# Patient Record
Sex: FEMALE | Race: BLACK OR AFRICAN AMERICAN | NOT HISPANIC OR LATINO | Employment: OTHER | RURAL
[De-identification: names, ages, dates, MRNs, and addresses within clinical notes are randomized per-mention and may not be internally consistent; named-entity substitution may affect disease eponyms.]

---

## 2020-08-19 ENCOUNTER — HISTORICAL (OUTPATIENT)
Dept: ADMINISTRATIVE | Facility: HOSPITAL | Age: 75
End: 2020-08-19

## 2020-08-21 LAB
REPORT: 38
REPORT: NORMAL

## 2020-08-23 LAB
REPORT: NORMAL

## 2021-02-03 ENCOUNTER — HISTORICAL (OUTPATIENT)
Dept: ADMINISTRATIVE | Facility: HOSPITAL | Age: 76
End: 2021-02-03

## 2021-04-06 RX ORDER — CILOSTAZOL 50 MG/1
TABLET ORAL
Qty: 180 TABLET | Refills: 3 | Status: SHIPPED | OUTPATIENT
Start: 2021-04-06 | End: 2021-07-07 | Stop reason: SDUPTHER

## 2021-04-06 RX ORDER — LOSARTAN POTASSIUM 50 MG/1
TABLET ORAL
Qty: 90 TABLET | Refills: 3 | Status: SHIPPED | OUTPATIENT
Start: 2021-04-06 | End: 2021-07-07 | Stop reason: SDUPTHER

## 2021-04-12 DIAGNOSIS — R56.9 SEIZURE: Primary | ICD-10-CM

## 2021-04-12 RX ORDER — DIVALPROEX SODIUM 250 MG/1
250 TABLET, DELAYED RELEASE ORAL 3 TIMES DAILY
COMMUNITY
End: 2021-04-12 | Stop reason: SDUPTHER

## 2021-04-12 RX ORDER — DIVALPROEX SODIUM 500 MG/1
500 TABLET, FILM COATED, EXTENDED RELEASE ORAL DAILY
Qty: 60 TABLET | Refills: 5 | Status: SHIPPED | OUTPATIENT
Start: 2021-04-12 | End: 2021-07-27

## 2021-04-12 RX ORDER — DIVALPROEX SODIUM 500 MG/1
500 TABLET, FILM COATED, EXTENDED RELEASE ORAL DAILY
COMMUNITY
End: 2021-04-12 | Stop reason: SDUPTHER

## 2021-04-12 RX ORDER — DIVALPROEX SODIUM 250 MG/1
250 TABLET, DELAYED RELEASE ORAL 3 TIMES DAILY
COMMUNITY
End: 2021-05-17

## 2021-04-12 RX ORDER — DIVALPROEX SODIUM 250 MG/1
TABLET, DELAYED RELEASE ORAL
Qty: 30 TABLET | Refills: 1 | Status: SHIPPED | OUTPATIENT
Start: 2021-04-12 | End: 2021-05-17

## 2021-05-04 VITALS
WEIGHT: 201.81 LBS | RESPIRATION RATE: 18 BRPM | HEART RATE: 68 BPM | HEIGHT: 66 IN | SYSTOLIC BLOOD PRESSURE: 130 MMHG | BODY MASS INDEX: 32.43 KG/M2 | DIASTOLIC BLOOD PRESSURE: 60 MMHG

## 2021-05-05 VITALS
SYSTOLIC BLOOD PRESSURE: 130 MMHG | HEART RATE: 68 BPM | BODY MASS INDEX: 32.43 KG/M2 | HEIGHT: 66 IN | DIASTOLIC BLOOD PRESSURE: 60 MMHG | RESPIRATION RATE: 18 BRPM | WEIGHT: 201.81 LBS

## 2021-05-17 DIAGNOSIS — R56.9 SEIZURE: ICD-10-CM

## 2021-05-17 RX ORDER — DIVALPROEX SODIUM 250 MG/1
TABLET, DELAYED RELEASE ORAL
Qty: 30 TABLET | Refills: 1 | Status: SHIPPED | OUTPATIENT
Start: 2021-05-17 | End: 2021-07-27 | Stop reason: SDUPTHER

## 2021-07-07 ENCOUNTER — OFFICE VISIT (OUTPATIENT)
Dept: PRIMARY CARE CLINIC | Facility: CLINIC | Age: 76
End: 2021-07-07
Payer: COMMERCIAL

## 2021-07-07 VITALS
TEMPERATURE: 98 F | SYSTOLIC BLOOD PRESSURE: 122 MMHG | DIASTOLIC BLOOD PRESSURE: 82 MMHG | HEART RATE: 78 BPM | BODY MASS INDEX: 30.37 KG/M2 | OXYGEN SATURATION: 97 % | WEIGHT: 189 LBS | HEIGHT: 66 IN

## 2021-07-07 DIAGNOSIS — D64.9 ANEMIA, UNSPECIFIED TYPE: Primary | ICD-10-CM

## 2021-07-07 DIAGNOSIS — I10 ESSENTIAL HYPERTENSION, BENIGN: Primary | ICD-10-CM

## 2021-07-07 DIAGNOSIS — J45.909 ASTHMA, UNSPECIFIED ASTHMA SEVERITY, UNSPECIFIED WHETHER COMPLICATED, UNSPECIFIED WHETHER PERSISTENT: ICD-10-CM

## 2021-07-07 PROCEDURE — 99213 PR OFFICE/OUTPT VISIT, EST, LEVL III, 20-29 MIN: ICD-10-PCS | Mod: ,,, | Performed by: NURSE PRACTITIONER

## 2021-07-07 PROCEDURE — 99213 OFFICE O/P EST LOW 20 MIN: CPT | Mod: ,,, | Performed by: NURSE PRACTITIONER

## 2021-07-07 RX ORDER — ALBUTEROL SULFATE 90 UG/1
2 AEROSOL, METERED RESPIRATORY (INHALATION) EVERY 6 HOURS PRN
COMMUNITY
End: 2021-07-07 | Stop reason: SDUPTHER

## 2021-07-07 RX ORDER — CLORAZEPATE DIPOTASSIUM 7.5 MG/1
0.5 TABLET ORAL 2 TIMES DAILY
COMMUNITY
Start: 2021-06-08 | End: 2021-09-08 | Stop reason: SDUPTHER

## 2021-07-07 RX ORDER — FERROUS SULFATE 325(65) MG
325 TABLET ORAL
Qty: 90 TABLET | Refills: 0 | Status: SHIPPED | OUTPATIENT
Start: 2021-07-07 | End: 2023-02-07

## 2021-07-07 RX ORDER — CILOSTAZOL 50 MG/1
50 TABLET ORAL 2 TIMES DAILY
Qty: 180 TABLET | Refills: 1 | Status: SHIPPED | OUTPATIENT
Start: 2021-07-07 | End: 2021-10-11

## 2021-07-07 RX ORDER — LOSARTAN POTASSIUM 50 MG/1
50 TABLET ORAL DAILY
Qty: 90 TABLET | Refills: 1 | Status: SHIPPED | OUTPATIENT
Start: 2021-07-07 | End: 2021-10-06

## 2021-07-07 RX ORDER — FUROSEMIDE 20 MG/1
20 TABLET ORAL DAILY
COMMUNITY
Start: 2021-06-08 | End: 2021-12-10

## 2021-07-07 RX ORDER — ALBUTEROL SULFATE 2 MG/5ML
2 SYRUP ORAL 3 TIMES DAILY
Qty: 450 ML | Refills: 11 | Status: SHIPPED | OUTPATIENT
Start: 2021-07-07 | End: 2022-07-07 | Stop reason: SDUPTHER

## 2021-07-07 RX ORDER — ALBUTEROL SULFATE 90 UG/1
2 AEROSOL, METERED RESPIRATORY (INHALATION) EVERY 6 HOURS PRN
Qty: 18 G | Refills: 1 | Status: SHIPPED | OUTPATIENT
Start: 2021-07-07 | End: 2022-07-07 | Stop reason: SDUPTHER

## 2021-07-27 ENCOUNTER — OFFICE VISIT (OUTPATIENT)
Dept: NEUROLOGY | Facility: CLINIC | Age: 76
End: 2021-07-27
Payer: COMMERCIAL

## 2021-07-27 ENCOUNTER — TELEPHONE (OUTPATIENT)
Dept: NEUROLOGY | Facility: CLINIC | Age: 76
End: 2021-07-27

## 2021-07-27 VITALS
DIASTOLIC BLOOD PRESSURE: 74 MMHG | OXYGEN SATURATION: 99 % | RESPIRATION RATE: 18 BRPM | BODY MASS INDEX: 30.37 KG/M2 | HEIGHT: 66 IN | SYSTOLIC BLOOD PRESSURE: 138 MMHG | WEIGHT: 189 LBS | HEART RATE: 82 BPM

## 2021-07-27 DIAGNOSIS — R56.9 SEIZURE: ICD-10-CM

## 2021-07-27 DIAGNOSIS — Z79.899 ENCOUNTER FOR LONG-TERM CURRENT USE OF MEDICATION: Primary | ICD-10-CM

## 2021-07-27 PROCEDURE — 99213 PR OFFICE/OUTPT VISIT, EST, LEVL III, 20-29 MIN: ICD-10-PCS | Mod: S$PBB,,, | Performed by: NURSE PRACTITIONER

## 2021-07-27 PROCEDURE — 99214 OFFICE O/P EST MOD 30 MIN: CPT | Mod: PBBFAC | Performed by: NURSE PRACTITIONER

## 2021-07-27 PROCEDURE — 99213 OFFICE O/P EST LOW 20 MIN: CPT | Mod: S$PBB,,, | Performed by: NURSE PRACTITIONER

## 2021-07-27 RX ORDER — DIVALPROEX SODIUM 250 MG/1
TABLET, DELAYED RELEASE ORAL
Qty: 90 TABLET | Refills: 3 | Status: SHIPPED | OUTPATIENT
Start: 2021-07-27 | End: 2022-01-07

## 2021-07-27 RX ORDER — DIVALPROEX SODIUM 500 MG/1
1000 TABLET, FILM COATED, EXTENDED RELEASE ORAL DAILY
Qty: 180 TABLET | Refills: 3 | Status: SHIPPED | OUTPATIENT
Start: 2021-07-27 | End: 2021-09-13

## 2021-09-07 ENCOUNTER — TELEPHONE (OUTPATIENT)
Dept: PRIMARY CARE CLINIC | Facility: CLINIC | Age: 76
End: 2021-09-07

## 2021-09-08 RX ORDER — CLORAZEPATE DIPOTASSIUM 3.75 MG/1
3.75 TABLET ORAL 2 TIMES DAILY PRN
Qty: 60 TABLET | Refills: 5 | Status: SHIPPED | OUTPATIENT
Start: 2021-09-08 | End: 2022-02-22

## 2021-09-08 RX ORDER — CLORAZEPATE DIPOTASSIUM 7.5 MG/1
3.75 TABLET ORAL 2 TIMES DAILY
Qty: 180 TABLET | Refills: 3 | OUTPATIENT
Start: 2021-09-08

## 2021-09-13 DIAGNOSIS — R56.9 SEIZURE: ICD-10-CM

## 2021-09-13 RX ORDER — DIVALPROEX SODIUM 500 MG/1
TABLET, FILM COATED, EXTENDED RELEASE ORAL
Qty: 60 TABLET | Refills: 5 | Status: SHIPPED | OUTPATIENT
Start: 2021-09-13 | End: 2022-03-25

## 2021-11-29 ENCOUNTER — OFFICE VISIT (OUTPATIENT)
Dept: VASCULAR SURGERY | Facility: CLINIC | Age: 76
End: 2021-11-29
Payer: COMMERCIAL

## 2021-11-29 VITALS
WEIGHT: 197.19 LBS | BODY MASS INDEX: 31.69 KG/M2 | SYSTOLIC BLOOD PRESSURE: 154 MMHG | RESPIRATION RATE: 16 BRPM | HEIGHT: 66 IN | HEART RATE: 80 BPM | DIASTOLIC BLOOD PRESSURE: 84 MMHG

## 2021-11-29 DIAGNOSIS — I87.312 STASIS EDEMA WITH ULCER, LEFT: ICD-10-CM

## 2021-11-29 DIAGNOSIS — L97.929 STASIS EDEMA WITH ULCER, LEFT: ICD-10-CM

## 2021-11-29 DIAGNOSIS — L81.9 HYPERPIGMENTATION OF SKIN: ICD-10-CM

## 2021-11-29 DIAGNOSIS — R60.0 LOWER EXTREMITY EDEMA: ICD-10-CM

## 2021-11-29 DIAGNOSIS — I87.2 VENOUS INSUFFICIENCY (CHRONIC) (PERIPHERAL): ICD-10-CM

## 2021-11-29 DIAGNOSIS — R60.0 EDEMA, LOWER EXTREMITY: ICD-10-CM

## 2021-11-29 DIAGNOSIS — M79.605 LEFT LEG PAIN: Primary | ICD-10-CM

## 2021-11-29 DIAGNOSIS — I87.2 VENOUS INSUFFICIENCY: ICD-10-CM

## 2021-11-29 PROCEDURE — 99214 OFFICE O/P EST MOD 30 MIN: CPT | Mod: S$PBB,25,, | Performed by: FAMILY MEDICINE

## 2021-11-29 PROCEDURE — 97597 DBRDMT OPN WND 1ST 20 CM/<: CPT | Mod: PBBFAC | Performed by: FAMILY MEDICINE

## 2021-11-29 PROCEDURE — 97597 DBRDMT OPN WND 1ST 20 CM/<: CPT | Mod: S$PBB,,, | Performed by: FAMILY MEDICINE

## 2021-11-29 PROCEDURE — 97597 PR DEBRIDEMENT OPEN WOUND 20 SQ CM<: ICD-10-PCS | Mod: S$PBB,,, | Performed by: FAMILY MEDICINE

## 2021-11-29 PROCEDURE — 99214 OFFICE O/P EST MOD 30 MIN: CPT | Mod: PBBFAC,25 | Performed by: FAMILY MEDICINE

## 2021-11-29 PROCEDURE — 99214 PR OFFICE/OUTPT VISIT, EST, LEVL IV, 30-39 MIN: ICD-10-PCS | Mod: S$PBB,25,, | Performed by: FAMILY MEDICINE

## 2021-12-02 ENCOUNTER — OUTSIDE PLACE OF SERVICE (OUTPATIENT)
Dept: VASCULAR SURGERY | Facility: CLINIC | Age: 76
End: 2021-12-02
Payer: COMMERCIAL

## 2021-12-02 PROCEDURE — G0180 MD CERTIFICATION HHA PATIENT: HCPCS | Mod: ,,, | Performed by: FAMILY MEDICINE

## 2021-12-02 PROCEDURE — G0180 PR HOME HEALTH MD CERTIFICATION: ICD-10-PCS | Mod: ,,, | Performed by: FAMILY MEDICINE

## 2021-12-07 ENCOUNTER — OFFICE VISIT (OUTPATIENT)
Dept: VASCULAR SURGERY | Facility: CLINIC | Age: 76
End: 2021-12-07
Payer: COMMERCIAL

## 2021-12-07 ENCOUNTER — HOSPITAL ENCOUNTER (OUTPATIENT)
Dept: RADIOLOGY | Facility: HOSPITAL | Age: 76
Discharge: HOME | End: 2021-12-07
Attending: FAMILY MEDICINE
Payer: COMMERCIAL

## 2021-12-07 VITALS
DIASTOLIC BLOOD PRESSURE: 70 MMHG | RESPIRATION RATE: 12 BRPM | SYSTOLIC BLOOD PRESSURE: 130 MMHG | HEART RATE: 72 BPM | BODY MASS INDEX: 31.66 KG/M2 | WEIGHT: 197 LBS | HEIGHT: 66 IN

## 2021-12-07 DIAGNOSIS — L97.929 STASIS EDEMA WITH ULCER, LEFT: ICD-10-CM

## 2021-12-07 DIAGNOSIS — R60.0 LOWER EXTREMITY EDEMA: ICD-10-CM

## 2021-12-07 DIAGNOSIS — I87.2 VENOUS INSUFFICIENCY (CHRONIC) (PERIPHERAL): ICD-10-CM

## 2021-12-07 DIAGNOSIS — M79.605 LEFT LEG PAIN: ICD-10-CM

## 2021-12-07 DIAGNOSIS — I87.2 VENOUS INSUFFICIENCY (CHRONIC) (PERIPHERAL): Primary | ICD-10-CM

## 2021-12-07 DIAGNOSIS — L81.9 HYPERPIGMENTATION OF SKIN: ICD-10-CM

## 2021-12-07 DIAGNOSIS — I87.312 STASIS EDEMA WITH ULCER, LEFT: ICD-10-CM

## 2021-12-07 PROCEDURE — 93971 EXTREMITY STUDY: CPT | Mod: TC

## 2021-12-07 PROCEDURE — 97597 PR DEBRIDEMENT OPEN WOUND 20 SQ CM<: ICD-10-PCS | Mod: S$PBB,,, | Performed by: FAMILY MEDICINE

## 2021-12-07 PROCEDURE — 93971 US VENOUS REFLUX STUDY UNILATERAL: ICD-10-PCS | Mod: 26,,, | Performed by: FAMILY MEDICINE

## 2021-12-07 PROCEDURE — 97597 DBRDMT OPN WND 1ST 20 CM/<: CPT | Mod: PBBFAC | Performed by: FAMILY MEDICINE

## 2021-12-07 PROCEDURE — 93971 EXTREMITY STUDY: CPT | Mod: 26,,, | Performed by: FAMILY MEDICINE

## 2021-12-07 PROCEDURE — 97597 DBRDMT OPN WND 1ST 20 CM/<: CPT | Mod: S$PBB,,, | Performed by: FAMILY MEDICINE

## 2021-12-07 PROCEDURE — 99214 OFFICE O/P EST MOD 30 MIN: CPT | Mod: PBBFAC,25 | Performed by: FAMILY MEDICINE

## 2021-12-21 ENCOUNTER — TELEPHONE (OUTPATIENT)
Dept: VASCULAR SURGERY | Facility: CLINIC | Age: 76
End: 2021-12-21
Payer: MEDICAID

## 2021-12-21 ENCOUNTER — OFFICE VISIT (OUTPATIENT)
Dept: VASCULAR SURGERY | Facility: CLINIC | Age: 76
End: 2021-12-21
Payer: COMMERCIAL

## 2021-12-21 VITALS
WEIGHT: 195 LBS | RESPIRATION RATE: 16 BRPM | HEIGHT: 66 IN | HEART RATE: 88 BPM | BODY MASS INDEX: 31.34 KG/M2 | SYSTOLIC BLOOD PRESSURE: 146 MMHG | DIASTOLIC BLOOD PRESSURE: 76 MMHG

## 2021-12-21 DIAGNOSIS — I87.2 VENOUS INSUFFICIENCY (CHRONIC) (PERIPHERAL): ICD-10-CM

## 2021-12-21 DIAGNOSIS — L81.9 HYPERPIGMENTATION OF SKIN: ICD-10-CM

## 2021-12-21 DIAGNOSIS — M79.605 LEFT LEG PAIN: ICD-10-CM

## 2021-12-21 DIAGNOSIS — I87.312 STASIS EDEMA WITH ULCER, LEFT: Primary | ICD-10-CM

## 2021-12-21 DIAGNOSIS — L97.929 STASIS EDEMA WITH ULCER, LEFT: Primary | ICD-10-CM

## 2021-12-21 PROCEDURE — 99214 PR OFFICE/OUTPT VISIT, EST, LEVL IV, 30-39 MIN: ICD-10-PCS | Mod: S$PBB,,, | Performed by: FAMILY MEDICINE

## 2021-12-21 PROCEDURE — 99214 OFFICE O/P EST MOD 30 MIN: CPT | Mod: PBBFAC | Performed by: FAMILY MEDICINE

## 2021-12-21 PROCEDURE — 87186 CULTURE, WOUND: ICD-10-PCS | Mod: ,,, | Performed by: CLINICAL MEDICAL LABORATORY

## 2021-12-21 PROCEDURE — 87070 CULTURE, WOUND: ICD-10-PCS | Mod: ,,, | Performed by: CLINICAL MEDICAL LABORATORY

## 2021-12-21 PROCEDURE — 87075 CULTURE, ANAEROBE: ICD-10-PCS | Mod: ,,, | Performed by: CLINICAL MEDICAL LABORATORY

## 2021-12-21 PROCEDURE — 87070 CULTURE OTHR SPECIMN AEROBIC: CPT | Mod: ,,, | Performed by: CLINICAL MEDICAL LABORATORY

## 2021-12-21 PROCEDURE — 99214 OFFICE O/P EST MOD 30 MIN: CPT | Mod: S$PBB,,, | Performed by: FAMILY MEDICINE

## 2021-12-21 PROCEDURE — 87075 CULTR BACTERIA EXCEPT BLOOD: CPT | Mod: ,,, | Performed by: CLINICAL MEDICAL LABORATORY

## 2021-12-21 PROCEDURE — 25000003 PHARM REV CODE 250

## 2021-12-21 PROCEDURE — 87186 SC STD MICRODIL/AGAR DIL: CPT | Mod: ,,, | Performed by: CLINICAL MEDICAL LABORATORY

## 2021-12-21 PROCEDURE — 87077 CULTURE AEROBIC IDENTIFY: CPT | Mod: ,,, | Performed by: CLINICAL MEDICAL LABORATORY

## 2021-12-21 PROCEDURE — 87077 CULTURE, WOUND: ICD-10-PCS | Mod: ,,, | Performed by: CLINICAL MEDICAL LABORATORY

## 2021-12-21 RX ORDER — SULFAMETHOXAZOLE AND TRIMETHOPRIM 800; 160 MG/1; MG/1
1 TABLET ORAL 2 TIMES DAILY
Qty: 14 TABLET | Refills: 0 | Status: SHIPPED | OUTPATIENT
Start: 2021-12-21 | End: 2021-12-21

## 2021-12-21 RX ORDER — CLINDAMYCIN HYDROCHLORIDE 300 MG/1
300 CAPSULE ORAL 3 TIMES DAILY
Qty: 21 CAPSULE | Refills: 0 | Status: SHIPPED | OUTPATIENT
Start: 2021-12-21 | End: 2021-12-28

## 2021-12-23 LAB — MICROORGANISM SPEC CULT: ABNORMAL

## 2021-12-25 LAB — BACTERIA SPEC ANAEROBE CULT: NORMAL

## 2021-12-29 RX ORDER — LEVOFLOXACIN 750 MG/1
750 TABLET ORAL DAILY
Qty: 7 TABLET | Refills: 0 | Status: SHIPPED | OUTPATIENT
Start: 2021-12-29 | End: 2022-01-05

## 2022-01-06 ENCOUNTER — OFFICE VISIT (OUTPATIENT)
Dept: VASCULAR SURGERY | Facility: CLINIC | Age: 77
End: 2022-01-06
Payer: COMMERCIAL

## 2022-01-06 VITALS
WEIGHT: 196.19 LBS | DIASTOLIC BLOOD PRESSURE: 80 MMHG | SYSTOLIC BLOOD PRESSURE: 130 MMHG | TEMPERATURE: 97 F | HEART RATE: 84 BPM | BODY MASS INDEX: 31.53 KG/M2 | RESPIRATION RATE: 18 BRPM | HEIGHT: 66 IN

## 2022-01-06 DIAGNOSIS — I87.2 VENOUS INSUFFICIENCY (CHRONIC) (PERIPHERAL): ICD-10-CM

## 2022-01-06 DIAGNOSIS — I87.312 STASIS EDEMA WITH ULCER, LEFT: Primary | ICD-10-CM

## 2022-01-06 DIAGNOSIS — M79.605 LEFT LEG PAIN: ICD-10-CM

## 2022-01-06 DIAGNOSIS — L97.929 STASIS EDEMA WITH ULCER, LEFT: Primary | ICD-10-CM

## 2022-01-06 DIAGNOSIS — L81.9 HYPERPIGMENTATION OF SKIN: ICD-10-CM

## 2022-01-06 PROCEDURE — 3075F PR MOST RECENT SYSTOLIC BLOOD PRESS GE 130-139MM HG: ICD-10-PCS | Mod: CPTII,,, | Performed by: FAMILY MEDICINE

## 2022-01-06 PROCEDURE — 3075F SYST BP GE 130 - 139MM HG: CPT | Mod: CPTII,,, | Performed by: FAMILY MEDICINE

## 2022-01-06 PROCEDURE — 25000003 PHARM REV CODE 250

## 2022-01-06 PROCEDURE — 1160F PR REVIEW ALL MEDS BY PRESCRIBER/CLIN PHARMACIST DOCUMENTED: ICD-10-PCS | Mod: CPTII,,, | Performed by: FAMILY MEDICINE

## 2022-01-06 PROCEDURE — 1159F PR MEDICATION LIST DOCUMENTED IN MEDICAL RECORD: ICD-10-PCS | Mod: CPTII,,, | Performed by: FAMILY MEDICINE

## 2022-01-06 PROCEDURE — 3079F PR MOST RECENT DIASTOLIC BLOOD PRESSURE 80-89 MM HG: ICD-10-PCS | Mod: CPTII,,, | Performed by: FAMILY MEDICINE

## 2022-01-06 PROCEDURE — 1159F MED LIST DOCD IN RCRD: CPT | Mod: CPTII,,, | Performed by: FAMILY MEDICINE

## 2022-01-06 PROCEDURE — 3079F DIAST BP 80-89 MM HG: CPT | Mod: CPTII,,, | Performed by: FAMILY MEDICINE

## 2022-01-06 PROCEDURE — 1160F RVW MEDS BY RX/DR IN RCRD: CPT | Mod: CPTII,,, | Performed by: FAMILY MEDICINE

## 2022-01-06 PROCEDURE — 99214 PR OFFICE/OUTPT VISIT, EST, LEVL IV, 30-39 MIN: ICD-10-PCS | Mod: S$PBB,,, | Performed by: FAMILY MEDICINE

## 2022-01-06 PROCEDURE — 99214 OFFICE O/P EST MOD 30 MIN: CPT | Mod: S$PBB,,, | Performed by: FAMILY MEDICINE

## 2022-01-06 PROCEDURE — 99214 OFFICE O/P EST MOD 30 MIN: CPT | Mod: PBBFAC | Performed by: FAMILY MEDICINE

## 2022-01-06 RX ORDER — HYDROCODONE BITARTRATE AND ACETAMINOPHEN 5; 325 MG/1; MG/1
1 TABLET ORAL EVERY 6 HOURS PRN
Qty: 20 TABLET | Refills: 0 | Status: SHIPPED | OUTPATIENT
Start: 2022-01-06 | End: 2022-01-26 | Stop reason: SDUPTHER

## 2022-01-06 NOTE — PROGRESS NOTES
[facility] CLINIC NOTE    Patient ID: Umesh Lassiter is a 76 y.o. female.    I. HISTORY     Chief Complaint:   Chief Complaint   Patient presents with    Wound Check     2 week wound check.  Collagen with silver, alginate, foam pad with 3MCoban 2 Lite wrap.  Pt states HH has been wrapping to tight, had to remove on her own.        HPI: Umesh Lassiter is a 76 y.o. female who presents today for a 2 week wound check and re-evaluation after being on p.o. Levaquin for the last week.  The patient was initially started on clindamycin in the week prior, and was switched to Levaquin once culture results were available.  The patient does not feel that her wound is any better.  She continues to have swelling of the left lower extremity along with persistent stasis ulcer.  Mild odor noted from wound today, but not over power ring.  Skin of the periwound area and wound bed very tender to palpation.  She has been receiving twice weekly wrap and wound dressing changes from home health.  She feels as though she had a subjective fever a few nights ago, she is afebrile in the clinic today.  Vital signs are stable without signs of distress.    Reflux study 12/21/2021 shows no evidence of DVT or superficial venous reflux.  Shows a well ablated proximal left great saphenous vein and ablated left small saphenous vein.     Clinical summary:   The patient is known to our clinic but has not been seen since 03/02/2021 for a one-month follow-up status post left great saphenous vein ablation.  She has been lost to follow-up since.  She states that this ulceration of her left lower extremity has been present for approximately 6 months.  She states that his been persistent and worsening during that time.  She has not been wearing compression daily.  She has pitting edema, hyperpigmentation along with the ulceration of the left medial Gator region.  No signs of infection, including purulent drainage, increased temperature or foul odor.  She  denies complaints of her right lower extremity.    Left great saphenous vein laser ablation 01/08/2021.  Right great saphenous vein, duplicate, laser ablation 12/17/2020.  Right distal great saphenous vein erythema ablation 02/28/2020.  Left small saphenous vein laser ablation 04/27/2018.  Right great saphenous vein laser ablation 04/20/2018.    Past Medical History:   Diagnosis Date    Acquired hypothyroidism     Anxiety state     Asthma     Bilateral leg pain 11/23/2020    BMI 33.0-33.9,adult     Bronchitis     Cellulitis 11/09/2020    Cellulitis and abscess of left lower extremity 08/19/2020    Cellulitis of right lower limb 11/10/2020    Chronic venous hypertension (idiopathic) with ulcer of bilateral lower extremity (CODE) 07/02/2018    CVA (cerebral vascular accident) 05/23/2018    Diverticular disease of colon 10/24/2014    Epilepsy 03/11/2021    Essential hypertension     History of adenomatous polyp of colon     Hyperlipemia     Lower extremity edema 03/21/2018    Non-pressure chronic ulcer of other part of left lower leg with unspecified severity 09/30/2020    Non-pressure chronic ulcer of other part of right lower leg with unspecified severity 02/13/2020    Obesity     Other skin changes 01/12/2021    Pain in left leg 03/02/2021    Pain in right leg 02/02/2021    Primary osteoarthritis of both knees     PVD (peripheral vascular disease) 11/10/2020    Venous insufficiency (chronic) (peripheral) 03/11/2021    Venous thrombosis 04/25/2019    Resolved on f/u U/S s/p ablation    Vitamin D deficiency 03/11/2021        Past Surgical History:   Procedure Laterality Date    KNEE ARTHROSCOPY Left     x 2    LASER ABLATION Right 04/20/2018    [facility] LASER ABLATION PERFORMED BY DR. KARMA BUTCHER.    LASER ABLATION Left 04/27/2018    [facility] LASER ABLATION PERFORMED BY DR. KARMA BUTCHER.    LASER ABLATION Right 12/17/2020    [facility] ( DUPLICATED )LASER ABLATION PERFORMED BY DR. KARMA BUTCHER.     LASER ABLATION Left 01/08/2021    [facility] LASER ABLATION PERFORMED BY DR. KARMA BUTCHER.    Remove cataracts, insert lens Bilateral     SHOULDER ARTHROSCOPY Left     SINUS SURGERY      TOTAL ABDOMINAL HYSTERECTOMY W/ BILATERAL SALPINGOOPHORECTOMY      VENOUS ABLATION Right 02/28/2020    DISTAL [facility] VARITHENA ABLATION PERFORMED BY DR. KARMA BUTCHER.       Social History     Tobacco Use   Smoking Status Never Smoker   Smokeless Tobacco Never Used         Current Outpatient Medications:     albuterol (PROVENTIL/VENTOLIN HFA) 90 mcg/actuation inhaler, Inhale 2 puffs into the lungs every 6 (six) hours as needed for Wheezing. Rescue, Disp: 18 g, Rfl: 1    albuterol 2 mg/5 mL syrup, Take 5 mLs (2 mg total) by mouth 3 (three) times daily., Disp: 450 mL, Rfl: 11    cilostazoL (PLETAL) 50 MG Tab, TAKE 1 TABLET BY MOUTH TWICE DAILY, Disp: 180 tablet, Rfl: 1    clorazepate (TRANXENE) 3.75 MG Tab, Take 1 tablet (3.75 mg total) by mouth 2 (two) times daily as needed., Disp: 60 tablet, Rfl: 5    divalproex (DEPAKOTE) 250 MG EC tablet, TAKE 1 TABLET BY MOUTH EACH MORNING, Disp: 90 tablet, Rfl: 3    divalproex ER (DEPAKOTE) 500 MG Tb24, TAKE 2 TABLETS BY MOUTH EACH NIGHT AT BEDTIME, Disp: 60 tablet, Rfl: 5    ferrous sulfate (FEOSOL) 325 mg (65 mg iron) Tab tablet, Take 1 tablet (325 mg total) by mouth daily with breakfast., Disp: 90 tablet, Rfl: 0    furosemide (LASIX) 20 MG tablet, TAKE 1 TABLET BY MOUTH EVERY DAY FOR FLUID, Disp: 90 tablet, Rfl: 3    losartan (COZAAR) 50 MG tablet, TAKE 1 TABLET BY MOUTH EVERY DAY FOR HIGH BLOOD PRESSURE, Disp: 90 tablet, Rfl: 1    HYDROcodone-acetaminophen (NORCO) 5-325 mg per tablet, Take 1 tablet by mouth every 6 (six) hours as needed for Pain., Disp: 20 tablet, Rfl: 0    Review of Systems   Constitutional: Negative for activity change, chills, diaphoresis, fatigue and fever.   Respiratory: Negative for cough and shortness of breath.    Cardiovascular: Positive for leg swelling.  Negative for chest pain and claudication.        Hyperpigmentation LE   Gastrointestinal: Negative for nausea and vomiting.   Musculoskeletal: Positive for leg pain. Negative for joint swelling.   Integumentary:  Negative for rash and wound.   Neurological: Negative for weakness and numbness.        II. PHYSICAL EXAM     Physical Exam  Constitutional:       General: She is awake. She is not in acute distress.     Appearance: Normal appearance. She is obese. She is not ill-appearing or toxic-appearing.   HENT:      Head: Normocephalic and atraumatic.   Eyes:      Extraocular Movements: Extraocular movements intact.      Conjunctiva/sclera: Conjunctivae normal.      Pupils: Pupils are equal, round, and reactive to light.   Neck:      Vascular: No carotid bruit or JVD.   Cardiovascular:      Rate and Rhythm: Normal rate and regular rhythm.      Pulses:           Dorsalis pedis pulses are detected w/ Doppler on the left side.        Posterior tibial pulses are detected w/ Doppler on the left side.      Heart sounds: No murmur heard.      Pulmonary:      Effort: Pulmonary effort is normal. No respiratory distress.      Breath sounds: No stridor. No wheezing, rhonchi or rales.   Musculoskeletal:         General: No swelling, tenderness or deformity.      Right lower le+ Edema present.      Left lower le+ Edema present.        Legs:       Comments: Ulcer of left medial Gator region measuring approximately 3.0 cm x 5.0 cm with 0.1 cm in depth.  Yellow slough noted in the wound bed.  Today, periwound tissue appears more friable with increased erythema and a little bit of increased warmth to palpation.    Hyperpigmentation of the left lower Gator region along with +2 pitting edema.       Feet:      Comments: Triphasic dopplerable pulses of the left lower extremity.  Skin:     General: Skin is warm.      Capillary Refill: Capillary refill takes less than 2 seconds.      Coloration: Skin is not ashen.      Findings: No  bruising, erythema, lesion, rash or wound.   Neurological:      Mental Status: She is alert and oriented to person, place, and time.      Motor: No weakness.   Psychiatric:         Speech: Speech normal.         Behavior: Behavior normal. Behavior is cooperative.       Reticular/Spider veins noted:  RLE: medial calf  LLE: none    Varicose veins noted:  RLE: medial calf  LLE:  medial calf    CEAP Classification  Clinical Signs: Class 6 - Leg ulceration, skin changes as defined above  Etiologic Classification: Primary  Anatomic distribution: Superficial  Pathophysiologic dysfunction: Reflux       Venous Clinical Severity Score  Pain:2=Daily, moderate activity limitation, occasional analgesics  Varicose Veins: 1=Few, scattered. Branch varicose veins  Venous Edema: 2=Afternoon edema, above ankle  Pigmentation: 2=Diffuse over most of gaiter distribution (lower 1/3) or recent pigmentation (purple)  Inflammation: 1=Mild cellulitis, limited to marginal area around ulcer  Induration: 1=Focal, circummalleolar (< 5 cm)  Number of Active Ulcers: 2=2  Active Ulceration, Duration: 2=>3 months, <1 year  Active Ulcer Size: 2=2 - 6 cm diameter  Compressive Therapy: 2=Wears elastic stockings most days  Total Score: 17       III. ASSESSMENT & PLAN (MEDICAL DECISION MAKING)     1. Stasis edema with ulcer, left    2. Hyperpigmentation of skin    3. Left leg pain    4. Venous insufficiency (chronic) (peripheral)        Assessment/Diagnosis and Plan:  Today, we will clean the patient's wound and apply wound dressing, and a light Ace.  No debridement performed today due to patient's sensitivity even following the application of lidocaine gel.  The patient appears to have a active infection despite 2 different p.o. antibiotics.  I have contacted [facility] for IV antibiotic administration through home health.  Antibiotic choice to be match with culture sensitivities.     Continue home health orders for twice weekly wrap/wound dressing  changes.  Follow-up in 1 weeks for wound check.    Orders Placed This Encounter    HYDROcodone-acetaminophen (NORCO) 5-325 mg per tablet      Minh Cisneros DO

## 2022-01-07 ENCOUNTER — OFFICE VISIT (OUTPATIENT)
Dept: PRIMARY CARE CLINIC | Facility: CLINIC | Age: 77
End: 2022-01-07
Payer: COMMERCIAL

## 2022-01-07 VITALS
HEART RATE: 100 BPM | SYSTOLIC BLOOD PRESSURE: 152 MMHG | OXYGEN SATURATION: 99 % | WEIGHT: 199 LBS | RESPIRATION RATE: 18 BRPM | DIASTOLIC BLOOD PRESSURE: 78 MMHG | HEIGHT: 66 IN | BODY MASS INDEX: 31.98 KG/M2 | TEMPERATURE: 98 F

## 2022-01-07 DIAGNOSIS — E03.9 HYPOTHYROIDISM, UNSPECIFIED TYPE: ICD-10-CM

## 2022-01-07 DIAGNOSIS — E78.5 HYPERLIPIDEMIA, UNSPECIFIED HYPERLIPIDEMIA TYPE: ICD-10-CM

## 2022-01-07 DIAGNOSIS — I87.2 VENOUS INSUFFICIENCY (CHRONIC) (PERIPHERAL): ICD-10-CM

## 2022-01-07 DIAGNOSIS — I10 ESSENTIAL HYPERTENSION, BENIGN: Primary | ICD-10-CM

## 2022-01-07 PROCEDURE — 99213 PR OFFICE/OUTPT VISIT, EST, LEVL III, 20-29 MIN: ICD-10-PCS | Mod: ,,, | Performed by: NURSE PRACTITIONER

## 2022-01-07 PROCEDURE — 3078F PR MOST RECENT DIASTOLIC BLOOD PRESSURE < 80 MM HG: ICD-10-PCS | Mod: ,,, | Performed by: NURSE PRACTITIONER

## 2022-01-07 PROCEDURE — 1159F PR MEDICATION LIST DOCUMENTED IN MEDICAL RECORD: ICD-10-PCS | Mod: ,,, | Performed by: NURSE PRACTITIONER

## 2022-01-07 PROCEDURE — 3078F DIAST BP <80 MM HG: CPT | Mod: ,,, | Performed by: NURSE PRACTITIONER

## 2022-01-07 PROCEDURE — 1101F PT FALLS ASSESS-DOCD LE1/YR: CPT | Mod: ,,, | Performed by: NURSE PRACTITIONER

## 2022-01-07 PROCEDURE — 3288F FALL RISK ASSESSMENT DOCD: CPT | Mod: ,,, | Performed by: NURSE PRACTITIONER

## 2022-01-07 PROCEDURE — 3077F SYST BP >= 140 MM HG: CPT | Mod: ,,, | Performed by: NURSE PRACTITIONER

## 2022-01-07 PROCEDURE — 3077F PR MOST RECENT SYSTOLIC BLOOD PRESSURE >= 140 MM HG: ICD-10-PCS | Mod: ,,, | Performed by: NURSE PRACTITIONER

## 2022-01-07 PROCEDURE — 3288F PR FALLS RISK ASSESSMENT DOCUMENTED: ICD-10-PCS | Mod: ,,, | Performed by: NURSE PRACTITIONER

## 2022-01-07 PROCEDURE — 1101F PR PT FALLS ASSESS DOC 0-1 FALLS W/OUT INJ PAST YR: ICD-10-PCS | Mod: ,,, | Performed by: NURSE PRACTITIONER

## 2022-01-07 PROCEDURE — 99213 OFFICE O/P EST LOW 20 MIN: CPT | Mod: ,,, | Performed by: NURSE PRACTITIONER

## 2022-01-07 PROCEDURE — 1159F MED LIST DOCD IN RCRD: CPT | Mod: ,,, | Performed by: NURSE PRACTITIONER

## 2022-01-07 RX ORDER — CILOSTAZOL 50 MG/1
50 TABLET ORAL 2 TIMES DAILY
Qty: 180 TABLET | Refills: 3 | Status: SHIPPED | OUTPATIENT
Start: 2022-01-07 | End: 2022-10-27 | Stop reason: SDUPTHER

## 2022-01-07 RX ORDER — LOSARTAN POTASSIUM 50 MG/1
50 TABLET ORAL DAILY
Qty: 90 TABLET | Refills: 3 | Status: SHIPPED | OUTPATIENT
Start: 2022-01-07 | End: 2022-10-27 | Stop reason: SDUPTHER

## 2022-01-07 RX ORDER — FUROSEMIDE 20 MG/1
20 TABLET ORAL DAILY
Qty: 90 TABLET | Refills: 3 | Status: SHIPPED | OUTPATIENT
Start: 2022-01-07 | End: 2022-10-27 | Stop reason: SDUPTHER

## 2022-01-07 NOTE — PROGRESS NOTES
Subjective:       Patient ID: Umesh Lassiter is a 76 y.o. female.    Chief Complaint: Follow-up (6 month) and Medication Refill (Lasix, losartan, and pletal)    HPI Umesh Lassiter presents today for routine follow up and medication refills.  Patient last seen by Terri MIR. TSH was elevated, will recheck today. CBC also showed mild anemia, patient has been on ferrous sulfate once daily. Will recheck.    Review of Systems   Constitutional: Negative for fatigue, fever and unexpected weight change.   HENT: Negative for nasal congestion, postnasal drip and sore throat.    Eyes: Negative for pain and redness.   Respiratory: Negative for cough, shortness of breath and wheezing.    Cardiovascular: Positive for leg swelling. Negative for chest pain.   Gastrointestinal: Negative for abdominal pain, constipation, diarrhea and nausea.   Genitourinary: Negative for dysuria and hematuria.   Musculoskeletal: Negative for gait problem.   Integumentary:  Negative for color change and rash.   Neurological: Negative for vertigo, syncope and headaches.         Objective:      Physical Exam  Constitutional:       Appearance: Normal appearance.   HENT:      Head: Normocephalic.   Eyes:      Pupils: Pupils are equal, round, and reactive to light.   Cardiovascular:      Rate and Rhythm: Normal rate and regular rhythm.      Pulses: Normal pulses.      Heart sounds: Normal heart sounds.   Pulmonary:      Effort: Pulmonary effort is normal. No respiratory distress.      Breath sounds: Normal breath sounds. No wheezing, rhonchi or rales.   Abdominal:      General: Bowel sounds are normal.      Palpations: Abdomen is soft.      Tenderness: There is no abdominal tenderness.   Musculoskeletal:         General: Normal range of motion.      Cervical back: Normal range of motion and neck supple. No tenderness.      Right lower leg: Edema present.      Left lower leg: Edema present.   Skin:     General: Skin is warm and dry.    Neurological:      General: No focal deficit present.      Mental Status: She is alert.      Cranial Nerves: No cranial nerve deficit.      Gait: Gait normal.         Assessment:       Problem List Items Addressed This Visit        Cardiac/Vascular    Venous insufficiency (chronic) (peripheral)    Relevant Medications    cilostazoL (PLETAL) 50 MG Tab    furosemide (LASIX) 20 MG tablet    Essential hypertension, benign - Primary     Monitor BP at home and record readings  Notify clinic if BP remains > 140/90         Relevant Medications    losartan (COZAAR) 50 MG tablet    Hyperlipemia       Endocrine    Hypothyroidism    Relevant Orders    TSH    CBC Auto Differential          Plan:       Essential hypertension, benign  Monitor BP at home and record readings  Notify clinic if BP remains > 140/90

## 2022-01-13 ENCOUNTER — HOSPITAL ENCOUNTER (INPATIENT)
Facility: HOSPITAL | Age: 77
LOS: 6 days | Discharge: HOME HEALTH | DRG: 300 | End: 2022-01-19
Attending: FAMILY MEDICINE | Admitting: FAMILY MEDICINE
Payer: COMMERCIAL

## 2022-01-13 ENCOUNTER — OFFICE VISIT (OUTPATIENT)
Dept: VASCULAR SURGERY | Facility: CLINIC | Age: 77
DRG: 300 | End: 2022-01-13
Payer: COMMERCIAL

## 2022-01-13 VITALS
DIASTOLIC BLOOD PRESSURE: 60 MMHG | TEMPERATURE: 98 F | SYSTOLIC BLOOD PRESSURE: 124 MMHG | WEIGHT: 198 LBS | RESPIRATION RATE: 18 BRPM | HEART RATE: 96 BPM | BODY MASS INDEX: 31.82 KG/M2 | HEIGHT: 66 IN

## 2022-01-13 DIAGNOSIS — G40.909 EPILEPSY: ICD-10-CM

## 2022-01-13 DIAGNOSIS — E78.5 HYPERLIPEMIA: ICD-10-CM

## 2022-01-13 DIAGNOSIS — L08.9 WOUND INFECTION: ICD-10-CM

## 2022-01-13 DIAGNOSIS — R60.0 LOWER EXTREMITY EDEMA: ICD-10-CM

## 2022-01-13 DIAGNOSIS — A49.8 PSEUDOMONAS INFECTION: ICD-10-CM

## 2022-01-13 DIAGNOSIS — E03.9 HYPOTHYROIDISM: ICD-10-CM

## 2022-01-13 DIAGNOSIS — T14.8XXA WOUND INFECTION: ICD-10-CM

## 2022-01-13 DIAGNOSIS — L03.119 CELLULITIS OF LEG: ICD-10-CM

## 2022-01-13 DIAGNOSIS — L97.929 VENOUS ULCER OF LEFT LEG: ICD-10-CM

## 2022-01-13 DIAGNOSIS — G40.409 OTHER GENERALIZED EPILEPSY, NOT INTRACTABLE, WITHOUT STATUS EPILEPTICUS: ICD-10-CM

## 2022-01-13 DIAGNOSIS — M79.605 LEFT LEG PAIN: ICD-10-CM

## 2022-01-13 DIAGNOSIS — I10 HYPERTENSION: ICD-10-CM

## 2022-01-13 DIAGNOSIS — L03.116 CELLULITIS OF LEFT LOWER EXTREMITY: ICD-10-CM

## 2022-01-13 DIAGNOSIS — I87.2 VENOUS INSUFFICIENCY (CHRONIC) (PERIPHERAL): ICD-10-CM

## 2022-01-13 DIAGNOSIS — I10 HYPERTENSION, UNSPECIFIED TYPE: ICD-10-CM

## 2022-01-13 DIAGNOSIS — I83.029 VENOUS ULCER OF LEFT LEG: ICD-10-CM

## 2022-01-13 DIAGNOSIS — I87.312 STASIS EDEMA WITH ULCER, LEFT: Primary | ICD-10-CM

## 2022-01-13 DIAGNOSIS — L97.929 STASIS EDEMA WITH ULCER, LEFT: Primary | ICD-10-CM

## 2022-01-13 DIAGNOSIS — L81.9 HYPERPIGMENTATION OF SKIN: ICD-10-CM

## 2022-01-13 LAB
ANION GAP SERPL CALCULATED.3IONS-SCNC: 10 MMOL/L (ref 7–16)
BASOPHILS # BLD AUTO: 0.03 K/UL (ref 0–0.2)
BASOPHILS NFR BLD AUTO: 0.5 % (ref 0–1)
BUN SERPL-MCNC: 10 MG/DL (ref 7–18)
BUN/CREAT SERPL: 10 (ref 6–20)
CALCIUM SERPL-MCNC: 9.1 MG/DL (ref 8.5–10.1)
CHLORIDE SERPL-SCNC: 108 MMOL/L (ref 98–107)
CO2 SERPL-SCNC: 30 MMOL/L (ref 21–32)
CREAT SERPL-MCNC: 1.04 MG/DL (ref 0.55–1.02)
DIFFERENTIAL METHOD BLD: ABNORMAL
EOSINOPHIL # BLD AUTO: 0.17 K/UL (ref 0–0.5)
EOSINOPHIL NFR BLD AUTO: 2.7 % (ref 1–4)
ERYTHROCYTE [DISTWIDTH] IN BLOOD BY AUTOMATED COUNT: 15.2 % (ref 11.5–14.5)
GLUCOSE SERPL-MCNC: 87 MG/DL (ref 74–106)
HCT VFR BLD AUTO: 35.9 % (ref 38–47)
HGB BLD-MCNC: 11.4 G/DL (ref 12–16)
IMM GRANULOCYTES # BLD AUTO: 0.06 K/UL (ref 0–0.04)
IMM GRANULOCYTES NFR BLD: 1 % (ref 0–0.4)
LACTATE SERPL-SCNC: 0.8 MMOL/L (ref 0.4–2)
LYMPHOCYTES # BLD AUTO: 2.4 K/UL (ref 1–4.8)
LYMPHOCYTES NFR BLD AUTO: 38.6 % (ref 27–41)
MCH RBC QN AUTO: 26.2 PG (ref 27–31)
MCHC RBC AUTO-ENTMCNC: 31.8 G/DL (ref 32–36)
MCV RBC AUTO: 82.5 FL (ref 80–96)
MONOCYTES # BLD AUTO: 0.6 K/UL (ref 0–0.8)
MONOCYTES NFR BLD AUTO: 9.6 % (ref 2–6)
MPC BLD CALC-MCNC: 10.5 FL (ref 9.4–12.4)
NEUTROPHILS # BLD AUTO: 2.96 K/UL (ref 1.8–7.7)
NEUTROPHILS NFR BLD AUTO: 47.6 % (ref 53–65)
NRBC # BLD AUTO: 0 X10E3/UL
NRBC, AUTO (.00): 0 %
PLATELET # BLD AUTO: 289 K/UL (ref 150–400)
POTASSIUM SERPL-SCNC: 4.4 MMOL/L (ref 3.5–5.1)
RBC # BLD AUTO: 4.35 M/UL (ref 4.2–5.4)
SARS-COV-2 RDRP RESP QL NAA+PROBE: NEGATIVE
SODIUM SERPL-SCNC: 144 MMOL/L (ref 136–145)
VALPROATE SERPL-MCNC: 67 ΜG/ML (ref 50–100)
WBC # BLD AUTO: 6.22 K/UL (ref 4.5–11)

## 2022-01-13 PROCEDURE — 99214 PR OFFICE/OUTPT VISIT, EST, LEVL IV, 30-39 MIN: ICD-10-PCS | Mod: S$PBB,,, | Performed by: FAMILY MEDICINE

## 2022-01-13 PROCEDURE — 96365 THER/PROPH/DIAG IV INF INIT: CPT

## 2022-01-13 PROCEDURE — 99285 EMERGENCY DEPT VISIT HI MDM: CPT | Mod: 25

## 2022-01-13 PROCEDURE — 36415 COLL VENOUS BLD VENIPUNCTURE: CPT | Performed by: FAMILY MEDICINE

## 2022-01-13 PROCEDURE — 3078F DIAST BP <80 MM HG: CPT | Mod: CPTII,,, | Performed by: FAMILY MEDICINE

## 2022-01-13 PROCEDURE — 99214 OFFICE O/P EST MOD 30 MIN: CPT | Mod: S$PBB,,, | Performed by: FAMILY MEDICINE

## 2022-01-13 PROCEDURE — 99223 1ST HOSP IP/OBS HIGH 75: CPT | Mod: ,,, | Performed by: HOSPITALIST

## 2022-01-13 PROCEDURE — 85025 COMPLETE CBC W/AUTO DIFF WBC: CPT | Performed by: FAMILY MEDICINE

## 2022-01-13 PROCEDURE — 3078F PR MOST RECENT DIASTOLIC BLOOD PRESSURE < 80 MM HG: ICD-10-PCS | Mod: CPTII,,, | Performed by: FAMILY MEDICINE

## 2022-01-13 PROCEDURE — 1160F PR REVIEW ALL MEDS BY PRESCRIBER/CLIN PHARMACIST DOCUMENTED: ICD-10-PCS | Mod: CPTII,,, | Performed by: FAMILY MEDICINE

## 2022-01-13 PROCEDURE — 63600175 PHARM REV CODE 636 W HCPCS: Performed by: FAMILY MEDICINE

## 2022-01-13 PROCEDURE — 1159F PR MEDICATION LIST DOCUMENTED IN MEDICAL RECORD: ICD-10-PCS | Mod: CPTII,,, | Performed by: FAMILY MEDICINE

## 2022-01-13 PROCEDURE — 83605 ASSAY OF LACTIC ACID: CPT | Performed by: FAMILY MEDICINE

## 2022-01-13 PROCEDURE — 99223 PR INITIAL HOSPITAL CARE,LEVL III: ICD-10-PCS | Mod: ,,, | Performed by: HOSPITALIST

## 2022-01-13 PROCEDURE — 80048 BASIC METABOLIC PNL TOTAL CA: CPT | Performed by: FAMILY MEDICINE

## 2022-01-13 PROCEDURE — 25000003 PHARM REV CODE 250: Performed by: FAMILY MEDICINE

## 2022-01-13 PROCEDURE — 11000001 HC ACUTE MED/SURG PRIVATE ROOM

## 2022-01-13 PROCEDURE — 87040 BLOOD CULTURE FOR BACTERIA: CPT | Performed by: FAMILY MEDICINE

## 2022-01-13 PROCEDURE — 80164 ASSAY DIPROPYLACETIC ACD TOT: CPT | Performed by: FAMILY MEDICINE

## 2022-01-13 PROCEDURE — 1160F RVW MEDS BY RX/DR IN RCRD: CPT | Mod: CPTII,,, | Performed by: FAMILY MEDICINE

## 2022-01-13 PROCEDURE — 1159F MED LIST DOCD IN RCRD: CPT | Mod: CPTII,,, | Performed by: FAMILY MEDICINE

## 2022-01-13 PROCEDURE — 3074F PR MOST RECENT SYSTOLIC BLOOD PRESSURE < 130 MM HG: ICD-10-PCS | Mod: CPTII,,, | Performed by: FAMILY MEDICINE

## 2022-01-13 PROCEDURE — 3074F SYST BP LT 130 MM HG: CPT | Mod: CPTII,,, | Performed by: FAMILY MEDICINE

## 2022-01-13 PROCEDURE — 87635 SARS-COV-2 COVID-19 AMP PRB: CPT | Performed by: FAMILY MEDICINE

## 2022-01-13 PROCEDURE — 99214 OFFICE O/P EST MOD 30 MIN: CPT | Mod: PBBFAC,25 | Performed by: FAMILY MEDICINE

## 2022-01-13 RX ORDER — DIVALPROEX SODIUM 500 MG/1
1000 TABLET, FILM COATED, EXTENDED RELEASE ORAL DAILY
Status: DISCONTINUED | OUTPATIENT
Start: 2022-01-13 | End: 2022-01-19 | Stop reason: HOSPADM

## 2022-01-13 RX ORDER — HYDRALAZINE HYDROCHLORIDE 20 MG/ML
10 INJECTION INTRAMUSCULAR; INTRAVENOUS EVERY 6 HOURS PRN
Status: DISCONTINUED | OUTPATIENT
Start: 2022-01-13 | End: 2022-01-19 | Stop reason: HOSPADM

## 2022-01-13 RX ORDER — LOSARTAN POTASSIUM 50 MG/1
50 TABLET ORAL DAILY
Status: DISCONTINUED | OUTPATIENT
Start: 2022-01-14 | End: 2022-01-19 | Stop reason: HOSPADM

## 2022-01-13 RX ORDER — SODIUM CHLORIDE 0.9 % (FLUSH) 0.9 %
10 SYRINGE (ML) INJECTION
Status: DISCONTINUED | OUTPATIENT
Start: 2022-01-13 | End: 2022-01-19 | Stop reason: HOSPADM

## 2022-01-13 RX ORDER — GUAIFENESIN/DEXTROMETHORPHAN 100-10MG/5
10 SYRUP ORAL EVERY 6 HOURS PRN
Status: DISCONTINUED | OUTPATIENT
Start: 2022-01-13 | End: 2022-01-19 | Stop reason: HOSPADM

## 2022-01-13 RX ORDER — BISACODYL 5 MG
10 TABLET, DELAYED RELEASE (ENTERIC COATED) ORAL DAILY PRN
Status: DISCONTINUED | OUTPATIENT
Start: 2022-01-13 | End: 2022-01-19 | Stop reason: HOSPADM

## 2022-01-13 RX ORDER — ACETAMINOPHEN 500 MG
1000 TABLET ORAL EVERY 6 HOURS PRN
Status: DISCONTINUED | OUTPATIENT
Start: 2022-01-13 | End: 2022-01-19 | Stop reason: HOSPADM

## 2022-01-13 RX ORDER — FUROSEMIDE 20 MG/1
20 TABLET ORAL DAILY
Status: DISCONTINUED | OUTPATIENT
Start: 2022-01-14 | End: 2022-01-19 | Stop reason: HOSPADM

## 2022-01-13 RX ORDER — LANOLIN ALCOHOL/MO/W.PET/CERES
1 CREAM (GRAM) TOPICAL DAILY
Status: DISCONTINUED | OUTPATIENT
Start: 2022-01-14 | End: 2022-01-19 | Stop reason: HOSPADM

## 2022-01-13 RX ORDER — SIMETHICONE 80 MG
1 TABLET,CHEWABLE ORAL 3 TIMES DAILY PRN
Status: DISCONTINUED | OUTPATIENT
Start: 2022-01-13 | End: 2022-01-19 | Stop reason: HOSPADM

## 2022-01-13 RX ORDER — ONDANSETRON 2 MG/ML
8 INJECTION INTRAMUSCULAR; INTRAVENOUS EVERY 6 HOURS PRN
Status: DISCONTINUED | OUTPATIENT
Start: 2022-01-13 | End: 2022-01-19 | Stop reason: HOSPADM

## 2022-01-13 RX ORDER — LEVOFLOXACIN 5 MG/ML
500 INJECTION, SOLUTION INTRAVENOUS
Status: DISCONTINUED | OUTPATIENT
Start: 2022-01-13 | End: 2022-01-19 | Stop reason: HOSPADM

## 2022-01-13 RX ORDER — ENOXAPARIN SODIUM 100 MG/ML
40 INJECTION SUBCUTANEOUS EVERY 24 HOURS
Status: DISCONTINUED | OUTPATIENT
Start: 2022-01-13 | End: 2022-01-19 | Stop reason: HOSPADM

## 2022-01-13 RX ORDER — TRAZODONE HYDROCHLORIDE 50 MG/1
50 TABLET ORAL NIGHTLY PRN
Status: DISCONTINUED | OUTPATIENT
Start: 2022-01-13 | End: 2022-01-19 | Stop reason: HOSPADM

## 2022-01-13 RX ORDER — CLORAZEPATE DIPOTASSIUM 3.75 MG/1
3.75 TABLET ORAL 2 TIMES DAILY PRN
Status: DISCONTINUED | OUTPATIENT
Start: 2022-01-13 | End: 2022-01-19 | Stop reason: HOSPADM

## 2022-01-13 RX ORDER — HYDROCODONE BITARTRATE AND ACETAMINOPHEN 7.5; 325 MG/1; MG/1
1 TABLET ORAL EVERY 6 HOURS PRN
Status: DISCONTINUED | OUTPATIENT
Start: 2022-01-13 | End: 2022-01-19 | Stop reason: HOSPADM

## 2022-01-13 RX ORDER — CILOSTAZOL 50 MG/1
50 TABLET ORAL 2 TIMES DAILY
Status: DISCONTINUED | OUTPATIENT
Start: 2022-01-13 | End: 2022-01-19 | Stop reason: HOSPADM

## 2022-01-13 RX ADMIN — DIVALPROEX SODIUM 1000 MG: 500 TABLET, FILM COATED, EXTENDED RELEASE ORAL at 09:01

## 2022-01-13 RX ADMIN — CILOSTAZOL 50 MG: 50 TABLET ORAL at 09:01

## 2022-01-13 RX ADMIN — ENOXAPARIN SODIUM 40 MG: 40 INJECTION SUBCUTANEOUS at 09:01

## 2022-01-13 RX ADMIN — HYDROCODONE BITARTRATE AND ACETAMINOPHEN 1 TABLET: 7.5; 325 TABLET ORAL at 05:01

## 2022-01-13 RX ADMIN — LEVOFLOXACIN 500 MG: 5 INJECTION, SOLUTION INTRAVENOUS at 05:01

## 2022-01-13 NOTE — ED PROVIDER NOTES
Encounter Date: 1/13/2022    SCRIBE #1 NOTE: I, Ayala Pierson, am scribing for, and in the presence of,  Natalya Starks. I have scribed the entire note.       History     Chief Complaint   Patient presents with    Wound Infection     The patient is a 76 year old female with complaints of a wound infection. The wound is on the lower left leg and has progressively gotten worse. She was taking antibiotics orally but did not have any relief even after changing the dosage. She was then placed on IV antibiotics but she could not take them because she does not have help at home. Home health comes once a week. She also complains of chills and fever and states her pain is a 10/10.    The history is provided by the patient and medical records. No  was used.     Review of patient's allergies indicates:   Allergen Reactions    Lexapro [escitalopram]      Past Medical History:   Diagnosis Date    Anxiety     Asthma     CVA (cerebral vascular accident) 05/23/2018    DVT (deep venous thrombosis) 2019    Epilepsy     Hyperlipidemia     Hypertension     Hypothyroid     PVD (peripheral vascular disease)     Venous stasis      Past Surgical History:   Procedure Laterality Date    KNEE ARTHROSCOPY Left     x 2    LASER ABLATION Right 04/20/2018    [facility] LASER ABLATION PERFORMED BY DR. KARMA BUTCHER.    LASER ABLATION Left 04/27/2018    [facility] LASER ABLATION PERFORMED BY DR. KARMA BUTCHER.    LASER ABLATION Right 12/17/2020    [facility] ( DUPLICATED )LASER ABLATION PERFORMED BY DR. KARMA BUTCHER.    LASER ABLATION Left 01/08/2021    [facility] LASER ABLATION PERFORMED BY DR. KARMA BUTCHER.    Remove cataracts, insert lens Bilateral     SHOULDER ARTHROSCOPY Left     SINUS SURGERY      TOTAL ABDOMINAL HYSTERECTOMY W/ BILATERAL SALPINGOOPHORECTOMY      VENOUS ABLATION Right 02/28/2020    DISTAL [facility] VARITHENA ABLATION PERFORMED BY DR. KARMA BUTCHER.     Family History   Problem Relation Age of Onset    Alzheimer's disease  Mother     Endometrial cancer Mother     Heart disease Mother     Lung cancer Father     Diabetes Sister     Endometrial cancer Sister     Hypertension Sister     Polycystic kidney disease Sister     Diabetes Brother     Sickle cell anemia Daughter     Thyroid disease Sister     Seizures Brother     Heart disease Daughter         7 MONTHS    No Known Problems Son     No Known Problems Son     No Known Problems Son     Cancer Brother     No Known Problems Sister     No Known Problems Sister     No Known Problems Sister     No Known Problems Sister     No Known Problems Sister      Social History     Tobacco Use    Smoking status: Never Smoker    Smokeless tobacco: Never Used   Substance Use Topics    Alcohol use: Never    Drug use: Never     Review of Systems   Constitutional: Positive for chills. Negative for fever.   Skin: Positive for wound (Infection of wound on lower left leg).   All other systems reviewed and are negative.      Physical Exam     Initial Vitals [01/13/22 1713]   BP Pulse Resp Temp SpO2   (!) 179/77 82 18 98.8 °F (37.1 °C) 100 %      MAP       --         Physical Exam    Constitutional: She appears well-developed and well-nourished.   HENT:   Head: Normocephalic.   Eyes: EOM are normal. Pupils are equal, round, and reactive to light.   Neck: Neck supple.   Normal range of motion.  Cardiovascular: Normal rate.   Pulmonary/Chest: No respiratory distress.   Musculoskeletal:         General: Normal range of motion.      Cervical back: Normal range of motion and neck supple.     Neurological: She is alert and oriented to person, place, and time.   Skin: Skin is warm and dry.        4cm x 3cm irregular ulceration to left interior leg  Wound is hot to the touch and draining yellow discharge   Psychiatric: She has a normal mood and affect. Thought content normal.         ED Course   Procedures  Labs Reviewed   BASIC METABOLIC PANEL - Abnormal; Notable for the following  components:       Result Value    Chloride 108 (*)     Creatinine 1.04 (*)     eGFR 54 (*)     All other components within normal limits   CBC WITH DIFFERENTIAL - Abnormal; Notable for the following components:    Hemoglobin 11.4 (*)     Hematocrit 35.9 (*)     MCH 26.2 (*)     MCHC 31.8 (*)     RDW 15.2 (*)     Neutrophils % 47.6 (*)     Monocytes % 9.6 (*)     Immature Granulocytes % 1.0 (*)     Immature Granulocytes, Absolute 0.06 (*)     All other components within normal limits   LACTIC ACID, PLASMA - Normal   SARS-COV-2 RNA AMPLIFICATION, QUAL - Normal   CBC W/ AUTO DIFFERENTIAL    Narrative:     The following orders were created for panel order CBC auto differential.  Procedure                               Abnormality         Status                     ---------                               -----------         ------                     CBC with Differential[[id number]]        Abnormal            Final result                 Please view results for these tests on the individual orders.          Imaging Results          US Lower Extremity Veins Bilateral (Final result)  Result time 01/14/22 07:41:36    Final result by Jonathon Gaviria MD (01/14/22 07:41:36)                 Impression:      No evidence of deep venous thrombosis in either lower extremity.      Electronically signed by: Jonathon Gaviria  Date:    01/14/2022  Time:    07:41             Narrative:    EXAMINATION:  US LOWER EXTREMITY VEINS BILATERAL    CLINICAL HISTORY:  To rule out DVT. Pain and swelling present.;    TECHNIQUE:  Duplex and color flow Doppler and dynamic compression was performed of the bilateral lower extremity veins.  Ultrasound images captured and stored.    COMPARISON:  None    FINDINGS:  Right thigh veins: The common femoral, femoral, popliteal, upper greater saphenous, and deep femoral veins are patent and free of thrombus. The veins are normally compressible and have normal phasic flow and augmentation response.    Right calf veins:  The visualized calf veins are patent.    Left thigh veins: The common femoral, femoral, popliteal, upper greater saphenous, and deep femoral veins are patent and free of thrombus. The veins are normally compressible and have normal phasic flow and augmentation response.    Left calf veins: The visualized calf veins are patent.    Miscellaneous: None                                 Medications - No data to display             Attending Attestation:           Physician Attestation for Scribe:  Physician Attestation Statement for Scribe #1: I, Natalya Nogueira M.D., reviewed documentation, as scribed by Ayala Pierson in my presence, and it is both accurate and complete.                      Clinical Impression:   Final diagnoses:  [L03.119] Cellulitis of leg  [L03.116] Cellulitis of left lower extremity  [G40.409] Other generalized epilepsy, not intractable, without status epilepticus  [I10] Hypertension, unspecified type  [I83.029, L97.929] Venous ulcer of left leg  [A49.8] Pseudomonas infection          ED Disposition Condition    Admit               Natalya Starks MD  02/03/22 3842

## 2022-01-13 NOTE — PROGRESS NOTES
[facility] CLINIC NOTE    Patient ID: Umesh Lassiter is a 76 y.o. female.    I. HISTORY     Chief Complaint:   Chief Complaint   Patient presents with    Wound Check     1 week left leg wound check. Unable to performed IV self home infusion.        HPI: Umesh Lassiter is a 76 y.o. female who presents today for a 1 week wound check and re-evaluation.  On last visit, a home infusion of IV antibiotics was ordered for the patient.  The patient had trouble with flushing of the IV and administering the antibiotics herself.  She lives alone and does not have any help.  We tried to reduce the frequency of the antibiotic administration but this still did not resolve the problem and she has refused further treatment of IV antibiotics at home.  She continues to have swelling of her left lower extremity along with pain of her stasis ulcer.  She has darker hyperpigmentation around the periwound area is states is very tender to palpation.  Mild odor noted from the wound today, but not over powering.  Previous cultures positive for Pseudomonas, antibiotics have been tailored to sensitivities. Vital signs are stable without signs of distress.    Reflux study 12/21/2021 shows no evidence of DVT or superficial venous reflux.  Shows a well ablated proximal left great saphenous vein and ablated left small saphenous vein.     Clinical summary:   The patient is known to our clinic but has not been seen since 03/02/2021 for a one-month follow-up status post left great saphenous vein ablation.  She has been lost to follow-up since.  She states that this ulceration of her left lower extremity has been present for approximately 6 months.  She states that his been persistent and worsening during that time.  She has not been wearing compression daily.  She has pitting edema, hyperpigmentation along with the ulceration of the left medial Gator region.  No signs of infection, including purulent drainage, increased temperature or foul odor.   She denies complaints of her right lower extremity.    Left great saphenous vein laser ablation 01/08/2021.  Right great saphenous vein, duplicate, laser ablation 12/17/2020.  Right distal great saphenous vein erythema ablation 02/28/2020.  Left small saphenous vein laser ablation 04/27/2018.  Right great saphenous vein laser ablation 04/20/2018.    Past Medical History:   Diagnosis Date    Acquired hypothyroidism     Anxiety state     Asthma     Bilateral leg pain 11/23/2020    BMI 33.0-33.9,adult     Bronchitis     Cellulitis 11/09/2020    Cellulitis and abscess of left lower extremity 08/19/2020    Cellulitis of right lower limb 11/10/2020    Chronic venous hypertension (idiopathic) with ulcer of bilateral lower extremity (CODE) 07/02/2018    CVA (cerebral vascular accident) 05/23/2018    Diverticular disease of colon 10/24/2014    Epilepsy 03/11/2021    Essential hypertension     History of adenomatous polyp of colon     Hyperlipemia     Lower extremity edema 03/21/2018    Non-pressure chronic ulcer of other part of left lower leg with unspecified severity 09/30/2020    Non-pressure chronic ulcer of other part of right lower leg with unspecified severity 02/13/2020    Obesity     Other skin changes 01/12/2021    Pain in left leg 03/02/2021    Pain in right leg 02/02/2021    Primary osteoarthritis of both knees     PVD (peripheral vascular disease) 11/10/2020    Venous insufficiency (chronic) (peripheral) 03/11/2021    Venous thrombosis 04/25/2019    Resolved on f/u U/S s/p ablation    Vitamin D deficiency 03/11/2021        Past Surgical History:   Procedure Laterality Date    KNEE ARTHROSCOPY Left     x 2    LASER ABLATION Right 04/20/2018    GSV LASER ABLATION PERFORMED BY DR. KARMA BUTCHER.    LASER ABLATION Left 04/27/2018    SSV LASER ABLATION PERFORMED BY DR. KARMA BUTCHER.    LASER ABLATION Right 12/17/2020    GSV ( DUPLICATED )LASER ABLATION PERFORMED BY DR. KARMA BUTCHER.     LASER ABLATION Left 01/08/2021    GSV LASER ABLATION PERFORMED BY DR. KARMA BUTCHER.    Remove cataracts, insert lens Bilateral     SHOULDER ARTHROSCOPY Left     SINUS SURGERY      TOTAL ABDOMINAL HYSTERECTOMY W/ BILATERAL SALPINGOOPHORECTOMY      VENOUS ABLATION Right 02/28/2020    DISTAL GSV VARITHENA ABLATION PERFORMED BY DR. KARMA BUTCHER.       Social History     Tobacco Use   Smoking Status Never Smoker   Smokeless Tobacco Never Used         Current Outpatient Medications:     albuterol (PROVENTIL/VENTOLIN HFA) 90 mcg/actuation inhaler, Inhale 2 puffs into the lungs every 6 (six) hours as needed for Wheezing. Rescue, Disp: 18 g, Rfl: 1    albuterol 2 mg/5 mL syrup, Take 5 mLs (2 mg total) by mouth 3 (three) times daily., Disp: 450 mL, Rfl: 11    cilostazoL (PLETAL) 50 MG Tab, Take 1 tablet (50 mg total) by mouth 2 (two) times daily., Disp: 180 tablet, Rfl: 3    clorazepate (TRANXENE) 3.75 MG Tab, Take 1 tablet (3.75 mg total) by mouth 2 (two) times daily as needed., Disp: 60 tablet, Rfl: 5    divalproex ER (DEPAKOTE) 500 MG Tb24, TAKE 2 TABLETS BY MOUTH EACH NIGHT AT BEDTIME, Disp: 60 tablet, Rfl: 5    ferrous sulfate (FEOSOL) 325 mg (65 mg iron) Tab tablet, Take 1 tablet (325 mg total) by mouth daily with breakfast., Disp: 90 tablet, Rfl: 0    furosemide (LASIX) 20 MG tablet, Take 1 tablet (20 mg total) by mouth once daily., Disp: 90 tablet, Rfl: 3    HYDROcodone-acetaminophen (NORCO) 5-325 mg per tablet, Take 1 tablet by mouth every 6 (six) hours as needed for Pain., Disp: 20 tablet, Rfl: 0    losartan (COZAAR) 50 MG tablet, Take 1 tablet (50 mg total) by mouth once daily., Disp: 90 tablet, Rfl: 3    Review of Systems   Constitutional: Negative for activity change, chills, diaphoresis, fatigue and fever.   Respiratory: Negative for cough and shortness of breath.    Cardiovascular: Positive for leg swelling. Negative for chest pain and claudication.        Hyperpigmentation LE   Gastrointestinal:  Negative for nausea and vomiting.   Musculoskeletal: Positive for leg pain. Negative for joint swelling.   Integumentary:  Negative for rash and wound.   Neurological: Negative for weakness and numbness.        II. PHYSICAL EXAM     Physical Exam  Constitutional:       General: She is awake. She is not in acute distress.     Appearance: Normal appearance. She is obese. She is not ill-appearing or toxic-appearing.   HENT:      Head: Normocephalic and atraumatic.   Eyes:      Extraocular Movements: Extraocular movements intact.      Conjunctiva/sclera: Conjunctivae normal.      Pupils: Pupils are equal, round, and reactive to light.   Neck:      Vascular: No carotid bruit or JVD.   Cardiovascular:      Rate and Rhythm: Normal rate and regular rhythm.      Pulses:           Dorsalis pedis pulses are detected w/ Doppler on the left side.        Posterior tibial pulses are detected w/ Doppler on the left side.      Heart sounds: No murmur heard.      Pulmonary:      Effort: Pulmonary effort is normal. No respiratory distress.      Breath sounds: No stridor. No wheezing, rhonchi or rales.   Musculoskeletal:         General: No swelling, tenderness or deformity.      Right lower le+ Edema present.      Left lower le+ Edema present.        Legs:       Comments: Ulcer of left medial Gator region measuring approximately 4.0 cm x 5.0 cm with 0.1 cm in depth.  Yellow slough noted in the wound bed.  Today, periwound tissue appears more friable with increased erythema and a little bit of increased warmth to palpation.    Hyperpigmentation of the left lower Gator region along with +2 pitting edema.       Feet:      Comments: Triphasic dopplerable pulses of the left lower extremity.  Skin:     General: Skin is warm.      Capillary Refill: Capillary refill takes less than 2 seconds.      Coloration: Skin is not ashen.      Findings: No bruising, erythema, lesion, rash or wound.   Neurological:      Mental Status: She is  alert and oriented to person, place, and time.      Motor: No weakness.   Psychiatric:         Speech: Speech normal.         Behavior: Behavior normal. Behavior is cooperative.       Reticular/Spider veins noted:  RLE: medial calf  LLE: none    Varicose veins noted:  RLE: medial calf  LLE:  medial calf    CEAP Classification  Clinical Signs: Class 6 - Leg ulceration, skin changes as defined above  Etiologic Classification: Primary  Anatomic distribution: Superficial  Pathophysiologic dysfunction: Reflux       Venous Clinical Severity Score  Pain:2=Daily, moderate activity limitation, occasional analgesics  Varicose Veins: 1=Few, scattered. Branch varicose veins  Venous Edema: 2=Afternoon edema, above ankle  Pigmentation: 2=Diffuse over most of gaiter distribution (lower 1/3) or recent pigmentation (purple)  Inflammation: 1=Mild cellulitis, limited to marginal area around ulcer  Induration: 1=Focal, circummalleolar (< 5 cm)  Number of Active Ulcers: 2=2  Active Ulceration, Duration: 2=>3 months, <1 year  Active Ulcer Size: 2=2 - 6 cm diameter  Compressive Therapy: 2=Wears elastic stockings most days  Total Score: 17       III. ASSESSMENT & PLAN (MEDICAL DECISION MAKING)     1. Stasis edema with ulcer, left    2. Hyperpigmentation of skin    3. Venous insufficiency (chronic) (peripheral)    4. Lower extremity edema    5. Wound infection        Assessment/Diagnosis and Plan:  The patient is left lower extremity wound infection, cultures positive for Pseudomonas, has been unresolved following p.o. antibiotics and what little IV antibiotics the patient received before denying home health IV services.  Today, I will have the patient admitted to the hospitalist service for IV antibiotics and supportive care. We cleaned the patient's wound but left it free of wound dressing since the patient will be admitted to the hospital.  I have spoken with the direct admission service in the patient will be accepted.  No debridement  today secondary to patient's hypersensitivity/pain.  We will follow-up with her post hospital discharge.    Orders Placed This Encounter    POCT COVID-19 Rapid Screening      Minh Cisneros, DO

## 2022-01-14 LAB
APTT PPP: 34.5 SECONDS (ref 25.2–37.3)
BASOPHILS # BLD AUTO: 0.04 K/UL (ref 0–0.2)
BASOPHILS NFR BLD AUTO: 0.7 % (ref 0–1)
DIFFERENTIAL METHOD BLD: ABNORMAL
EOSINOPHIL # BLD AUTO: 0.15 K/UL (ref 0–0.5)
EOSINOPHIL NFR BLD AUTO: 2.7 % (ref 1–4)
ERYTHROCYTE [DISTWIDTH] IN BLOOD BY AUTOMATED COUNT: 15 % (ref 11.5–14.5)
HCT VFR BLD AUTO: 32.6 % (ref 38–47)
HGB BLD-MCNC: 10.5 G/DL (ref 12–16)
IMM GRANULOCYTES # BLD AUTO: 0.05 K/UL (ref 0–0.04)
IMM GRANULOCYTES NFR BLD: 0.9 % (ref 0–0.4)
INR BLD: 0.93 (ref 0.9–1.1)
LYMPHOCYTES # BLD AUTO: 2.63 K/UL (ref 1–4.8)
LYMPHOCYTES NFR BLD AUTO: 46.6 % (ref 27–41)
MCH RBC QN AUTO: 26.1 PG (ref 27–31)
MCHC RBC AUTO-ENTMCNC: 32.2 G/DL (ref 32–36)
MCV RBC AUTO: 81.1 FL (ref 80–96)
MONOCYTES # BLD AUTO: 0.55 K/UL (ref 0–0.8)
MONOCYTES NFR BLD AUTO: 9.8 % (ref 2–6)
MPC BLD CALC-MCNC: 10.3 FL (ref 9.4–12.4)
NEUTROPHILS # BLD AUTO: 2.22 K/UL (ref 1.8–7.7)
NEUTROPHILS NFR BLD AUTO: 39.3 % (ref 53–65)
NRBC # BLD AUTO: 0 X10E3/UL
NRBC, AUTO (.00): 0 %
PLATELET # BLD AUTO: 277 K/UL (ref 150–400)
PROTHROMBIN TIME: 12.5 SECONDS (ref 11.7–14.7)
RBC # BLD AUTO: 4.02 M/UL (ref 4.2–5.4)
WBC # BLD AUTO: 5.64 K/UL (ref 4.5–11)

## 2022-01-14 PROCEDURE — 85025 COMPLETE CBC W/AUTO DIFF WBC: CPT | Performed by: FAMILY MEDICINE

## 2022-01-14 PROCEDURE — 63600175 PHARM REV CODE 636 W HCPCS: Performed by: FAMILY MEDICINE

## 2022-01-14 PROCEDURE — 25000003 PHARM REV CODE 250: Performed by: FAMILY MEDICINE

## 2022-01-14 PROCEDURE — 36415 COLL VENOUS BLD VENIPUNCTURE: CPT | Performed by: FAMILY MEDICINE

## 2022-01-14 PROCEDURE — 25000003 PHARM REV CODE 250: Performed by: HOSPITALIST

## 2022-01-14 PROCEDURE — 11000001 HC ACUTE MED/SURG PRIVATE ROOM

## 2022-01-14 PROCEDURE — 85730 THROMBOPLASTIN TIME PARTIAL: CPT | Performed by: HOSPITALIST

## 2022-01-14 PROCEDURE — 63600175 PHARM REV CODE 636 W HCPCS: Performed by: HOSPITALIST

## 2022-01-14 PROCEDURE — 36415 COLL VENOUS BLD VENIPUNCTURE: CPT | Performed by: HOSPITALIST

## 2022-01-14 PROCEDURE — 99232 PR SUBSEQUENT HOSPITAL CARE,LEVL II: ICD-10-PCS | Mod: ,,, | Performed by: FAMILY MEDICINE

## 2022-01-14 PROCEDURE — 99232 SBSQ HOSP IP/OBS MODERATE 35: CPT | Mod: ,,, | Performed by: FAMILY MEDICINE

## 2022-01-14 RX ADMIN — DIVALPROEX SODIUM 1000 MG: 500 TABLET, FILM COATED, EXTENDED RELEASE ORAL at 09:01

## 2022-01-14 RX ADMIN — CEFEPIME HYDROCHLORIDE 1 G: 1 INJECTION, POWDER, FOR SOLUTION INTRAMUSCULAR; INTRAVENOUS at 09:01

## 2022-01-14 RX ADMIN — LEVOFLOXACIN 500 MG: 5 INJECTION, SOLUTION INTRAVENOUS at 05:01

## 2022-01-14 RX ADMIN — CILOSTAZOL 50 MG: 50 TABLET ORAL at 09:01

## 2022-01-14 RX ADMIN — CEFEPIME HYDROCHLORIDE 1 G: 1 INJECTION, POWDER, FOR SOLUTION INTRAMUSCULAR; INTRAVENOUS at 03:01

## 2022-01-14 RX ADMIN — CILOSTAZOL 50 MG: 50 TABLET ORAL at 08:01

## 2022-01-14 RX ADMIN — CEFEPIME HYDROCHLORIDE 1 G: 1 INJECTION, POWDER, FOR SOLUTION INTRAMUSCULAR; INTRAVENOUS at 06:01

## 2022-01-14 RX ADMIN — HYDROCODONE BITARTRATE AND ACETAMINOPHEN 1 TABLET: 7.5; 325 TABLET ORAL at 01:01

## 2022-01-14 RX ADMIN — FUROSEMIDE 20 MG: 20 TABLET ORAL at 09:01

## 2022-01-14 RX ADMIN — LOSARTAN POTASSIUM 50 MG: 50 TABLET, FILM COATED ORAL at 09:01

## 2022-01-14 RX ADMIN — FERROUS SULFATE TAB 325 MG (65 MG ELEMENTAL FE) 1 EACH: 325 (65 FE) TAB at 09:01

## 2022-01-14 RX ADMIN — ENOXAPARIN SODIUM 40 MG: 40 INJECTION SUBCUTANEOUS at 05:01

## 2022-01-14 NOTE — H&P
[facility] [place] Emergency Department  Hospital Medicine  History & Physical    Patient Name: Umesh Lassiter  MRN: [id number]  Patient Class: IP- Inpatient  Admission Date: 1/13/2022  Attending Physician: Dillon Alcantara Jr., MD   Primary Care Provider: MELANI Kuo         Patient information was obtained from patient, past medical records and ER records.     Subjective:     Principal Problem:Cellulitis of leg    Chief Complaint:   Chief Complaint   Patient presents with    Wound Infection        HPI: Patient is a 76 year old female presenting to [facility] ED from Dr. Cisneros's office due to infected venous stasis ulcer of the left leg. He has been treating the wound since 12/21/21. The wound initially grew Pseudomonas from the wound culture (sensitivity: pansensitive). Oral antibiotics were tried and the wound failed to respond, then IV antibiotics were attempted at home but the patient was unable to successfully administer the IV antibiotics to herself. When seen at Dr. Cisneros's clinic, he was concerned for erythema and warmth spreading from the wound, and wound size increasing itself. Upon talking with the patient, she endorses pain at the wound site 10/10, subjective fever, chills, nausea. Vitals were stable. No leukocytosis present. Lactic acid level is 0.8. Blood cultures X 2 have been collected, and the patient was started on Levaquin while in the ED.    Patient will be admitted inpatient to the hospital service for further evaluation and management of infected venous stasis ulcer of the left lower extremity.      Past Medical History:   Diagnosis Date    Anxiety     Asthma     CVA (cerebral vascular accident) 05/23/2018    DVT (deep venous thrombosis) 2019    Epilepsy     Hyperlipidemia     Hypertension     Hypothyroid     PVD (peripheral vascular disease)     Venous stasis        Past Surgical History:   Procedure Laterality Date    KNEE ARTHROSCOPY Left     x 2     LASER ABLATION Right 04/20/2018    GSV LASER ABLATION PERFORMED BY DR. KARMA BUTCHER.    LASER ABLATION Left 04/27/2018    SSV LASER ABLATION PERFORMED BY DR. KARMA BUTCHER.    LASER ABLATION Right 12/17/2020    GSV ( DUPLICATED )LASER ABLATION PERFORMED BY DR. KARMA BUTCHER.    LASER ABLATION Left 01/08/2021    GSV LASER ABLATION PERFORMED BY DR. KARMA BUTCHER.    Remove cataracts, insert lens Bilateral     SHOULDER ARTHROSCOPY Left     SINUS SURGERY      TOTAL ABDOMINAL HYSTERECTOMY W/ BILATERAL SALPINGOOPHORECTOMY      VENOUS ABLATION Right 02/28/2020    DISTAL GSV VARITHENA ABLATION PERFORMED BY DR. KARMA BUTCHER.       Review of patient's allergies indicates:   Allergen Reactions    Lexapro [escitalopram]        No current facility-administered medications on file prior to encounter.     Current Outpatient Medications on File Prior to Encounter   Medication Sig    albuterol (PROVENTIL/VENTOLIN HFA) 90 mcg/actuation inhaler Inhale 2 puffs into the lungs every 6 (six) hours as needed for Wheezing. Rescue    albuterol 2 mg/5 mL syrup Take 5 mLs (2 mg total) by mouth 3 (three) times daily.    cilostazoL (PLETAL) 50 MG Tab Take 1 tablet (50 mg total) by mouth 2 (two) times daily.    clorazepate (TRANXENE) 3.75 MG Tab Take 1 tablet (3.75 mg total) by mouth 2 (two) times daily as needed.    divalproex ER (DEPAKOTE) 500 MG Tb24 TAKE 2 TABLETS BY MOUTH EACH NIGHT AT BEDTIME    ferrous sulfate (FEOSOL) 325 mg (65 mg iron) Tab tablet Take 1 tablet (325 mg total) by mouth daily with breakfast.    furosemide (LASIX) 20 MG tablet Take 1 tablet (20 mg total) by mouth once daily.    HYDROcodone-acetaminophen (NORCO) 5-325 mg per tablet Take 1 tablet by mouth every 6 (six) hours as needed for Pain.    losartan (COZAAR) 50 MG tablet Take 1 tablet (50 mg total) by mouth once daily.     Family History     Problem Relation (Age of Onset)    Alzheimer's disease Mother    Cancer Brother    Diabetes Sister, Brother     Endometrial cancer Mother, Sister    Heart disease Mother, Daughter    Hypertension Sister    Lung cancer Father    No Known Problems Son, Son, Son, Sister, Sister, Sister, Sister, Sister    Polycystic kidney disease Sister    Seizures Brother    Sickle cell anemia Daughter    Thyroid disease Sister        Tobacco Use    Smoking status: Never Smoker    Smokeless tobacco: Never Used   Substance and Sexual Activity    Alcohol use: Never    Drug use: Never    Sexual activity: Not Currently     Review of Systems   Constitutional: Positive for chills and fever. Negative for appetite change, diaphoresis and fatigue.   HENT: Negative for congestion, dental problem, ear pain, hearing loss, mouth sores, nosebleeds, postnasal drip, rhinorrhea, sinus pain, sneezing, sore throat and trouble swallowing.    Eyes: Negative for photophobia, pain, redness and visual disturbance.   Respiratory: Negative for cough, shortness of breath and wheezing.    Cardiovascular: Negative for chest pain, palpitations and leg swelling.   Gastrointestinal: Positive for nausea. Negative for abdominal pain, blood in stool, constipation, diarrhea and vomiting.   Endocrine: Negative for cold intolerance and heat intolerance.   Genitourinary: Negative for difficulty urinating, dysuria, flank pain, frequency, genital sores and hematuria.   Musculoskeletal: Negative for arthralgias, back pain, gait problem, myalgias, neck pain and neck stiffness.   Skin: Positive for wound. Negative for color change and rash.   Neurological: Negative for dizziness, tremors, seizures, syncope, facial asymmetry, speech difficulty, weakness, light-headedness, numbness and headaches.   Hematological: Does not bruise/bleed easily.   Psychiatric/Behavioral: Negative for agitation, confusion, hallucinations and suicidal ideas. The patient is not nervous/anxious.      Objective:     Vital Signs (Most Recent):  Temp: 98.8 °F (37.1 °C) (01/13/22 1713)  Pulse: 93 (01/13/22  2000)  Resp: 17 (01/13/22 2000)  BP: (!) 181/84 (01/13/22 2000)  SpO2: 100 % (01/13/22 2000) Vital Signs (24h Range):  Temp:  [98.2 °F (36.8 °C)-98.8 °F (37.1 °C)] 98.8 °F (37.1 °C)  Pulse:  [82-96] 93  Resp:  [17-18] 17  SpO2:  [100 %] 100 %  BP: (124-181)/(60-84) 181/84     Weight: 83.9 kg (185 lb)  Body mass index is 29.86 kg/m².    Physical Exam  Vitals reviewed.   Constitutional:       General: She is not in acute distress.     Appearance: She is not ill-appearing, toxic-appearing or diaphoretic.   HENT:      Head: Normocephalic and atraumatic.      Right Ear: External ear normal.      Left Ear: External ear normal.      Nose: Nose normal.      Mouth/Throat:      Mouth: Mucous membranes are moist.   Eyes:      General: No scleral icterus.     Extraocular Movements: Extraocular movements intact.      Conjunctiva/sclera: Conjunctivae normal.      Pupils: Pupils are equal, round, and reactive to light.   Neck:      Vascular: No carotid bruit.   Cardiovascular:      Rate and Rhythm: Normal rate and regular rhythm.      Pulses: Normal pulses of bilateral upper extremities. Dorsalis pedis pulses felt bilaterally.     Heart sounds: Normal heart sounds. No murmur heard.  No friction rub. No gallop.    Pulmonary:      Effort: Pulmonary effort is normal. No respiratory distress.      Breath sounds: Normal breath sounds. No wheezing or rales.   Abdominal:      General: Abdomen is flat. Bowel sounds are normal.      Palpations: Abdomen is soft.      Tenderness: There is no abdominal tenderness.   Skin:     General: Skin is warm.      Capillary Refill: Capillary refill takes less than 2 seconds.      Coloration: Skin is not jaundiced or pale.      Findings: Erythema and lesion present. No bruising or rash.      Comments: Large venous stasis ulcer on the medial shin with surrounding erythema. Warm to the touch.   Neurological:      General: No focal deficit present.      Mental Status: She is alert and oriented to person,  place, and time.      Cranial Nerves: No cranial nerve deficit.      Sensory: No sensory deficit.      Motor: No weakness.   Psychiatric:         Mood and Affect: Mood normal.         Behavior: Behavior normal.           CRANIAL NERVES     CN III, IV, VI   Pupils are equal, round, and reactive to light.       Significant Labs: All pertinent labs within the past 24 hours have been reviewed.    Significant Imaging: I have reviewed all pertinent imaging results/findings within the past 24 hours.    Assessment/Plan:     * Cellulitis of leg  - wound culture previously positive for Pseudomonas on 12/21/21  - wound failed to respond to oral therapy and unsure if patient gave herself any IV meds at home  - repeat wound culture (pend)  - blood culture X 2 (pend)  - Levaquin 500 mg IV q 24 hrs; deescalate upon review of final wound cxs/sensitivites  - bilateral lower extremity dopplers ordered to r/o DVT        Venous ulcer of left leg  - wound care consult placed  - Tylenol for mild pain  - Norco 7.5 mg PO q 6 hrs PRN for moderate to severe pain      Hypertension  - BP currently elevated at 179/77  - continue patient's home BP medication of Losartan 50 mg PO daily  - hydralazine 10 mg IV q 6 hrs PRN for systolic BP > 180 or diastolic BP > 110      Epilepsy  - continue home Depakote  - Valproic acid level (pend)        VTE Risk Mitigation (From admission, onward)         Ordered     enoxaparin injection 40 mg  Daily         01/13/22 2027     Place TERRY hose  Until discontinued         01/13/22 2027     IP VTE HIGH RISK PATIENT  Once         01/13/22 2027                   Tray Solomon MD  Department of Hospital Medicine   [facility] - Emergency Department

## 2022-01-14 NOTE — PLAN OF CARE
[facility] - [facility] Telemetry  Initial Discharge Assessment       Primary Care Provider: MELANI Kuo    Admission Diagnosis: Cellulitis of leg [L03.119]    Admission Date: 1/13/2022  Expected Discharge Date:     Discharge Barriers Identified: None    Payor: [employer] / Plan: [employer] MEDICARE HMO / Product Type: Medicare Advantage /     Extended Emergency Contact Information  Primary Emergency Contact: nazia munoz  Mobile Phone: [phone]  Relation: Daughter  Preferred language: English   needed? No    Discharge Plan A: Home Health  Discharge Plan B: Home Health      [facility] - [place], [place] - [address]  [address]  [place] [address]  Phone: [phone] Fax: [phone]    [id number] - [place], [place] - [address]  [address]  [place] [address]  Phone: [phone] Fax: [phone]      Initial Assessment (most recent)     Adult Discharge Assessment - 01/14/22 1028        Discharge Assessment    Assessment Type Discharge Planning Assessment     Source of Information family     If unable to respond/provide information was family/caregiver contacted? Yes     Contact Name/Number nazia munoz, brandon     Lives With alone     Do you expect to return to your current living situation? Yes     Equipment Currently Used at Home walker, standard     Do you currently have service(s) that help you manage your care at home? Yes     Name and Contact number of agency [facility]     Who is going to help you get home at discharge? brandon torres     Discharge Plan A Home Health     Discharge Plan B Home Health     DME Needed Upon Discharge  none     Discharge Plan discussed with: Adult children     Discharge Barriers Identified None                 SS spoke with pt's daughter Nazia Munoz. States pt lives at home alone. Pt is current with [facility]. Pt has a walker at home but does not currently use it. Plan at d/c is to return home  with [facility], choice obtained. IM obtained. SS following for d/c needs.

## 2022-01-14 NOTE — ASSESSMENT & PLAN NOTE
- wound culture previously positive for Pseudomonas on 12/21/21  - wound failed to respond to oral therapy and unsure if patient gave herself any IV meds at home  - repeat wound culture (pend)  - blood culture X 2 (pend)  - Levaquin 500 mg IV q 24 hrs; deescalate upon review of final wound cxs/sensitivites

## 2022-01-14 NOTE — ASSESSMENT & PLAN NOTE
- wound care consult placed  - Tylenol for mild pain  - Norco 7.5 mg PO q 6 hrs PRN for moderate to severe pain

## 2022-01-14 NOTE — ASSESSMENT & PLAN NOTE
- BP currently elevated at 179/77  - continue patient's home BP medication of Losartan 50 mg PO daily  - hydralazine 10 mg IV q 6 hrs PRN for systolic BP > 180 or diastolic BP > 110

## 2022-01-14 NOTE — SUBJECTIVE & OBJECTIVE
Past Medical History:   Diagnosis Date    Anxiety     Asthma     CVA (cerebral vascular accident) 05/23/2018    DVT (deep venous thrombosis) 2019    Epilepsy     Hyperlipidemia     Hypertension     Hypothyroid     PVD (peripheral vascular disease)     Venous stasis        Past Surgical History:   Procedure Laterality Date    KNEE ARTHROSCOPY Left     x 2    LASER ABLATION Right 04/20/2018    [facility] LASER ABLATION PERFORMED BY DR. KARMA BUTCHER.    LASER ABLATION Left 04/27/2018    [facility] LASER ABLATION PERFORMED BY DR. KARMA BUTCHER.    LASER ABLATION Right 12/17/2020    [facility] ( DUPLICATED )LASER ABLATION PERFORMED BY DR. KARMA BUTCHER.    LASER ABLATION Left 01/08/2021    [facility] LASER ABLATION PERFORMED BY DR. KARMA BUTCHER.    Remove cataracts, insert lens Bilateral     SHOULDER ARTHROSCOPY Left     SINUS SURGERY      TOTAL ABDOMINAL HYSTERECTOMY W/ BILATERAL SALPINGOOPHORECTOMY      VENOUS ABLATION Right 02/28/2020    [facility] VARITHENA ABLATION PERFORMED BY DR. KARMA BUTCHER.       Review of patient's allergies indicates:   Allergen Reactions    Lexapro [escitalopram]        No current facility-administered medications on file prior to encounter.     Current Outpatient Medications on File Prior to Encounter   Medication Sig    albuterol (PROVENTIL/VENTOLIN HFA) 90 mcg/actuation inhaler Inhale 2 puffs into the lungs every 6 (six) hours as needed for Wheezing. Rescue    albuterol 2 mg/5 mL syrup Take 5 mLs (2 mg total) by mouth 3 (three) times daily.    cilostazoL (PLETAL) 50 MG Tab Take 1 tablet (50 mg total) by mouth 2 (two) times daily.    clorazepate (TRANXENE) 3.75 MG Tab Take 1 tablet (3.75 mg total) by mouth 2 (two) times daily as needed.    divalproex ER (DEPAKOTE) 500 MG Tb24 TAKE 2 TABLETS BY MOUTH EACH NIGHT AT BEDTIME    ferrous sulfate (FEOSOL) 325 mg (65 mg iron) Tab tablet Take 1 tablet (325 mg total) by mouth daily with breakfast.    furosemide (LASIX) 20 MG tablet Take 1 tablet (20  mg total) by mouth once daily.    HYDROcodone-acetaminophen (NORCO) 5-325 mg per tablet Take 1 tablet by mouth every 6 (six) hours as needed for Pain.    losartan (COZAAR) 50 MG tablet Take 1 tablet (50 mg total) by mouth once daily.     Family History     Problem Relation (Age of Onset)    Alzheimer's disease Mother    Cancer Brother    Diabetes Sister, Brother    Endometrial cancer Mother, Sister    Heart disease Mother, Daughter    Hypertension Sister    Lung cancer Father    No Known Problems Son, Son, Son, Sister, Sister, Sister, Sister, Sister    Polycystic kidney disease Sister    Seizures Brother    Sickle cell anemia Daughter    Thyroid disease Sister        Tobacco Use    Smoking status: Never Smoker    Smokeless tobacco: Never Used   Substance and Sexual Activity    Alcohol use: Never    Drug use: Never    Sexual activity: Not Currently     Review of Systems   Constitutional: Positive for chills and fever. Negative for appetite change, diaphoresis and fatigue.   HENT: Negative for congestion, dental problem, ear pain, hearing loss, mouth sores, nosebleeds, postnasal drip, rhinorrhea, sinus pain, sneezing, sore throat and trouble swallowing.    Eyes: Negative for photophobia, pain, redness and visual disturbance.   Respiratory: Negative for cough, shortness of breath and wheezing.    Cardiovascular: Negative for chest pain, palpitations and leg swelling.   Gastrointestinal: Positive for nausea. Negative for abdominal pain, blood in stool, constipation, diarrhea and vomiting.   Endocrine: Negative for cold intolerance and heat intolerance.   Genitourinary: Negative for difficulty urinating, dysuria, flank pain, frequency, genital sores and hematuria.   Musculoskeletal: Negative for arthralgias, back pain, gait problem, myalgias, neck pain and neck stiffness.   Skin: Positive for wound. Negative for color change and rash.   Neurological: Negative for dizziness, tremors, seizures, syncope, facial  asymmetry, speech difficulty, weakness, light-headedness, numbness and headaches.   Hematological: Does not bruise/bleed easily.   Psychiatric/Behavioral: Negative for agitation, confusion, hallucinations and suicidal ideas. The patient is not nervous/anxious.      Objective:     Vital Signs (Most Recent):  Temp: 98.8 °F (37.1 °C) (01/13/22 1713)  Pulse: 93 (01/13/22 2000)  Resp: 17 (01/13/22 2000)  BP: (!) 181/84 (01/13/22 2000)  SpO2: 100 % (01/13/22 2000) Vital Signs (24h Range):  Temp:  [98.2 °F (36.8 °C)-98.8 °F (37.1 °C)] 98.8 °F (37.1 °C)  Pulse:  [82-96] 93  Resp:  [17-18] 17  SpO2:  [100 %] 100 %  BP: (124-181)/(60-84) 181/84     Weight: 83.9 kg (185 lb)  Body mass index is 29.86 kg/m².    Physical Exam  Vitals reviewed.   Constitutional:       General: She is not in acute distress.     Appearance: She is not ill-appearing, toxic-appearing or diaphoretic.   HENT:      Head: Normocephalic and atraumatic.      Right Ear: External ear normal.      Left Ear: External ear normal.      Nose: Nose normal.      Mouth/Throat:      Mouth: Mucous membranes are moist.   Eyes:      General: No scleral icterus.     Extraocular Movements: Extraocular movements intact.      Conjunctiva/sclera: Conjunctivae normal.      Pupils: Pupils are equal, round, and reactive to light.   Neck:      Vascular: No carotid bruit.   Cardiovascular:      Rate and Rhythm: Normal rate and regular rhythm.      Pulses: Normal pulses.      Heart sounds: Normal heart sounds. No murmur heard.  No friction rub. No gallop.    Pulmonary:      Effort: Pulmonary effort is normal. No respiratory distress.      Breath sounds: Normal breath sounds. No wheezing or rales.   Abdominal:      General: Abdomen is flat. Bowel sounds are normal.      Palpations: Abdomen is soft.      Tenderness: There is no abdominal tenderness.   Skin:     General: Skin is warm.      Capillary Refill: Capillary refill takes less than 2 seconds.      Coloration: Skin is not  jaundiced or pale.      Findings: Erythema and lesion present. No bruising or rash.      Comments: Large venous stasis ulcer on the medial shin with surrounding erythema. Warm to the touch.   Neurological:      General: No focal deficit present.      Mental Status: She is alert and oriented to person, place, and time.      Cranial Nerves: No cranial nerve deficit.      Sensory: No sensory deficit.      Motor: No weakness.   Psychiatric:         Mood and Affect: Mood normal.         Behavior: Behavior normal.           CRANIAL NERVES     CN III, IV, VI   Pupils are equal, round, and reactive to light.       Significant Labs: All pertinent labs within the past 24 hours have been reviewed.    Significant Imaging: I have reviewed all pertinent imaging results/findings within the past 24 hours.

## 2022-01-14 NOTE — HPI
Patient is a 76 year old female presenting to [facility] ED from Dr. Cisneros's office due to infected venous stasis ulcer of the left leg. He has been treating the wound since 12/21/21. The wound initially grew Pseudomonas from the wound culture (sensitivity: pansensitive). Oral antibiotics were tried and the wound failed to respond, then IV antibiotics were attempted at home but the patient was unable to successfully administer the IV antibiotics to herself. When seen at Dr. Cisneros's clinic, he was concerned for erythema and warmth spreading from the wound, and wound size increasing itself. Upon talking with the patient, she endorses pain at the wound site 10/10, subjective fever, chills, nausea. Vitals were stable. No leukocytosis present. Lactic acid level is 0.8. Blood cultures X 2 have been collected, and the patient was started on Levaquin while in the ED.    Patient will be admitted inpatient to the hospital service for further evaluation and management of infected venous stasis ulcer of the left lower extremity.

## 2022-01-15 LAB
BASOPHILS # BLD AUTO: 0.04 K/UL (ref 0–0.2)
BASOPHILS NFR BLD AUTO: 0.7 % (ref 0–1)
DIFFERENTIAL METHOD BLD: ABNORMAL
EOSINOPHIL # BLD AUTO: 0.17 K/UL (ref 0–0.5)
EOSINOPHIL NFR BLD AUTO: 3.1 % (ref 1–4)
ERYTHROCYTE [DISTWIDTH] IN BLOOD BY AUTOMATED COUNT: 15 % (ref 11.5–14.5)
HCT VFR BLD AUTO: 37.2 % (ref 38–47)
HGB BLD-MCNC: 12.4 G/DL (ref 12–16)
IMM GRANULOCYTES # BLD AUTO: 0.06 K/UL (ref 0–0.04)
IMM GRANULOCYTES NFR BLD: 1.1 % (ref 0–0.4)
LYMPHOCYTES # BLD AUTO: 1.74 K/UL (ref 1–4.8)
LYMPHOCYTES NFR BLD AUTO: 31.5 % (ref 27–41)
MCH RBC QN AUTO: 26.6 PG (ref 27–31)
MCHC RBC AUTO-ENTMCNC: 33.3 G/DL (ref 32–36)
MCV RBC AUTO: 79.7 FL (ref 80–96)
MONOCYTES # BLD AUTO: 0.64 K/UL (ref 0–0.8)
MONOCYTES NFR BLD AUTO: 11.6 % (ref 2–6)
MPC BLD CALC-MCNC: 10.4 FL (ref 9.4–12.4)
NEUTROPHILS # BLD AUTO: 2.88 K/UL (ref 1.8–7.7)
NEUTROPHILS NFR BLD AUTO: 52 % (ref 53–65)
NRBC # BLD AUTO: 0 X10E3/UL
NRBC, AUTO (.00): 0 %
PLATELET # BLD AUTO: 321 K/UL (ref 150–400)
RBC # BLD AUTO: 4.67 M/UL (ref 4.2–5.4)
WBC # BLD AUTO: 5.53 K/UL (ref 4.5–11)

## 2022-01-15 PROCEDURE — 63600175 PHARM REV CODE 636 W HCPCS: Performed by: FAMILY MEDICINE

## 2022-01-15 PROCEDURE — 25000003 PHARM REV CODE 250: Performed by: FAMILY MEDICINE

## 2022-01-15 PROCEDURE — 36415 COLL VENOUS BLD VENIPUNCTURE: CPT | Performed by: FAMILY MEDICINE

## 2022-01-15 PROCEDURE — 99232 SBSQ HOSP IP/OBS MODERATE 35: CPT | Mod: ,,, | Performed by: FAMILY MEDICINE

## 2022-01-15 PROCEDURE — 85025 COMPLETE CBC W/AUTO DIFF WBC: CPT | Performed by: FAMILY MEDICINE

## 2022-01-15 PROCEDURE — 11000001 HC ACUTE MED/SURG PRIVATE ROOM

## 2022-01-15 PROCEDURE — 25000003 PHARM REV CODE 250: Performed by: HOSPITALIST

## 2022-01-15 PROCEDURE — 63600175 PHARM REV CODE 636 W HCPCS: Performed by: HOSPITALIST

## 2022-01-15 PROCEDURE — 99232 PR SUBSEQUENT HOSPITAL CARE,LEVL II: ICD-10-PCS | Mod: ,,, | Performed by: FAMILY MEDICINE

## 2022-01-15 RX ADMIN — ENOXAPARIN SODIUM 40 MG: 40 INJECTION SUBCUTANEOUS at 05:01

## 2022-01-15 RX ADMIN — DIVALPROEX SODIUM 1000 MG: 500 TABLET, FILM COATED, EXTENDED RELEASE ORAL at 09:01

## 2022-01-15 RX ADMIN — CEFEPIME HYDROCHLORIDE 1 G: 1 INJECTION, POWDER, FOR SOLUTION INTRAMUSCULAR; INTRAVENOUS at 03:01

## 2022-01-15 RX ADMIN — LEVOFLOXACIN 500 MG: 5 INJECTION, SOLUTION INTRAVENOUS at 05:01

## 2022-01-15 RX ADMIN — FUROSEMIDE 20 MG: 20 TABLET ORAL at 09:01

## 2022-01-15 RX ADMIN — HYDROCODONE BITARTRATE AND ACETAMINOPHEN 1 TABLET: 7.5; 325 TABLET ORAL at 12:01

## 2022-01-15 RX ADMIN — CEFEPIME HYDROCHLORIDE 1 G: 1 INJECTION, POWDER, FOR SOLUTION INTRAMUSCULAR; INTRAVENOUS at 09:01

## 2022-01-15 RX ADMIN — CEFEPIME HYDROCHLORIDE 1 G: 1 INJECTION, POWDER, FOR SOLUTION INTRAMUSCULAR; INTRAVENOUS at 05:01

## 2022-01-15 RX ADMIN — HYDROCODONE BITARTRATE AND ACETAMINOPHEN 1 TABLET: 7.5; 325 TABLET ORAL at 09:01

## 2022-01-15 RX ADMIN — HYDROCODONE BITARTRATE AND ACETAMINOPHEN 1 TABLET: 7.5; 325 TABLET ORAL at 05:01

## 2022-01-15 RX ADMIN — CILOSTAZOL 50 MG: 50 TABLET ORAL at 09:01

## 2022-01-15 RX ADMIN — LOSARTAN POTASSIUM 50 MG: 50 TABLET, FILM COATED ORAL at 09:01

## 2022-01-15 RX ADMIN — FERROUS SULFATE TAB 325 MG (65 MG ELEMENTAL FE) 1 EACH: 325 (65 FE) TAB at 09:01

## 2022-01-15 NOTE — SUBJECTIVE & OBJECTIVE
Interval History: Admitted last night with cellulitis LLE, cultures with psuedomonas. No complaints.     Review of Systems   Respiratory: Negative for shortness of breath.    Cardiovascular: Negative for chest pain.   Gastrointestinal: Negative for abdominal pain.     Objective:     Vital Signs (Most Recent):  Temp: 98 °F (36.7 °C) (01/14/22 1901)  Pulse: 91 (01/14/22 1901)  Resp: 18 (01/14/22 1901)  BP: (!) 142/65 (01/14/22 1901)  SpO2: 96 % (01/14/22 1901) Vital Signs (24h Range):  Temp:  [97 °F (36.1 °C)-98.7 °F (37.1 °C)] 98 °F (36.7 °C)  Pulse:  [82-94] 91  Resp:  [18] 18  SpO2:  [95 %-99 %] 96 %  BP: (133-148)/(52-83) 142/65     Weight: 83 kg (182 lb 15.7 oz)  Body mass index is 29.53 kg/m².  No intake or output data in the 24 hours ending 01/14/22 2203   Physical Exam  Vitals reviewed.   Constitutional:       General: She is not in acute distress.     Appearance: She is not ill-appearing, toxic-appearing or diaphoretic.   HENT:      Head: Normocephalic and atraumatic.      Right Ear: External ear normal.      Left Ear: External ear normal.      Nose: Nose normal.      Mouth/Throat:      Mouth: Mucous membranes are moist.   Eyes:      General: No scleral icterus.     Extraocular Movements: Extraocular movements intact.      Conjunctiva/sclera: Conjunctivae normal.      Pupils: Pupils are equal, round, and reactive to light.   Neck:      Vascular: No carotid bruit.   Cardiovascular:      Rate and Rhythm: Normal rate and regular rhythm.      Pulses: Normal pulses.      Heart sounds: Normal heart sounds. No murmur heard.  No friction rub. No gallop.    Pulmonary:      Effort: Pulmonary effort is normal. No respiratory distress.      Breath sounds: Normal breath sounds. No wheezing or rales.   Abdominal:      General: Abdomen is flat. Bowel sounds are normal.      Palpations: Abdomen is soft.      Tenderness: There is no abdominal tenderness.   Skin:     General: Skin is warm.      Capillary Refill: Capillary  refill takes less than 2 seconds.      Coloration: Skin is not jaundiced or pale.      Findings: Erythema and lesion present. No bruising or rash.      Comments: Large venous stasis ulcer on the medial shin with surrounding erythema. Warm to the touch.   Neurological:      General: No focal deficit present.      Mental Status: She is alert and oriented to person, place, and time.      Cranial Nerves: No cranial nerve deficit.      Sensory: No sensory deficit.      Motor: No weakness.   Psychiatric:         Mood and Affect: Mood normal.         Behavior: Behavior normal.         Significant Labs:   All pertinent labs within the past 24 hours have been reviewed.  BMP:   Recent Labs   Lab 01/13/22  1730   GLU 87      K 4.4   *   CO2 30   BUN 10   CREATININE 1.04*   CALCIUM 9.1     CBC:   Recent Labs   Lab 01/13/22  1730 01/14/22  0607   WBC 6.22 5.64   HGB 11.4* 10.5*   HCT 35.9* 32.6*    277       Significant Imaging: I have reviewed all pertinent imaging results/findings within the past 24 hours.

## 2022-01-15 NOTE — PROGRESS NOTES
[address]  Wound Care    Patient Name:  Umesh Lassiter   MRN:  [id number]  Date: 1/14/2022  Diagnosis: Cellulitis of leg    History:     Past Medical History:   Diagnosis Date    Anxiety     Asthma     CVA (cerebral vascular accident) 05/23/2018    DVT (deep venous thrombosis) 2019    Epilepsy     Hyperlipidemia     Hypertension     Hypothyroid     PVD (peripheral vascular disease)     Venous stasis        Social History     Socioeconomic History    Marital status:    Tobacco Use    Smoking status: Never Smoker    Smokeless tobacco: Never Used   Substance and Sexual Activity    Alcohol use: Never    Drug use: Never    Sexual activity: Not Currently       Precautions:     Allergies as of 01/13/2022 - Reviewed 01/13/2022   Allergen Reaction Noted    Lexapro [escitalopram]  04/12/2021       WOC Assessment Details/Treatment        01/14/22 1745   WOCN Assessment   WOCN Total Time (mins) 45   Visit Date 01/14/22   Visit Time 1745   Consult Type New   WOCN Speciality Wound   Wound venous   Number of Wounds 1   Intervention assessed;changed;applied;chart review;orders;team conference   Teaching on-going;complication;discharge   Skin Interventions   Device Skin Pressure Protection absorbent pad utilized/changed;adhesive use limited   Pressure Reduction Techniques frequent weight shift encouraged   Skin Protection adhesive use limited;skin sealant/moisture barrier applied;tubing/devices free from skin contact   Positioning   Body Position position changed independently   Head of Bed (HOB) Positioning HOB at 30-45 degrees   Positioning/Transfer Devices pillows;in use   Pressure Injury Prevention    Check Moisture Management Pad Not Applicable   Elevate Heels Technique Pillow   Check Medical Devices Done        Wound 01/13/22 1900 Venous Ulcer Left lower #1   Date First Assessed/Time First Assessed: 01/13/22 1900   Pre-existing: Yes  Primary Wound Type: Venous Ulcer   Side: Left  Orientation: lower  Wound Number: #1   Wound Image    Wound WDL ex   Dressing Appearance Moist drainage   Drainage Amount Moderate   Drainage Characteristics/Odor Yellow;Creamy;No odor   Appearance Pink;Red;Yellow;Moist;Fibrin   Tissue loss description Partial thickness   Red (%), Wound Tissue Color 10 %   Yellow (%), Wound Tissue Color 90 %   Periwound Area Macerated;Moist;Redness;Swelling;Warm;Denuded   Wound Edges Defined;Open   Wound Length (cm) 5 cm   Wound Width (cm) 3 cm   Wound Depth (cm) 0.1 cm   Wound Volume (cm^3) 1.5 cm^3   Wound Surface Area (cm^2) 15 cm^2   Care Cleansed with:;Wound cleanser;Applied:;Skin Barrier   Dressing Applied;Other (comment);Absorptive Pad;Rolled gauze  (DACC: sorbact)   Periwound Care Absorptive dressing applied;Dry periwound area maintained;Skin barrier film applied   Dressing Change Due 01/15/22     Left LE venous stasis ulcer present to medial gaitor region with hemosiderin staining noted to periwound. Patient went to see Dr. Cisneros today with worsening 6 month ulceration.     I attempted to educate on the importance of not standing/sitting for too long periods of time and to try to keep Le's elevated above the level of the heart when sitting. I also tried to encourage the patient to begin wearing compression hose at home once the cellulitis improves. She verbalized understanding.    Recommendations:    Left Lower Extremity venous stasis ulcer: DAILY  1. Cleanse with wound cleanser  2. Periwound: pat dry, spray no sting barrier spray and allow to air dry  3. Apply sorbact (double layer) to wound bed  4. Cover with ABD pad (cut into 1/4th).  5. Wrap in kerlix.  Date and time dressing.    Recommendations made to primary team for above. Orders placed.     01/14/2022

## 2022-01-15 NOTE — PROGRESS NOTES
[address] Medicine  Progress Note    Patient Name: Umesh Lassiter  MRN: [id number]  Patient Class: IP- Inpatient   Admission Date: 1/13/2022  Length of Stay: 1 days  Attending Physician: Dillon Alcantara Jr., MD  Primary Care Provider: MELANI Kuo        Subjective:     Principal Problem:Cellulitis of leg        HPI:  Patient is a 76 year old female presenting to [facility] ED from Dr. Cisneros's office due to infected venous stasis ulcer of the left leg. He has been treating the wound since 12/21/21. The wound initially grew Pseudomonas from the wound culture (sensitivity: pansensitive). Oral antibiotics were tried and the wound failed to respond, then IV antibiotics were attempted at home but the patient was unable to successfully administer the IV antibiotics to herself. When seen at Dr. Cisneros's clinic, he was concerned for erythema and warmth spreading from the wound, and wound size increasing itself. Upon talking with the patient, she endorses pain at the wound site 10/10, subjective fever, chills, nausea. Vitals were stable. No leukocytosis present. Lactic acid level is 0.8. Blood cultures X 2 have been collected, and the patient was started on Levaquin while in the ED.    Patient will be admitted inpatient to the hospital service for further evaluation and management of infected venous stasis ulcer of the left lower extremity.      Overview/Hospital Course:  No notes on file    Interval History: Admitted last night with cellulitis LLE, cultures with psuedomonas. No complaints.     Review of Systems   Respiratory: Negative for shortness of breath.    Cardiovascular: Negative for chest pain.   Gastrointestinal: Negative for abdominal pain.     Objective:     Vital Signs (Most Recent):  Temp: 98 °F (36.7 °C) (01/14/22 1901)  Pulse: 91 (01/14/22 1901)  Resp: 18 (01/14/22 1901)  BP: (!) 142/65 (01/14/22 1901)  SpO2: 96 % (01/14/22 1901) Vital  Signs (24h Range):  Temp:  [97 °F (36.1 °C)-98.7 °F (37.1 °C)] 98 °F (36.7 °C)  Pulse:  [82-94] 91  Resp:  [18] 18  SpO2:  [95 %-99 %] 96 %  BP: (133-148)/(52-83) 142/65     Weight: 83 kg (182 lb 15.7 oz)  Body mass index is 29.53 kg/m².  No intake or output data in the 24 hours ending 01/14/22 2203   Physical Exam  Vitals reviewed.   Constitutional:       General: She is not in acute distress.     Appearance: She is not ill-appearing, toxic-appearing or diaphoretic.   HENT:      Head: Normocephalic and atraumatic.      Right Ear: External ear normal.      Left Ear: External ear normal.      Nose: Nose normal.      Mouth/Throat:      Mouth: Mucous membranes are moist.   Eyes:      General: No scleral icterus.     Extraocular Movements: Extraocular movements intact.      Conjunctiva/sclera: Conjunctivae normal.      Pupils: Pupils are equal, round, and reactive to light.   Neck:      Vascular: No carotid bruit.   Cardiovascular:      Rate and Rhythm: Normal rate and regular rhythm.      Pulses: Normal pulses.      Heart sounds: Normal heart sounds. No murmur heard.  No friction rub. No gallop.    Pulmonary:      Effort: Pulmonary effort is normal. No respiratory distress.      Breath sounds: Normal breath sounds. No wheezing or rales.   Abdominal:      General: Abdomen is flat. Bowel sounds are normal.      Palpations: Abdomen is soft.      Tenderness: There is no abdominal tenderness.   Skin:     General: Skin is warm.      Capillary Refill: Capillary refill takes less than 2 seconds.      Coloration: Skin is not jaundiced or pale.      Findings: Erythema and lesion present. No bruising or rash.      Comments: Large venous stasis ulcer on the medial shin with surrounding erythema. Warm to the touch.   Neurological:      General: No focal deficit present.      Mental Status: She is alert and oriented to person, place, and time.      Cranial Nerves: No cranial nerve deficit.      Sensory: No sensory deficit.       Motor: No weakness.   Psychiatric:         Mood and Affect: Mood normal.         Behavior: Behavior normal.         Significant Labs:   All pertinent labs within the past 24 hours have been reviewed.  BMP:   Recent Labs   Lab 01/13/22  1730   GLU 87      K 4.4   *   CO2 30   BUN 10   CREATININE 1.04*   CALCIUM 9.1     CBC:   Recent Labs   Lab 01/13/22  1730 01/14/22  0607   WBC 6.22 5.64   HGB 11.4* 10.5*   HCT 35.9* 32.6*    277       Significant Imaging: I have reviewed all pertinent imaging results/findings within the past 24 hours.      Assessment/Plan:      * Cellulitis of leg  Cefepime/levaquin, f/u repeat cx.       Epilepsy  - continue home Depakote  - Valproic acid level (pend)      Venous ulcer of left leg  - wound care consult placed  - Tylenol for mild pain  - Norco 7.5 mg PO q 6 hrs PRN for moderate to severe pain      Hypertension  - BP currently elevated at 179/77  - continue patient's home BP medication of Losartan 50 mg PO daily  - hydralazine 10 mg IV q 6 hrs PRN for systolic BP > 180 or diastolic BP > 110        VTE Risk Mitigation (From admission, onward)         Ordered     enoxaparin injection 40 mg  Daily         01/13/22 2027     Place TERRY hose  Until discontinued         01/13/22 2027     IP VTE HIGH RISK PATIENT  Once         01/13/22 2027                Discharge Planning   CARMEN:      Code Status: Full Code   Is the patient medically ready for discharge?:     Reason for patient still in hospital (select all that apply): Treatment  Discharge Plan A: Home Health                  Dillon Alcantara Jr, MD  Department of Hospital Medicine   [address]

## 2022-01-15 NOTE — SUBJECTIVE & OBJECTIVE
Interval History: No new complaints. Leg pain is bothersome.     Review of Systems   Respiratory: Negative for shortness of breath.    Cardiovascular: Negative for chest pain.   Gastrointestinal: Negative for abdominal pain.     Objective:     Vital Signs (Most Recent):  Temp: 97 °F (36.1 °C) (01/15/22 1636)  Pulse: 89 (01/15/22 1636)  Resp: 18 (01/15/22 1733)  BP: (!) 127/55 (01/15/22 1636)  SpO2: 100 % (01/15/22 1636) Vital Signs (24h Range):  Temp:  [97 °F (36.1 °C)-98.6 °F (37 °C)] 97 °F (36.1 °C)  Pulse:  [74-91] 89  Resp:  [18-20] 18  SpO2:  [94 %-100 %] 100 %  BP: (126-142)/(55-69) 127/55     Weight: 88 kg (194 lb 0.1 oz)  Body mass index is 31.31 kg/m².    Intake/Output Summary (Last 24 hours) at 1/15/2022 1759  Last data filed at 1/15/2022 0015  Gross per 24 hour   Intake 400 ml   Output --   Net 400 ml      Physical Exam  Vitals reviewed.   Constitutional:       General: She is not in acute distress.     Appearance: She is not ill-appearing, toxic-appearing or diaphoretic.   HENT:      Head: Normocephalic and atraumatic.      Right Ear: External ear normal.      Left Ear: External ear normal.      Nose: Nose normal.      Mouth/Throat:      Mouth: Mucous membranes are moist.   Eyes:      General: No scleral icterus.     Extraocular Movements: Extraocular movements intact.      Conjunctiva/sclera: Conjunctivae normal.      Pupils: Pupils are equal, round, and reactive to light.   Neck:      Vascular: No carotid bruit.   Cardiovascular:      Rate and Rhythm: Normal rate and regular rhythm.      Pulses: Normal pulses.      Heart sounds: Normal heart sounds. No murmur heard.  No friction rub. No gallop.    Pulmonary:      Effort: Pulmonary effort is normal. No respiratory distress.      Breath sounds: Normal breath sounds. No wheezing or rales.   Abdominal:      General: Abdomen is flat. Bowel sounds are normal.      Palpations: Abdomen is soft.      Tenderness: There is no abdominal tenderness.   Skin:      General: Skin is warm.      Capillary Refill: Capillary refill takes less than 2 seconds.      Coloration: Skin is not jaundiced or pale.      Findings: Erythema and lesion present. No bruising or rash.      Comments: Large venous stasis ulcer on the medial shin with improving surrounding erythema.    Neurological:      General: No focal deficit present.      Mental Status: She is alert and oriented to person, place, and time.      Cranial Nerves: No cranial nerve deficit.      Sensory: No sensory deficit.      Motor: No weakness.   Psychiatric:         Mood and Affect: Mood normal.         Behavior: Behavior normal.         Significant Labs:   All pertinent labs within the past 24 hours have been reviewed.  BMP: No results for input(s): GLU, NA, K, CL, CO2, BUN, CREATININE, CALCIUM, MG in the last 48 hours.  CBC:   Recent Labs   Lab 01/14/22  0607 01/15/22  0826   WBC 5.64 5.53   HGB 10.5* 12.4   HCT 32.6* 37.2*    321       Significant Imaging: I have reviewed all pertinent imaging results/findings within the past 24 hours.

## 2022-01-15 NOTE — PLAN OF CARE
Problem: Adult Inpatient Plan of Care  Goal: Plan of Care Review  Outcome: Ongoing, Progressing  Goal: Patient-Specific Goal (Individualized)  Outcome: Ongoing, Progressing  Goal: Absence of Hospital-Acquired Illness or Injury  Outcome: Ongoing, Progressing  Goal: Optimal Comfort and Wellbeing  Outcome: Ongoing, Progressing  Goal: Readiness for Transition of Care  Outcome: Ongoing, Progressing     Problem: Impaired Wound Healing  Goal: Optimal Wound Healing  Outcome: Ongoing, Progressing

## 2022-01-16 LAB
BASOPHILS # BLD AUTO: 0.04 K/UL (ref 0–0.2)
BASOPHILS NFR BLD AUTO: 0.6 % (ref 0–1)
DIFFERENTIAL METHOD BLD: ABNORMAL
EOSINOPHIL # BLD AUTO: 0.25 K/UL (ref 0–0.5)
EOSINOPHIL NFR BLD AUTO: 4 % (ref 1–4)
ERYTHROCYTE [DISTWIDTH] IN BLOOD BY AUTOMATED COUNT: 15 % (ref 11.5–14.5)
HCT VFR BLD AUTO: 38 % (ref 38–47)
HGB BLD-MCNC: 12.2 G/DL (ref 12–16)
IMM GRANULOCYTES # BLD AUTO: 0.07 K/UL (ref 0–0.04)
IMM GRANULOCYTES NFR BLD: 1.1 % (ref 0–0.4)
LYMPHOCYTES # BLD AUTO: 2.22 K/UL (ref 1–4.8)
LYMPHOCYTES NFR BLD AUTO: 35.2 % (ref 27–41)
MCH RBC QN AUTO: 26.2 PG (ref 27–31)
MCHC RBC AUTO-ENTMCNC: 32.1 G/DL (ref 32–36)
MCV RBC AUTO: 81.7 FL (ref 80–96)
MONOCYTES # BLD AUTO: 0.62 K/UL (ref 0–0.8)
MONOCYTES NFR BLD AUTO: 9.8 % (ref 2–6)
MPC BLD CALC-MCNC: 10.2 FL (ref 9.4–12.4)
NEUTROPHILS # BLD AUTO: 3.11 K/UL (ref 1.8–7.7)
NEUTROPHILS NFR BLD AUTO: 49.3 % (ref 53–65)
NRBC # BLD AUTO: 0 X10E3/UL
NRBC, AUTO (.00): 0 %
PLATELET # BLD AUTO: 284 K/UL (ref 150–400)
RBC # BLD AUTO: 4.65 M/UL (ref 4.2–5.4)
WBC # BLD AUTO: 6.31 K/UL (ref 4.5–11)

## 2022-01-16 PROCEDURE — 25000003 PHARM REV CODE 250: Performed by: HOSPITALIST

## 2022-01-16 PROCEDURE — 25000003 PHARM REV CODE 250: Performed by: FAMILY MEDICINE

## 2022-01-16 PROCEDURE — 11000001 HC ACUTE MED/SURG PRIVATE ROOM

## 2022-01-16 PROCEDURE — 36415 COLL VENOUS BLD VENIPUNCTURE: CPT | Performed by: FAMILY MEDICINE

## 2022-01-16 PROCEDURE — 63600175 PHARM REV CODE 636 W HCPCS: Performed by: FAMILY MEDICINE

## 2022-01-16 PROCEDURE — 85025 COMPLETE CBC W/AUTO DIFF WBC: CPT | Performed by: FAMILY MEDICINE

## 2022-01-16 PROCEDURE — 63600175 PHARM REV CODE 636 W HCPCS: Performed by: HOSPITALIST

## 2022-01-16 PROCEDURE — 99232 PR SUBSEQUENT HOSPITAL CARE,LEVL II: ICD-10-PCS | Mod: ,,, | Performed by: FAMILY MEDICINE

## 2022-01-16 PROCEDURE — 99232 SBSQ HOSP IP/OBS MODERATE 35: CPT | Mod: ,,, | Performed by: FAMILY MEDICINE

## 2022-01-16 RX ADMIN — CILOSTAZOL 50 MG: 50 TABLET ORAL at 10:01

## 2022-01-16 RX ADMIN — CEFEPIME HYDROCHLORIDE 1 G: 1 INJECTION, POWDER, FOR SOLUTION INTRAMUSCULAR; INTRAVENOUS at 05:01

## 2022-01-16 RX ADMIN — FUROSEMIDE 20 MG: 20 TABLET ORAL at 10:01

## 2022-01-16 RX ADMIN — LEVOFLOXACIN 500 MG: 5 INJECTION, SOLUTION INTRAVENOUS at 06:01

## 2022-01-16 RX ADMIN — CEFEPIME HYDROCHLORIDE 1 G: 1 INJECTION, POWDER, FOR SOLUTION INTRAMUSCULAR; INTRAVENOUS at 09:01

## 2022-01-16 RX ADMIN — FERROUS SULFATE TAB 325 MG (65 MG ELEMENTAL FE) 1 EACH: 325 (65 FE) TAB at 10:01

## 2022-01-16 RX ADMIN — CILOSTAZOL 50 MG: 50 TABLET ORAL at 09:01

## 2022-01-16 RX ADMIN — CEFEPIME HYDROCHLORIDE 1 G: 1 INJECTION, POWDER, FOR SOLUTION INTRAMUSCULAR; INTRAVENOUS at 01:01

## 2022-01-16 RX ADMIN — DIVALPROEX SODIUM 1000 MG: 500 TABLET, FILM COATED, EXTENDED RELEASE ORAL at 10:01

## 2022-01-16 RX ADMIN — LOSARTAN POTASSIUM 50 MG: 50 TABLET, FILM COATED ORAL at 10:01

## 2022-01-16 RX ADMIN — ENOXAPARIN SODIUM 40 MG: 40 INJECTION SUBCUTANEOUS at 06:01

## 2022-01-16 NOTE — SUBJECTIVE & OBJECTIVE
Interval History:  Leg less painful.     Review of Systems   Respiratory: Negative for shortness of breath.    Cardiovascular: Negative for chest pain.   Gastrointestinal: Negative for abdominal pain and nausea.     Objective:     Vital Signs (Most Recent):  Temp: 97.9 °F (36.6 °C) (01/16/22 0425)  Pulse: 75 (01/16/22 0425)  Resp: 18 (01/16/22 0425)  BP: 121/65 (01/16/22 0425)  SpO2: 97 % (01/16/22 0425) Vital Signs (24h Range):  Temp:  [97 °F (36.1 °C)-98.4 °F (36.9 °C)] 97.9 °F (36.6 °C)  Pulse:  [74-89] 75  Resp:  [18-19] 18  SpO2:  [93 %-100 %] 97 %  BP: (117-128)/(45-65) 121/65     Weight: 88.4 kg (194 lb 14.2 oz)  Body mass index is 31.46 kg/m².  No intake or output data in the 24 hours ending 01/16/22 0834   Physical Exam  Vitals reviewed.   Constitutional:       General: She is not in acute distress.     Appearance: She is not ill-appearing, toxic-appearing or diaphoretic.   HENT:      Head: Normocephalic and atraumatic.      Right Ear: External ear normal.      Left Ear: External ear normal.      Nose: Nose normal.      Mouth/Throat:      Mouth: Mucous membranes are moist.   Neck:      Vascular: No carotid bruit.   Cardiovascular:      Rate and Rhythm: Normal rate and regular rhythm.      Pulses: Normal pulses.      Heart sounds: Normal heart sounds. No murmur heard.  No friction rub. No gallop.    Pulmonary:      Effort: Pulmonary effort is normal. No respiratory distress.      Breath sounds: Normal breath sounds. No wheezing or rales.   Abdominal:      General: Abdomen is flat. Bowel sounds are normal.      Palpations: Abdomen is soft.      Tenderness: There is no abdominal tenderness.   Skin:     General: Skin is warm.      Capillary Refill: Capillary refill takes less than 2 seconds.      Coloration: Skin is not jaundiced or pale.      Findings: Erythema and lesion present. No bruising or rash.      Comments: Large venous stasis ulcer on the medial shin with improving surrounding erythema.     Neurological:      General: No focal deficit present.      Mental Status: She is alert and oriented to person, place, and time.      Cranial Nerves: No cranial nerve deficit.      Sensory: No sensory deficit.      Motor: No weakness.   Psychiatric:         Mood and Affect: Mood normal.         Behavior: Behavior normal.         Significant Labs:   All pertinent labs within the past 24 hours have been reviewed.  BMP: No results for input(s): GLU, NA, K, CL, CO2, BUN, CREATININE, CALCIUM, MG in the last 48 hours.  CBC:   Recent Labs   Lab 01/15/22  0826   WBC 5.53   HGB 12.4   HCT 37.2*          Significant Imaging: I have reviewed all pertinent imaging results/findings within the past 24 hours.

## 2022-01-16 NOTE — ASSESSMENT & PLAN NOTE
-- continue patient's home BP medication of Losartan 50 mg PO daily  - hydralazine 10 mg IV q 6 hrs PRN for systolic BP > 180 or diastolic BP > 110

## 2022-01-16 NOTE — PROGRESS NOTES
[address] Medicine  Progress Note    Patient Name: Umesh Lassiter  MRN: [id number]  Patient Class: IP- Inpatient   Admission Date: 1/13/2022  Length of Stay: 2 days  Attending Physician: Dillon Alcantara Jr., MD  Primary Care Provider: MELANI Kuo        Subjective:     Principal Problem:Cellulitis of leg        HPI:  Patient is a 76 year old female presenting to [facility] ED from Dr. Cisneros's office due to infected venous stasis ulcer of the left leg. He has been treating the wound since 12/21/21. The wound initially grew Pseudomonas from the wound culture (sensitivity: pansensitive). Oral antibiotics were tried and the wound failed to respond, then IV antibiotics were attempted at home but the patient was unable to successfully administer the IV antibiotics to herself. When seen at Dr. Cisneros's clinic, he was concerned for erythema and warmth spreading from the wound, and wound size increasing itself. Upon talking with the patient, she endorses pain at the wound site 10/10, subjective fever, chills, nausea. Vitals were stable. No leukocytosis present. Lactic acid level is 0.8. Blood cultures X 2 have been collected, and the patient was started on Levaquin while in the ED.    Patient will be admitted inpatient to the hospital service for further evaluation and management of infected venous stasis ulcer of the left lower extremity.      Overview/Hospital Course:  No notes on file    Interval History: No new complaints. Leg pain is bothersome.     Review of Systems   Respiratory: Negative for shortness of breath.    Cardiovascular: Negative for chest pain.   Gastrointestinal: Negative for abdominal pain.     Objective:     Vital Signs (Most Recent):  Temp: 97 °F (36.1 °C) (01/15/22 1636)  Pulse: 89 (01/15/22 1636)  Resp: 18 (01/15/22 1733)  BP: (!) 127/55 (01/15/22 1636)  SpO2: 100 % (01/15/22 1636) Vital Signs (24h Range):  Temp:  [97 °F (36.1  °C)-98.6 °F (37 °C)] 97 °F (36.1 °C)  Pulse:  [74-91] 89  Resp:  [18-20] 18  SpO2:  [94 %-100 %] 100 %  BP: (126-142)/(55-69) 127/55     Weight: 88 kg (194 lb 0.1 oz)  Body mass index is 31.31 kg/m².    Intake/Output Summary (Last 24 hours) at 1/15/2022 1758  Last data filed at 1/15/2022 0015  Gross per 24 hour   Intake 400 ml   Output --   Net 400 ml      Physical Exam  Vitals reviewed.   Constitutional:       General: She is not in acute distress.     Appearance: She is not ill-appearing, toxic-appearing or diaphoretic.   HENT:      Head: Normocephalic and atraumatic.      Right Ear: External ear normal.      Left Ear: External ear normal.      Nose: Nose normal.      Mouth/Throat:      Mouth: Mucous membranes are moist.   Eyes:      General: No scleral icterus.     Extraocular Movements: Extraocular movements intact.      Conjunctiva/sclera: Conjunctivae normal.      Pupils: Pupils are equal, round, and reactive to light.   Neck:      Vascular: No carotid bruit.   Cardiovascular:      Rate and Rhythm: Normal rate and regular rhythm.      Pulses: Normal pulses.      Heart sounds: Normal heart sounds. No murmur heard.  No friction rub. No gallop.    Pulmonary:      Effort: Pulmonary effort is normal. No respiratory distress.      Breath sounds: Normal breath sounds. No wheezing or rales.   Abdominal:      General: Abdomen is flat. Bowel sounds are normal.      Palpations: Abdomen is soft.      Tenderness: There is no abdominal tenderness.   Skin:     General: Skin is warm.      Capillary Refill: Capillary refill takes less than 2 seconds.      Coloration: Skin is not jaundiced or pale.      Findings: Erythema and lesion present. No bruising or rash.      Comments: Large venous stasis ulcer on the medial shin with improving surrounding erythema.    Neurological:      General: No focal deficit present.      Mental Status: She is alert and oriented to person, place, and time.      Cranial Nerves: No cranial nerve  deficit.      Sensory: No sensory deficit.      Motor: No weakness.   Psychiatric:         Mood and Affect: Mood normal.         Behavior: Behavior normal.         Significant Labs:   All pertinent labs within the past 24 hours have been reviewed.  BMP: No results for input(s): GLU, NA, K, CL, CO2, BUN, CREATININE, CALCIUM, MG in the last 48 hours.  CBC:   Recent Labs   Lab 01/14/22  0607 01/15/22  0826   WBC 5.64 5.53   HGB 10.5* 12.4   HCT 32.6* 37.2*    321       Significant Imaging: I have reviewed all pertinent imaging results/findings within the past 24 hours.      Assessment/Plan:      * Cellulitis of leg  Cefepime/levaquin, improving.  Add Norco for pain.       Epilepsy  - continue home Depakote  - Valproic acid level (pend)      Venous ulcer of left leg  - wound care consult placed  -    Hypertension  -- continue patient's home BP medication of Losartan 50 mg PO daily  - hydralazine 10 mg IV q 6 hrs PRN for systolic BP > 180 or diastolic BP > 110        VTE Risk Mitigation (From admission, onward)         Ordered     enoxaparin injection 40 mg  Daily         01/13/22 2027     Place TERRY hose  Until discontinued         01/13/22 2027     IP VTE HIGH RISK PATIENT  Once         01/13/22 2027                Discharge Planning   CARMEN:      Code Status: Full Code   Is the patient medically ready for discharge?:     Reason for patient still in hospital (select all that apply): Treatment  Discharge Plan A: Home Health                  Dillon Alcantara Jr, MD  Department of Hospital Medicine   [address]

## 2022-01-16 NOTE — PROGRESS NOTES
[address] Medicine  Progress Note    Patient Name: Umesh Lassiter  MRN: [id number]  Patient Class: IP- Inpatient   Admission Date: 1/13/2022  Length of Stay: 3 days  Attending Physician: Dillon Alcantara Jr., MD  Primary Care Provider: MELANI Kuo        Subjective:     Principal Problem:Cellulitis of leg        HPI:  Patient is a 76 year old female presenting to [facility] ED from Dr. Cisneros's office due to infected venous stasis ulcer of the left leg. He has been treating the wound since 12/21/21. The wound initially grew Pseudomonas from the wound culture (sensitivity: pansensitive). Oral antibiotics were tried and the wound failed to respond, then IV antibiotics were attempted at home but the patient was unable to successfully administer the IV antibiotics to herself. When seen at Dr. Cisneros's clinic, he was concerned for erythema and warmth spreading from the wound, and wound size increasing itself. Upon talking with the patient, she endorses pain at the wound site 10/10, subjective fever, chills, nausea. Vitals were stable. No leukocytosis present. Lactic acid level is 0.8. Blood cultures X 2 have been collected, and the patient was started on Levaquin while in the ED.    Patient will be admitted inpatient to the hospital service for further evaluation and management of infected venous stasis ulcer of the left lower extremity.      Overview/Hospital Course:  No notes on file    Interval History:  Leg less painful.     Review of Systems   Respiratory: Negative for shortness of breath.    Cardiovascular: Negative for chest pain.   Gastrointestinal: Negative for abdominal pain and nausea.     Objective:     Vital Signs (Most Recent):  Temp: 97.9 °F (36.6 °C) (01/16/22 0425)  Pulse: 75 (01/16/22 0425)  Resp: 18 (01/16/22 0425)  BP: 121/65 (01/16/22 0425)  SpO2: 97 % (01/16/22 0425) Vital Signs (24h Range):  Temp:  [97 °F (36.1 °C)-98.4 °F (36.9  °C)] 97.9 °F (36.6 °C)  Pulse:  [74-89] 75  Resp:  [18-19] 18  SpO2:  [93 %-100 %] 97 %  BP: (117-128)/(45-65) 121/65     Weight: 88.4 kg (194 lb 14.2 oz)  Body mass index is 31.46 kg/m².  No intake or output data in the 24 hours ending 01/16/22 0834   Physical Exam  Vitals reviewed.   Constitutional:       General: She is not in acute distress.     Appearance: She is not ill-appearing, toxic-appearing or diaphoretic.   HENT:      Head: Normocephalic and atraumatic.      Right Ear: External ear normal.      Left Ear: External ear normal.      Nose: Nose normal.      Mouth/Throat:      Mouth: Mucous membranes are moist.   Neck:      Vascular: No carotid bruit.   Cardiovascular:      Rate and Rhythm: Normal rate and regular rhythm.      Pulses: Normal pulses.      Heart sounds: Normal heart sounds. No murmur heard.  No friction rub. No gallop.    Pulmonary:      Effort: Pulmonary effort is normal. No respiratory distress.      Breath sounds: Normal breath sounds. No wheezing or rales.   Abdominal:      General: Abdomen is flat. Bowel sounds are normal.      Palpations: Abdomen is soft.      Tenderness: There is no abdominal tenderness.   Skin:     General: Skin is warm.      Capillary Refill: Capillary refill takes less than 2 seconds.      Coloration: Skin is not jaundiced or pale.      Findings: Erythema and lesion present. No bruising or rash.      Comments: Large venous stasis ulcer on the medial shin with improving surrounding erythema.    Neurological:      General: No focal deficit present.      Mental Status: She is alert and oriented to person, place, and time.      Cranial Nerves: No cranial nerve deficit.      Sensory: No sensory deficit.      Motor: No weakness.   Psychiatric:         Mood and Affect: Mood normal.         Behavior: Behavior normal.         Significant Labs:   All pertinent labs within the past 24 hours have been reviewed.  BMP: No results for input(s): GLU, NA, K, CL, CO2, BUN, CREATININE,  CALCIUM, MG in the last 48 hours.  CBC:   Recent Labs   Lab 01/15/22  0826   WBC 5.53   HGB 12.4   HCT 37.2*          Significant Imaging: I have reviewed all pertinent imaging results/findings within the past 24 hours.      Assessment/Plan:      * Cellulitis of leg  Cefepime/levaquin, improving.  Continue IV cefepime plus levaquin for another day or two.     Epilepsy  - continue home Depakote  - Valproic acid level (pend)      Venous ulcer of left leg  - wound care consult placed. Appreciate recs.   -    Hypertension  -- continue patient's home BP medication of Losartan 50 mg PO daily  - hydralazine 10 mg IV q 6 hrs PRN for systolic BP > 180 or diastolic BP > 110        VTE Risk Mitigation (From admission, onward)         Ordered     enoxaparin injection 40 mg  Daily         01/13/22 2027     Place TERRY hose  Until discontinued         01/13/22 2027     IP VTE HIGH RISK PATIENT  Once         01/13/22 2027                Discharge Planning   CARMEN:      Code Status: Full Code   Is the patient medically ready for discharge?:     Reason for patient still in hospital (select all that apply): Treatment  Discharge Plan A: Home Health                  Dillon Alcantara Jr, MD  Department of Hospital Medicine   [address]

## 2022-01-17 LAB
BASOPHILS # BLD AUTO: 0.04 K/UL (ref 0–0.2)
BASOPHILS NFR BLD AUTO: 0.7 % (ref 0–1)
DIFFERENTIAL METHOD BLD: ABNORMAL
EOSINOPHIL # BLD AUTO: 0.24 K/UL (ref 0–0.5)
EOSINOPHIL NFR BLD AUTO: 4.4 % (ref 1–4)
ERYTHROCYTE [DISTWIDTH] IN BLOOD BY AUTOMATED COUNT: 14.9 % (ref 11.5–14.5)
GLUCOSE SERPL-MCNC: 107 MG/DL (ref 70–105)
HCT VFR BLD AUTO: 35.6 % (ref 38–47)
HGB BLD-MCNC: 11.6 G/DL (ref 12–16)
IMM GRANULOCYTES # BLD AUTO: 0.07 K/UL (ref 0–0.04)
IMM GRANULOCYTES NFR BLD: 1.3 % (ref 0–0.4)
LYMPHOCYTES # BLD AUTO: 1.71 K/UL (ref 1–4.8)
LYMPHOCYTES NFR BLD AUTO: 31 % (ref 27–41)
MCH RBC QN AUTO: 25.6 PG (ref 27–31)
MCHC RBC AUTO-ENTMCNC: 32.6 G/DL (ref 32–36)
MCV RBC AUTO: 78.4 FL (ref 80–96)
MONOCYTES # BLD AUTO: 0.77 K/UL (ref 0–0.8)
MONOCYTES NFR BLD AUTO: 14 % (ref 2–6)
MPC BLD CALC-MCNC: 10.6 FL (ref 9.4–12.4)
NEUTROPHILS # BLD AUTO: 2.68 K/UL (ref 1.8–7.7)
NEUTROPHILS NFR BLD AUTO: 48.6 % (ref 53–65)
NRBC # BLD AUTO: 0 X10E3/UL
NRBC, AUTO (.00): 0 %
PLATELET # BLD AUTO: 213 K/UL (ref 150–400)
RBC # BLD AUTO: 4.54 M/UL (ref 4.2–5.4)
WBC # BLD AUTO: 5.51 K/UL (ref 4.5–11)

## 2022-01-17 PROCEDURE — 99232 PR SUBSEQUENT HOSPITAL CARE,LEVL II: ICD-10-PCS | Mod: ,,, | Performed by: FAMILY MEDICINE

## 2022-01-17 PROCEDURE — 25000003 PHARM REV CODE 250: Performed by: HOSPITALIST

## 2022-01-17 PROCEDURE — 94761 N-INVAS EAR/PLS OXIMETRY MLT: CPT

## 2022-01-17 PROCEDURE — 63600175 PHARM REV CODE 636 W HCPCS: Performed by: FAMILY MEDICINE

## 2022-01-17 PROCEDURE — 11000001 HC ACUTE MED/SURG PRIVATE ROOM

## 2022-01-17 PROCEDURE — 82962 GLUCOSE BLOOD TEST: CPT

## 2022-01-17 PROCEDURE — 25000003 PHARM REV CODE 250: Performed by: FAMILY MEDICINE

## 2022-01-17 PROCEDURE — 99232 SBSQ HOSP IP/OBS MODERATE 35: CPT | Mod: ,,, | Performed by: FAMILY MEDICINE

## 2022-01-17 PROCEDURE — 85025 COMPLETE CBC W/AUTO DIFF WBC: CPT | Performed by: FAMILY MEDICINE

## 2022-01-17 PROCEDURE — 63600175 PHARM REV CODE 636 W HCPCS: Performed by: HOSPITALIST

## 2022-01-17 PROCEDURE — 36415 COLL VENOUS BLD VENIPUNCTURE: CPT | Performed by: FAMILY MEDICINE

## 2022-01-17 RX ADMIN — CEFEPIME HYDROCHLORIDE 1 G: 1 INJECTION, POWDER, FOR SOLUTION INTRAMUSCULAR; INTRAVENOUS at 05:01

## 2022-01-17 RX ADMIN — FERROUS SULFATE TAB 325 MG (65 MG ELEMENTAL FE) 1 EACH: 325 (65 FE) TAB at 10:01

## 2022-01-17 RX ADMIN — CEFEPIME HYDROCHLORIDE 1 G: 1 INJECTION, POWDER, FOR SOLUTION INTRAMUSCULAR; INTRAVENOUS at 09:01

## 2022-01-17 RX ADMIN — CILOSTAZOL 50 MG: 50 TABLET ORAL at 09:01

## 2022-01-17 RX ADMIN — LEVOFLOXACIN 500 MG: 5 INJECTION, SOLUTION INTRAVENOUS at 06:01

## 2022-01-17 RX ADMIN — ENOXAPARIN SODIUM 40 MG: 40 INJECTION SUBCUTANEOUS at 05:01

## 2022-01-17 RX ADMIN — CEFEPIME HYDROCHLORIDE 1 G: 1 INJECTION, POWDER, FOR SOLUTION INTRAMUSCULAR; INTRAVENOUS at 03:01

## 2022-01-17 RX ADMIN — FUROSEMIDE 20 MG: 20 TABLET ORAL at 10:01

## 2022-01-17 RX ADMIN — LOSARTAN POTASSIUM 50 MG: 50 TABLET, FILM COATED ORAL at 10:01

## 2022-01-17 RX ADMIN — DIVALPROEX SODIUM 1000 MG: 500 TABLET, FILM COATED, EXTENDED RELEASE ORAL at 10:01

## 2022-01-17 RX ADMIN — CILOSTAZOL 50 MG: 50 TABLET ORAL at 10:01

## 2022-01-17 NOTE — NURSING
As reported by off going Nurse (RIGO) pt stated she was tired of wearing her heart monitor. After explaining the benefit of the heart monitor pt still insisted that she was not going to wear her heart monitor. Will notify her doctor. Will continue to provide safe environment .

## 2022-01-18 LAB
BASOPHILS # BLD AUTO: 0.03 K/UL (ref 0–0.2)
BASOPHILS NFR BLD AUTO: 0.5 % (ref 0–1)
DIFFERENTIAL METHOD BLD: ABNORMAL
EOSINOPHIL # BLD AUTO: 0.25 K/UL (ref 0–0.5)
EOSINOPHIL NFR BLD AUTO: 4.3 % (ref 1–4)
ERYTHROCYTE [DISTWIDTH] IN BLOOD BY AUTOMATED COUNT: 15.1 % (ref 11.5–14.5)
HCT VFR BLD AUTO: 35.9 % (ref 38–47)
HGB BLD-MCNC: 11.8 G/DL (ref 12–16)
IMM GRANULOCYTES # BLD AUTO: 0.06 K/UL (ref 0–0.04)
IMM GRANULOCYTES NFR BLD: 1 % (ref 0–0.4)
LYMPHOCYTES # BLD AUTO: 1.99 K/UL (ref 1–4.8)
LYMPHOCYTES NFR BLD AUTO: 34.1 % (ref 27–41)
MCH RBC QN AUTO: 26.5 PG (ref 27–31)
MCHC RBC AUTO-ENTMCNC: 32.9 G/DL (ref 32–36)
MCV RBC AUTO: 80.5 FL (ref 80–96)
MONOCYTES # BLD AUTO: 0.87 K/UL (ref 0–0.8)
MONOCYTES NFR BLD AUTO: 14.9 % (ref 2–6)
MPC BLD CALC-MCNC: 10.8 FL (ref 9.4–12.4)
NEUTROPHILS # BLD AUTO: 2.63 K/UL (ref 1.8–7.7)
NEUTROPHILS NFR BLD AUTO: 45.2 % (ref 53–65)
NRBC # BLD AUTO: 0 X10E3/UL
NRBC, AUTO (.00): 0 %
PLATELET # BLD AUTO: 253 K/UL (ref 150–400)
RBC # BLD AUTO: 4.46 M/UL (ref 4.2–5.4)
WBC # BLD AUTO: 5.83 K/UL (ref 4.5–11)

## 2022-01-18 PROCEDURE — 85025 COMPLETE CBC W/AUTO DIFF WBC: CPT | Performed by: FAMILY MEDICINE

## 2022-01-18 PROCEDURE — 11000001 HC ACUTE MED/SURG PRIVATE ROOM

## 2022-01-18 PROCEDURE — 99232 SBSQ HOSP IP/OBS MODERATE 35: CPT | Mod: ,,, | Performed by: FAMILY MEDICINE

## 2022-01-18 PROCEDURE — 63600175 PHARM REV CODE 636 W HCPCS: Performed by: FAMILY MEDICINE

## 2022-01-18 PROCEDURE — 36415 COLL VENOUS BLD VENIPUNCTURE: CPT | Performed by: FAMILY MEDICINE

## 2022-01-18 PROCEDURE — 25000003 PHARM REV CODE 250: Performed by: FAMILY MEDICINE

## 2022-01-18 PROCEDURE — 25000003 PHARM REV CODE 250: Performed by: HOSPITALIST

## 2022-01-18 PROCEDURE — 63600175 PHARM REV CODE 636 W HCPCS: Performed by: HOSPITALIST

## 2022-01-18 PROCEDURE — 99232 PR SUBSEQUENT HOSPITAL CARE,LEVL II: ICD-10-PCS | Mod: ,,, | Performed by: FAMILY MEDICINE

## 2022-01-18 PROCEDURE — 94761 N-INVAS EAR/PLS OXIMETRY MLT: CPT

## 2022-01-18 RX ADMIN — DIVALPROEX SODIUM 1000 MG: 500 TABLET, FILM COATED, EXTENDED RELEASE ORAL at 09:01

## 2022-01-18 RX ADMIN — CEFEPIME HYDROCHLORIDE 1 G: 1 INJECTION, POWDER, FOR SOLUTION INTRAMUSCULAR; INTRAVENOUS at 06:01

## 2022-01-18 RX ADMIN — LEVOFLOXACIN 500 MG: 5 INJECTION, SOLUTION INTRAVENOUS at 06:01

## 2022-01-18 RX ADMIN — HYDROCODONE BITARTRATE AND ACETAMINOPHEN 1 TABLET: 7.5; 325 TABLET ORAL at 09:01

## 2022-01-18 RX ADMIN — FUROSEMIDE 20 MG: 20 TABLET ORAL at 09:01

## 2022-01-18 RX ADMIN — FERROUS SULFATE TAB 325 MG (65 MG ELEMENTAL FE) 1 EACH: 325 (65 FE) TAB at 09:01

## 2022-01-18 RX ADMIN — CEFEPIME HYDROCHLORIDE 1 G: 1 INJECTION, POWDER, FOR SOLUTION INTRAMUSCULAR; INTRAVENOUS at 09:01

## 2022-01-18 RX ADMIN — CILOSTAZOL 50 MG: 50 TABLET ORAL at 09:01

## 2022-01-18 RX ADMIN — ENOXAPARIN SODIUM 40 MG: 40 INJECTION SUBCUTANEOUS at 05:01

## 2022-01-18 RX ADMIN — LOSARTAN POTASSIUM 50 MG: 50 TABLET, FILM COATED ORAL at 09:01

## 2022-01-18 RX ADMIN — CEFEPIME HYDROCHLORIDE 1 G: 1 INJECTION, POWDER, FOR SOLUTION INTRAMUSCULAR; INTRAVENOUS at 02:01

## 2022-01-18 NOTE — PLAN OF CARE
Problem: Impaired Wound Healing  Goal: Optimal Wound Healing  Outcome: Ongoing, Progressing     Problem: Adult Inpatient Plan of Care  Goal: Readiness for Transition of Care  Outcome: Ongoing, Progressing     Problem: Adult Inpatient Plan of Care  Goal: Optimal Comfort and Wellbeing  Outcome: Ongoing, Progressing

## 2022-01-18 NOTE — PROGRESS NOTES
[address] Medicine  Progress Note    Patient Name: Umesh Lassiter  MRN: [id number]  Patient Class: IP- Inpatient   Admission Date: 1/13/2022  Length of Stay: 4 days  Attending Physician: Dillon Alcantara Jr., MD  Primary Care Provider: MELANI Kuo        Subjective:     Principal Problem:Cellulitis of leg        HPI:  Patient is a 76 year old female presenting to [facility] ED from Dr. Cisneros's office due to infected venous stasis ulcer of the left leg. He has been treating the wound since 12/21/21. The wound initially grew Pseudomonas from the wound culture (sensitivity: pansensitive). Oral antibiotics were tried and the wound failed to respond, then IV antibiotics were attempted at home but the patient was unable to successfully administer the IV antibiotics to herself. When seen at Dr. Cisneros's clinic, he was concerned for erythema and warmth spreading from the wound, and wound size increasing itself. Upon talking with the patient, she endorses pain at the wound site 10/10, subjective fever, chills, nausea. Vitals were stable. No leukocytosis present. Lactic acid level is 0.8. Blood cultures X 2 have been collected, and the patient was started on Levaquin while in the ED.    Patient will be admitted inpatient to the hospital service for further evaluation and management of infected venous stasis ulcer of the left lower extremity.      Overview/Hospital Course:  No notes on file    Interval History:  No new complaints. Leg less painful.     Review of Systems   Respiratory: Negative for shortness of breath.    Cardiovascular: Negative for chest pain.   Gastrointestinal: Negative for abdominal pain.     Objective:     Vital Signs (Most Recent):  Temp: 99 °F (37.2 °C) (01/17/22 1515)  Pulse: 65 (01/17/22 1515)  Resp: 20 (01/17/22 1515)  BP: (!) 92/52 (01/17/22 1515)  SpO2: 98 % (01/17/22 1515) Vital Signs (24h Range):  Temp:  [97.3 °F (36.3 °C)-99  °F (37.2 °C)] 99 °F (37.2 °C)  Pulse:  [65-90] 65  Resp:  [18-20] 20  SpO2:  [92 %-98 %] 98 %  BP: ()/(48-66) 92/52     Weight: 88.2 kg (194 lb 7.1 oz)  Body mass index is 31.38 kg/m².  No intake or output data in the 24 hours ending 01/17/22 1844   Physical Exam  Vitals reviewed.   Constitutional:       General: She is not in acute distress.     Appearance: She is not ill-appearing, toxic-appearing or diaphoretic.   HENT:      Head: Normocephalic and atraumatic.      Right Ear: External ear normal.      Left Ear: External ear normal.      Nose: Nose normal.      Mouth/Throat:      Mouth: Mucous membranes are moist.   Neck:      Vascular: No carotid bruit.   Cardiovascular:      Rate and Rhythm: Normal rate and regular rhythm.      Pulses: Normal pulses.      Heart sounds: Normal heart sounds. No murmur heard.  No friction rub. No gallop.    Pulmonary:      Effort: Pulmonary effort is normal. No respiratory distress.      Breath sounds: Normal breath sounds. No wheezing or rales.   Abdominal:      General: Abdomen is flat. Bowel sounds are normal.      Palpations: Abdomen is soft.      Tenderness: There is no abdominal tenderness.   Skin:     General: Skin is warm.      Capillary Refill: Capillary refill takes less than 2 seconds.      Coloration: Skin is not jaundiced or pale.      Findings: Erythema and lesion present. No bruising or rash.      Comments: Large venous stasis ulcer on the medial shin with improving surrounding erythema.    Neurological:      General: No focal deficit present.      Mental Status: She is alert and oriented to person, place, and time.      Cranial Nerves: No cranial nerve deficit.      Sensory: No sensory deficit.      Motor: No weakness.   Psychiatric:         Mood and Affect: Mood normal.         Behavior: Behavior normal.         Significant Labs:   All pertinent labs within the past 24 hours have been reviewed.  BMP: No results for input(s): GLU, NA, K, CL, CO2, BUN,  CREATININE, CALCIUM, MG in the last 48 hours.  CBC:   Recent Labs   Lab 01/16/22  0821 01/17/22  0624   WBC 6.31 5.51   HGB 12.2 11.6*   HCT 38.0 35.6*    213       Significant Imaging: I have reviewed all pertinent imaging results/findings within the past 24 hours.      Assessment/Plan:      * Cellulitis of leg  Cefepime/levaquin, improving.  Continue IV cefepime plus levaquin for another day.     Epilepsy  - continue home Depakote  - Valproic acid level (pend)      Venous ulcer of left leg  Follow up with wound care tomorrow for d/c recs.   -    Hypertension  -- continue patient's home BP medication of Losartan 50 mg PO daily  - hydralazine 10 mg IV q 6 hrs PRN for systolic BP > 180 or diastolic BP > 110        VTE Risk Mitigation (From admission, onward)         Ordered     enoxaparin injection 40 mg  Daily         01/13/22 2027     Place TERRY hose  Until discontinued         01/13/22 2027     IP VTE HIGH RISK PATIENT  Once         01/13/22 2027                Discharge Planning   CARMEN:      Code Status: Full Code   Is the patient medically ready for discharge?:     Reason for patient still in hospital (select all that apply): Treatment  Discharge Plan A: Home Health                  Dillon Alcantara Jr, MD  Department of Hospital Medicine   [address]

## 2022-01-18 NOTE — SUBJECTIVE & OBJECTIVE
Interval History:  No new complaints.  Leg feeling better.     Review of Systems   Respiratory: Negative for shortness of breath.    Cardiovascular: Negative for chest pain.   Gastrointestinal: Negative for abdominal pain.     Objective:     Vital Signs (Most Recent):  Temp: 98.1 °F (36.7 °C) (01/18/22 1619)  Pulse: 79 (01/18/22 1619)  Resp: 18 (01/18/22 1619)  BP: (!) 127/50 (01/18/22 1619)  SpO2: 98 % (01/18/22 1619) Vital Signs (24h Range):  Temp:  [97.2 °F (36.2 °C)-98.6 °F (37 °C)] 98.1 °F (36.7 °C)  Pulse:  [72-81] 79  Resp:  [18-20] 18  SpO2:  [97 %-98 %] 98 %  BP: (111-151)/(42-65) 127/50     Weight: 88.2 kg (194 lb 7.1 oz)  Body mass index is 31.38 kg/m².  No intake or output data in the 24 hours ending 01/18/22 1652   Physical Exam  Constitutional:       Appearance: Normal appearance.   Cardiovascular:      Rate and Rhythm: Normal rate and regular rhythm.      Heart sounds: Normal heart sounds.   Pulmonary:      Effort: Pulmonary effort is normal. No respiratory distress.      Breath sounds: Normal breath sounds. No wheezing.   Abdominal:      General: Abdomen is flat. There is no distension.      Palpations: Abdomen is soft.      Tenderness: There is no abdominal tenderness.   Skin:     General: Skin is warm and dry.      Comments: LLE ulcer much improved.  Wound base cleaning up nicely.    Neurological:      Mental Status: She is alert.         Significant Labs:   All pertinent labs within the past 24 hours have been reviewed.  BMP: No results for input(s): GLU, NA, K, CL, CO2, BUN, CREATININE, CALCIUM, MG in the last 48 hours.  CBC:   Recent Labs   Lab 01/17/22  0624 01/18/22  0233   WBC 5.51 5.83   HGB 11.6* 11.8*   HCT 35.6* 35.9*    253       Significant Imaging: I have reviewed all pertinent imaging results/findings within the past 24 hours.

## 2022-01-18 NOTE — PROGRESS NOTES
[address]  Wound Care    Patient Name:  Umesh Lassiter   MRN:  [id number]  Date: 1/18/2022  Diagnosis: Cellulitis of leg    History:     Past Medical History:   Diagnosis Date    Anxiety     Asthma     CVA (cerebral vascular accident) 05/23/2018    DVT (deep venous thrombosis) 2019    Epilepsy     Hyperlipidemia     Hypertension     Hypothyroid     PVD (peripheral vascular disease)     Venous stasis        Social History     Socioeconomic History    Marital status:    Tobacco Use    Smoking status: Never Smoker    Smokeless tobacco: Never Used   Substance and Sexual Activity    Alcohol use: Never    Drug use: Never    Sexual activity: Not Currently       Precautions:     Allergies as of 01/13/2022 - Reviewed 01/13/2022   Allergen Reaction Noted    Lexapro [escitalopram]  04/12/2021       WOC Assessment Details/Treatment      01/18/22 1616   WOCN Assessment   WOCN Total Time (mins) 45   Visit Date 01/18/22   Visit Time 1616   Consult Type Follow Up   WOCN Speciality Wound   Wound venous   Number of Wounds 1   Intervention assessed;changed;applied;chart review;team conference;orders   Teaching on-going;complication;discharge        Wound 01/13/22 1900 Venous Ulcer Left lower #1   Date First Assessed/Time First Assessed: 01/13/22 1900   Pre-existing: Yes  Primary Wound Type: Venous Ulcer  Side: Left  Orientation: lower  Wound Number: #1   Wound Image    Wound WDL ex   Dressing Appearance Dried drainage   Drainage Amount Small   Drainage Characteristics/Odor Serous;Yellow;No odor   Appearance Red;Yellow;Dry;Fibrin   Tissue loss description Partial thickness   Red (%), Wound Tissue Color 20 %   Yellow (%), Wound Tissue Color 80 %   Periwound Area Intact;Dry;Other (see comments)  (peeling)   Wound Edges Defined;Open   Wound Length (cm) 5 cm   Wound Width (cm) 2.7 cm   Wound Depth (cm) 0.1 cm   Wound Volume (cm^3) 1.35 cm^3   Wound Surface Area (cm^2) 13.5 cm^2   Care Cleansed  with:;Wound cleanser;Applied:;Skin Barrier   Dressing Applied;Other (comment);Absorptive Pad;Rolled gauze  (Sorbact)   Periwound Care Absorptive dressing applied;Skin barrier film applied   Dressing Change Due 01/20/22     Assessed wound with Dr. Alcantara. Wound has slightly decreased in size since last week. Periwound edema and erythema are now gone. Wound bed still has yellow, dry fibrin but it has improved since last week's assessment.    Pt encouraged once discharged to continue wearing compression hose at home and to keep f/u appt with Dr. Cisneros for close monitoring of this wound and prevention of further wounds.    Left Lower Extremity venous stasis ulcer: Every OTHER day  *remove previous dressing DRY.  1. Cleanse with wound cleanser  2. Periwound: pat dry, spray no sting barrier spray and allow to air dry  3. Apply sorbact (double layer) to wound bed  4. Cover with ABD pad (cut into 1/4th). *very important to help absorb extra drainage & prevent kerlix from soiling quickly.  5. Wrap in kerlix.  Date and time dressing.    Recommendations made to primary team for above. Orders placed.     01/18/2022

## 2022-01-18 NOTE — ASSESSMENT & PLAN NOTE
Cefepime/levaquin, improving.  Continue IV cefepime plus levaquin for another day. Anticipate d/c tomorrow.

## 2022-01-18 NOTE — PROGRESS NOTES
[address] Medicine  Progress Note    Patient Name: Umesh Lassiter  MRN: [id number]  Patient Class: IP- Inpatient   Admission Date: 1/13/2022  Length of Stay: 5 days  Attending Physician: Dillon Alcantara Jr., MD  Primary Care Provider: MELANI Kuo        Subjective:     Principal Problem:Cellulitis of leg        HPI:  Patient is a 76 year old female presenting to [facility] ED from Dr. Cisneros's office due to infected venous stasis ulcer of the left leg. He has been treating the wound since 12/21/21. The wound initially grew Pseudomonas from the wound culture (sensitivity: pansensitive). Oral antibiotics were tried and the wound failed to respond, then IV antibiotics were attempted at home but the patient was unable to successfully administer the IV antibiotics to herself. When seen at Dr. Cisneros's clinic, he was concerned for erythema and warmth spreading from the wound, and wound size increasing itself. Upon talking with the patient, she endorses pain at the wound site 10/10, subjective fever, chills, nausea. Vitals were stable. No leukocytosis present. Lactic acid level is 0.8. Blood cultures X 2 have been collected, and the patient was started on Levaquin while in the ED.    Patient will be admitted inpatient to the hospital service for further evaluation and management of infected venous stasis ulcer of the left lower extremity.      Overview/Hospital Course:  No notes on file    Interval History:  No new complaints.  Leg feeling better.     Review of Systems   Respiratory: Negative for shortness of breath.    Cardiovascular: Negative for chest pain.   Gastrointestinal: Negative for abdominal pain.     Objective:     Vital Signs (Most Recent):  Temp: 98.1 °F (36.7 °C) (01/18/22 1619)  Pulse: 79 (01/18/22 1619)  Resp: 18 (01/18/22 1619)  BP: (!) 127/50 (01/18/22 1619)  SpO2: 98 % (01/18/22 1619) Vital Signs (24h Range):  Temp:  [97.2 °F (36.2  °C)-98.6 °F (37 °C)] 98.1 °F (36.7 °C)  Pulse:  [72-81] 79  Resp:  [18-20] 18  SpO2:  [97 %-98 %] 98 %  BP: (111-151)/(42-65) 127/50     Weight: 88.2 kg (194 lb 7.1 oz)  Body mass index is 31.38 kg/m².  No intake or output data in the 24 hours ending 01/18/22 1652   Physical Exam  Constitutional:       Appearance: Normal appearance.   Cardiovascular:      Rate and Rhythm: Normal rate and regular rhythm.      Heart sounds: Normal heart sounds.   Pulmonary:      Effort: Pulmonary effort is normal. No respiratory distress.      Breath sounds: Normal breath sounds. No wheezing.   Abdominal:      General: Abdomen is flat. There is no distension.      Palpations: Abdomen is soft.      Tenderness: There is no abdominal tenderness.   Skin:     General: Skin is warm and dry.      Comments: LLE ulcer much improved.  Wound base cleaning up nicely.    Neurological:      Mental Status: She is alert.         Significant Labs:   All pertinent labs within the past 24 hours have been reviewed.  BMP: No results for input(s): GLU, NA, K, CL, CO2, BUN, CREATININE, CALCIUM, MG in the last 48 hours.  CBC:   Recent Labs   Lab 01/17/22  0624 01/18/22  0233   WBC 5.51 5.83   HGB 11.6* 11.8*   HCT 35.6* 35.9*    253       Significant Imaging: I have reviewed all pertinent imaging results/findings within the past 24 hours.      Assessment/Plan:      * Cellulitis of leg  Cefepime/levaquin, improving.  Continue IV cefepime plus levaquin for another day. Anticipate d/c tomorrow.      Epilepsy  - continue home Depakote  - Valproic acid level (pend)      Venous ulcer of left leg  Will arrange f/u with Dr. Cisneros.     Hypertension  -- continue patient's home BP medication of Losartan 50 mg PO daily  - hydralazine 10 mg IV q 6 hrs PRN for systolic BP > 180 or diastolic BP > 110        VTE Risk Mitigation (From admission, onward)         Ordered     enoxaparin injection 40 mg  Daily         01/13/22 2027     Place TERRY hose  Until discontinued          01/13/22 2027     IP VTE HIGH RISK PATIENT  Once         01/13/22 2027                Discharge Planning   CARMEN:      Code Status: Full Code   Is the patient medically ready for discharge?:     Reason for patient still in hospital (select all that apply): Treatment  Discharge Plan A: Home Health                  Dillon Alcantara Jr, MD  Department of Hospital Medicine   [address]

## 2022-01-18 NOTE — SUBJECTIVE & OBJECTIVE
Interval History:  No new complaints. Leg less painful.     Review of Systems   Respiratory: Negative for shortness of breath.    Cardiovascular: Negative for chest pain.   Gastrointestinal: Negative for abdominal pain.     Objective:     Vital Signs (Most Recent):  Temp: 99 °F (37.2 °C) (01/17/22 1515)  Pulse: 65 (01/17/22 1515)  Resp: 20 (01/17/22 1515)  BP: (!) 92/52 (01/17/22 1515)  SpO2: 98 % (01/17/22 1515) Vital Signs (24h Range):  Temp:  [97.3 °F (36.3 °C)-99 °F (37.2 °C)] 99 °F (37.2 °C)  Pulse:  [65-90] 65  Resp:  [18-20] 20  SpO2:  [92 %-98 %] 98 %  BP: ()/(48-66) 92/52     Weight: 88.2 kg (194 lb 7.1 oz)  Body mass index is 31.38 kg/m².  No intake or output data in the 24 hours ending 01/17/22 1844   Physical Exam  Vitals reviewed.   Constitutional:       General: She is not in acute distress.     Appearance: She is not ill-appearing, toxic-appearing or diaphoretic.   HENT:      Head: Normocephalic and atraumatic.      Right Ear: External ear normal.      Left Ear: External ear normal.      Nose: Nose normal.      Mouth/Throat:      Mouth: Mucous membranes are moist.   Neck:      Vascular: No carotid bruit.   Cardiovascular:      Rate and Rhythm: Normal rate and regular rhythm.      Pulses: Normal pulses.      Heart sounds: Normal heart sounds. No murmur heard.  No friction rub. No gallop.    Pulmonary:      Effort: Pulmonary effort is normal. No respiratory distress.      Breath sounds: Normal breath sounds. No wheezing or rales.   Abdominal:      General: Abdomen is flat. Bowel sounds are normal.      Palpations: Abdomen is soft.      Tenderness: There is no abdominal tenderness.   Skin:     General: Skin is warm.      Capillary Refill: Capillary refill takes less than 2 seconds.      Coloration: Skin is not jaundiced or pale.      Findings: Erythema and lesion present. No bruising or rash.      Comments: Large venous stasis ulcer on the medial shin with improving surrounding erythema.     Neurological:      General: No focal deficit present.      Mental Status: She is alert and oriented to person, place, and time.      Cranial Nerves: No cranial nerve deficit.      Sensory: No sensory deficit.      Motor: No weakness.   Psychiatric:         Mood and Affect: Mood normal.         Behavior: Behavior normal.         Significant Labs:   All pertinent labs within the past 24 hours have been reviewed.  BMP: No results for input(s): GLU, NA, K, CL, CO2, BUN, CREATININE, CALCIUM, MG in the last 48 hours.  CBC:   Recent Labs   Lab 01/16/22  0821 01/17/22  0624   WBC 6.31 5.51   HGB 12.2 11.6*   HCT 38.0 35.6*    213       Significant Imaging: I have reviewed all pertinent imaging results/findings within the past 24 hours.

## 2022-01-19 VITALS
RESPIRATION RATE: 18 BRPM | OXYGEN SATURATION: 98 % | DIASTOLIC BLOOD PRESSURE: 50 MMHG | SYSTOLIC BLOOD PRESSURE: 121 MMHG | BODY MASS INDEX: 31.25 KG/M2 | WEIGHT: 194.44 LBS | HEIGHT: 66 IN | HEART RATE: 77 BPM | TEMPERATURE: 98 F

## 2022-01-19 PROBLEM — L03.119 CELLULITIS OF LEG: Status: RESOLVED | Noted: 2022-01-13 | Resolved: 2022-01-19

## 2022-01-19 PROBLEM — L03.116 CELLULITIS OF LEFT LOWER EXTREMITY: Status: ACTIVE | Noted: 2022-01-13

## 2022-01-19 LAB
BACTERIA BLD CULT: NORMAL
BACTERIA BLD CULT: NORMAL
BASOPHILS # BLD AUTO: 0.04 K/UL (ref 0–0.2)
BASOPHILS NFR BLD AUTO: 0.7 % (ref 0–1)
DIFFERENTIAL METHOD BLD: ABNORMAL
EOSINOPHIL # BLD AUTO: 0.37 K/UL (ref 0–0.5)
EOSINOPHIL NFR BLD AUTO: 6.9 % (ref 1–4)
ERYTHROCYTE [DISTWIDTH] IN BLOOD BY AUTOMATED COUNT: 14.5 % (ref 11.5–14.5)
HCT VFR BLD AUTO: 34.6 % (ref 38–47)
HGB BLD-MCNC: 11.5 G/DL (ref 12–16)
IMM GRANULOCYTES # BLD AUTO: 0.07 K/UL (ref 0–0.04)
IMM GRANULOCYTES NFR BLD: 1.3 % (ref 0–0.4)
LYMPHOCYTES # BLD AUTO: 2.2 K/UL (ref 1–4.8)
LYMPHOCYTES NFR BLD AUTO: 41 % (ref 27–41)
MCH RBC QN AUTO: 26.4 PG (ref 27–31)
MCHC RBC AUTO-ENTMCNC: 33.2 G/DL (ref 32–36)
MCV RBC AUTO: 79.4 FL (ref 80–96)
MONOCYTES # BLD AUTO: 0.86 K/UL (ref 0–0.8)
MONOCYTES NFR BLD AUTO: 16 % (ref 2–6)
MPC BLD CALC-MCNC: 10.9 FL (ref 9.4–12.4)
NEUTROPHILS # BLD AUTO: 1.82 K/UL (ref 1.8–7.7)
NEUTROPHILS NFR BLD AUTO: 34.1 % (ref 53–65)
NRBC # BLD AUTO: 0 X10E3/UL
NRBC, AUTO (.00): 0 %
PLATELET # BLD AUTO: 231 K/UL (ref 150–400)
RBC # BLD AUTO: 4.36 M/UL (ref 4.2–5.4)
WBC # BLD AUTO: 5.36 K/UL (ref 4.5–11)

## 2022-01-19 PROCEDURE — 25000003 PHARM REV CODE 250: Performed by: FAMILY MEDICINE

## 2022-01-19 PROCEDURE — 36415 COLL VENOUS BLD VENIPUNCTURE: CPT | Performed by: FAMILY MEDICINE

## 2022-01-19 PROCEDURE — 63600175 PHARM REV CODE 636 W HCPCS: Performed by: HOSPITALIST

## 2022-01-19 PROCEDURE — 99239 PR HOSPITAL DISCHARGE DAY,>30 MIN: ICD-10-PCS | Mod: ,,, | Performed by: FAMILY MEDICINE

## 2022-01-19 PROCEDURE — 94761 N-INVAS EAR/PLS OXIMETRY MLT: CPT

## 2022-01-19 PROCEDURE — 25000003 PHARM REV CODE 250: Performed by: HOSPITALIST

## 2022-01-19 PROCEDURE — 85025 COMPLETE CBC W/AUTO DIFF WBC: CPT | Performed by: FAMILY MEDICINE

## 2022-01-19 PROCEDURE — 99239 HOSP IP/OBS DSCHRG MGMT >30: CPT | Mod: ,,, | Performed by: FAMILY MEDICINE

## 2022-01-19 RX ORDER — LEVOFLOXACIN 500 MG/1
500 TABLET, FILM COATED ORAL DAILY
Qty: 3 TABLET | Refills: 0 | Status: SHIPPED | OUTPATIENT
Start: 2022-01-19 | End: 2022-01-26 | Stop reason: ALTCHOICE

## 2022-01-19 RX ADMIN — CILOSTAZOL 50 MG: 50 TABLET ORAL at 09:01

## 2022-01-19 RX ADMIN — DIVALPROEX SODIUM 1000 MG: 500 TABLET, FILM COATED, EXTENDED RELEASE ORAL at 09:01

## 2022-01-19 RX ADMIN — FUROSEMIDE 20 MG: 20 TABLET ORAL at 09:01

## 2022-01-19 RX ADMIN — CEFEPIME HYDROCHLORIDE 1 G: 1 INJECTION, POWDER, FOR SOLUTION INTRAMUSCULAR; INTRAVENOUS at 05:01

## 2022-01-19 RX ADMIN — FERROUS SULFATE TAB 325 MG (65 MG ELEMENTAL FE) 1 EACH: 325 (65 FE) TAB at 09:01

## 2022-01-19 NOTE — NURSING
Discharge paperwork given and reviewed.  Patient voiced understanding.    Wound care instructions given and reviewed. Supplies to be sent with patient.  Voiced understanding.

## 2022-01-19 NOTE — DISCHARGE SUMMARY
[address] Medicine  Discharge Summary      Patient Name: Umesh Lassiter  MRN: [id number]  Patient Class: IP- Inpatient  Admission Date: 1/13/2022  Hospital Length of Stay: 6 days  Discharge Date and Time:  01/19/2022 9:42 AM  Attending Physician: Dillon Alcantara Jr., MD   Discharging Provider: Dillon Alcantara Jr, MD  Primary Care Provider: MELANI Kuo      HPI:   Patient is a 76 year old female presenting to [facility] ED from Dr. Cisneros's office due to infected venous stasis ulcer of the left leg. He has been treating the wound since 12/21/21. The wound initially grew Pseudomonas from the wound culture (sensitivity: pansensitive). Oral antibiotics were tried and the wound failed to respond, then IV antibiotics were attempted at home but the patient was unable to successfully administer the IV antibiotics to herself. When seen at Dr. Cisneros's clinic, he was concerned for erythema and warmth spreading from the wound, and wound size increasing itself. Upon talking with the patient, she endorses pain at the wound site 10/10, subjective fever, chills, nausea. Vitals were stable. No leukocytosis present. Lactic acid level is 0.8. Blood cultures X 2 have been collected, and the patient was started on Levaquin while in the ED.    Patient will be admitted inpatient to the hospital service for further evaluation and management of infected venous stasis ulcer of the left lower extremity.      * No surgery found *      Hospital Course:   Patient admitted with venous stasis ulcer LLE with surrounding cellulitis.  Recent outpatient cultures showed psuedomonas. She was covered with cefepime and levaquin and had a quick response.  Wound care consult was obtained from Ms Begum.  Wound treated with sorbact dressing with substantial improvement.  At this point she has reached maximum hospital benefit and is ready for discharge home. Will schedule f/u appt with  Dr. Cisneros in one week. Will give 3 more days of levaquin PO.        Goals of Care Treatment Preferences:  Code Status: Full Code      Consults:     Venous ulcer of left leg  Will arrange f/u with Dr. Cisneros. Continue wound care ulcers       Final Active Diagnoses:    Diagnosis Date Noted POA    Venous ulcer of left leg [I83.029, L97.929] 01/13/2022 Yes    Epilepsy [G40.909] 01/13/2022 Yes    Hypertension [I10] 01/07/2022 Yes      Problems Resolved During this Admission:    Diagnosis Date Noted Date Resolved POA    PRINCIPAL PROBLEM:  Cellulitis of leg [L03.119] 01/13/2022 01/19/2022 Yes    Pseudomonas infection [A49.8]  01/19/2022 Yes       Discharged Condition: good    Disposition: Home or Self Care    Follow Up:   Follow-up Information     Minh Cisneros DO In 1 week.    Specialties: Phlebotomy , Family Medicine  Contact information:  [address]  [facility] [id number] [phone]                       Patient Instructions:      Diet Adult Regular     Activity as tolerated       Significant Diagnostic Studies: Labs: BMP: No results for input(s): GLU, NA, K, CL, CO2, BUN, CREATININE, CALCIUM, MG in the last 48 hours.    Pending Diagnostic Studies:     Procedure Component Value Units Date/Time    EXTRA TUBES [[id number]] Collected: 01/19/22 0449    Order Status: Sent Lab Status: In process Updated: 01/19/22 0449    Specimen: Blood, Venous     Narrative:      The following orders were created for panel order EXTRA TUBES.  Procedure                               Abnormality         Status                     ---------                               -----------         ------                     Light Green Top Hold[[id number]]                             In process                   Please view results for these tests on the individual orders.    EXTRA TUBES [[id number]] Collected: 01/16/22 0927    Order Status: Sent Lab Status: In process Updated: 01/16/22 0927    Specimen: Blood, Venous      Narrative:      The following orders were created for panel order EXTRA TUBES.  Procedure                               Abnormality         Status                     ---------                               -----------         ------                     Light Green Top Hold[[id number]]                             In process                   Please view results for these tests on the individual orders.    EXTRA TUBES [[id number]] Collected: 01/15/22 0832    Order Status: Sent Lab Status: In process Updated: 01/15/22 0832    Specimen: Blood, Venous     Narrative:      The following orders were created for panel order EXTRA TUBES.  Procedure                               Abnormality         Status                     ---------                               -----------         ------                     Light Green Top Hold[[id number]]                             In process                   Please view results for these tests on the individual orders.    EXTRA TUBES [[id number]] Collected: 01/14/22 0718    Order Status: Sent Lab Status: In process Updated: 01/14/22 0718    Specimen: Blood, Venous     Narrative:      The following orders were created for panel order EXTRA TUBES.  Procedure                               Abnormality         Status                     ---------                               -----------         ------                     Light Green Top Hold[[id number]]                             In process                   Please view results for these tests on the individual orders.         Medications:  Reconciled Home Medications:      Medication List      START taking these medications    levoFLOXacin 500 MG tablet  Commonly known as: LEVAQUIN  Take 1 tablet (500 mg total) by mouth once daily.        CONTINUE taking these medications    * albuterol 90 mcg/actuation inhaler  Commonly known as: PROVENTIL/VENTOLIN HFA  Inhale 2 puffs into the lungs every 6 (six) hours as needed for Wheezing. Rescue     *  albuterol 2 mg/5 mL syrup  Take 5 mLs (2 mg total) by mouth 3 (three) times daily.     cilostazoL 50 MG Tab  Commonly known as: PLETAL  Take 1 tablet (50 mg total) by mouth 2 (two) times daily.     clorazepate 3.75 MG Tab  Commonly known as: TRANXENE  Take 1 tablet (3.75 mg total) by mouth 2 (two) times daily as needed.     divalproex  MG Tb24  Commonly known as: DEPAKOTE  TAKE 2 TABLETS BY MOUTH EACH NIGHT AT BEDTIME     ferrous sulfate 325 mg (65 mg iron) Tab tablet  Commonly known as: FEOSOL  Take 1 tablet (325 mg total) by mouth daily with breakfast.     furosemide 20 MG tablet  Commonly known as: LASIX  Take 1 tablet (20 mg total) by mouth once daily.     HYDROcodone-acetaminophen 5-325 mg per tablet  Commonly known as: NORCO  Take 1 tablet by mouth every 6 (six) hours as needed for Pain.     losartan 50 MG tablet  Commonly known as: COZAAR  Take 1 tablet (50 mg total) by mouth once daily.         * This list has 2 medication(s) that are the same as other medications prescribed for you. Read the directions carefully, and ask your doctor or other care provider to review them with you.                Indwelling Lines/Drains at time of discharge:   Lines/Drains/Airways     None                 Time spent on the discharge of patient: 32 minutes         Dillon Alcantara Jr, MD  Department of Hospital Medicine  [address]

## 2022-01-19 NOTE — PLAN OF CARE
[facility] - [facility] Telemetry  Discharge Final Note    Primary Care Provider: MELANI Kuo    Expected Discharge Date: 1/19/2022    Final Discharge Note (most recent)     Final Note - 01/19/22 0956        Final Note    Assessment Type Final Discharge Note     Anticipated Discharge Disposition Home-Health Care Svc        Post-Acute Status    Post-Acute Authorization Home Health     Home Health Status Set-up Complete/Auth obtained     Patient choice form signed by patient/caregiver List with quality metrics by geographic area provided;List from CMS Compare;List from System Post-Acute Care     Discharge Delays None known at this time                 Important Message from Medicare  Important Message from Medicare regarding Discharge Appeal Rights: Given to patient/caregiver,Explained to patient/caregiver,Signed/date by patient/caregiver     Date IMM was signed: 01/14/22  Time IMM was signed: 1024    Contact Info     Minh Cisneros DO   Specialty: Phlebotomy , Family Medicine    [address]  [facility]  [facility] [address]   Phone: [phone]       Next Steps: Follow up in 1 week(s)      pt to d/c home with [facility] today. No further needs. D/c summary faxed to [facility].

## 2022-01-19 NOTE — HOSPITAL COURSE
Patient admitted with venous stasis ulcer LLE with surrounding cellulitis.  Recent outpatient cultures showed psuedomonas. She was covered with cefepime and levaquin and had a quick response.  Wound care consult was obtained from Ms Begum.  Wound treated with sorbact dressing with substantial improvement.  At this point she has reached maximum hospital benefit and is ready for discharge home. Will schedule f/u appt with Dr. Cisneros in one week. Will give 3 more days of levaquin PO.

## 2022-01-20 RX ORDER — LEVOTHYROXINE SODIUM 50 UG/1
50 TABLET ORAL
COMMUNITY
End: 2022-01-20 | Stop reason: SDUPTHER

## 2022-01-21 RX ORDER — LEVOTHYROXINE SODIUM 50 UG/1
50 TABLET ORAL
Qty: 90 TABLET | Refills: 3 | Status: SHIPPED | OUTPATIENT
Start: 2022-01-21 | End: 2022-10-27 | Stop reason: SDUPTHER

## 2022-01-26 ENCOUNTER — OFFICE VISIT (OUTPATIENT)
Dept: VASCULAR SURGERY | Facility: CLINIC | Age: 77
End: 2022-01-26
Payer: COMMERCIAL

## 2022-01-26 VITALS
BODY MASS INDEX: 31.28 KG/M2 | RESPIRATION RATE: 118 BRPM | DIASTOLIC BLOOD PRESSURE: 70 MMHG | HEART RATE: 100 BPM | WEIGHT: 194.63 LBS | TEMPERATURE: 98 F | HEIGHT: 66 IN | SYSTOLIC BLOOD PRESSURE: 124 MMHG

## 2022-01-26 DIAGNOSIS — L81.9 HYPERPIGMENTATION OF SKIN: ICD-10-CM

## 2022-01-26 DIAGNOSIS — I87.2 VENOUS INSUFFICIENCY (CHRONIC) (PERIPHERAL): ICD-10-CM

## 2022-01-26 DIAGNOSIS — R60.0 LOWER EXTREMITY EDEMA: ICD-10-CM

## 2022-01-26 DIAGNOSIS — I87.312 STASIS EDEMA WITH ULCER, LEFT: Primary | ICD-10-CM

## 2022-01-26 DIAGNOSIS — L97.929 STASIS EDEMA WITH ULCER, LEFT: Primary | ICD-10-CM

## 2022-01-26 PROCEDURE — 25000003 PHARM REV CODE 250

## 2022-01-26 PROCEDURE — 1159F MED LIST DOCD IN RCRD: CPT | Mod: CPTII,,, | Performed by: FAMILY MEDICINE

## 2022-01-26 PROCEDURE — 1159F PR MEDICATION LIST DOCUMENTED IN MEDICAL RECORD: ICD-10-PCS | Mod: CPTII,,, | Performed by: FAMILY MEDICINE

## 2022-01-26 PROCEDURE — 99214 OFFICE O/P EST MOD 30 MIN: CPT | Mod: PBBFAC | Performed by: FAMILY MEDICINE

## 2022-01-26 PROCEDURE — 3074F PR MOST RECENT SYSTOLIC BLOOD PRESSURE < 130 MM HG: ICD-10-PCS | Mod: CPTII,,, | Performed by: FAMILY MEDICINE

## 2022-01-26 PROCEDURE — 99214 OFFICE O/P EST MOD 30 MIN: CPT | Mod: S$PBB,,, | Performed by: FAMILY MEDICINE

## 2022-01-26 PROCEDURE — 99214 PR OFFICE/OUTPT VISIT, EST, LEVL IV, 30-39 MIN: ICD-10-PCS | Mod: S$PBB,,, | Performed by: FAMILY MEDICINE

## 2022-01-26 PROCEDURE — 1160F RVW MEDS BY RX/DR IN RCRD: CPT | Mod: CPTII,,, | Performed by: FAMILY MEDICINE

## 2022-01-26 PROCEDURE — 1160F PR REVIEW ALL MEDS BY PRESCRIBER/CLIN PHARMACIST DOCUMENTED: ICD-10-PCS | Mod: CPTII,,, | Performed by: FAMILY MEDICINE

## 2022-01-26 PROCEDURE — 1111F PR DISCHARGE MEDS RECONCILED W/ CURRENT OUTPATIENT MED LIST: ICD-10-PCS | Mod: CPTII,,, | Performed by: FAMILY MEDICINE

## 2022-01-26 PROCEDURE — 3074F SYST BP LT 130 MM HG: CPT | Mod: CPTII,,, | Performed by: FAMILY MEDICINE

## 2022-01-26 PROCEDURE — 3078F PR MOST RECENT DIASTOLIC BLOOD PRESSURE < 80 MM HG: ICD-10-PCS | Mod: CPTII,,, | Performed by: FAMILY MEDICINE

## 2022-01-26 PROCEDURE — 3078F DIAST BP <80 MM HG: CPT | Mod: CPTII,,, | Performed by: FAMILY MEDICINE

## 2022-01-26 PROCEDURE — 1111F DSCHRG MED/CURRENT MED MERGE: CPT | Mod: CPTII,,, | Performed by: FAMILY MEDICINE

## 2022-01-26 RX ORDER — HYDROCODONE BITARTRATE AND ACETAMINOPHEN 5; 325 MG/1; MG/1
1 TABLET ORAL EVERY 6 HOURS PRN
Qty: 24 TABLET | Refills: 0 | Status: SHIPPED | OUTPATIENT
Start: 2022-01-26 | End: 2022-04-12

## 2022-01-26 RX ORDER — LIDOCAINE HYDROCHLORIDE 20 MG/ML
JELLY TOPICAL
Status: COMPLETED | OUTPATIENT
Start: 2022-01-26 | End: 2022-01-26

## 2022-01-26 RX ADMIN — LIDOCAINE HYDROCHLORIDE: 20 JELLY TOPICAL at 02:01

## 2022-01-26 NOTE — PROGRESS NOTES
[facility] CLINIC NOTE    Patient ID: Umesh Lassiter is a 76 y.o. female.    I. HISTORY     Chief Complaint:   Chief Complaint   Patient presents with    Hospital Follow Up     1 week hospital discharge    Wound Check        HPI: Umesh Lassiter is a 76 y.o. female who presents today for a 1 week post hospital discharge visit.  The patient was treated with 6 days of IV cefepime and Levaquin.  She was also treated by wound care with sore back dressing.  Wound shows substantial improvement and she was discharged home with 3 days of p.o. Levaquin.  Today, the patient states that she still having pain from the actual wound bed and then a kept her up all last night.  She is asking for pain pills today.  The periwound area looks much better with the drying up of the surrounding skin and scabbing over of previous skin breakdown.  Wound bed has moderate yellow slough.  No signs of active infection or cellulitis today.    Clinical summary:   The patient is known to our clinic but has not been seen since 03/02/2021 for a one-month follow-up status post left great saphenous vein ablation.  She has been lost to follow-up since.  She states that this ulceration of her left lower extremity has been present for approximately 6 months.  She states that his been persistent and worsening during that time.  She has not been wearing compression daily.  She has pitting edema, hyperpigmentation along with the ulceration of the left medial Gator region.  No signs of infection, including purulent drainage, increased temperature or foul odor.  She denies complaints of her right lower extremity.    Reflux study 12/21/2021 shows no evidence of DVT or superficial venous reflux.  Shows a well ablated proximal left great saphenous vein and ablated left small saphenous vein.     Left great saphenous vein laser ablation 01/08/2021.  Right great saphenous vein, duplicate, laser ablation 12/17/2020.  Right distal great saphenous vein erythema  ablation 02/28/2020.  Left small saphenous vein laser ablation 04/27/2018.  Right great saphenous vein laser ablation 04/20/2018.    Past Medical History:   Diagnosis Date    Anxiety     Asthma     CVA (cerebral vascular accident) 05/23/2018    DVT (deep venous thrombosis) 2019    Epilepsy     Hyperlipidemia     Hypertension     Hypothyroid     PVD (peripheral vascular disease)     Venous stasis         Past Surgical History:   Procedure Laterality Date    KNEE ARTHROSCOPY Left     x 2    LASER ABLATION Right 04/20/2018    [facility] LASER ABLATION PERFORMED BY DR. KARMA BUTCHER.    LASER ABLATION Left 04/27/2018    [facility] LASER ABLATION PERFORMED BY DR. KARMA BUTCHER.    LASER ABLATION Right 12/17/2020    [facility] ( DUPLICATED )LASER ABLATION PERFORMED BY DR. KARMA BUTCHER.    LASER ABLATION Left 01/08/2021    [facility] LASER ABLATION PERFORMED BY DR. KARMA BUTCHER.    Remove cataracts, insert lens Bilateral     SHOULDER ARTHROSCOPY Left     SINUS SURGERY      TOTAL ABDOMINAL HYSTERECTOMY W/ BILATERAL SALPINGOOPHORECTOMY      VENOUS ABLATION Right 02/28/2020    DISTAL [facility] VARITHENA ABLATION PERFORMED BY DR. KARMA BUTCHER.       Social History     Tobacco Use   Smoking Status Never Smoker   Smokeless Tobacco Never Used         Current Outpatient Medications:     albuterol (PROVENTIL/VENTOLIN HFA) 90 mcg/actuation inhaler, Inhale 2 puffs into the lungs every 6 (six) hours as needed for Wheezing. Rescue, Disp: 18 g, Rfl: 1    albuterol 2 mg/5 mL syrup, Take 5 mLs (2 mg total) by mouth 3 (three) times daily., Disp: 450 mL, Rfl: 11    cilostazoL (PLETAL) 50 MG Tab, Take 1 tablet (50 mg total) by mouth 2 (two) times daily., Disp: 180 tablet, Rfl: 3    clorazepate (TRANXENE) 3.75 MG Tab, Take 1 tablet (3.75 mg total) by mouth 2 (two) times daily as needed., Disp: 60 tablet, Rfl: 5    divalproex ER (DEPAKOTE) 500 MG Tb24, TAKE 2 TABLETS BY MOUTH EACH NIGHT AT BEDTIME, Disp: 60 tablet, Rfl: 5    ferrous sulfate (FEOSOL) 325  mg (65 mg iron) Tab tablet, Take 1 tablet (325 mg total) by mouth daily with breakfast., Disp: 90 tablet, Rfl: 0    furosemide (LASIX) 20 MG tablet, Take 1 tablet (20 mg total) by mouth once daily., Disp: 90 tablet, Rfl: 3    HYDROcodone-acetaminophen (NORCO) 5-325 mg per tablet, Take 1 tablet by mouth every 6 (six) hours as needed for Pain., Disp: 24 tablet, Rfl: 0    levothyroxine (SYNTHROID) 50 MCG tablet, Take 1 tablet (50 mcg total) by mouth before breakfast. Take on an empty stomach with a full glass of water. Nothing else to eat or drink for 30 mins, Disp: 90 tablet, Rfl: 3    losartan (COZAAR) 50 MG tablet, Take 1 tablet (50 mg total) by mouth once daily., Disp: 90 tablet, Rfl: 3    collagenase (SANTYL) ointment, Apply topically once daily. Apply dressing changes., Disp: 80 g, Rfl: 2  No current facility-administered medications for this visit.    Review of Systems   Constitutional: Negative for activity change, chills, diaphoresis, fatigue and fever.   Respiratory: Negative for cough and shortness of breath.    Cardiovascular: Positive for leg swelling. Negative for chest pain and claudication.        Hyperpigmentation LE   Gastrointestinal: Negative for nausea and vomiting.   Musculoskeletal: Positive for leg pain. Negative for joint swelling.   Integumentary:  Negative for rash and wound.   Neurological: Negative for weakness and numbness.       II. PHYSICAL EXAM     Physical Exam  Constitutional:       General: She is awake. She is not in acute distress.     Appearance: Normal appearance. She is obese. She is not ill-appearing or toxic-appearing.   HENT:      Head: Normocephalic and atraumatic.   Eyes:      Extraocular Movements: Extraocular movements intact.      Conjunctiva/sclera: Conjunctivae normal.      Pupils: Pupils are equal, round, and reactive to light.   Neck:      Vascular: No carotid bruit or JVD.   Cardiovascular:      Rate and Rhythm: Normal rate and regular rhythm.      Pulses:            Dorsalis pedis pulses are detected w/ Doppler on the left side.        Posterior tibial pulses are detected w/ Doppler on the left side.      Heart sounds: No murmur heard.      Pulmonary:      Effort: Pulmonary effort is normal. No respiratory distress.      Breath sounds: No stridor. No wheezing, rhonchi or rales.   Musculoskeletal:         General: No swelling, tenderness or deformity.      Right lower le+ Edema present.      Left lower le+ Edema present.        Legs:       Comments: Ulcer of left medial Gator region measuring approximately 4.0 cm x 5.0 cm with 0.1 cm in depth.  Yellow slough noted in the wound bed.  Today, periwound tissue appears more friable with increased erythema and a little bit of increased warmth to palpation.    Hyperpigmentation of the left lower Gator region along with +2 pitting edema.       Feet:      Comments: Triphasic dopplerable pulses of the left lower extremity.  Skin:     General: Skin is warm.      Capillary Refill: Capillary refill takes less than 2 seconds.      Coloration: Skin is not ashen.      Findings: No bruising, erythema, lesion, rash or wound.   Neurological:      Mental Status: She is alert and oriented to person, place, and time.      Motor: No weakness.   Psychiatric:         Speech: Speech normal.         Behavior: Behavior normal. Behavior is cooperative.       Reticular/Spider veins noted:  RLE: medial calf  LLE: none    Varicose veins noted:  RLE: medial calf  LLE:  medial calf    CEAP Classification  Clinical Signs: Class 6 - Leg ulceration, skin changes as defined above  Etiologic Classification: Primary  Anatomic distribution: Superficial  Pathophysiologic dysfunction: Reflux       Venous Clinical Severity Score  Pain:2=Daily, moderate activity limitation, occasional analgesics  Varicose Veins: 1=Few, scattered. Branch varicose veins  Venous Edema: 2=Afternoon edema, above ankle  Pigmentation: 2=Diffuse over most of gaiter distribution  (lower 1/3) or recent pigmentation (purple)  Inflammation: 1=Mild cellulitis, limited to marginal area around ulcer  Induration: 1=Focal, circummalleolar (< 5 cm)  Number of Active Ulcers: 2=2  Active Ulceration, Duration: 2=>3 months, <1 year  Active Ulcer Size: 2=2 - 6 cm diameter  Compressive Therapy: 2=Wears elastic stockings most days  Total Score: 17       III. ASSESSMENT & PLAN (MEDICAL DECISION MAKING)     1. Stasis edema with ulcer, left    2. Hyperpigmentation of skin    3. Venous insufficiency (chronic) (peripheral)    4. Lower extremity edema        Assessment/Diagnosis and Plan:  The patient's wound is much improved today.  Today, I was unable to debride the wound secondary to pain complaints from the patient.  The leg and wound were cleaned with wound cleanser.  We will apply Santyl, silver collagen, alginate, foam pad and a Coban Lite wrap.  I will write new home health orders to this effect with twice weekly wrap changes.  Follow-up in 2 weeks for re-evaluation.    Orders Placed This Encounter    LIDOcaine HCL 2% jelly    HYDROcodone-acetaminophen (NORCO) 5-325 mg per tablet    collagenase (SANTYL) ointment      Minh Cisneros,

## 2022-01-31 PROCEDURE — G0179 MD RECERTIFICATION HHA PT: HCPCS | Mod: ,,, | Performed by: FAMILY MEDICINE

## 2022-01-31 PROCEDURE — G0179 PR HOME HEALTH MD RECERTIFICATION: ICD-10-PCS | Mod: ,,, | Performed by: FAMILY MEDICINE

## 2022-02-09 ENCOUNTER — OFFICE VISIT (OUTPATIENT)
Dept: VASCULAR SURGERY | Facility: CLINIC | Age: 77
End: 2022-02-09
Payer: COMMERCIAL

## 2022-02-09 ENCOUNTER — OUTSIDE PLACE OF SERVICE (OUTPATIENT)
Dept: ADMINISTRATIVE | Facility: HOSPITAL | Age: 77
End: 2022-02-09
Payer: COMMERCIAL

## 2022-02-09 VITALS
WEIGHT: 197.38 LBS | HEART RATE: 88 BPM | HEIGHT: 66 IN | RESPIRATION RATE: 12 BRPM | DIASTOLIC BLOOD PRESSURE: 76 MMHG | BODY MASS INDEX: 31.72 KG/M2 | SYSTOLIC BLOOD PRESSURE: 136 MMHG

## 2022-02-09 DIAGNOSIS — I87.2 VENOUS INSUFFICIENCY (CHRONIC) (PERIPHERAL): ICD-10-CM

## 2022-02-09 DIAGNOSIS — L97.929 STASIS EDEMA WITH ULCER, LEFT: Primary | ICD-10-CM

## 2022-02-09 DIAGNOSIS — I87.312 STASIS EDEMA WITH ULCER, LEFT: Primary | ICD-10-CM

## 2022-02-09 DIAGNOSIS — R60.0 LOWER EXTREMITY EDEMA: ICD-10-CM

## 2022-02-09 DIAGNOSIS — L81.9 HYPERPIGMENTATION OF SKIN: ICD-10-CM

## 2022-02-09 PROCEDURE — 99499 UNLISTED E&M SERVICE: CPT | Mod: S$PBB,,, | Performed by: FAMILY MEDICINE

## 2022-02-09 PROCEDURE — 1159F MED LIST DOCD IN RCRD: CPT | Mod: CPTII,,, | Performed by: FAMILY MEDICINE

## 2022-02-09 PROCEDURE — 1111F PR DISCHARGE MEDS RECONCILED W/ CURRENT OUTPATIENT MED LIST: ICD-10-PCS | Mod: CPTII,,, | Performed by: FAMILY MEDICINE

## 2022-02-09 PROCEDURE — 97597 DBRDMT OPN WND 1ST 20 CM/<: CPT | Mod: PBBFAC | Performed by: FAMILY MEDICINE

## 2022-02-09 PROCEDURE — 1160F PR REVIEW ALL MEDS BY PRESCRIBER/CLIN PHARMACIST DOCUMENTED: ICD-10-PCS | Mod: CPTII,,, | Performed by: FAMILY MEDICINE

## 2022-02-09 PROCEDURE — 99214 OFFICE O/P EST MOD 30 MIN: CPT | Mod: PBBFAC,25 | Performed by: FAMILY MEDICINE

## 2022-02-09 PROCEDURE — 1159F PR MEDICATION LIST DOCUMENTED IN MEDICAL RECORD: ICD-10-PCS | Mod: CPTII,,, | Performed by: FAMILY MEDICINE

## 2022-02-09 PROCEDURE — 3078F PR MOST RECENT DIASTOLIC BLOOD PRESSURE < 80 MM HG: ICD-10-PCS | Mod: CPTII,,, | Performed by: FAMILY MEDICINE

## 2022-02-09 PROCEDURE — 99499 NO LOS: ICD-10-PCS | Mod: S$PBB,,, | Performed by: FAMILY MEDICINE

## 2022-02-09 PROCEDURE — 97597 PR DEBRIDEMENT OPEN WOUND 20 SQ CM<: ICD-10-PCS | Mod: S$PBB,,, | Performed by: FAMILY MEDICINE

## 2022-02-09 PROCEDURE — 3075F PR MOST RECENT SYSTOLIC BLOOD PRESS GE 130-139MM HG: ICD-10-PCS | Mod: CPTII,,, | Performed by: FAMILY MEDICINE

## 2022-02-09 PROCEDURE — 3075F SYST BP GE 130 - 139MM HG: CPT | Mod: CPTII,,, | Performed by: FAMILY MEDICINE

## 2022-02-09 PROCEDURE — 97597 DBRDMT OPN WND 1ST 20 CM/<: CPT | Mod: S$PBB,,, | Performed by: FAMILY MEDICINE

## 2022-02-09 PROCEDURE — 1160F RVW MEDS BY RX/DR IN RCRD: CPT | Mod: CPTII,,, | Performed by: FAMILY MEDICINE

## 2022-02-09 PROCEDURE — 3078F DIAST BP <80 MM HG: CPT | Mod: CPTII,,, | Performed by: FAMILY MEDICINE

## 2022-02-09 PROCEDURE — 1111F DSCHRG MED/CURRENT MED MERGE: CPT | Mod: CPTII,,, | Performed by: FAMILY MEDICINE

## 2022-02-09 RX ORDER — LIDOCAINE HYDROCHLORIDE 10 MG/ML
1 INJECTION INFILTRATION; PERINEURAL
Status: DISCONTINUED | OUTPATIENT
Start: 2022-02-09 | End: 2022-04-12

## 2022-02-09 RX ORDER — LIDOCAINE HYDROCHLORIDE 20 MG/ML
JELLY TOPICAL
Status: COMPLETED | OUTPATIENT
Start: 2022-02-09 | End: 2022-02-09

## 2022-02-09 RX ADMIN — LIDOCAINE HYDROCHLORIDE 2 ML: 20 JELLY TOPICAL at 03:02

## 2022-02-09 NOTE — PROGRESS NOTES
[facility] NOTE    Patient ID: Umesh Lassiter is a 76 y.o. female.    I. HISTORY     Chief Complaint:   Chief Complaint   Patient presents with    Wound Check     2 week wound check left leg; santyl, silver collagen, alginate, foam pad, Coban2 Lite.        HPI: Umesh Lassiter is a 76 y.o. female who presents today for a 2 week wound check follow-up.  She has a persistent stasis ulceration of the left lower extremity which today looks much improved and infection free.  The ulcer also looks reduced in size.  There is moderate yellow slough in the wound bed.  She is not currently on antibiotics.  She states the pain from the wound is also much more tolerable and she is now sleeping at night.   No signs of active infection or cellulitis today.    Clinical summary:   The patient is known to our clinic but has not been seen since 03/02/2021 for a one-month follow-up status post left great saphenous vein ablation.  She has been lost to follow-up since.  She states that this ulceration of her left lower extremity has been present for approximately 6 months.  She states that his been persistent and worsening during that time.  She has not been wearing compression daily.  She has pitting edema, hyperpigmentation along with the ulceration of the left medial Gator region.  No signs of infection, including purulent drainage, increased temperature or foul odor.  She denies complaints of her right lower extremity.    Reflux study 12/21/2021 shows no evidence of DVT or superficial venous reflux.  Shows a well ablated proximal left great saphenous vein and ablated left small saphenous vein.     Left great saphenous vein laser ablation 01/08/2021.  Right great saphenous vein, duplicate, laser ablation 12/17/2020.  Right distal great saphenous vein erythema ablation 02/28/2020.  Left small saphenous vein laser ablation 04/27/2018.  Right great saphenous vein laser ablation 04/20/2018.    Past Medical History:   Diagnosis  Date    Anxiety     Asthma     CVA (cerebral vascular accident) 05/23/2018    DVT (deep venous thrombosis) 2019    Epilepsy     Hyperlipidemia     Hypertension     Hypothyroid     PVD (peripheral vascular disease)     Venous stasis         Past Surgical History:   Procedure Laterality Date    KNEE ARTHROSCOPY Left     x 2    LASER ABLATION Right 04/20/2018    [facility] LASER ABLATION PERFORMED BY DR. KARMA BUTCHER.    LASER ABLATION Left 04/27/2018    [facility] LASER ABLATION PERFORMED BY DR. KARMA BUTCHER.    LASER ABLATION Right 12/17/2020    [facility] ( DUPLICATED )LASER ABLATION PERFORMED BY DR. KARMA BUTCHER.    LASER ABLATION Left 01/08/2021    [facility] LASER ABLATION PERFORMED BY DR. KARMA BUTCHER.    Remove cataracts, insert lens Bilateral     SHOULDER ARTHROSCOPY Left     SINUS SURGERY      TOTAL ABDOMINAL HYSTERECTOMY W/ BILATERAL SALPINGOOPHORECTOMY      VENOUS ABLATION Right 02/28/2020    DISTAL [facility] VARITHENA ABLATION PERFORMED BY DR. KARMA BUTCHER.       Social History     Tobacco Use   Smoking Status Never Smoker   Smokeless Tobacco Never Used         Current Outpatient Medications:     albuterol (PROVENTIL/VENTOLIN HFA) 90 mcg/actuation inhaler, Inhale 2 puffs into the lungs every 6 (six) hours as needed for Wheezing. Rescue, Disp: 18 g, Rfl: 1    albuterol 2 mg/5 mL syrup, Take 5 mLs (2 mg total) by mouth 3 (three) times daily., Disp: 450 mL, Rfl: 11    cilostazoL (PLETAL) 50 MG Tab, Take 1 tablet (50 mg total) by mouth 2 (two) times daily., Disp: 180 tablet, Rfl: 3    clorazepate (TRANXENE) 3.75 MG Tab, Take 1 tablet (3.75 mg total) by mouth 2 (two) times daily as needed., Disp: 60 tablet, Rfl: 5    collagenase (SANTYL) ointment, Apply topically once daily. Apply dressing changes., Disp: 80 g, Rfl: 2    divalproex ER (DEPAKOTE) 500 MG Tb24, TAKE 2 TABLETS BY MOUTH EACH NIGHT AT BEDTIME, Disp: 60 tablet, Rfl: 5    ferrous sulfate (FEOSOL) 325 mg (65 mg iron) Tab tablet, Take 1 tablet (325 mg total) by  mouth daily with breakfast., Disp: 90 tablet, Rfl: 0    furosemide (LASIX) 20 MG tablet, Take 1 tablet (20 mg total) by mouth once daily., Disp: 90 tablet, Rfl: 3    HYDROcodone-acetaminophen (NORCO) 5-325 mg per tablet, Take 1 tablet by mouth every 6 (six) hours as needed for Pain., Disp: 24 tablet, Rfl: 0    levothyroxine (SYNTHROID) 50 MCG tablet, Take 1 tablet (50 mcg total) by mouth before breakfast. Take on an empty stomach with a full glass of water. Nothing else to eat or drink for 30 mins, Disp: 90 tablet, Rfl: 3    losartan (COZAAR) 50 MG tablet, Take 1 tablet (50 mg total) by mouth once daily., Disp: 90 tablet, Rfl: 3    Current Facility-Administered Medications:     LIDOcaine HCL 10 mg/ml (1%) injection 1 mL, 1 mL, Other, 1 time in Clinic/HOD, Minh Cisneros DO    Review of Systems   Constitutional: Negative for activity change, chills, diaphoresis, fatigue and fever.   Respiratory: Negative for cough and shortness of breath.    Cardiovascular: Positive for leg swelling. Negative for chest pain and claudication.        Hyperpigmentation LE   Gastrointestinal: Negative for nausea and vomiting.   Musculoskeletal: Positive for leg pain. Negative for joint swelling.   Integumentary:  Negative for rash and wound.   Neurological: Negative for weakness and numbness.       II. PHYSICAL EXAM     Physical Exam  Constitutional:       General: She is awake. She is not in acute distress.     Appearance: Normal appearance. She is obese. She is not ill-appearing or toxic-appearing.   HENT:      Head: Normocephalic and atraumatic.   Eyes:      Extraocular Movements: Extraocular movements intact.      Conjunctiva/sclera: Conjunctivae normal.      Pupils: Pupils are equal, round, and reactive to light.   Neck:      Vascular: No carotid bruit or JVD.   Cardiovascular:      Rate and Rhythm: Normal rate and regular rhythm.      Pulses:           Dorsalis pedis pulses are detected w/ Doppler on the left side.         Posterior tibial pulses are detected w/ Doppler on the left side.      Heart sounds: No murmur heard.      Pulmonary:      Effort: Pulmonary effort is normal. No respiratory distress.      Breath sounds: No stridor. No wheezing, rhonchi or rales.   Musculoskeletal:         General: No swelling, tenderness or deformity.      Right lower le+ Edema present.      Left lower le+ Edema present.        Legs:       Comments: Ulcer of left medial Gator region measuring approximately 3.3 cm x 5.1 cm with 0.1 cm in depth.  Yellow slough noted in the wound bed.    Hyperpigmentation of the left lower Gator region along with +1 edema.   Feet:      Comments: Triphasic dopplerable pulses of the left lower extremity.  Skin:     General: Skin is warm.      Capillary Refill: Capillary refill takes less than 2 seconds.      Coloration: Skin is not ashen.      Findings: No bruising, erythema, lesion, rash or wound.   Neurological:      Mental Status: She is alert and oriented to person, place, and time.      Motor: No weakness.   Psychiatric:         Speech: Speech normal.         Behavior: Behavior normal. Behavior is cooperative.       Reticular/Spider veins noted:  RLE: medial calf  LLE: none    Varicose veins noted:  RLE: medial calf  LLE:  medial calf    CEAP Classification  Clinical Signs: Class 6 - Leg ulceration, skin changes as defined above  Etiologic Classification: Primary  Anatomic distribution: Superficial  Pathophysiologic dysfunction: Reflux       Venous Clinical Severity Score  Pain:2=Daily, moderate activity limitation, occasional analgesics  Varicose Veins: 1=Few, scattered. Branch varicose veins  Venous Edema: 2=Afternoon edema, above ankle  Pigmentation: 2=Diffuse over most of gaiter distribution (lower 1/3) or recent pigmentation (purple)  Inflammation: 1=Mild cellulitis, limited to marginal area around ulcer  Induration: 1=Focal, circummalleolar (< 5 cm)  Number of Active Ulcers: 2=2  Active Ulceration,  Duration: 2=>3 months, <1 year  Active Ulcer Size: 2=2 - 6 cm diameter  Compressive Therapy: 2=Wears elastic stockings most days  Total Score: 17       III. ASSESSMENT & PLAN (MEDICAL DECISION MAKING)     1. Stasis edema with ulcer, left    2. Hyperpigmentation of skin    3. Venous insufficiency (chronic) (peripheral)    4. Lower extremity edema        Assessment/Diagnosis and Plan:  The patient's wound is much improved today.  Today, I was able to debride the wound.   The leg and wound were cleaned with wound cleanser.  We will apply Santyl, silver collagen, alginate, foam pad and a Coban Lite wrap.  Continue home health orders to this effect with twice weekly wrap changes.  Follow-up in 2 weeks for re-evaluation.    Procedure note:  The old dressing was removed. The wound was cleaned and examined for signs of infection which were negative. The ulcer measures 3.3 cm x 5.1 cm, with a depth of 0.1 cm.  Nonviable tissue was debrided using a sharp curet down to the subcutaneous tissue and a small amount of bleeding was noted and controlled.  The ulcer area was rinsed, dried.  A dressing consisting of Santyl, silver collagen, alginate, foam pad and a 3M Coban 2 lite compression wrap was applied to the lower extremity below the knee.  This is an area of  approximately 17 cm2. This venous ulcer is complicated by DMII, hyperlipidemia, HTN, obesity and chronic venous insufficiency. Patient tolerated the procedure well and there were no complications.    Orders Placed This Encounter    LIDOcaine HCL 10 mg/ml (1%) injection 1 mL    LIDOcaine HCL 2% zi Cisneros DO

## 2022-02-22 RX ORDER — CLORAZEPATE DIPOTASSIUM 3.75 MG/1
TABLET ORAL
Qty: 60 TABLET | Refills: 5 | Status: SHIPPED | OUTPATIENT
Start: 2022-02-22 | End: 2022-08-24 | Stop reason: SDUPTHER

## 2022-02-28 ENCOUNTER — OFFICE VISIT (OUTPATIENT)
Dept: VASCULAR SURGERY | Facility: CLINIC | Age: 77
End: 2022-02-28
Payer: COMMERCIAL

## 2022-02-28 VITALS
HEIGHT: 66 IN | RESPIRATION RATE: 20 BRPM | BODY MASS INDEX: 31.05 KG/M2 | DIASTOLIC BLOOD PRESSURE: 80 MMHG | WEIGHT: 193.19 LBS | SYSTOLIC BLOOD PRESSURE: 130 MMHG | HEART RATE: 84 BPM

## 2022-02-28 DIAGNOSIS — I87.2 VENOUS INSUFFICIENCY (CHRONIC) (PERIPHERAL): ICD-10-CM

## 2022-02-28 DIAGNOSIS — L81.9 HYPERPIGMENTATION OF SKIN: ICD-10-CM

## 2022-02-28 DIAGNOSIS — R60.0 LOWER EXTREMITY EDEMA: ICD-10-CM

## 2022-02-28 DIAGNOSIS — L97.929 STASIS EDEMA WITH ULCER, LEFT: Primary | ICD-10-CM

## 2022-02-28 DIAGNOSIS — I87.312 STASIS EDEMA WITH ULCER, LEFT: Primary | ICD-10-CM

## 2022-02-28 PROCEDURE — 3075F SYST BP GE 130 - 139MM HG: CPT | Mod: CPTII,,, | Performed by: FAMILY MEDICINE

## 2022-02-28 PROCEDURE — 3079F DIAST BP 80-89 MM HG: CPT | Mod: CPTII,,, | Performed by: FAMILY MEDICINE

## 2022-02-28 PROCEDURE — 1159F PR MEDICATION LIST DOCUMENTED IN MEDICAL RECORD: ICD-10-PCS | Mod: CPTII,,, | Performed by: FAMILY MEDICINE

## 2022-02-28 PROCEDURE — 3079F PR MOST RECENT DIASTOLIC BLOOD PRESSURE 80-89 MM HG: ICD-10-PCS | Mod: CPTII,,, | Performed by: FAMILY MEDICINE

## 2022-02-28 PROCEDURE — 1160F RVW MEDS BY RX/DR IN RCRD: CPT | Mod: CPTII,,, | Performed by: FAMILY MEDICINE

## 2022-02-28 PROCEDURE — 99214 OFFICE O/P EST MOD 30 MIN: CPT | Mod: PBBFAC | Performed by: FAMILY MEDICINE

## 2022-02-28 PROCEDURE — 3075F PR MOST RECENT SYSTOLIC BLOOD PRESS GE 130-139MM HG: ICD-10-PCS | Mod: CPTII,,, | Performed by: FAMILY MEDICINE

## 2022-02-28 PROCEDURE — 1160F PR REVIEW ALL MEDS BY PRESCRIBER/CLIN PHARMACIST DOCUMENTED: ICD-10-PCS | Mod: CPTII,,, | Performed by: FAMILY MEDICINE

## 2022-02-28 PROCEDURE — 1159F MED LIST DOCD IN RCRD: CPT | Mod: CPTII,,, | Performed by: FAMILY MEDICINE

## 2022-02-28 PROCEDURE — 99214 OFFICE O/P EST MOD 30 MIN: CPT | Mod: S$PBB,,, | Performed by: FAMILY MEDICINE

## 2022-02-28 PROCEDURE — 99214 PR OFFICE/OUTPT VISIT, EST, LEVL IV, 30-39 MIN: ICD-10-PCS | Mod: S$PBB,,, | Performed by: FAMILY MEDICINE

## 2022-02-28 RX ORDER — LIDOCAINE HYDROCHLORIDE 20 MG/ML
1 JELLY TOPICAL ONCE
Status: COMPLETED | OUTPATIENT
Start: 2022-02-28 | End: 2022-02-28

## 2022-02-28 RX ADMIN — LIDOCAINE HYDROCHLORIDE 1 TUBE: 20 JELLY TOPICAL at 03:02

## 2022-02-28 NOTE — PROGRESS NOTES
[facility] CLINIC NOTE    Patient ID: Umesh Lassiter is a 76 y.o. female.    I. HISTORY     Chief Complaint:   Chief Complaint   Patient presents with    Wound Check     2 week wound check        HPI: Umesh Lassiter is a 76 y.o. female who presents today for a 2 week wound check follow-up.  She has a persistent stasis ulceration of the left lower extremity which today looks much improved and infection free.  There is a small amount of yellow slough noted in the wound bed.  She states that the wound has been mostly pain-free and allowed her to sleep better at night.   No signs of active infection or cellulitis today.    Clinical summary:   The patient is known to our clinic but has not been seen since 03/02/2021 for a one-month follow-up status post left great saphenous vein ablation.  She has been lost to follow-up since.  She states that this ulceration of her left lower extremity has been present for approximately 6 months.  She states that his been persistent and worsening during that time.  She has not been wearing compression daily.  She has pitting edema, hyperpigmentation along with the ulceration of the left medial Gator region.  No signs of infection, including purulent drainage, increased temperature or foul odor.  She denies complaints of her right lower extremity.    Reflux study 12/21/2021 shows no evidence of DVT or superficial venous reflux.  Shows a well ablated proximal left great saphenous vein and ablated left small saphenous vein.     Left great saphenous vein laser ablation 01/08/2021.  Right great saphenous vein, duplicate, laser ablation 12/17/2020.  Right distal great saphenous vein erythema ablation 02/28/2020.  Left small saphenous vein laser ablation 04/27/2018.  Right great saphenous vein laser ablation 04/20/2018.    Past Medical History:   Diagnosis Date    Anxiety     Asthma     CVA (cerebral vascular accident) 05/23/2018    DVT (deep venous thrombosis) 2019    Epilepsy      Hyperlipidemia     Hypertension     Hypothyroid     PVD (peripheral vascular disease)     Venous stasis         Past Surgical History:   Procedure Laterality Date    KNEE ARTHROSCOPY Left     x 2    LASER ABLATION Right 04/20/2018    [facility] LASER ABLATION PERFORMED BY DR. KARMA BUTCHER.    LASER ABLATION Left 04/27/2018    [facility] LASER ABLATION PERFORMED BY DR. KARMA BUTCHER.    LASER ABLATION Right 12/17/2020    [facility] ( DUPLICATED )LASER ABLATION PERFORMED BY DR. KARMA BUTCHER.    LASER ABLATION Left 01/08/2021    [facility] LASER ABLATION PERFORMED BY DR. KARMA BUTCHER.    Remove cataracts, insert lens Bilateral     SHOULDER ARTHROSCOPY Left     SINUS SURGERY      TOTAL ABDOMINAL HYSTERECTOMY W/ BILATERAL SALPINGOOPHORECTOMY      VENOUS ABLATION Right 02/28/2020    [facility]AL [facility] VARITHENA ABLATION PERFORMED BY DR. KARMA BUTCHER.       Social History     Tobacco Use   Smoking Status Never Smoker   Smokeless Tobacco Never Used         Current Outpatient Medications:     albuterol (PROVENTIL/VENTOLIN HFA) 90 mcg/actuation inhaler, Inhale 2 puffs into the lungs every 6 (six) hours as needed for Wheezing. Rescue, Disp: 18 g, Rfl: 1    albuterol 2 mg/5 mL syrup, Take 5 mLs (2 mg total) by mouth 3 (three) times daily., Disp: 450 mL, Rfl: 11    cilostazoL (PLETAL) 50 MG Tab, Take 1 tablet (50 mg total) by mouth 2 (two) times daily., Disp: 180 tablet, Rfl: 3    clorazepate (TRANXENE) 3.75 MG Tab, TAKE 1 TABLET BY MOUTH TWICE A DAY AS NEEDED FOR ANXIETY *CAUSES DROWSINESS-AVOID ALCOHOL!!, Disp: 60 tablet, Rfl: 5    collagenase (SANTYL) ointment, Apply topically once daily. Apply dressing changes., Disp: 80 g, Rfl: 2    divalproex ER (DEPAKOTE) 500 MG Tb24, TAKE 2 TABLETS BY MOUTH EACH NIGHT AT BEDTIME, Disp: 60 tablet, Rfl: 5    ferrous sulfate (FEOSOL) 325 mg (65 mg iron) Tab tablet, Take 1 tablet (325 mg total) by mouth daily with breakfast., Disp: 90 tablet, Rfl: 0    furosemide (LASIX) 20 MG tablet, Take 1 tablet (20 mg  total) by mouth once daily., Disp: 90 tablet, Rfl: 3    HYDROcodone-acetaminophen (NORCO) 5-325 mg per tablet, Take 1 tablet by mouth every 6 (six) hours as needed for Pain., Disp: 24 tablet, Rfl: 0    levothyroxine (SYNTHROID) 50 MCG tablet, Take 1 tablet (50 mcg total) by mouth before breakfast. Take on an empty stomach with a full glass of water. Nothing else to eat or drink for 30 mins, Disp: 90 tablet, Rfl: 3    losartan (COZAAR) 50 MG tablet, Take 1 tablet (50 mg total) by mouth once daily., Disp: 90 tablet, Rfl: 3    Current Facility-Administered Medications:     LIDOcaine HCL 10 mg/ml (1%) injection 1 mL, 1 mL, Other, 1 time in Clinic/HOD, Minh Cisneros,     Review of Systems   Constitutional: Negative for activity change, chills, diaphoresis, fatigue and fever.   Respiratory: Negative for cough and shortness of breath.    Cardiovascular: Positive for leg swelling. Negative for chest pain and claudication.        Hyperpigmentation LE   Gastrointestinal: Negative for nausea and vomiting.   Musculoskeletal: Positive for leg pain. Negative for joint swelling.   Integumentary:  Negative for rash and wound.   Neurological: Negative for weakness and numbness.       II. PHYSICAL EXAM     Physical Exam  Constitutional:       General: She is awake. She is not in acute distress.     Appearance: Normal appearance. She is obese. She is not ill-appearing or toxic-appearing.   HENT:      Head: Normocephalic and atraumatic.   Eyes:      Extraocular Movements: Extraocular movements intact.      Conjunctiva/sclera: Conjunctivae normal.      Pupils: Pupils are equal, round, and reactive to light.   Neck:      Vascular: No carotid bruit or JVD.   Cardiovascular:      Rate and Rhythm: Normal rate and regular rhythm.      Pulses:           Dorsalis pedis pulses are detected w/ Doppler on the left side.        Posterior tibial pulses are detected w/ Doppler on the left side.      Heart sounds: No murmur heard.  Pulmonary:       Effort: Pulmonary effort is normal. No respiratory distress.      Breath sounds: No stridor. No wheezing, rhonchi or rales.   Musculoskeletal:         General: No swelling, tenderness or deformity.      Right lower le+ Edema present.      Left lower le+ Edema present.        Legs:       Comments: Ulcer of left medial Gator region measuring approximately 1.0 cm x 3.0 cm with 0.1 cm in depth.   Small amount of Yellow slough noted in the wound bed.    Hyperpigmentation of the left lower Gator region along with +1 edema.   Feet:      Comments: Triphasic dopplerable pulses of the left lower extremity.  Skin:     General: Skin is warm.      Capillary Refill: Capillary refill takes less than 2 seconds.      Coloration: Skin is not ashen.      Findings: No bruising, erythema, lesion, rash or wound.   Neurological:      Mental Status: She is alert and oriented to person, place, and time.      Motor: No weakness.   Psychiatric:         Speech: Speech normal.         Behavior: Behavior normal. Behavior is cooperative.       Reticular/Spider veins noted:  RLE: medial calf  LLE: none    Varicose veins noted:  RLE: medial calf  LLE:  medial calf    CEAP Classification  Clinical Signs: Class 6 - Leg ulceration, skin changes as defined above  Etiologic Classification: Primary  Anatomic distribution: Superficial  Pathophysiologic dysfunction: Reflux       Venous Clinical Severity Score  Pain:2=Daily, moderate activity limitation, occasional analgesics  Varicose Veins: 1=Few, scattered. Branch varicose veins  Venous Edema: 2=Afternoon edema, above ankle  Pigmentation: 2=Diffuse over most of gaiter distribution (lower 1/3) or recent pigmentation (purple)  Inflammation: 1=Mild cellulitis, limited to marginal area around ulcer  Induration: 1=Focal, circummalleolar (< 5 cm)  Number of Active Ulcers: 2=2  Active Ulceration, Duration: 2=>3 months, <1 year  Active Ulcer Size: 2=2 - 6 cm diameter  Compressive Therapy: 3=Full  compliance, stockings + elevation  Total Score: 18       III. ASSESSMENT & PLAN (MEDICAL DECISION MAKING)     1. Stasis edema with ulcer, left    2. Hyperpigmentation of skin    3. Venous insufficiency (chronic) (peripheral)    4. Lower extremity edema        Assessment/Diagnosis and Plan:  The patient's wound is much improved today.  Today, the wound was gently debrided with a 4 x 4 sponge to remove a mild amount of yellow slough.  No signs of infection noted.  The wound was cleaned with wound cleanser.   We will apply Santyl, silver collagen, alginate, foam pad and a Coban Lite wrap.  Continue home health orders to this effect with twice weekly wrap changes.  Follow-up in 2 weeks for re-evaluation.    Orders Placed This Encounter    LIDOcaine HCL 2% jelly 1 Tube      Minh Cisneros,

## 2022-03-14 ENCOUNTER — OFFICE VISIT (OUTPATIENT)
Dept: VASCULAR SURGERY | Facility: CLINIC | Age: 77
End: 2022-03-14
Payer: COMMERCIAL

## 2022-03-14 VITALS
WEIGHT: 197.63 LBS | DIASTOLIC BLOOD PRESSURE: 80 MMHG | BODY MASS INDEX: 31.76 KG/M2 | SYSTOLIC BLOOD PRESSURE: 140 MMHG | HEIGHT: 66 IN

## 2022-03-14 DIAGNOSIS — I87.2 VENOUS INSUFFICIENCY: ICD-10-CM

## 2022-03-14 DIAGNOSIS — L81.9 HYPERPIGMENTATION OF SKIN: ICD-10-CM

## 2022-03-14 DIAGNOSIS — I83.029 VENOUS ULCER OF LEFT LEG: Primary | ICD-10-CM

## 2022-03-14 DIAGNOSIS — R60.0 EDEMA, LOWER EXTREMITY: ICD-10-CM

## 2022-03-14 DIAGNOSIS — L97.929 VENOUS ULCER OF LEFT LEG: Primary | ICD-10-CM

## 2022-03-14 PROCEDURE — 3077F PR MOST RECENT SYSTOLIC BLOOD PRESSURE >= 140 MM HG: ICD-10-PCS | Mod: CPTII,,, | Performed by: FAMILY MEDICINE

## 2022-03-14 PROCEDURE — 99214 OFFICE O/P EST MOD 30 MIN: CPT | Mod: S$PBB,,, | Performed by: FAMILY MEDICINE

## 2022-03-14 PROCEDURE — 1159F MED LIST DOCD IN RCRD: CPT | Mod: CPTII,,, | Performed by: FAMILY MEDICINE

## 2022-03-14 PROCEDURE — 1160F PR REVIEW ALL MEDS BY PRESCRIBER/CLIN PHARMACIST DOCUMENTED: ICD-10-PCS | Mod: CPTII,,, | Performed by: FAMILY MEDICINE

## 2022-03-14 PROCEDURE — 1159F PR MEDICATION LIST DOCUMENTED IN MEDICAL RECORD: ICD-10-PCS | Mod: CPTII,,, | Performed by: FAMILY MEDICINE

## 2022-03-14 PROCEDURE — 3079F DIAST BP 80-89 MM HG: CPT | Mod: CPTII,,, | Performed by: FAMILY MEDICINE

## 2022-03-14 PROCEDURE — 1160F RVW MEDS BY RX/DR IN RCRD: CPT | Mod: CPTII,,, | Performed by: FAMILY MEDICINE

## 2022-03-14 PROCEDURE — 99214 PR OFFICE/OUTPT VISIT, EST, LEVL IV, 30-39 MIN: ICD-10-PCS | Mod: S$PBB,,, | Performed by: FAMILY MEDICINE

## 2022-03-14 PROCEDURE — 3079F PR MOST RECENT DIASTOLIC BLOOD PRESSURE 80-89 MM HG: ICD-10-PCS | Mod: CPTII,,, | Performed by: FAMILY MEDICINE

## 2022-03-14 PROCEDURE — 3077F SYST BP >= 140 MM HG: CPT | Mod: CPTII,,, | Performed by: FAMILY MEDICINE

## 2022-03-14 PROCEDURE — 99213 OFFICE O/P EST LOW 20 MIN: CPT | Mod: PBBFAC | Performed by: FAMILY MEDICINE

## 2022-03-14 RX ORDER — LIDOCAINE HYDROCHLORIDE 20 MG/ML
JELLY TOPICAL
Status: COMPLETED | OUTPATIENT
Start: 2022-03-14 | End: 2022-03-14

## 2022-03-14 RX ADMIN — LIDOCAINE HYDROCHLORIDE 2 %: 20 JELLY TOPICAL at 11:03

## 2022-03-14 NOTE — PROGRESS NOTES
[facility] NOTE    Patient ID: Umesh Lassiter is a 76 y.o. female.    I. HISTORY     Chief Complaint:   Chief Complaint   Patient presents with    Wound Check     2wk wound check  Procedure rm 1        HPI: Umesh Lassiter is a 76 y.o. female who presents today for a 2 week wound check follow-up.  She has a persistent stasis ulcer of the left medial Gator region, which appears to be almost fully healed today.  Only superficial skin breakdown remains from the ulceration.  The wound is filled in with nice pink new skin.  No signs of infection, weeping or cellulitis.  The patient is afebrile and denies fever, sweats or chills.  She is also pain-free and states she is doing well.    Clinical summary:   The patient is known to our clinic but has not been seen since 03/02/2021 for a one-month follow-up status post left great saphenous vein ablation.  She has been lost to follow-up since.  She states that this ulceration of her left lower extremity has been present for approximately 6 months.  She states that his been persistent and worsening during that time.  She has not been wearing compression daily.  She has pitting edema, hyperpigmentation along with the ulceration of the left medial Gator region.  No signs of infection, including purulent drainage, increased temperature or foul odor.  She denies complaints of her right lower extremity.    Reflux study 12/21/2021 shows no evidence of DVT or superficial venous reflux.  Shows a well ablated proximal left great saphenous vein and ablated left small saphenous vein.     Left great saphenous vein laser ablation 01/08/2021.  Right great saphenous vein, duplicate, laser ablation 12/17/2020.  Right distal great saphenous vein erythema ablation 02/28/2020.  Left small saphenous vein laser ablation 04/27/2018.  Right great saphenous vein laser ablation 04/20/2018.    Past Medical History:   Diagnosis Date    Anxiety     Asthma     CVA (cerebral vascular accident)  05/23/2018    DVT (deep venous thrombosis) 2019    Epilepsy     Hyperlipidemia     Hypertension     Hypothyroid     PVD (peripheral vascular disease)     Venous stasis         Past Surgical History:   Procedure Laterality Date    KNEE ARTHROSCOPY Left     x 2    LASER ABLATION Right 04/20/2018    [facility] LASER ABLATION PERFORMED BY DR. KARMA BUTCHER.    LASER ABLATION Left 04/27/2018    [facility] LASER ABLATION PERFORMED BY DR. KARMA BUTCHER.    LASER ABLATION Right 12/17/2020    [facility] ( DUPLICATED )LASER ABLATION PERFORMED BY DR. KARMA BUTCHER.    LASER ABLATION Left 01/08/2021    [facility] LASER ABLATION PERFORMED BY DR. KARMA BUTCHRE.    Remove cataracts, insert lens Bilateral     SHOULDER ARTHROSCOPY Left     SINUS SURGERY      TOTAL ABDOMINAL HYSTERECTOMY W/ BILATERAL SALPINGOOPHORECTOMY      VENOUS ABLATION Right 02/28/2020    DISTAL [facility] VARITHENA ABLATION PERFORMED BY DR. KARMA BUTCHER.       Social History     Tobacco Use   Smoking Status Never Smoker   Smokeless Tobacco Never Used         Current Outpatient Medications:     albuterol (PROVENTIL/VENTOLIN HFA) 90 mcg/actuation inhaler, Inhale 2 puffs into the lungs every 6 (six) hours as needed for Wheezing. Rescue, Disp: 18 g, Rfl: 1    albuterol 2 mg/5 mL syrup, Take 5 mLs (2 mg total) by mouth 3 (three) times daily., Disp: 450 mL, Rfl: 11    cilostazoL (PLETAL) 50 MG Tab, Take 1 tablet (50 mg total) by mouth 2 (two) times daily., Disp: 180 tablet, Rfl: 3    clorazepate (TRANXENE) 3.75 MG Tab, TAKE 1 TABLET BY MOUTH TWICE A DAY AS NEEDED FOR ANXIETY *CAUSES DROWSINESS-AVOID ALCOHOL!!, Disp: 60 tablet, Rfl: 5    collagenase (SANTYL) ointment, Apply topically once daily. Apply dressing changes., Disp: 80 g, Rfl: 2    divalproex ER (DEPAKOTE) 500 MG Tb24, TAKE 2 TABLETS BY MOUTH EACH NIGHT AT BEDTIME, Disp: 60 tablet, Rfl: 5    ferrous sulfate (FEOSOL) 325 mg (65 mg iron) Tab tablet, Take 1 tablet (325 mg total) by mouth daily with breakfast., Disp: 90  tablet, Rfl: 0    furosemide (LASIX) 20 MG tablet, Take 1 tablet (20 mg total) by mouth once daily., Disp: 90 tablet, Rfl: 3    HYDROcodone-acetaminophen (NORCO) 5-325 mg per tablet, Take 1 tablet by mouth every 6 (six) hours as needed for Pain., Disp: 24 tablet, Rfl: 0    levothyroxine (SYNTHROID) 50 MCG tablet, Take 1 tablet (50 mcg total) by mouth before breakfast. Take on an empty stomach with a full glass of water. Nothing else to eat or drink for 30 mins, Disp: 90 tablet, Rfl: 3    losartan (COZAAR) 50 MG tablet, Take 1 tablet (50 mg total) by mouth once daily., Disp: 90 tablet, Rfl: 3    Current Facility-Administered Medications:     LIDOcaine HCL 10 mg/ml (1%) injection 1 mL, 1 mL, Other, 1 time in Clinic/HOD, Minh Cisneros, DO    Review of Systems   Constitutional: Negative for activity change, chills, diaphoresis, fatigue and fever.   Respiratory: Negative for cough and shortness of breath.    Cardiovascular: Positive for leg swelling. Negative for chest pain and claudication.        Hyperpigmentation LE   Gastrointestinal: Negative for nausea and vomiting.   Musculoskeletal: Positive for leg pain. Negative for joint swelling.   Integumentary:  Negative for rash and wound.   Neurological: Negative for weakness and numbness.       II. PHYSICAL EXAM     Physical Exam  Constitutional:       General: She is awake. She is not in acute distress.     Appearance: Normal appearance. She is obese. She is not ill-appearing or toxic-appearing.   HENT:      Head: Normocephalic and atraumatic.   Eyes:      Extraocular Movements: Extraocular movements intact.      Conjunctiva/sclera: Conjunctivae normal.      Pupils: Pupils are equal, round, and reactive to light.   Neck:      Vascular: No carotid bruit or JVD.   Cardiovascular:      Rate and Rhythm: Normal rate and regular rhythm.      Pulses:           Dorsalis pedis pulses are detected w/ Doppler on the left side.        Posterior tibial pulses are detected w/  Doppler on the left side.      Heart sounds: No murmur heard.  Pulmonary:      Effort: Pulmonary effort is normal. No respiratory distress.      Breath sounds: No stridor. No wheezing, rhonchi or rales.   Musculoskeletal:         General: No swelling, tenderness or deformity.      Right lower le+ Edema present.      Left lower le+ Edema present.        Legs:       Comments: Ulcer of left medial Gator region now appears fully healed with only superficial skin breakdown.    Hyperpigmentation of the left lower Gator region along with +1 edema.   Feet:      Comments: Triphasic dopplerable pulses of the left lower extremity.  Skin:     General: Skin is warm.      Capillary Refill: Capillary refill takes less than 2 seconds.      Coloration: Skin is not ashen.      Findings: No bruising, erythema, lesion, rash or wound.   Neurological:      Mental Status: She is alert and oriented to person, place, and time.      Motor: No weakness.   Psychiatric:         Speech: Speech normal.         Behavior: Behavior normal. Behavior is cooperative.       Reticular/Spider veins noted:  RLE: medial calf  LLE: none    Varicose veins noted:  RLE: medial calf  LLE:  medial calf    CEAP Classification  Clinical Signs: Class 6 - Leg ulceration, skin changes as defined above  Etiologic Classification: Primary  Anatomic distribution: Superficial  Pathophysiologic dysfunction: Reflux       Venous Clinical Severity Score  Pain:2=Daily, moderate activity limitation, occasional analgesics  Varicose Veins: 1=Few, scattered. Branch varicose veins  Venous Edema: 2=Afternoon edema, above ankle  Pigmentation: 2=Diffuse over most of gaiter distribution (lower 1/3) or recent pigmentation (purple)  Inflammation: 1=Mild cellulitis, limited to marginal area around ulcer  Induration: 1=Focal, circummalleolar (< 5 cm)  Number of Active Ulcers: 2=2  Active Ulceration, Duration: 2=>3 months, <1 year  Active Ulcer Size: 2=2 - 6 cm diameter  Compressive  Therapy: 3=Full compliance, stockings + elevation  Total Score: 18       III. ASSESSMENT & PLAN (MEDICAL DECISION MAKING)     1. Venous ulcer of left leg    2. Hyperpigmentation of skin    3. Edema, lower extremity    4. Venous insufficiency        Assessment/Diagnosis and Plan:  The patient's wound is nearly healed today.  Today, we will discontinue wound dressings and reapply pink unna boot and Coban 2 Lite wrap to the left lower extremity.  Follow-up in 2 weeks for re-evaluation with intentions of switching patient to compression socks at that time.    Follow-up in 2 weeks for re-evaluation.    Orders Placed This Encounter    LIDOcaine HCL 2% zi Cisneros, DO

## 2022-03-25 DIAGNOSIS — R56.9 SEIZURE: ICD-10-CM

## 2022-03-25 RX ORDER — DIVALPROEX SODIUM 500 MG/1
TABLET, FILM COATED, EXTENDED RELEASE ORAL
Qty: 60 TABLET | Refills: 0 | Status: SHIPPED | OUTPATIENT
Start: 2022-03-25 | End: 2022-04-11 | Stop reason: SDUPTHER

## 2022-04-11 ENCOUNTER — OFFICE VISIT (OUTPATIENT)
Dept: NEUROLOGY | Facility: CLINIC | Age: 77
End: 2022-04-11
Payer: COMMERCIAL

## 2022-04-11 VITALS
HEIGHT: 66 IN | BODY MASS INDEX: 31.34 KG/M2 | OXYGEN SATURATION: 98 % | HEART RATE: 74 BPM | SYSTOLIC BLOOD PRESSURE: 126 MMHG | DIASTOLIC BLOOD PRESSURE: 80 MMHG | RESPIRATION RATE: 18 BRPM | WEIGHT: 195 LBS

## 2022-04-11 DIAGNOSIS — G40.309 GENERALIZED EPILEPSY: Primary | ICD-10-CM

## 2022-04-11 DIAGNOSIS — R56.9 SEIZURE: ICD-10-CM

## 2022-04-11 PROCEDURE — 99213 OFFICE O/P EST LOW 20 MIN: CPT | Mod: S$PBB,,, | Performed by: NURSE PRACTITIONER

## 2022-04-11 PROCEDURE — 3074F PR MOST RECENT SYSTOLIC BLOOD PRESSURE < 130 MM HG: ICD-10-PCS | Mod: CPTII,,, | Performed by: NURSE PRACTITIONER

## 2022-04-11 PROCEDURE — 1160F PR REVIEW ALL MEDS BY PRESCRIBER/CLIN PHARMACIST DOCUMENTED: ICD-10-PCS | Mod: CPTII,,, | Performed by: NURSE PRACTITIONER

## 2022-04-11 PROCEDURE — 1159F MED LIST DOCD IN RCRD: CPT | Mod: CPTII,,, | Performed by: NURSE PRACTITIONER

## 2022-04-11 PROCEDURE — 3079F PR MOST RECENT DIASTOLIC BLOOD PRESSURE 80-89 MM HG: ICD-10-PCS | Mod: CPTII,,, | Performed by: NURSE PRACTITIONER

## 2022-04-11 PROCEDURE — 3074F SYST BP LT 130 MM HG: CPT | Mod: CPTII,,, | Performed by: NURSE PRACTITIONER

## 2022-04-11 PROCEDURE — 99215 OFFICE O/P EST HI 40 MIN: CPT | Mod: PBBFAC | Performed by: NURSE PRACTITIONER

## 2022-04-11 PROCEDURE — 99213 PR OFFICE/OUTPT VISIT, EST, LEVL III, 20-29 MIN: ICD-10-PCS | Mod: S$PBB,,, | Performed by: NURSE PRACTITIONER

## 2022-04-11 PROCEDURE — 1159F PR MEDICATION LIST DOCUMENTED IN MEDICAL RECORD: ICD-10-PCS | Mod: CPTII,,, | Performed by: NURSE PRACTITIONER

## 2022-04-11 PROCEDURE — 3079F DIAST BP 80-89 MM HG: CPT | Mod: CPTII,,, | Performed by: NURSE PRACTITIONER

## 2022-04-11 PROCEDURE — 1160F RVW MEDS BY RX/DR IN RCRD: CPT | Mod: CPTII,,, | Performed by: NURSE PRACTITIONER

## 2022-04-11 RX ORDER — DIVALPROEX SODIUM 500 MG/1
TABLET, FILM COATED, EXTENDED RELEASE ORAL
Qty: 180 TABLET | Refills: 1 | Status: SHIPPED | OUTPATIENT
Start: 2022-04-11 | End: 2022-07-06 | Stop reason: SDUPTHER

## 2022-04-11 NOTE — PATIENT INSTRUCTIONS
Continue current Depakote ER 1000mg at night  Will check depakote level and CMP next visit  Reviewed recent Depakote and labs in EMR  Notify clinic if any seizures      Seizure Precautions:  1. Take medications as directed  2. Avoid open flames and hot surfaces such as fires and grills  3. Avoid elevations such as ladders or stools  4. Avoid swimming alone  5. Make sure getting plenty of sleep, recommend 7-8 hours per day  6. Avoid alcohol and illicit drugs  7. No driving or operating heavy machinery for 6 months after last known seizure

## 2022-04-11 NOTE — PROGRESS NOTES
[facility]     PATIENT NAME: Umesh Lassiter   : 1945    AGE: 76 y.o. DATE: 2022   MRN: [id number]        Reason for Visit / Chief Complaint: Medicare AWV (Initial wellcare AWV )        Umesh Lassiter presents for an Initial Well[employer] AWV today.     The following components were reviewed and updated:    Medical/Social/Family History:  Past Medical History:   Diagnosis Date    Anxiety     Asthma     CVA (cerebral vascular accident) 2018    DVT (deep venous thrombosis)     Epilepsy     Hyperlipidemia     Hypertension     Hypothyroid     PVD (peripheral vascular disease)     Venous stasis         Family History   Problem Relation Age of Onset    Alzheimer's disease Mother     Endometrial cancer Mother     Heart disease Mother     Lung cancer Father     Diabetes Sister     Endometrial cancer Sister     Hypertension Sister     Polycystic kidney disease Sister     Diabetes Brother     Sickle cell anemia Daughter     Thyroid disease Sister     Seizures Brother     Heart disease Daughter         7 MONTHS    No Known Problems Son     No Known Problems Son     No Known Problems Son     Cancer Brother     No Known Problems Sister     No Known Problems Sister     No Known Problems Sister     No Known Problems Sister     No Known Problems Sister         Past Surgical History:   Procedure Laterality Date    KNEE ARTHROSCOPY Left     x 2    LASER ABLATION Right 2018    [facility] LASER ABLATION PERFORMED BY DR. KARMA BUTCHER.    LASER ABLATION Left 2018    [facility] LASER ABLATION PERFORMED BY DR. KARMA BUTCHER.    LASER ABLATION Right 2020    GSV ( DUPLICATED )LASER ABLATION PERFORMED BY DR. KARMA BUTCHER.    LASER ABLATION Left 2021    [facility]V LASER ABLATION PERFORMED BY DR. KARMA BUTCHER.    Remove cataracts, insert lens Bilateral     SHOULDER ARTHROSCOPY Left     SINUS SURGERY      TOTAL ABDOMINAL HYSTERECTOMY W/ BILATERAL  SALPINGOOPHORECTOMY      VENOUS ABLATION Right 02/28/2020    DISTAL [facility] VARITHENA ABLATION PERFORMED BY DR. KARMA BUTCHER.       Social History     Tobacco Use   Smoking Status Never Smoker   Smokeless Tobacco Never Used       Social History     Substance and Sexual Activity   Alcohol Use Never         · Allergies and Current Medications     Review of patient's allergies indicates:   Allergen Reactions    Influenza virus vaccines      Pt states had to be admitted to hospital after last flu vaccine     Lexapro [escitalopram]        Current Outpatient Medications:     albuterol (PROVENTIL/VENTOLIN HFA) 90 mcg/actuation inhaler, Inhale 2 puffs into the lungs every 6 (six) hours as needed for Wheezing. Rescue, Disp: 18 g, Rfl: 1    albuterol 2 mg/5 mL syrup, Take 5 mLs (2 mg total) by mouth 3 (three) times daily., Disp: 450 mL, Rfl: 11    cilostazoL (PLETAL) 50 MG Tab, Take 1 tablet (50 mg total) by mouth 2 (two) times daily., Disp: 180 tablet, Rfl: 3    clorazepate (TRANXENE) 3.75 MG Tab, TAKE 1 TABLET BY MOUTH TWICE A DAY AS NEEDED FOR ANXIETY *CAUSES DROWSINESS-AVOID ALCOHOL!!, Disp: 60 tablet, Rfl: 5    collagenase (SANTYL) ointment, Apply topically once daily. Apply dressing changes., Disp: 80 g, Rfl: 2    divalproex (DEPAKOTE) 250 MG EC tablet, Take 250 mg by mouth Daily., Disp: , Rfl:     divalproex ER (DEPAKOTE) 500 MG Tb24, TAKE 2 TABLETS BY MOUTH EACH NIGHT AT BEDTIME FOR SEIZURES, Disp: 180 tablet, Rfl: 1    ferrous sulfate (FEOSOL) 325 mg (65 mg iron) Tab tablet, Take 1 tablet (325 mg total) by mouth daily with breakfast., Disp: 90 tablet, Rfl: 0    furosemide (LASIX) 20 MG tablet, Take 1 tablet (20 mg total) by mouth once daily., Disp: 90 tablet, Rfl: 3    levothyroxine (SYNTHROID) 50 MCG tablet, Take 1 tablet (50 mcg total) by mouth before breakfast. Take on an empty stomach with a full glass of water. Nothing else to eat or drink for 30 mins, Disp: 90 tablet, Rfl: 3    losartan (COZAAR) 50 MG  tablet, Take 1 tablet (50 mg total) by mouth once daily., Disp: 90 tablet, Rfl: 3    HYDROcodone-acetaminophen (NORCO) 5-325 mg per tablet, Take 1 tablet by mouth every 6 (six) hours as needed for Pain. (Patient not taking: Reported on 4/12/2022), Disp: 24 tablet, Rfl: 0    Current Facility-Administered Medications:     LIDOcaine HCL 10 mg/ml (1%) injection 1 mL, 1 mL, Other, 1 time in Clinic/HOD, Minh Cisneros, DO      · Health Risk Assessment   Fall Risk:  No   Obesity: BMI Body mass index is 30.34 kg/m².   Advance Directive: Does not have an advanced directive.  Declines information    Depression: PHQ9- 5   HTN:  DASH diet, exercise, weight management, med compliance, home BP monitoring, and follow-up discussed.  STI: Not at risk   Statin Use: No      · Health Maintenance   Last eye exam: States approximately 2 years ago but does not remember Doctors name    Last CV screen with lipids: 07/07/2021   Diabetes screening with fasting glucose or A1c: 01/17/2022   Colonoscopy: Has not had and declines    Flu Vaccine: Not applicable . Pt states allergic   Pneumonia vaccines: 13-11/08/2016 refused further vaccination   COVID vaccine: 08/11/2021, 08/25/2021   Hep B vaccine: N/A   DEXA: Has not had and declines referral    Last pap/pelvic: History hysterectomy and advanced age      Last Mammogram: Patient states has one already scheduled for this year  AAA screening: N/A   HIV Screeing: Not at risk  Hepatitis C Screen: Available - declines  Low Dose CT Scan: N/A    Health Maintenance Topics with due status: Not Due       Topic Last Completion Date    Lipid Panel 07/07/2021     Health Maintenance Due   Topic Date Due    TETANUS VACCINE  Never done    Shingles Vaccine (1 of 2) Never done    COVID-19 Vaccine (3 - Booster) 01/25/2022         Lab results available in Epic or see dates from [facility] above:   Lab Results   Component Value Date    CHOL 188 07/07/2021     Lab Results   Component Value Date    HDL 59 07/07/2021      Lab Results   Component Value Date    LDLCALC 109 07/07/2021     Lab Results   Component Value Date    TRIG 102 07/07/2021     Lab Results   Component Value Date    CHOLHDL 3.2 07/07/2021       No results found for: LABA1C, HGBA1C    Sodium   Date Value Ref Range Status   01/13/2022 144 136 - 145 mmol/L Final     Potassium   Date Value Ref Range Status   01/13/2022 4.4 3.5 - 5.1 mmol/L Final     Chloride   Date Value Ref Range Status   01/13/2022 108 (H) 98 - 107 mmol/L Final     CO2   Date Value Ref Range Status   01/13/2022 30 21 - 32 mmol/L Final     Glucose   Date Value Ref Range Status   01/13/2022 87 74 - 106 mg/dL Final     BUN   Date Value Ref Range Status   01/13/2022 10 7 - 18 mg/dL Final     Creatinine   Date Value Ref Range Status   01/13/2022 1.04 (H) 0.55 - 1.02 mg/dL Final     Calcium   Date Value Ref Range Status   01/13/2022 9.1 8.5 - 10.1 mg/dL Final     Total Protein   Date Value Ref Range Status   07/07/2021 7.4 6.4 - 8.2 g/dL Final     Albumin   Date Value Ref Range Status   07/07/2021 3.4 (L) 3.5 - 5.0 g/dL Final     Bilirubin, Total   Date Value Ref Range Status   07/07/2021 0.2 >0.0 - 1.2 mg/dL Final     Alk Phos   Date Value Ref Range Status   07/07/2021 59 55 - 142 U/L Final     AST   Date Value Ref Range Status   07/07/2021 11 (L) 15 - 37 U/L Final     ALT   Date Value Ref Range Status   07/07/2021 11 (L) 13 - 56 U/L Final     Anion Gap   Date Value Ref Range Status   01/13/2022 10 7 - 16 mmol/L Final     eGFR    Date Value Ref Range Status   07/07/2021 59 (L) >=60 mL/min/1.73m² Final     eGFR   Date Value Ref Range Status   01/13/2022 54 (L) >=60 mL/min/1.73m² Final              Incontinence  Bowel: No  Bladder: No      · Care Team  Yu Valenzuela FNP - PCP                             Kapil To FNP Neurology                              [facility]           **See Completed Assessments for Annual Wellness visit within the encounter summary    The  "following assessments were completed & reviewed:  · Depression Screening  · Cognitive function Screening  · Timed Get Up Test  · Whisper Test  · Vision Screen  · Health Risk Assessment  · Checklist of ADLs and IADLs        Objective  Vitals:    04/12/22 1025 04/12/22 1051   BP: (!) 150/84 138/80   Pulse: 63    Resp: 16    Temp: 98.2 °F (36.8 °C)    SpO2: 99%    Weight: 85.3 kg (188 lb)    Height: 5' 6" (1.676 m)    PainSc: 10-Worst pain ever       Body mass index is 30.34 kg/m².  Ideal body weight: 59.3 kg (130 lb 11.7 oz)       Physical Exam  Vitals and nursing note reviewed.   Constitutional:       Appearance: Normal appearance.   Cardiovascular:      Rate and Rhythm: Normal rate and regular rhythm.      Heart sounds: Normal heart sounds.   Pulmonary:      Effort: Pulmonary effort is normal.      Breath sounds: Normal breath sounds.   Musculoskeletal:         General: Normal range of motion.   Neurological:      Mental Status: She is alert and oriented to person, place, and time.   Psychiatric:         Behavior: Behavior normal.           Assessment:     1. Hypothyroidism, unspecified type    2. Hyperlipidemia, unspecified hyperlipidemia type    3. BMI 30.0-30.9,adult    4. Essential hypertension, benign    5. Venous insufficiency (chronic) (peripheral)         Plan:    Referrals:   none    Advised to call office if does not hear from anyone with referral appt within 2-3 weeks to check on status of referral. Voiced understanding.      Discussed and provided with a screening schedule and personal prevention plan in accordance with USPSTF age appropriate recommendations and Medicare screening guidelines.   Education, counseling, and referrals were provided as needed.  After Visit Summary printed and given to patient which includes written education and a list of any referrals if indicated.     Education including diet, exercise, falls and advanced directives discussed with patient and patient verbalized " understanding.      F/u plan for yearly AWV.    Signature: MELANI Stafford

## 2022-04-11 NOTE — PROGRESS NOTES
Subjective:       Patient ID: Umesh Lassiter is a 76 y.o. female     Chief Complaint:    Chief Complaint   Patient presents with    Follow-up    Seizures        Allergies:  Lexapro [escitalopram]    Current Medications:    Outpatient Encounter Medications as of 4/11/2022   Medication Sig Dispense Refill    albuterol (PROVENTIL/VENTOLIN HFA) 90 mcg/actuation inhaler Inhale 2 puffs into the lungs every 6 (six) hours as needed for Wheezing. Rescue 18 g 1    albuterol 2 mg/5 mL syrup Take 5 mLs (2 mg total) by mouth 3 (three) times daily. 450 mL 11    cilostazoL (PLETAL) 50 MG Tab Take 1 tablet (50 mg total) by mouth 2 (two) times daily. 180 tablet 3    clorazepate (TRANXENE) 3.75 MG Tab TAKE 1 TABLET BY MOUTH TWICE A DAY AS NEEDED FOR ANXIETY *CAUSES DROWSINESS-AVOID ALCOHOL!! 60 tablet 5    collagenase (SANTYL) ointment Apply topically once daily. Apply dressing changes. 80 g 2    ferrous sulfate (FEOSOL) 325 mg (65 mg iron) Tab tablet Take 1 tablet (325 mg total) by mouth daily with breakfast. 90 tablet 0    furosemide (LASIX) 20 MG tablet Take 1 tablet (20 mg total) by mouth once daily. 90 tablet 3    HYDROcodone-acetaminophen (NORCO) 5-325 mg per tablet Take 1 tablet by mouth every 6 (six) hours as needed for Pain. 24 tablet 0    levothyroxine (SYNTHROID) 50 MCG tablet Take 1 tablet (50 mcg total) by mouth before breakfast. Take on an empty stomach with a full glass of water. Nothing else to eat or drink for 30 mins 90 tablet 3    losartan (COZAAR) 50 MG tablet Take 1 tablet (50 mg total) by mouth once daily. 90 tablet 3    [DISCONTINUED] divalproex ER (DEPAKOTE) 500 MG Tb24 TAKE 2 TABLETS BY MOUTH EACH NIGHT AT BEDTIME FOR SEIZURES 60 tablet 0    divalproex ER (DEPAKOTE) 500 MG Tb24 TAKE 2 TABLETS BY MOUTH EACH NIGHT AT BEDTIME FOR SEIZURES 180 tablet 1     Facility-Administered Encounter Medications as of 4/11/2022   Medication Dose Route Frequency Provider Last Rate Last Admin    LIDOcaine  HCL 10 mg/ml (1%) injection 1 mL  1 mL Other 1 time in Clinic/HOD Minh Cisneros DO           History of Present Illness  75 y/o female following for history of seizures    Last visit reported last seizure was February 2015.  Continued on depakote ER 1000mg QPM.  I note he had admit in January for a lower extremity cellulitis and had depakote level checked noted at 67.  No recent liver function studies.  Prior was on 250mg dosing in the morning, but this resulted in level being too high.    She has slight bilateral hand tremor on exam, she denies this causes her any complications.  Likely due to the depakote.         Review of Systems  Review of Systems   Constitutional: Negative for diaphoresis and fever.   HENT: Negative for congestion, hearing loss and tinnitus.    Eyes: Negative for blurred vision, double vision, photophobia, discharge and redness.   Respiratory: Negative for cough and shortness of breath.    Cardiovascular: Negative for chest pain.   Gastrointestinal: Negative for abdominal pain, nausea and vomiting.   Musculoskeletal: Negative for back pain, joint pain, myalgias and neck pain.   Skin: Negative for itching and rash.   Neurological: Positive for tremors. Negative for dizziness, sensory change, speech change, focal weakness, seizures, loss of consciousness, weakness and headaches.   Psychiatric/Behavioral: Negative for depression, hallucinations and memory loss. The patient does not have insomnia.    All other systems reviewed and are negative.     Objective:     NEUROLOGICAL EXAMINATION:     MENTAL STATUS   Oriented to person, place, and time.   Attention: normal. Concentration: normal.   Speech: speech is normal   Level of consciousness: alert  Knowledge: good and consistent with education.   Normal comprehension.     CRANIAL NERVES     CN II   Visual fields full to confrontation.   Visual acuity: normal  Right visual field deficit: none  Left visual field deficit: none     CN III, IV, VI    Pupils are equal, round, and reactive to light.  Extraocular motions are normal.   Right pupil: Size: 3 mm. Shape: regular. Reactivity: brisk. Consensual response: intact. Accommodation: intact.   Left pupil: Size: 3 mm. Shape: regular. Reactivity: brisk. Consensual response: intact. Accommodation: intact.   CN III: no CN III palsy  CN VI: no CN VI palsy  Nystagmus: none   Diplopia: none  Upgaze: normal  Downgaze: normal  Conjugate gaze: present  Vestibulo-ocular reflex: present    CN V   Facial sensation intact.   Right facial sensation deficit: none  Left facial sensation deficit: none  Right corneal reflex: normal  Left corneal reflex: normal    CN VII   Facial expression full, symmetric.   Right facial weakness: none  Left facial weakness: none  Right taste: normal  Left taste: normal    CN VIII   CN VIII normal.   Hearing: intact    CN IX, X   CN IX normal.   CN X normal.   Palate: symmetric    CN XI   CN XI normal.   Right sternocleidomastoid strength: normal  Left sternocleidomastoid strength: normal  Right trapezius strength: normal  Left trapezius strength: normal    CN XII   CN XII normal.   Tongue: not atrophic  Fasciculations: absent  Tongue deviation: none    MOTOR EXAM   Muscle bulk: normal  Overall muscle tone: normal  Right arm tone: normal  Left arm tone: normal  Right arm pronator drift: absent  Left arm pronator drift: absent  Right leg tone: normal  Left leg tone: normal    Strength   Right neck flexion: 5/5  Left neck flexion: 5/5  Right neck extension: 5/5  Left neck extension: 5/5  Right deltoid: 5/5  Left deltoid: 5/5  Right biceps: 5/5  Left biceps: 5/5  Right triceps: 5/5  Left triceps: 5/5  Right wrist flexion: 5/5  Left wrist flexion: 5/5  Right wrist extension: 5/5  Left wrist extension: 5/5  Right interossei: 5/5  Left interossei: 5/5  Right iliopsoas: 5/5  Left iliopsoas: 5/5  Right quadriceps: 5/5  Left quadriceps: 5/5  Right hamstrin/5  Left hamstrin/5  Right anterior  tibial: 5/5  Left anterior tibial: 5/5  Right posterior tibial: 5/5  Left posterior tibial: 5/5  Right gastroc: 5/5  Left gastroc: 5/5    REFLEXES     Reflexes   Right brachioradialis: 2+  Left brachioradialis: 2+  Right biceps: 2+  Left biceps: 2+  Right triceps: 2+  Left triceps: 2+  Right patellar: 2+  Left patellar: 2+  Right achilles: 2+  Left achilles: 2+  Right plantar: normal  Left plantar: normal  Right Juárez: absent  Left Juárez: absent  Right ankle clonus: absent  Left ankle clonus: absent  Right pendular knee jerk: absent  Left pendular knee jerk: absent    SENSORY EXAM   Light touch normal.   Right arm light touch: normal  Left arm light touch: normal  Right leg light touch: normal  Left leg light touch: normal  Vibration normal.   Right arm vibration: normal  Left arm vibration: normal  Right leg vibration: normal  Left leg vibration: normal  Proprioception normal.   Right arm proprioception: normal  Left arm proprioception: normal  Right leg proprioception: normal  Left leg proprioception: normal  Pinprick normal.   Right arm pinprick: normal  Left arm pinprick: normal  Right leg pinprick: normal  Left leg pinprick: normal  Graphesthesia: normal  Romberg: negative  Stereognosis: normal    GAIT AND COORDINATION     Gait  Gait: normal     Coordination   Finger to nose coordination: normal  Heel to shin coordination: normal  Tandem walking coordination: normal    Tremor   Resting tremor: absent  Intention tremor: present  Action tremor: right arm and left arm       Physical Exam  Vitals and nursing note reviewed.   Constitutional:       Appearance: Normal appearance.   HENT:      Head: Normocephalic.   Eyes:      Extraocular Movements: Extraocular movements intact and EOM normal.      Pupils: Pupils are equal, round, and reactive to light.   Cardiovascular:      Rate and Rhythm: Normal rate and regular rhythm.   Pulmonary:      Effort: Pulmonary effort is normal.      Breath sounds: Normal breath  sounds.   Musculoskeletal:         General: No swelling or tenderness. Normal range of motion.      Cervical back: Normal range of motion and neck supple.      Right lower leg: No edema.      Left lower leg: No edema.   Skin:     General: Skin is warm and dry.      Coloration: Skin is not jaundiced.      Findings: No rash.   Neurological:      General: No focal deficit present.      Mental Status: She is alert and oriented to person, place, and time.      GCS: GCS eye subscore is 4. GCS verbal subscore is 5. GCS motor subscore is 6.      Cranial Nerves: No cranial nerve deficit.      Sensory: No sensory deficit.      Motor: Motor function is intact. No weakness.      Coordination: Coordination is intact. Coordination normal. Finger-Nose-Finger Test, Heel to Shin Test and Romberg Test normal.      Gait: Gait is intact. Gait and tandem walk normal.      Deep Tendon Reflexes: Reflexes normal.      Reflex Scores:       Tricep reflexes are 2+ on the right side and 2+ on the left side.       Bicep reflexes are 2+ on the right side and 2+ on the left side.       Brachioradialis reflexes are 2+ on the right side and 2+ on the left side.       Patellar reflexes are 2+ on the right side and 2+ on the left side.       Achilles reflexes are 2+ on the right side and 2+ on the left side.  Psychiatric:         Mood and Affect: Mood normal.         Speech: Speech normal.         Behavior: Behavior normal.          Assessment:     Problem List Items Addressed This Visit        Neuro    Generalized epilepsy - Primary      Other Visit Diagnoses     Seizure        Relevant Medications    divalproex ER (DEPAKOTE) 500 MG Tb24           Primary Diagnosis and ICD10  Generalized epilepsy [G40.309]    Plan:     Patient Instructions   1. Continue current Depakote ER 1000mg at night  2. Will check depakote level and CMP next visit  3. Reviewed recent Depakote and labs in EMR  4. Notify clinic if any seizures      Seizure Precautions:  1. Take  medications as directed  2. Avoid open flames and hot surfaces such as fires and grills  3. Avoid elevations such as ladders or stools  4. Avoid swimming alone  5. Make sure getting plenty of sleep, recommend 7-8 hours per day  6. Avoid alcohol and illicit drugs  7. No driving or operating heavy machinery for 6 months after last known seizure       Medications Discontinued During This Encounter   Medication Reason    divalproex ER (DEPAKOTE) 500 MG Tb24 Reorder       Requested Prescriptions     Signed Prescriptions Disp Refills    divalproex ER (DEPAKOTE) 500 MG Tb24 180 tablet 1     Sig: TAKE 2 TABLETS BY MOUTH EACH NIGHT AT BEDTIME FOR SEIZURES       No orders of the defined types were placed in this encounter.

## 2022-04-12 ENCOUNTER — OFFICE VISIT (OUTPATIENT)
Dept: PRIMARY CARE CLINIC | Facility: CLINIC | Age: 77
End: 2022-04-12
Payer: COMMERCIAL

## 2022-04-12 VITALS
HEIGHT: 66 IN | RESPIRATION RATE: 16 BRPM | BODY MASS INDEX: 30.22 KG/M2 | OXYGEN SATURATION: 99 % | TEMPERATURE: 98 F | HEART RATE: 63 BPM | WEIGHT: 188 LBS | SYSTOLIC BLOOD PRESSURE: 138 MMHG | DIASTOLIC BLOOD PRESSURE: 80 MMHG

## 2022-04-12 DIAGNOSIS — E03.9 HYPOTHYROIDISM, UNSPECIFIED TYPE: ICD-10-CM

## 2022-04-12 DIAGNOSIS — I87.2 VENOUS INSUFFICIENCY (CHRONIC) (PERIPHERAL): ICD-10-CM

## 2022-04-12 DIAGNOSIS — E78.5 HYPERLIPIDEMIA, UNSPECIFIED HYPERLIPIDEMIA TYPE: ICD-10-CM

## 2022-04-12 DIAGNOSIS — I10 ESSENTIAL HYPERTENSION, BENIGN: Primary | ICD-10-CM

## 2022-04-12 PROCEDURE — 1159F PR MEDICATION LIST DOCUMENTED IN MEDICAL RECORD: ICD-10-PCS | Mod: ,,, | Performed by: NURSE PRACTITIONER

## 2022-04-12 PROCEDURE — 1160F PR REVIEW ALL MEDS BY PRESCRIBER/CLIN PHARMACIST DOCUMENTED: ICD-10-PCS | Mod: ,,, | Performed by: NURSE PRACTITIONER

## 2022-04-12 PROCEDURE — 1125F PR PAIN SEVERITY QUANTIFIED, PAIN PRESENT: ICD-10-PCS | Mod: ,,, | Performed by: NURSE PRACTITIONER

## 2022-04-12 PROCEDURE — 3288F FALL RISK ASSESSMENT DOCD: CPT | Mod: ,,, | Performed by: NURSE PRACTITIONER

## 2022-04-12 PROCEDURE — 1125F AMNT PAIN NOTED PAIN PRSNT: CPT | Mod: ,,, | Performed by: NURSE PRACTITIONER

## 2022-04-12 PROCEDURE — 3079F PR MOST RECENT DIASTOLIC BLOOD PRESSURE 80-89 MM HG: ICD-10-PCS | Mod: ,,, | Performed by: NURSE PRACTITIONER

## 2022-04-12 PROCEDURE — 3075F PR MOST RECENT SYSTOLIC BLOOD PRESS GE 130-139MM HG: ICD-10-PCS | Mod: ,,, | Performed by: NURSE PRACTITIONER

## 2022-04-12 PROCEDURE — 1101F PT FALLS ASSESS-DOCD LE1/YR: CPT | Mod: ,,, | Performed by: NURSE PRACTITIONER

## 2022-04-12 PROCEDURE — 1159F MED LIST DOCD IN RCRD: CPT | Mod: ,,, | Performed by: NURSE PRACTITIONER

## 2022-04-12 PROCEDURE — 1160F RVW MEDS BY RX/DR IN RCRD: CPT | Mod: ,,, | Performed by: NURSE PRACTITIONER

## 2022-04-12 PROCEDURE — 3079F DIAST BP 80-89 MM HG: CPT | Mod: ,,, | Performed by: NURSE PRACTITIONER

## 2022-04-12 PROCEDURE — 1101F PR PT FALLS ASSESS DOC 0-1 FALLS W/OUT INJ PAST YR: ICD-10-PCS | Mod: ,,, | Performed by: NURSE PRACTITIONER

## 2022-04-12 PROCEDURE — G0438 PPPS, INITIAL VISIT: HCPCS | Mod: ,,, | Performed by: NURSE PRACTITIONER

## 2022-04-12 PROCEDURE — 3075F SYST BP GE 130 - 139MM HG: CPT | Mod: ,,, | Performed by: NURSE PRACTITIONER

## 2022-04-12 PROCEDURE — G0438 PR WELCOME MEDICARE ANNUAL WELLNESS INITIAL VISIT: ICD-10-PCS | Mod: ,,, | Performed by: NURSE PRACTITIONER

## 2022-04-12 PROCEDURE — 3288F PR FALLS RISK ASSESSMENT DOCUMENTED: ICD-10-PCS | Mod: ,,, | Performed by: NURSE PRACTITIONER

## 2022-04-12 RX ORDER — DIVALPROEX SODIUM 250 MG/1
250 TABLET, DELAYED RELEASE ORAL DAILY
COMMUNITY
End: 2022-08-13

## 2022-04-12 NOTE — PATIENT INSTRUCTIONS
Counseling and Referral of Other Preventative  (Italic type indicates deductible and co-insurance are waived)    Patient Name: Umesh Lassiter  Today's Date: 4/12/2022    Health Maintenance         Date Due Completion Date    Hepatitis C Screening Never done ---    TETANUS VACCINE Never done ---    DEXA Scan Never done ---    Shingles Vaccine (1 of 2) Never done ---    Pneumococcal Vaccines (Age 65+) (1 - PCV) Never done ---    COVID-19 Vaccine (3 - Booster) 01/25/2022 8/25/2021    Influenza Vaccine (1) 06/30/2022 (Originally 9/1/2021) ---    Lipid Panel 07/07/2026 7/7/2021          No orders of the defined types were placed in this encounter.

## 2022-05-10 RX ORDER — COLLAGENASE SANTYL 250 [ARB'U]/G
OINTMENT TOPICAL
Qty: 90 G | Refills: 2 | Status: SHIPPED | OUTPATIENT
Start: 2022-05-10 | End: 2022-07-27

## 2022-05-25 ENCOUNTER — OFFICE VISIT (OUTPATIENT)
Dept: VASCULAR SURGERY | Facility: CLINIC | Age: 77
End: 2022-05-25
Payer: COMMERCIAL

## 2022-05-25 VITALS
BODY MASS INDEX: 31.24 KG/M2 | HEIGHT: 66 IN | RESPIRATION RATE: 12 BRPM | SYSTOLIC BLOOD PRESSURE: 146 MMHG | HEART RATE: 96 BPM | DIASTOLIC BLOOD PRESSURE: 80 MMHG | WEIGHT: 194.38 LBS

## 2022-05-25 DIAGNOSIS — M79.605 LEFT LEG PAIN: ICD-10-CM

## 2022-05-25 DIAGNOSIS — L81.9 HYPERPIGMENTATION OF SKIN: ICD-10-CM

## 2022-05-25 DIAGNOSIS — I87.312 STASIS EDEMA WITH ULCER, LEFT: Primary | ICD-10-CM

## 2022-05-25 DIAGNOSIS — L97.929 STASIS EDEMA WITH ULCER, LEFT: Primary | ICD-10-CM

## 2022-05-25 DIAGNOSIS — I87.2 VENOUS INSUFFICIENCY (CHRONIC) (PERIPHERAL): ICD-10-CM

## 2022-05-25 PROCEDURE — 1159F MED LIST DOCD IN RCRD: CPT | Mod: CPTII,,, | Performed by: FAMILY MEDICINE

## 2022-05-25 PROCEDURE — 3079F DIAST BP 80-89 MM HG: CPT | Mod: CPTII,,, | Performed by: FAMILY MEDICINE

## 2022-05-25 PROCEDURE — 3079F PR MOST RECENT DIASTOLIC BLOOD PRESSURE 80-89 MM HG: ICD-10-PCS | Mod: CPTII,,, | Performed by: FAMILY MEDICINE

## 2022-05-25 PROCEDURE — 1159F PR MEDICATION LIST DOCUMENTED IN MEDICAL RECORD: ICD-10-PCS | Mod: CPTII,,, | Performed by: FAMILY MEDICINE

## 2022-05-25 PROCEDURE — 99214 PR OFFICE/OUTPT VISIT, EST, LEVL IV, 30-39 MIN: ICD-10-PCS | Mod: S$PBB,,, | Performed by: FAMILY MEDICINE

## 2022-05-25 PROCEDURE — 99214 OFFICE O/P EST MOD 30 MIN: CPT | Mod: S$PBB,,, | Performed by: FAMILY MEDICINE

## 2022-05-25 PROCEDURE — 1160F PR REVIEW ALL MEDS BY PRESCRIBER/CLIN PHARMACIST DOCUMENTED: ICD-10-PCS | Mod: CPTII,,, | Performed by: FAMILY MEDICINE

## 2022-05-25 PROCEDURE — 3077F PR MOST RECENT SYSTOLIC BLOOD PRESSURE >= 140 MM HG: ICD-10-PCS | Mod: CPTII,,, | Performed by: FAMILY MEDICINE

## 2022-05-25 PROCEDURE — 99214 OFFICE O/P EST MOD 30 MIN: CPT | Mod: PBBFAC | Performed by: FAMILY MEDICINE

## 2022-05-25 PROCEDURE — 25000003 PHARM REV CODE 250

## 2022-05-25 PROCEDURE — 1160F RVW MEDS BY RX/DR IN RCRD: CPT | Mod: CPTII,,, | Performed by: FAMILY MEDICINE

## 2022-05-25 PROCEDURE — 3077F SYST BP >= 140 MM HG: CPT | Mod: CPTII,,, | Performed by: FAMILY MEDICINE

## 2022-05-25 RX ORDER — CEFDINIR 300 MG/1
CAPSULE ORAL
COMMUNITY
Start: 2022-05-18 | End: 2022-07-13 | Stop reason: ALTCHOICE

## 2022-05-25 RX ORDER — HYDROCODONE BITARTRATE AND ACETAMINOPHEN 5; 325 MG/1; MG/1
TABLET ORAL
COMMUNITY
Start: 2022-04-15 | End: 2022-06-08

## 2022-05-25 NOTE — PROGRESS NOTES
VEIN CENTER CLINIC NOTE    Patient ID: Umesh Lassiter is a 76 y.o. female.    I. HISTORY     Chief Complaint:   Chief Complaint   Patient presents with    Wound Check     Procedure 1  Pt referred back by [facility] for wound care.  Has been at [facility] wound care for about 2 months.  Pt states HH is not consistant with visits.        HPI: Umesh Lassiter is a 76 y.o. female who presents today by referral from [facility] wound [facility] for worsening of a left lower extremity stasis ulceration.  The patient was last seen in our clinic on 03/14/2022 and her ulcer was nearly healed.  With this continued wound dressings and switched in a boot with compression only.  Evidently it irritated the patient's skin and she was upset by this when she returned on 03/28/2022 and she left the clinic before being seen.  She has now returned, per her daughter's request, and is apologetic and would like for us to continue with wound care.  The stasis ulcer of her left lower extremity remains superficial in nature but there are several small new areas of skin breakdown.  She appears to have a little more swelling in her left lower extremity as well as hyperpigmentation.  No signs of purulent drainage, cellulitis or signs of infection.    Clinical summary:  The patient is known to our clinic but has not been seen since 03/02/2021 for a one-month follow-up status post left great saphenous vein ablation.  She has been lost to follow-up since.  She states that this ulceration of her left lower extremity has been present for approximately 6 months.  She states that his been persistent and worsening during that time.  She has not been wearing compression daily.  She has pitting edema, hyperpigmentation along with the ulceration of the left medial Gator region.  No signs of infection, including purulent drainage, increased temperature or foul odor.  She denies complaints of her right lower extremity.    Reflux study 12/21/2021 shows no  evidence of DVT or superficial venous reflux.  Shows a well ablated proximal left great saphenous vein and ablated left small saphenous vein.     Left great saphenous vein laser ablation 01/08/2021.  Right great saphenous vein, duplicate, laser ablation 12/17/2020.  Right distal great saphenous vein erythema ablation 02/28/2020.  Left small saphenous vein laser ablation 04/27/2018.  Right great saphenous vein laser ablation 04/20/2018.    Past Medical History:   Diagnosis Date    Anxiety     Asthma     CVA (cerebral vascular accident) 05/23/2018    DVT (deep venous thrombosis) 2019    Epilepsy     Hyperlipidemia     Hypertension     Hypothyroid     PVD (peripheral vascular disease)     Venous stasis         Past Surgical History:   Procedure Laterality Date    KNEE ARTHROSCOPY Left     x 2    LASER ABLATION Right 04/20/2018    [facility] LASER ABLATION PERFORMED BY DR. KARMA BUTCHER.    LASER ABLATION Left 04/27/2018    [facility] LASER ABLATION PERFORMED BY DR. KARMA BUTCHER.    LASER ABLATION Right 12/17/2020    [facility] ( DUPLICATED )LASER ABLATION PERFORMED BY DR. KARMA BUTCHER.    LASER ABLATION Left 01/08/2021    [facility] LASER ABLATION PERFORMED BY DR. KARMA BUTCHER.    Remove cataracts, insert lens Bilateral     SHOULDER ARTHROSCOPY Left     SINUS SURGERY      TOTAL ABDOMINAL HYSTERECTOMY W/ BILATERAL SALPINGOOPHORECTOMY      VENOUS ABLATION Right 02/28/2020    DISTAL [facility] VARITHENA ABLATION PERFORMED BY DR. KARMA BUTCHER.       Social History     Tobacco Use   Smoking Status Never Smoker   Smokeless Tobacco Never Used         Current Outpatient Medications:     albuterol (PROVENTIL/VENTOLIN HFA) 90 mcg/actuation inhaler, Inhale 2 puffs into the lungs every 6 (six) hours as needed for Wheezing. Rescue, Disp: 18 g, Rfl: 1    albuterol 2 mg/5 mL syrup, Take 5 mLs (2 mg total) by mouth 3 (three) times daily., Disp: 450 mL, Rfl: 11    cefdinir (OMNICEF) 300 MG capsule, TAKE 1 CAPSULE BY MOUTH TWICE A DAY WITH FOOD FOR  10 DAYS, Disp: , Rfl:     cilostazoL (PLETAL) 50 MG Tab, Take 1 tablet (50 mg total) by mouth 2 (two) times daily., Disp: 180 tablet, Rfl: 3    clorazepate (TRANXENE) 3.75 MG Tab, TAKE 1 TABLET BY MOUTH TWICE A DAY AS NEEDED FOR ANXIETY *CAUSES DROWSINESS-AVOID ALCOHOL!!, Disp: 60 tablet, Rfl: 5    divalproex (DEPAKOTE) 250 MG EC tablet, Take 250 mg by mouth Daily., Disp: , Rfl:     divalproex ER (DEPAKOTE) 500 MG Tb24, TAKE 2 TABLETS BY MOUTH EACH NIGHT AT BEDTIME FOR SEIZURES, Disp: 180 tablet, Rfl: 1    ferrous sulfate (FEOSOL) 325 mg (65 mg iron) Tab tablet, Take 1 tablet (325 mg total) by mouth daily with breakfast., Disp: 90 tablet, Rfl: 0    furosemide (LASIX) 20 MG tablet, Take 1 tablet (20 mg total) by mouth once daily., Disp: 90 tablet, Rfl: 3    HYDROcodone-acetaminophen (NORCO) 5-325 mg per tablet, TAKE 1 TABLET BY MOUTH EVERY 12 HOURS AS NEEDED FOR PAIN May Cause Drowsiness, No alcohol, No driving ..30 DAY SUPPLY.., Disp: , Rfl:     levothyroxine (SYNTHROID) 50 MCG tablet, Take 1 tablet (50 mcg total) by mouth before breakfast. Take on an empty stomach with a full glass of water. Nothing else to eat or drink for 30 mins, Disp: 90 tablet, Rfl: 3    losartan (COZAAR) 50 MG tablet, Take 1 tablet (50 mg total) by mouth once daily., Disp: 90 tablet, Rfl: 3    SANTYL ointment, APPLY TOPICALLY TO AFFECTED AREA EVERY DAY ...APPLY DRESSING CHANGES, Disp: 90 g, Rfl: 2    Review of Systems   Constitutional: Negative for activity change, chills, diaphoresis, fatigue and fever.   Respiratory: Negative for cough and shortness of breath.    Cardiovascular: Positive for leg swelling. Negative for chest pain and claudication.        Hyperpigmentation LE   Gastrointestinal: Negative for nausea and vomiting.   Musculoskeletal: Positive for leg pain. Negative for joint swelling.   Integumentary:  Negative for rash and wound.   Neurological: Negative for weakness and numbness.       II. PHYSICAL EXAM     Physical  Exam  Constitutional:       General: She is awake. She is not in acute distress.     Appearance: Normal appearance. She is obese. She is not ill-appearing or toxic-appearing.   HENT:      Head: Normocephalic and atraumatic.   Eyes:      Extraocular Movements: Extraocular movements intact.      Conjunctiva/sclera: Conjunctivae normal.      Pupils: Pupils are equal, round, and reactive to light.   Neck:      Vascular: No carotid bruit or JVD.   Cardiovascular:      Rate and Rhythm: Normal rate and regular rhythm.      Pulses:           Dorsalis pedis pulses are detected w/ Doppler on the left side.        Posterior tibial pulses are detected w/ Doppler on the left side.      Heart sounds: No murmur heard.  Pulmonary:      Effort: Pulmonary effort is normal. No respiratory distress.      Breath sounds: No stridor. No wheezing, rhonchi or rales.   Musculoskeletal:         General: No swelling, tenderness or deformity.      Right lower le+ Edema present.      Left lower le+ Edema present.        Legs:       Comments: Ulcer of left medial Gator region shallow in appearance with several small areas of skin breakdown.  No signs of cellulitis, infection or foul odor.    Hyperpigmentation of the left lower Gator region along with +2 edema.   Feet:      Comments: Triphasic dopplerable pulses of the left lower extremity.  Skin:     General: Skin is warm.      Capillary Refill: Capillary refill takes less than 2 seconds.      Coloration: Skin is not ashen.      Findings: No bruising, erythema, lesion, rash or wound.   Neurological:      Mental Status: She is alert and oriented to person, place, and time.      Motor: No weakness.   Psychiatric:         Speech: Speech normal.         Behavior: Behavior normal. Behavior is cooperative.       Reticular/Spider veins noted:  RLE: medial calf  LLE: none    Varicose veins noted:  RLE: medial calf  LLE:  medial calf    CEAP Classification  Clinical Signs: Class 6 - Leg  ulceration, skin changes as defined above  Etiologic Classification: Primary  Anatomic distribution: Superficial  Pathophysiologic dysfunction: Reflux       Venous Clinical Severity Score  Pain:2=Daily, moderate activity limitation, occasional analgesics  Varicose Veins: 1=Few, scattered. Branch varicose veins  Venous Edema: 2=Afternoon edema, above ankle  Pigmentation: 2=Diffuse over most of gaiter distribution (lower 1/3) or recent pigmentation (purple)  Inflammation: 1=Mild cellulitis, limited to marginal area around ulcer  Induration: 1=Focal, circummalleolar (< 5 cm)  Number of Active Ulcers: 2=2  Active Ulceration, Duration: 2=>3 months, <1 year  Active Ulcer Size: 2=2 - 6 cm diameter  Compressive Therapy: 3=Full compliance, stockings + elevation  Total Score: 18       III. ASSESSMENT & PLAN (MEDICAL DECISION MAKING)     1. Stasis edema with ulcer, left    2. Venous insufficiency (chronic) (peripheral)    3. Hyperpigmentation of skin    4. Left leg pain        Assessment/Diagnosis and Plan:  The leg and wound were cleaned with wound cleanser.  We will apply Santyl, silver collagen, alginate, foam pad and a Coban Lite wrap.  Continue home health orders to this effect with twice weekly wrap changes.   I will also write new home health orders for twice weekly wound dressing and wrap changes.    Follow-up in 2 weeks for re-evaluation.      Minh Cisneros,

## 2022-06-02 ENCOUNTER — OUTSIDE PLACE OF SERVICE (OUTPATIENT)
Dept: VASCULAR SURGERY | Facility: CLINIC | Age: 77
End: 2022-06-02
Payer: MEDICARE

## 2022-06-02 PROCEDURE — G0180 MD CERTIFICATION HHA PATIENT: HCPCS | Mod: ,,, | Performed by: FAMILY MEDICINE

## 2022-06-02 PROCEDURE — G0180 PR HOME HEALTH MD CERTIFICATION: ICD-10-PCS | Mod: ,,, | Performed by: FAMILY MEDICINE

## 2022-06-05 ENCOUNTER — HOSPITAL ENCOUNTER (EMERGENCY)
Facility: HOSPITAL | Age: 77
Discharge: HOME | End: 2022-06-06
Attending: EMERGENCY MEDICINE
Payer: MEDICARE

## 2022-06-05 DIAGNOSIS — L08.9 CHRONIC WOUND INFECTION OF ABDOMEN, INITIAL ENCOUNTER: Primary | ICD-10-CM

## 2022-06-05 DIAGNOSIS — S31.109A CHRONIC WOUND INFECTION OF ABDOMEN, INITIAL ENCOUNTER: Primary | ICD-10-CM

## 2022-06-05 LAB
ANION GAP SERPL CALCULATED.3IONS-SCNC: 7 MMOL/L (ref 7–16)
BASOPHILS # BLD AUTO: 0.03 K/UL (ref 0–0.2)
BASOPHILS NFR BLD AUTO: 0.5 % (ref 0–1)
BUN SERPL-MCNC: 21 MG/DL (ref 7–18)
BUN/CREAT SERPL: 22 (ref 6–20)
CALCIUM SERPL-MCNC: 9.7 MG/DL (ref 8.5–10.1)
CHLORIDE SERPL-SCNC: 109 MMOL/L (ref 98–107)
CO2 SERPL-SCNC: 30 MMOL/L (ref 21–32)
CREAT SERPL-MCNC: 0.97 MG/DL (ref 0.55–1.02)
CRP SERPL-MCNC: 0.72 MG/DL (ref 0–0.8)
DIFFERENTIAL METHOD BLD: ABNORMAL
EOSINOPHIL # BLD AUTO: 0.19 K/UL (ref 0–0.5)
EOSINOPHIL NFR BLD AUTO: 3.4 % (ref 1–4)
ERYTHROCYTE [DISTWIDTH] IN BLOOD BY AUTOMATED COUNT: 15.4 % (ref 11.5–14.5)
GLUCOSE SERPL-MCNC: 90 MG/DL (ref 74–106)
HCT VFR BLD AUTO: 35.1 % (ref 38–47)
HGB BLD-MCNC: 11.4 G/DL (ref 12–16)
IMM GRANULOCYTES # BLD AUTO: 0.02 K/UL (ref 0–0.04)
IMM GRANULOCYTES NFR BLD: 0.4 % (ref 0–0.4)
LYMPHOCYTES # BLD AUTO: 2.39 K/UL (ref 1–4.8)
LYMPHOCYTES NFR BLD AUTO: 42.5 % (ref 27–41)
MCH RBC QN AUTO: 26.1 PG (ref 27–31)
MCHC RBC AUTO-ENTMCNC: 32.5 G/DL (ref 32–36)
MCV RBC AUTO: 80.3 FL (ref 80–96)
MONOCYTES # BLD AUTO: 0.68 K/UL (ref 0–0.8)
MONOCYTES NFR BLD AUTO: 12.1 % (ref 2–6)
MPC BLD CALC-MCNC: 10.7 FL (ref 9.4–12.4)
NEUTROPHILS # BLD AUTO: 2.31 K/UL (ref 1.8–7.7)
NEUTROPHILS NFR BLD AUTO: 41.1 % (ref 53–65)
NRBC # BLD AUTO: 0 X10E3/UL
NRBC, AUTO (.00): 0 %
PLATELET # BLD AUTO: 294 K/UL (ref 150–400)
POTASSIUM SERPL-SCNC: 4.6 MMOL/L (ref 3.5–5.1)
RBC # BLD AUTO: 4.37 M/UL (ref 4.2–5.4)
SODIUM SERPL-SCNC: 141 MMOL/L (ref 136–145)
WBC # BLD AUTO: 5.62 K/UL (ref 4.5–11)

## 2022-06-05 PROCEDURE — 86140 C-REACTIVE PROTEIN: CPT | Performed by: EMERGENCY MEDICINE

## 2022-06-05 PROCEDURE — 96372 THER/PROPH/DIAG INJ SC/IM: CPT

## 2022-06-05 PROCEDURE — 36415 COLL VENOUS BLD VENIPUNCTURE: CPT

## 2022-06-05 PROCEDURE — 99284 EMERGENCY DEPT VISIT MOD MDM: CPT

## 2022-06-05 PROCEDURE — 80048 BASIC METABOLIC PNL TOTAL CA: CPT | Performed by: EMERGENCY MEDICINE

## 2022-06-05 PROCEDURE — 99283 EMERGENCY DEPT VISIT LOW MDM: CPT | Mod: ,,, | Performed by: EMERGENCY MEDICINE

## 2022-06-05 PROCEDURE — 99283 PR EMERGENCY DEPT VISIT,LEVEL III: ICD-10-PCS | Mod: ,,, | Performed by: EMERGENCY MEDICINE

## 2022-06-05 PROCEDURE — 85025 COMPLETE CBC W/AUTO DIFF WBC: CPT | Performed by: EMERGENCY MEDICINE

## 2022-06-05 PROCEDURE — 85651 RBC SED RATE NONAUTOMATED: CPT | Performed by: EMERGENCY MEDICINE

## 2022-06-06 ENCOUNTER — TELEPHONE (OUTPATIENT)
Dept: VASCULAR SURGERY | Facility: CLINIC | Age: 77
End: 2022-06-06
Payer: MEDICARE

## 2022-06-06 VITALS
SYSTOLIC BLOOD PRESSURE: 148 MMHG | TEMPERATURE: 98 F | RESPIRATION RATE: 16 BRPM | WEIGHT: 190 LBS | OXYGEN SATURATION: 99 % | BODY MASS INDEX: 30.53 KG/M2 | DIASTOLIC BLOOD PRESSURE: 60 MMHG | HEART RATE: 80 BPM | HEIGHT: 66 IN

## 2022-06-06 PROBLEM — S31.109A CHRONIC WOUND INFECTION OF ABDOMEN: Status: ACTIVE | Noted: 2022-06-06

## 2022-06-06 PROBLEM — L08.9 CHRONIC WOUND INFECTION OF ABDOMEN: Status: ACTIVE | Noted: 2022-06-06

## 2022-06-06 LAB — ERYTHROCYTE [SEDIMENTATION RATE] IN BLOOD BY WESTERGREN METHOD: 33 MM/HR (ref 0–30)

## 2022-06-06 PROCEDURE — 63600175 PHARM REV CODE 636 W HCPCS: Performed by: EMERGENCY MEDICINE

## 2022-06-06 PROCEDURE — 25000003 PHARM REV CODE 250: Performed by: EMERGENCY MEDICINE

## 2022-06-06 RX ORDER — ONDANSETRON 4 MG/1
4 TABLET, ORALLY DISINTEGRATING ORAL
Status: COMPLETED | OUTPATIENT
Start: 2022-06-06 | End: 2022-06-06

## 2022-06-06 RX ORDER — MORPHINE SULFATE 2 MG/ML
2 INJECTION, SOLUTION INTRAMUSCULAR; INTRAVENOUS
Status: COMPLETED | OUTPATIENT
Start: 2022-06-06 | End: 2022-06-06

## 2022-06-06 RX ORDER — MORPHINE SULFATE 2 MG/ML
2 INJECTION, SOLUTION INTRAMUSCULAR; INTRAVENOUS
Status: DISCONTINUED | OUTPATIENT
Start: 2022-06-06 | End: 2022-06-06

## 2022-06-06 RX ADMIN — MORPHINE SULFATE 2 MG: 2 INJECTION, SOLUTION INTRAMUSCULAR; INTRAVENOUS at 12:06

## 2022-06-06 RX ADMIN — ONDANSETRON 4 MG: 4 TABLET, ORALLY DISINTEGRATING ORAL at 12:06

## 2022-06-06 NOTE — DISCHARGE INSTRUCTIONS
KEEP YOUR WOUND CARE APPOINTMENT THAT IS ALREADY SCHEDULED.  FOLLOW UP WITH YOUR PRIMARY CARE PROVIDER.  RETURN TO THE EMERGENCY DEPARTMENT AS NEEDED.

## 2022-06-06 NOTE — ED PROVIDER NOTES
Encounter Date: 6/5/2022    SCRIBE #1 NOTE: I, Ayala Dangelo, am scribing for, and in the presence of,  Candelario Scott. I have scribed the entire note.       History     Chief Complaint   Patient presents with    Wound Check     The patient is a 77 year old female with complaints of a recurring wound. She reports she has had the wound for a year and sees  at the vein clinic for the issue. She has an appointment with him in 3 days as well. The patient reports over the last week the wound has become increasingly more painful and has gotten worse in appearance. Tonight the pain became significantly worse. She denies any abdominal pain, nausea, vomiting, fever, chills, or any other complaints. The daughter reports the wound looks better than it has recently.    The history is provided by the patient and a relative. No  was used.     Review of patient's allergies indicates:   Allergen Reactions    Influenza virus vaccines      Pt states had to be admitted to hospital after last flu vaccine     Lexapro [escitalopram]      Past Medical History:   Diagnosis Date    Anxiety     Asthma     CVA (cerebral vascular accident) 05/23/2018    DVT (deep venous thrombosis) 2019    Epilepsy     Hyperlipidemia     Hypertension     Hypothyroid     PVD (peripheral vascular disease)     Venous stasis      Past Surgical History:   Procedure Laterality Date    KNEE ARTHROSCOPY Left     x 2    LASER ABLATION Right 04/20/2018    [facility] LASER ABLATION PERFORMED BY DR. KAMRA BUTCHER.    LASER ABLATION Left 04/27/2018    [facility] LASER ABLATION PERFORMED BY DR. KARMA BUTCHER.    LASER ABLATION Right 12/17/2020    [facility] ( DUPLICATED )LASER ABLATION PERFORMED BY DR. KARMA BUTCHER.    LASER ABLATION Left 01/08/2021    [facility] LASER ABLATION PERFORMED BY DR. KARMA BUTCHER.    Remove cataracts, insert lens Bilateral     SHOULDER ARTHROSCOPY Left     SINUS SURGERY      TOTAL ABDOMINAL HYSTERECTOMY W/ BILATERAL  SALPINGOOPHORECTOMY      VENOUS ABLATION Right 02/28/2020    DISTAL [facility] VARITHENA ABLATION PERFORMED BY DR. KARMA BUTCHER.     Family History   Problem Relation Age of Onset    Alzheimer's disease Mother     Endometrial cancer Mother     Heart disease Mother     Lung cancer Father     Diabetes Sister     Endometrial cancer Sister     Hypertension Sister     Polycystic kidney disease Sister     Diabetes Brother     Sickle cell anemia Daughter     Thyroid disease Sister     Seizures Brother     Heart disease Daughter         7 MONTHS    No Known Problems Son     No Known Problems Son     No Known Problems Son     Cancer Brother     No Known Problems Sister     No Known Problems Sister     No Known Problems Sister     No Known Problems Sister     No Known Problems Sister      Social History     Tobacco Use    Smoking status: Never Smoker    Smokeless tobacco: Never Used   Substance Use Topics    Alcohol use: Never    Drug use: Never     Review of Systems   Constitutional: Negative for chills and fever.   Gastrointestinal: Negative for abdominal pain, nausea and vomiting.   Skin: Positive for wound (Recurring wound to the lower left leg).   All other systems reviewed and are negative.      Physical Exam     Initial Vitals [06/05/22 2108]   BP Pulse Resp Temp SpO2   137/69 73 18 98.3 °F (36.8 °C) 99 %      MAP       --         Physical Exam    Constitutional: She appears well-developed and well-nourished.   HENT:   Head: Normocephalic and atraumatic.   Eyes: Conjunctivae and EOM are normal. Pupils are equal, round, and reactive to light.   Neck: Neck supple.   Normal range of motion.  Cardiovascular: Normal rate and regular rhythm.   Pulmonary/Chest: No respiratory distress.   Abdominal: Abdomen is soft. There is no abdominal tenderness.   Musculoskeletal:         General: Normal range of motion.      Cervical back: Normal range of motion and neck supple.     Neurological: She is alert and  oriented to person, place, and time.   Skin: Skin is warm and dry. Capillary refill takes less than 2 seconds.   Stasis dermatitis to the distal 2/3 of the left lower leg and large superficial ulceration approximately 12 cm x 5 cm   Psychiatric: She has a normal mood and affect. Thought content normal.         ED Course   Procedures  Labs Reviewed   BASIC METABOLIC PANEL - Abnormal; Notable for the following components:       Result Value    Chloride 109 (*)     BUN 21 (*)     BUN/Creatinine Ratio 22 (*)     eGFR 59 (*)     All other components within normal limits   CBC WITH DIFFERENTIAL - Abnormal; Notable for the following components:    Hemoglobin 11.4 (*)     Hematocrit 35.1 (*)     MCH 26.1 (*)     RDW 15.4 (*)     Neutrophils % 41.1 (*)     Lymphocytes % 42.5 (*)     Monocytes % 12.1 (*)     All other components within normal limits   C-REACTIVE PROTEIN - Normal   CBC W/ AUTO DIFFERENTIAL    Narrative:     The following orders were created for panel order CBC auto differential.  Procedure                               Abnormality         Status                     ---------                               -----------         ------                     CBC with Differential[[id number]]        Abnormal            Final result                 Please view results for these tests on the individual orders.   SEDIMENTATION RATE, AUTOMATED          Imaging Results    None          Medications - No data to display             Attending Attestation:           Physician Attestation for Scribe:  Physician Attestation Statement for Scribe #1: I, Candelario Scott, reviewed documentation, as scribed by Ayala Pierson in my presence, and it is both accurate and complete.                      Clinical Impression:   Final diagnoses:  [S31.109A, L08.9] Chronic wound infection of abdomen, initial encounter (Primary)          ED Disposition Condition    Discharge Stable        ED Prescriptions     None        Follow-up Information      Follow up With Specialties Details Why Contact Info    [facility]  As needed [address]  [facility]  [place] [address]  [phone]             Candelario Scott MD  06/06/22 0002

## 2022-06-06 NOTE — TELEPHONE ENCOUNTER
Phone call from Nataly with [facility] that patient refused compression wrap change for today. -- djf/csh.

## 2022-06-06 NOTE — ED TRIAGE NOTES
Pt in with chronic wound on left leg, she sees [facility] and Dr Cisneros, and has an appoint on Wednesday

## 2022-06-08 ENCOUNTER — OFFICE VISIT (OUTPATIENT)
Dept: VASCULAR SURGERY | Facility: CLINIC | Age: 77
End: 2022-06-08
Payer: MEDICARE

## 2022-06-08 VITALS
HEIGHT: 66 IN | DIASTOLIC BLOOD PRESSURE: 78 MMHG | SYSTOLIC BLOOD PRESSURE: 148 MMHG | WEIGHT: 191.19 LBS | BODY MASS INDEX: 30.73 KG/M2 | HEART RATE: 96 BPM | RESPIRATION RATE: 12 BRPM

## 2022-06-08 DIAGNOSIS — M79.605 LEFT LEG PAIN: ICD-10-CM

## 2022-06-08 DIAGNOSIS — L81.9 HYPERPIGMENTATION OF SKIN: ICD-10-CM

## 2022-06-08 DIAGNOSIS — R60.0 EDEMA, LOWER EXTREMITY: ICD-10-CM

## 2022-06-08 DIAGNOSIS — I87.2 VENOUS INSUFFICIENCY (CHRONIC) (PERIPHERAL): ICD-10-CM

## 2022-06-08 DIAGNOSIS — I87.312 STASIS EDEMA WITH ULCER, LEFT: Primary | ICD-10-CM

## 2022-06-08 DIAGNOSIS — L97.929 STASIS EDEMA WITH ULCER, LEFT: Primary | ICD-10-CM

## 2022-06-08 PROCEDURE — 1159F PR MEDICATION LIST DOCUMENTED IN MEDICAL RECORD: ICD-10-PCS | Mod: CPTII,,, | Performed by: FAMILY MEDICINE

## 2022-06-08 PROCEDURE — 99214 OFFICE O/P EST MOD 30 MIN: CPT | Mod: S$PBB,25,, | Performed by: FAMILY MEDICINE

## 2022-06-08 PROCEDURE — 3077F SYST BP >= 140 MM HG: CPT | Mod: CPTII,,, | Performed by: FAMILY MEDICINE

## 2022-06-08 PROCEDURE — 1159F MED LIST DOCD IN RCRD: CPT | Mod: CPTII,,, | Performed by: FAMILY MEDICINE

## 2022-06-08 PROCEDURE — 1160F PR REVIEW ALL MEDS BY PRESCRIBER/CLIN PHARMACIST DOCUMENTED: ICD-10-PCS | Mod: CPTII,,, | Performed by: FAMILY MEDICINE

## 2022-06-08 PROCEDURE — 3078F PR MOST RECENT DIASTOLIC BLOOD PRESSURE < 80 MM HG: ICD-10-PCS | Mod: CPTII,,, | Performed by: FAMILY MEDICINE

## 2022-06-08 PROCEDURE — 99214 OFFICE O/P EST MOD 30 MIN: CPT | Mod: PBBFAC,25 | Performed by: FAMILY MEDICINE

## 2022-06-08 PROCEDURE — 1160F RVW MEDS BY RX/DR IN RCRD: CPT | Mod: CPTII,,, | Performed by: FAMILY MEDICINE

## 2022-06-08 PROCEDURE — 96372 THER/PROPH/DIAG INJ SC/IM: CPT | Mod: PBBFAC | Performed by: FAMILY MEDICINE

## 2022-06-08 PROCEDURE — 99214 PR OFFICE/OUTPT VISIT, EST, LEVL IV, 30-39 MIN: ICD-10-PCS | Mod: S$PBB,25,, | Performed by: FAMILY MEDICINE

## 2022-06-08 PROCEDURE — 3078F DIAST BP <80 MM HG: CPT | Mod: CPTII,,, | Performed by: FAMILY MEDICINE

## 2022-06-08 PROCEDURE — 3077F PR MOST RECENT SYSTOLIC BLOOD PRESSURE >= 140 MM HG: ICD-10-PCS | Mod: CPTII,,, | Performed by: FAMILY MEDICINE

## 2022-06-08 RX ORDER — KETOROLAC TROMETHAMINE 30 MG/ML
30 INJECTION, SOLUTION INTRAMUSCULAR; INTRAVENOUS ONCE
Status: COMPLETED | OUTPATIENT
Start: 2022-06-08 | End: 2022-06-08

## 2022-06-08 RX ORDER — HYDROCODONE BITARTRATE AND ACETAMINOPHEN 5; 325 MG/1; MG/1
1 TABLET ORAL EVERY 8 HOURS PRN
Qty: 21 TABLET | Refills: 0 | Status: SHIPPED | OUTPATIENT
Start: 2022-06-08 | End: 2022-06-15

## 2022-06-08 RX ORDER — SILVER SULFADIAZINE 10 G/1000G
CREAM TOPICAL DAILY
Qty: 90 G | Refills: 2 | Status: SHIPPED | OUTPATIENT
Start: 2022-06-08 | End: 2022-09-07 | Stop reason: SDUPTHER

## 2022-06-08 RX ORDER — KETOROLAC TROMETHAMINE 30 MG/ML
30 INJECTION, SOLUTION INTRAMUSCULAR; INTRAVENOUS ONCE
Status: SHIPPED | OUTPATIENT
Start: 2022-06-08 | End: 2022-06-11

## 2022-06-08 RX ORDER — SILVER SULFADIAZINE 10 G/1000G
CREAM TOPICAL
Status: COMPLETED | OUTPATIENT
Start: 2022-06-08 | End: 2022-06-08

## 2022-06-08 RX ORDER — LEVOFLOXACIN 750 MG/1
750 TABLET ORAL DAILY
Qty: 7 TABLET | Refills: 0 | Status: SHIPPED | OUTPATIENT
Start: 2022-06-08 | End: 2022-06-15

## 2022-06-08 RX ADMIN — KETOROLAC TROMETHAMINE 30 MG: 30 INJECTION, SOLUTION INTRAMUSCULAR; INTRAVENOUS at 10:06

## 2022-06-08 RX ADMIN — SILVER SULFADIAZINE 1 %: 10 CREAM TOPICAL at 10:06

## 2022-06-08 NOTE — PROGRESS NOTES
[facility] CLINIC NOTE    Patient ID: Umesh Lassiter is a 77 y.o. female.    I. HISTORY     Chief Complaint:   Chief Complaint   Patient presents with    Wound Check     Procedure 2  2 week wound check.  Pt cannot tolerate 3MCoban 2 Lite wraps, refused to let HH apply.        HPI: Umesh Lassiter is a 77 y.o. female who presents today for a 2 week wound check.  The patient reports that she has had increased pain and swelling of her left lower extremity, especially around the ulcerated area.  She does not have 1 concentrated ulcer, more so a broad area of skin breakdown with superficial ulcerations.  She reported to the [facility] ED 2 nights ago secondary to pain she feels was caused by the ulcer.  However, she stated she went to the ED with left leg, hip and arm pain.  She was worked up in the ED and referred back to our clinic.  No foul odor noted today.  Slightly increased erythema and warmth in the periwound area.    Clinical summary:  The patient is known to our clinic but has not been seen since 03/02/2021 for a one-month follow-up status post left great saphenous vein ablation.  She has been lost to follow-up since.  She states that this ulceration of her left lower extremity has been present for approximately 6 months.  She states that his been persistent and worsening during that time.  She has not been wearing compression daily.  She has pitting edema, hyperpigmentation along with the ulceration of the left medial Gator region.  No signs of infection, including purulent drainage, increased temperature or foul odor.  She denies complaints of her right lower extremity.  Do not use Unna boot with patient, she does not tolerate well.    Reflux study 12/21/2021 shows no evidence of DVT or superficial venous reflux.  Shows a well ablated proximal left great saphenous vein and ablated left small saphenous vein.     Left great saphenous vein laser ablation 01/08/2021.  Right great saphenous vein, duplicate, laser  ablation 12/17/2020.  Right distal great saphenous vein erythema ablation 02/28/2020.  Left small saphenous vein laser ablation 04/27/2018.  Right great saphenous vein laser ablation 04/20/2018.    Past Medical History:   Diagnosis Date    Anxiety     Asthma     CVA (cerebral vascular accident) 05/23/2018    DVT (deep venous thrombosis) 2019    Epilepsy     Hyperlipidemia     Hypertension     Hypothyroid     PVD (peripheral vascular disease)     Venous stasis         Past Surgical History:   Procedure Laterality Date    KNEE ARTHROSCOPY Left     x 2    LASER ABLATION Right 04/20/2018    [facility] LASER ABLATION PERFORMED BY DR. KARMA BUTCHER.    LASER ABLATION Left 04/27/2018    [facility] LASER ABLATION PERFORMED BY DR. KARMA BUTCHER.    LASER ABLATION Right 12/17/2020    [facility] ( DUPLICATED )LASER ABLATION PERFORMED BY DR. KARMA BUTCHER.    LASER ABLATION Left 01/08/2021    [facility] LASER ABLATION PERFORMED BY DR. KARMA BUTCHER.    Remove cataracts, insert lens Bilateral     SHOULDER ARTHROSCOPY Left     SINUS SURGERY      TOTAL ABDOMINAL HYSTERECTOMY W/ BILATERAL SALPINGOOPHORECTOMY      VENOUS ABLATION Right 02/28/2020    DISTAL [facility] VARITHENA ABLATION PERFORMED BY DR. KARMA BUTCHER.       Social History     Tobacco Use   Smoking Status Never Smoker   Smokeless Tobacco Never Used         Current Outpatient Medications:     albuterol (PROVENTIL/VENTOLIN HFA) 90 mcg/actuation inhaler, Inhale 2 puffs into the lungs every 6 (six) hours as needed for Wheezing. Rescue, Disp: 18 g, Rfl: 1    albuterol 2 mg/5 mL syrup, Take 5 mLs (2 mg total) by mouth 3 (three) times daily., Disp: 450 mL, Rfl: 11    cefdinir (OMNICEF) 300 MG capsule, TAKE 1 CAPSULE BY MOUTH TWICE A DAY WITH FOOD FOR 10 DAYS, Disp: , Rfl:     cilostazoL (PLETAL) 50 MG Tab, Take 1 tablet (50 mg total) by mouth 2 (two) times daily., Disp: 180 tablet, Rfl: 3    clorazepate (TRANXENE) 3.75 MG Tab, TAKE 1 TABLET BY MOUTH TWICE A DAY AS NEEDED FOR ANXIETY  *CAUSES DROWSINESS-AVOID ALCOHOL!!, Disp: 60 tablet, Rfl: 5    divalproex (DEPAKOTE) 250 MG EC tablet, Take 250 mg by mouth Daily., Disp: , Rfl:     divalproex ER (DEPAKOTE) 500 MG Tb24, TAKE 2 TABLETS BY MOUTH EACH NIGHT AT BEDTIME FOR SEIZURES, Disp: 180 tablet, Rfl: 1    ferrous sulfate (FEOSOL) 325 mg (65 mg iron) Tab tablet, Take 1 tablet (325 mg total) by mouth daily with breakfast., Disp: 90 tablet, Rfl: 0    furosemide (LASIX) 20 MG tablet, Take 1 tablet (20 mg total) by mouth once daily., Disp: 90 tablet, Rfl: 3    levothyroxine (SYNTHROID) 50 MCG tablet, Take 1 tablet (50 mcg total) by mouth before breakfast. Take on an empty stomach with a full glass of water. Nothing else to eat or drink for 30 mins, Disp: 90 tablet, Rfl: 3    losartan (COZAAR) 50 MG tablet, Take 1 tablet (50 mg total) by mouth once daily., Disp: 90 tablet, Rfl: 3    SANTYL ointment, APPLY TOPICALLY TO AFFECTED AREA EVERY DAY ...APPLY DRESSING CHANGES, Disp: 90 g, Rfl: 2    HYDROcodone-acetaminophen (NORCO) 5-325 mg per tablet, Take 1 tablet by mouth every 8 (eight) hours as needed for Pain., Disp: 21 tablet, Rfl: 0    levoFLOXacin (LEVAQUIN) 750 MG tablet, Take 1 tablet (750 mg total) by mouth once daily. for 7 days, Disp: 7 tablet, Rfl: 0    silver sulfADIAZINE 1% (SILVADENE) 1 % cream, Apply topically once daily., Disp: 90 g, Rfl: 2    Current Facility-Administered Medications:     ketorolac injection 30 mg, 30 mg, Intramuscular, Once, Minh Cisneros, DO    Review of Systems   Constitutional: Negative for activity change, chills, diaphoresis, fatigue and fever.   Respiratory: Negative for cough and shortness of breath.    Cardiovascular: Positive for leg swelling. Negative for chest pain and claudication.        Hyperpigmentation LE   Gastrointestinal: Negative for nausea and vomiting.   Musculoskeletal: Positive for leg pain. Negative for joint swelling.   Integumentary:  Negative for rash and wound.   Neurological:  Negative for weakness and numbness.       II. PHYSICAL EXAM     Physical Exam  Constitutional:       General: She is awake. She is not in acute distress.     Appearance: Normal appearance. She is obese. She is not ill-appearing or toxic-appearing.   HENT:      Head: Normocephalic and atraumatic.   Eyes:      Extraocular Movements: Extraocular movements intact.      Conjunctiva/sclera: Conjunctivae normal.      Pupils: Pupils are equal, round, and reactive to light.   Neck:      Vascular: No carotid bruit or JVD.   Cardiovascular:      Rate and Rhythm: Normal rate and regular rhythm.      Pulses:           Dorsalis pedis pulses are detected w/ Doppler on the left side.        Posterior tibial pulses are detected w/ Doppler on the left side.      Heart sounds: No murmur heard.  Pulmonary:      Effort: Pulmonary effort is normal. No respiratory distress.      Breath sounds: No stridor. No wheezing, rhonchi or rales.   Musculoskeletal:         General: No swelling, tenderness or deformity.      Right lower le+ Edema present.      Left lower le+ Edema present.        Legs:       Comments: Ulcer of left medial Gator region shallow in appearance with several small areas of skin breakdown.     Hyperpigmentation of the left lower Gator region along with +2 edema.   Feet:      Comments: Triphasic dopplerable pulses of the left lower extremity.  Skin:     General: Skin is warm.      Capillary Refill: Capillary refill takes less than 2 seconds.      Coloration: Skin is not ashen.      Findings: No bruising, erythema, lesion, rash or wound.   Neurological:      Mental Status: She is alert and oriented to person, place, and time.      Motor: No weakness.   Psychiatric:         Speech: Speech normal.         Behavior: Behavior normal. Behavior is cooperative.       Reticular/Spider veins noted:  RLE: medial calf  LLE: none    Varicose veins noted:  RLE: medial calf  LLE:  medial calf    CEAP Classification  Clinical Signs:  Class 6 - Leg ulceration, skin changes as defined above  Etiologic Classification: Primary  Anatomic distribution: Superficial  Pathophysiologic dysfunction: Reflux       Venous Clinical Severity Score  Pain:2=Daily, moderate activity limitation, occasional analgesics  Varicose Veins: 1=Few, scattered. Branch varicose veins  Venous Edema: 2=Afternoon edema, above ankle  Pigmentation: 2=Diffuse over most of gaiter distribution (lower 1/3) or recent pigmentation (purple)  Inflammation: 1=Mild cellulitis, limited to marginal area around ulcer  Induration: 1=Focal, circummalleolar (< 5 cm)  Number of Active Ulcers: 2=2  Active Ulceration, Duration: 2=>3 months, <1 year  Active Ulcer Size: 2=2 - 6 cm diameter  Compressive Therapy: 3=Full compliance, stockings + elevation  Total Score: 18       III. ASSESSMENT & PLAN (MEDICAL DECISION MAKING)     1. Stasis edema with ulcer, left    2. Venous insufficiency (chronic) (peripheral)    3. Left leg pain    4. Edema, lower extremity    5. Hyperpigmentation of skin        Assessment/Diagnosis and Plan:  The leg and wound were cleaned with wound cleanser.   discontinue Santyl for now.  Apply Silvadene, non adherent dressing, 4x4s, Kerlix and a light Ace wrap.  We will notify home health of these wound dressing changes.  Wound cultures were also taken and pending.  Will call patient with results.  Toradol injection given in clinic.  Prescriptions as below.  Consider biopsy next visit.  Follow-up 2 weeks.      Orders Placed This Encounter    Culture, Wound    Culture, Anaerobe    levoFLOXacin (LEVAQUIN) 750 MG tablet    silver sulfADIAZINE 1% (SILVADENE) 1 % cream    HYDROcodone-acetaminophen (NORCO) 5-325 mg per tablet    ketorolac injection 30 mg    silver sulfADIAZINE 1% cream    ketorolac injection 30 mg      Minh Cisneros DO

## 2022-06-09 PROCEDURE — 87075 CULTR BACTERIA EXCEPT BLOOD: CPT | Mod: ,,, | Performed by: CLINICAL MEDICAL LABORATORY

## 2022-06-09 PROCEDURE — 87075 CULTURE, ANAEROBE: ICD-10-PCS | Mod: ,,, | Performed by: CLINICAL MEDICAL LABORATORY

## 2022-06-09 PROCEDURE — 87077 CULTURE, WOUND: ICD-10-PCS | Mod: ,,, | Performed by: CLINICAL MEDICAL LABORATORY

## 2022-06-09 PROCEDURE — 87070 CULTURE, WOUND: ICD-10-PCS | Mod: ,,, | Performed by: CLINICAL MEDICAL LABORATORY

## 2022-06-09 PROCEDURE — 87077 CULTURE AEROBIC IDENTIFY: CPT | Mod: ,,, | Performed by: CLINICAL MEDICAL LABORATORY

## 2022-06-09 PROCEDURE — 87070 CULTURE OTHR SPECIMN AEROBIC: CPT | Mod: ,,, | Performed by: CLINICAL MEDICAL LABORATORY

## 2022-06-09 PROCEDURE — 87186 SC STD MICRODIL/AGAR DIL: CPT | Mod: ,,, | Performed by: CLINICAL MEDICAL LABORATORY

## 2022-06-09 PROCEDURE — 87186 CULTURE, WOUND: ICD-10-PCS | Mod: ,,, | Performed by: CLINICAL MEDICAL LABORATORY

## 2022-06-12 LAB
BACTERIA SPEC ANAEROBE CULT: NORMAL
MICROORGANISM SPEC CULT: ABNORMAL

## 2022-06-13 DIAGNOSIS — L97.929 STASIS EDEMA WITH ULCER, LEFT: Primary | ICD-10-CM

## 2022-06-13 DIAGNOSIS — I87.312 STASIS EDEMA WITH ULCER, LEFT: Primary | ICD-10-CM

## 2022-06-13 DIAGNOSIS — L08.9 WOUND INFECTION: ICD-10-CM

## 2022-06-13 DIAGNOSIS — T14.8XXA WOUND INFECTION: ICD-10-CM

## 2022-06-15 ENCOUNTER — HOSPITAL ENCOUNTER (OUTPATIENT)
Dept: RADIOLOGY | Facility: HOSPITAL | Age: 77
Discharge: HOME | End: 2022-06-15
Attending: FAMILY MEDICINE
Payer: MEDICARE

## 2022-06-15 DIAGNOSIS — L97.929 STASIS EDEMA WITH ULCER, LEFT: ICD-10-CM

## 2022-06-15 DIAGNOSIS — L08.9 WOUND INFECTION: ICD-10-CM

## 2022-06-15 DIAGNOSIS — T14.8XXA WOUND INFECTION: ICD-10-CM

## 2022-06-15 DIAGNOSIS — I87.312 STASIS EDEMA WITH ULCER, LEFT: ICD-10-CM

## 2022-06-15 PROCEDURE — 76937 US GUIDE VASCULAR ACCESS: CPT | Mod: XB

## 2022-06-15 PROCEDURE — C1751 CATH, INF, PER/CENT/MIDLINE: HCPCS

## 2022-06-15 NOTE — PROCEDURES
Procedure performed by Rom MARTIN under the supervision of Dr. Gaviria . 5 Norwegian PICC line placed in basilic vein in left upper extremity. Informed consent obtained including risks and possible complications. Patient pepped with chloro prep and draped in a sterile fashion. 2 cc 1% lidocaine infused. Utilizing U/S guidance a 42 cm 5 Norwegian PICC line placed and position verified by fluoroscopy. PICC line flushed with heparinized saline.Statlock and bandage applied. Patient tolerated procedure well with no immediate complication.

## 2022-06-15 NOTE — PROGRESS NOTES
IR consulted for PICC line placement. Informed consent obtained. H and P has been reviewed.   mahi all pertinent systems normal

## 2022-06-16 DIAGNOSIS — L97.929 STASIS EDEMA WITH ULCER, LEFT: Primary | ICD-10-CM

## 2022-06-16 DIAGNOSIS — T14.8XXA WOUND INFECTION: Primary | ICD-10-CM

## 2022-06-16 DIAGNOSIS — I87.312 STASIS EDEMA WITH ULCER, LEFT: Primary | ICD-10-CM

## 2022-06-16 DIAGNOSIS — L08.9 WOUND INFECTION: Primary | ICD-10-CM

## 2022-06-16 RX ORDER — DIPHENHYDRAMINE HYDROCHLORIDE 50 MG/ML
25 INJECTION INTRAMUSCULAR; INTRAVENOUS
Status: CANCELLED
Start: 2022-06-20

## 2022-06-16 RX ORDER — SODIUM CHLORIDE 0.9 % (FLUSH) 0.9 %
10 SYRINGE (ML) INJECTION
Status: CANCELLED | OUTPATIENT
Start: 2022-06-20

## 2022-06-16 RX ORDER — ONDANSETRON 2 MG/ML
4 INJECTION INTRAMUSCULAR; INTRAVENOUS
Status: CANCELLED
Start: 2022-06-20

## 2022-06-16 RX ORDER — LEVOFLOXACIN 5 MG/ML
750 INJECTION, SOLUTION INTRAVENOUS
Status: CANCELLED
Start: 2022-06-20

## 2022-06-20 ENCOUNTER — OFFICE VISIT (OUTPATIENT)
Dept: VASCULAR SURGERY | Facility: CLINIC | Age: 77
End: 2022-06-20
Payer: MEDICARE

## 2022-06-20 VITALS
DIASTOLIC BLOOD PRESSURE: 84 MMHG | HEART RATE: 96 BPM | HEIGHT: 66 IN | WEIGHT: 190 LBS | BODY MASS INDEX: 30.53 KG/M2 | SYSTOLIC BLOOD PRESSURE: 124 MMHG | RESPIRATION RATE: 20 BRPM | TEMPERATURE: 98 F

## 2022-06-20 DIAGNOSIS — L97.929 VENOUS ULCER OF LEFT LEG: Primary | ICD-10-CM

## 2022-06-20 DIAGNOSIS — L03.116 CELLULITIS OF LEFT LOWER EXTREMITY: ICD-10-CM

## 2022-06-20 DIAGNOSIS — I87.2 VENOUS INSUFFICIENCY (CHRONIC) (PERIPHERAL): ICD-10-CM

## 2022-06-20 DIAGNOSIS — G40.309 GENERALIZED EPILEPSY: ICD-10-CM

## 2022-06-20 DIAGNOSIS — L81.9 HYPERPIGMENTATION OF SKIN: ICD-10-CM

## 2022-06-20 DIAGNOSIS — I83.029 VENOUS ULCER OF LEFT LEG: Primary | ICD-10-CM

## 2022-06-20 PROCEDURE — 3074F SYST BP LT 130 MM HG: CPT | Mod: CPTII,,, | Performed by: NURSE PRACTITIONER

## 2022-06-20 PROCEDURE — 99214 OFFICE O/P EST MOD 30 MIN: CPT | Mod: S$PBB,,, | Performed by: NURSE PRACTITIONER

## 2022-06-20 PROCEDURE — 3079F PR MOST RECENT DIASTOLIC BLOOD PRESSURE 80-89 MM HG: ICD-10-PCS | Mod: CPTII,,, | Performed by: NURSE PRACTITIONER

## 2022-06-20 PROCEDURE — 1159F PR MEDICATION LIST DOCUMENTED IN MEDICAL RECORD: ICD-10-PCS | Mod: CPTII,,, | Performed by: NURSE PRACTITIONER

## 2022-06-20 PROCEDURE — 3074F PR MOST RECENT SYSTOLIC BLOOD PRESSURE < 130 MM HG: ICD-10-PCS | Mod: CPTII,,, | Performed by: NURSE PRACTITIONER

## 2022-06-20 PROCEDURE — 99214 OFFICE O/P EST MOD 30 MIN: CPT | Mod: PBBFAC | Performed by: NURSE PRACTITIONER

## 2022-06-20 PROCEDURE — 1159F MED LIST DOCD IN RCRD: CPT | Mod: CPTII,,, | Performed by: NURSE PRACTITIONER

## 2022-06-20 PROCEDURE — 99214 PR OFFICE/OUTPT VISIT, EST, LEVL IV, 30-39 MIN: ICD-10-PCS | Mod: S$PBB,,, | Performed by: NURSE PRACTITIONER

## 2022-06-20 PROCEDURE — 3079F DIAST BP 80-89 MM HG: CPT | Mod: CPTII,,, | Performed by: NURSE PRACTITIONER

## 2022-06-20 NOTE — PROGRESS NOTES
"[facility]ENTER CLINIC NOTE    Patient ID: Umesh Lassiter is a 77 y.o. female.    I. HISTORY     Chief Complaint:   Chief Complaint   Patient presents with    Follow-up     Requesting PICC line removal until after insurance is reinstated. Voiced its making her sick and nauseated.        HPI: Umesh Lassiter is a 77 y.o. female who presents today with complaints of her PICC line "making her sick". She wants it removed since she is not receiving antibiotics through the line at this time. She had cultures performed on the previous visit of her ulcer that revealed Pseudomas A. Dr. Cisneros made the decision to have a PICC line inserted in order for her to receive long term antibiotic treatment for Pseudo. However, initially on pre cert it appeared she did not have insurance coverage for IV antibiotics. Today , she is afebrile. No significant change from the previous visit. Will investigate her insurance coverage once again in an attempt for her to receive IV antibiotics through the Infusion [facility] her at [facility].   The patient reports that she has had increased pain and swelling of her left lower extremity, especially around the ulcerated area.  She does not have 1 concentrated ulcer, more so a broad area of skin breakdown with superficial ulcerations.  She reported to the [facility] ED 2 nights ago secondary to pain she feels was caused by the ulcer.  She was recently seen in the ED with left leg, hip and arm pain.  She was worked up in the ED and referred back to our clinic.  No foul odor noted today.  Slightly increased erythema and warmth in the periwound area.    Clinical summary:  The patient is known to our clinic but has not been seen since 03/02/2021 for a one-month follow-up status post left great saphenous vein ablation.  She has been lost to follow-up since.  She states that this ulceration of her left lower extremity has been present for approximately 6 months.  She states that his been persistent and worsening during " that time.  She has not been wearing compression daily.  She has pitting edema, hyperpigmentation along with the ulceration of the left medial Gator region.  No signs of infection, including purulent drainage, increased temperature or foul odor.  She denies complaints of her right lower extremity.  Do not use Unna boot with patient, she does not tolerate well.    Reflux study 12/21/2021 shows no evidence of DVT or superficial venous reflux.  Shows a well ablated proximal left great saphenous vein and ablated left small saphenous vein.     Left great saphenous vein laser ablation 01/08/2021.  Right great saphenous vein, duplicate, laser ablation 12/17/2020.  Right distal great saphenous vein erythema ablation 02/28/2020.  Left small saphenous vein laser ablation 04/27/2018.  Right great saphenous vein laser ablation 04/20/2018.    Past Medical History:   Diagnosis Date    Anxiety     Asthma     CVA (cerebral vascular accident) 05/23/2018    DVT (deep venous thrombosis) 2019    Epilepsy     Hyperlipidemia     Hypertension     Hypothyroid     PVD (peripheral vascular disease)     Venous stasis         Past Surgical History:   Procedure Laterality Date    KNEE ARTHROSCOPY Left     x 2    LASER ABLATION Right 04/20/2018    [facility] LASER ABLATION PERFORMED BY DR. KARMA BUTCHER.    LASER ABLATION Left 04/27/2018    [facility] LASER ABLATION PERFORMED BY DR. KARMA BUTCHER.    LASER ABLATION Right 12/17/2020    [facility] ( DUPLICATED )LASER ABLATION PERFORMED BY DR. KARMA BUTCHER.    LASER ABLATION Left 01/08/2021    [facility] LASER ABLATION PERFORMED BY DR. KARMA BUTCHER.    Remove cataracts, insert lens Bilateral     SHOULDER ARTHROSCOPY Left     SINUS SURGERY      TOTAL ABDOMINAL HYSTERECTOMY W/ BILATERAL SALPINGOOPHORECTOMY      VENOUS ABLATION Right 02/28/2020    DISTAL [facility]SV VARITHENA ABLATION PERFORMED BY DR. KARMA BUTCHER.       Social History     Tobacco Use   Smoking Status Never Smoker   Smokeless Tobacco Never Used          Current Outpatient Medications:     albuterol (PROVENTIL/VENTOLIN HFA) 90 mcg/actuation inhaler, Inhale 2 puffs into the lungs every 6 (six) hours as needed for Wheezing. Rescue, Disp: 18 g, Rfl: 1    albuterol 2 mg/5 mL syrup, Take 5 mLs (2 mg total) by mouth 3 (three) times daily., Disp: 450 mL, Rfl: 11    cefdinir (OMNICEF) 300 MG capsule, TAKE 1 CAPSULE BY MOUTH TWICE A DAY WITH FOOD FOR 10 DAYS, Disp: , Rfl:     cilostazoL (PLETAL) 50 MG Tab, Take 1 tablet (50 mg total) by mouth 2 (two) times daily., Disp: 180 tablet, Rfl: 3    clorazepate (TRANXENE) 3.75 MG Tab, TAKE 1 TABLET BY MOUTH TWICE A DAY AS NEEDED FOR ANXIETY *CAUSES DROWSINESS-AVOID ALCOHOL!!, Disp: 60 tablet, Rfl: 5    divalproex (DEPAKOTE) 250 MG EC tablet, Take 250 mg by mouth Daily., Disp: , Rfl:     divalproex ER (DEPAKOTE) 500 MG Tb24, TAKE 2 TABLETS BY MOUTH EACH NIGHT AT BEDTIME FOR SEIZURES, Disp: 180 tablet, Rfl: 1    ferrous sulfate (FEOSOL) 325 mg (65 mg iron) Tab tablet, Take 1 tablet (325 mg total) by mouth daily with breakfast., Disp: 90 tablet, Rfl: 0    furosemide (LASIX) 20 MG tablet, Take 1 tablet (20 mg total) by mouth once daily., Disp: 90 tablet, Rfl: 3    levothyroxine (SYNTHROID) 50 MCG tablet, Take 1 tablet (50 mcg total) by mouth before breakfast. Take on an empty stomach with a full glass of water. Nothing else to eat or drink for 30 mins, Disp: 90 tablet, Rfl: 3    losartan (COZAAR) 50 MG tablet, Take 1 tablet (50 mg total) by mouth once daily., Disp: 90 tablet, Rfl: 3    SANTYL ointment, APPLY TOPICALLY TO AFFECTED AREA EVERY DAY ...APPLY DRESSING CHANGES, Disp: 90 g, Rfl: 2    silver sulfADIAZINE 1% (SILVADENE) 1 % cream, Apply topically once daily., Disp: 90 g, Rfl: 2    Review of Systems   Constitutional: Negative for activity change, chills, diaphoresis, fatigue and fever.   Respiratory: Negative for cough and shortness of breath.    Cardiovascular: Positive for leg swelling. Negative for chest  pain and claudication.        Hyperpigmentation LE   Gastrointestinal: Negative for nausea and vomiting.   Musculoskeletal: Positive for leg pain. Negative for joint swelling.   Integumentary:  Negative for rash and wound.   Neurological: Negative for weakness and numbness.       II. PHYSICAL EXAM     Physical Exam  Constitutional:       General: She is awake. She is not in acute distress.     Appearance: Normal appearance. She is obese. She is not ill-appearing or toxic-appearing.   HENT:      Head: Normocephalic and atraumatic.   Eyes:      Extraocular Movements: Extraocular movements intact.      Conjunctiva/sclera: Conjunctivae normal.      Pupils: Pupils are equal, round, and reactive to light.   Neck:      Vascular: No carotid bruit or JVD.   Cardiovascular:      Rate and Rhythm: Normal rate and regular rhythm.      Pulses:           Dorsalis pedis pulses are detected w/ Doppler on the left side.        Posterior tibial pulses are detected w/ Doppler on the left side.      Heart sounds: No murmur heard.  Pulmonary:      Effort: Pulmonary effort is normal. No respiratory distress.      Breath sounds: No stridor. No wheezing, rhonchi or rales.   Musculoskeletal:         General: No swelling, tenderness or deformity.      Right lower le+ Edema present.      Left lower le+ Edema present.        Legs:       Comments: Ulcer of left medial Gator region shallow in appearance with several small areas of skin breakdown.     Hyperpigmentation of the left lower Gator region along with +2 edema.   Feet:      Comments: Triphasic dopplerable pulses of the left lower extremity.  Skin:     General: Skin is warm.      Capillary Refill: Capillary refill takes less than 2 seconds.      Coloration: Skin is not ashen.      Findings: No bruising, erythema, lesion, rash or wound.   Neurological:      Mental Status: She is alert and oriented to person, place, and time.      Motor: No weakness.   Psychiatric:         Speech:  Speech normal.         Behavior: Behavior normal. Behavior is cooperative.       Reticular/Spider veins noted:  RLE: medial calf  LLE: none    Varicose veins noted:  RLE: medial calf  LLE:  medial calf    CEAP Classification  Clinical Signs: Class 6 - Leg ulceration, skin changes as defined above  Etiologic Classification: Primary  Anatomic distribution: Superficial  Pathophysiologic dysfunction: Reflux       Venous Clinical Severity Score  Pain:2=Daily, moderate activity limitation, occasional analgesics  Varicose Veins: 1=Few, scattered. Branch varicose veins  Venous Edema: 2=Afternoon edema, above ankle  Pigmentation: 2=Diffuse over most of gaiter distribution (lower 1/3) or recent pigmentation (purple)  Inflammation: 1=Mild cellulitis, limited to marginal area around ulcer  Induration: 1=Focal, circummalleolar (< 5 cm)  Number of Active Ulcers: 2=2  Active Ulceration, Duration: 2=>3 months, <1 year  Active Ulcer Size: 2=2 - 6 cm diameter  Compressive Therapy: 3=Full compliance, stockings + elevation  Total Score: 18       III. ASSESSMENT & PLAN (MEDICAL DECISION MAKING)     1. Venous ulcer of left leg    2. Hyperpigmentation of skin    3. Venous insufficiency (chronic) (peripheral)    4. Cellulitis of left lower extremity    5. Generalized epilepsy        Assessment/Diagnosis and Plan:  Patient has PICC line in place , refer to Infusion Center for IV antibiotic treatment of Pseudomonas. Gentamycin was recommended ; however, there is no Gentamycin available for infusion. Pre cert for Levaquin Infusion. Did not remove PICC line . Encouraged patient to leave in place until we could possibly get her approved for Infusions. Will notify her when we get a reply from her insurance carrier.       MELANI Hewitt

## 2022-06-21 ENCOUNTER — INFUSION (OUTPATIENT)
Dept: INFUSION THERAPY | Facility: HOSPITAL | Age: 77
End: 2022-06-21
Attending: NURSE PRACTITIONER
Payer: MEDICARE

## 2022-06-21 VITALS
SYSTOLIC BLOOD PRESSURE: 173 MMHG | RESPIRATION RATE: 15 BRPM | HEART RATE: 74 BPM | HEIGHT: 66 IN | BODY MASS INDEX: 31.82 KG/M2 | TEMPERATURE: 98 F | DIASTOLIC BLOOD PRESSURE: 87 MMHG | OXYGEN SATURATION: 100 % | WEIGHT: 198 LBS

## 2022-06-21 DIAGNOSIS — L03.116 CELLULITIS OF LEFT LOWER EXTREMITY: Primary | ICD-10-CM

## 2022-06-21 PROCEDURE — 96365 THER/PROPH/DIAG IV INF INIT: CPT

## 2022-06-21 PROCEDURE — 96366 THER/PROPH/DIAG IV INF ADDON: CPT

## 2022-06-21 PROCEDURE — 63600175 PHARM REV CODE 636 W HCPCS: Performed by: FAMILY MEDICINE

## 2022-06-21 RX ORDER — DIPHENHYDRAMINE HYDROCHLORIDE 50 MG/ML
25 INJECTION INTRAMUSCULAR; INTRAVENOUS
Status: CANCELLED
Start: 2022-06-22

## 2022-06-21 RX ORDER — LEVOFLOXACIN 5 MG/ML
750 INJECTION, SOLUTION INTRAVENOUS
Status: CANCELLED
Start: 2022-06-22

## 2022-06-21 RX ORDER — SODIUM CHLORIDE 0.9 % (FLUSH) 0.9 %
10 SYRINGE (ML) INJECTION
Status: CANCELLED | OUTPATIENT
Start: 2022-06-22

## 2022-06-21 RX ORDER — SODIUM CHLORIDE 0.9 % (FLUSH) 0.9 %
10 SYRINGE (ML) INJECTION
Status: DISCONTINUED | OUTPATIENT
Start: 2022-06-21 | End: 2022-06-21 | Stop reason: HOSPADM

## 2022-06-21 RX ORDER — ONDANSETRON 2 MG/ML
4 INJECTION INTRAMUSCULAR; INTRAVENOUS
Status: CANCELLED
Start: 2022-06-22

## 2022-06-21 RX ORDER — LEVOFLOXACIN 5 MG/ML
750 INJECTION, SOLUTION INTRAVENOUS
Status: DISCONTINUED | OUTPATIENT
Start: 2022-06-21 | End: 2022-06-21 | Stop reason: HOSPADM

## 2022-06-21 RX ADMIN — LEVOFLOXACIN 750 MG: 5 INJECTION, SOLUTION INTRAVENOUS at 08:06

## 2022-06-21 NOTE — PROGRESS NOTES
0800 - Arrived in stable condition for infusion.  Pharmacy notified of patient's arrival.    0820 - Levaquin infusing without difficulty.  No reactions noted.      0930 - Patient in recliner with no distress. No request at present. VSS.     0950 - Levaquin infusion complete with no side effects noted or reported.    1055 - Patient left ambulatory with son in stable condition.  No distress noted.  Instructed to return to ER for any problems.  Understanding voiced.

## 2022-06-22 ENCOUNTER — INFUSION (OUTPATIENT)
Dept: INFUSION THERAPY | Facility: HOSPITAL | Age: 77
End: 2022-06-22
Attending: NURSE PRACTITIONER
Payer: MEDICARE

## 2022-06-22 VITALS
RESPIRATION RATE: 16 BRPM | SYSTOLIC BLOOD PRESSURE: 179 MMHG | DIASTOLIC BLOOD PRESSURE: 70 MMHG | OXYGEN SATURATION: 99 % | HEART RATE: 85 BPM | TEMPERATURE: 99 F

## 2022-06-22 DIAGNOSIS — L03.116 CELLULITIS OF LEFT LOWER EXTREMITY: Primary | ICD-10-CM

## 2022-06-22 PROCEDURE — 63600175 PHARM REV CODE 636 W HCPCS: Performed by: FAMILY MEDICINE

## 2022-06-22 PROCEDURE — 96366 THER/PROPH/DIAG IV INF ADDON: CPT

## 2022-06-22 PROCEDURE — 96365 THER/PROPH/DIAG IV INF INIT: CPT

## 2022-06-22 RX ORDER — SODIUM CHLORIDE 0.9 % (FLUSH) 0.9 %
10 SYRINGE (ML) INJECTION
Status: DISCONTINUED | OUTPATIENT
Start: 2022-06-22 | End: 2022-06-22 | Stop reason: HOSPADM

## 2022-06-22 RX ORDER — SODIUM CHLORIDE 0.9 % (FLUSH) 0.9 %
10 SYRINGE (ML) INJECTION
Status: CANCELLED | OUTPATIENT
Start: 2022-06-23

## 2022-06-22 RX ORDER — ONDANSETRON 2 MG/ML
4 INJECTION INTRAMUSCULAR; INTRAVENOUS
Status: CANCELLED
Start: 2022-06-23

## 2022-06-22 RX ORDER — LEVOFLOXACIN 5 MG/ML
750 INJECTION, SOLUTION INTRAVENOUS
Status: CANCELLED
Start: 2022-06-23

## 2022-06-22 RX ORDER — LEVOFLOXACIN 5 MG/ML
750 INJECTION, SOLUTION INTRAVENOUS
Status: DISCONTINUED | OUTPATIENT
Start: 2022-06-22 | End: 2022-06-22 | Stop reason: HOSPADM

## 2022-06-22 RX ORDER — DIPHENHYDRAMINE HYDROCHLORIDE 50 MG/ML
25 INJECTION INTRAMUSCULAR; INTRAVENOUS
Status: CANCELLED
Start: 2022-06-23

## 2022-06-22 RX ADMIN — LEVOFLOXACIN 750 MG: 5 INJECTION, SOLUTION INTRAVENOUS at 08:06

## 2022-06-22 NOTE — PROGRESS NOTES
0750 pt here for infusion.  TP released and pharmacy notified. Pt resting in recliner.   0940 pt infusion complete, son called for ride, pt ambulatory at discharge

## 2022-06-23 ENCOUNTER — INFUSION (OUTPATIENT)
Dept: INFUSION THERAPY | Facility: HOSPITAL | Age: 77
End: 2022-06-23
Attending: FAMILY MEDICINE
Payer: MEDICARE

## 2022-06-23 VITALS
TEMPERATURE: 99 F | RESPIRATION RATE: 16 BRPM | OXYGEN SATURATION: 97 % | SYSTOLIC BLOOD PRESSURE: 154 MMHG | DIASTOLIC BLOOD PRESSURE: 64 MMHG | HEART RATE: 87 BPM

## 2022-06-23 DIAGNOSIS — L03.116 CELLULITIS OF LEFT LOWER EXTREMITY: Primary | ICD-10-CM

## 2022-06-23 PROCEDURE — 96366 THER/PROPH/DIAG IV INF ADDON: CPT

## 2022-06-23 PROCEDURE — 96365 THER/PROPH/DIAG IV INF INIT: CPT

## 2022-06-23 PROCEDURE — 63600175 PHARM REV CODE 636 W HCPCS: Performed by: FAMILY MEDICINE

## 2022-06-23 RX ORDER — SODIUM CHLORIDE 0.9 % (FLUSH) 0.9 %
10 SYRINGE (ML) INJECTION
Status: CANCELLED | OUTPATIENT
Start: 2022-06-24

## 2022-06-23 RX ORDER — LEVOFLOXACIN 5 MG/ML
750 INJECTION, SOLUTION INTRAVENOUS
Status: CANCELLED
Start: 2022-06-24

## 2022-06-23 RX ORDER — SODIUM CHLORIDE 0.9 % (FLUSH) 0.9 %
10 SYRINGE (ML) INJECTION
Status: DISCONTINUED | OUTPATIENT
Start: 2022-06-23 | End: 2022-06-23 | Stop reason: HOSPADM

## 2022-06-23 RX ORDER — LEVOFLOXACIN 5 MG/ML
750 INJECTION, SOLUTION INTRAVENOUS
Status: DISCONTINUED | OUTPATIENT
Start: 2022-06-23 | End: 2022-06-23 | Stop reason: HOSPADM

## 2022-06-23 RX ORDER — ONDANSETRON 2 MG/ML
4 INJECTION INTRAMUSCULAR; INTRAVENOUS
Status: CANCELLED
Start: 2022-06-24

## 2022-06-23 RX ORDER — DIPHENHYDRAMINE HYDROCHLORIDE 50 MG/ML
25 INJECTION INTRAMUSCULAR; INTRAVENOUS
Status: CANCELLED
Start: 2022-06-24

## 2022-06-23 RX ADMIN — LEVOFLOXACIN 750 MG: 5 INJECTION, SOLUTION INTRAVENOUS at 08:06

## 2022-06-23 NOTE — PROGRESS NOTES
0750 pt here for infusion. Resting in recliner, TP released, pharmacy notified  0806 pt infusion started  0940 infusion complete, PICC flushed and new caps applied.  Daughter called for ride.  Will see pt tomorrow to continue tx.

## 2022-06-24 ENCOUNTER — INFUSION (OUTPATIENT)
Dept: INFUSION THERAPY | Facility: HOSPITAL | Age: 77
End: 2022-06-24
Attending: NURSE PRACTITIONER
Payer: MEDICARE

## 2022-06-24 VITALS
OXYGEN SATURATION: 100 % | RESPIRATION RATE: 16 BRPM | SYSTOLIC BLOOD PRESSURE: 126 MMHG | DIASTOLIC BLOOD PRESSURE: 61 MMHG | TEMPERATURE: 99 F | HEART RATE: 64 BPM

## 2022-06-24 DIAGNOSIS — L03.116 CELLULITIS OF LEFT LOWER EXTREMITY: Primary | ICD-10-CM

## 2022-06-24 PROCEDURE — 63600175 PHARM REV CODE 636 W HCPCS: Performed by: FAMILY MEDICINE

## 2022-06-24 PROCEDURE — 96365 THER/PROPH/DIAG IV INF INIT: CPT

## 2022-06-24 RX ORDER — ONDANSETRON 2 MG/ML
4 INJECTION INTRAMUSCULAR; INTRAVENOUS
Status: CANCELLED
Start: 2022-06-25

## 2022-06-24 RX ORDER — LEVOFLOXACIN 5 MG/ML
750 INJECTION, SOLUTION INTRAVENOUS
Status: DISCONTINUED | OUTPATIENT
Start: 2022-06-24 | End: 2022-06-24 | Stop reason: HOSPADM

## 2022-06-24 RX ORDER — SODIUM CHLORIDE 0.9 % (FLUSH) 0.9 %
10 SYRINGE (ML) INJECTION
Status: CANCELLED | OUTPATIENT
Start: 2022-06-25

## 2022-06-24 RX ORDER — DIPHENHYDRAMINE HYDROCHLORIDE 50 MG/ML
25 INJECTION INTRAMUSCULAR; INTRAVENOUS
Status: CANCELLED
Start: 2022-06-25

## 2022-06-24 RX ORDER — SODIUM CHLORIDE 0.9 % (FLUSH) 0.9 %
10 SYRINGE (ML) INJECTION
Status: DISCONTINUED | OUTPATIENT
Start: 2022-06-24 | End: 2022-06-24 | Stop reason: HOSPADM

## 2022-06-24 RX ORDER — LEVOFLOXACIN 5 MG/ML
750 INJECTION, SOLUTION INTRAVENOUS
Status: CANCELLED
Start: 2022-06-25

## 2022-06-24 RX ADMIN — LEVOFLOXACIN 750 MG: 5 INJECTION, SOLUTION INTRAVENOUS at 08:06

## 2022-06-24 NOTE — PROGRESS NOTES
0805 pt here for infusion, TP released, VSS, pt resting in recliner  1000 infusion complete, both lines of PICC flushed and new cap applied, wrapped for extra security.  Pt informed will go to ER for infusions on the weekend and will come back to me on Monday, daughter called for ride

## 2022-06-25 ENCOUNTER — INFUSION (OUTPATIENT)
Dept: INFUSION THERAPY | Facility: HOSPITAL | Age: 77
End: 2022-06-25
Attending: NURSE PRACTITIONER
Payer: MEDICARE

## 2022-06-25 VITALS
HEART RATE: 94 BPM | WEIGHT: 198 LBS | BODY MASS INDEX: 31.82 KG/M2 | TEMPERATURE: 98 F | SYSTOLIC BLOOD PRESSURE: 155 MMHG | RESPIRATION RATE: 18 BRPM | HEIGHT: 66 IN | OXYGEN SATURATION: 100 % | DIASTOLIC BLOOD PRESSURE: 66 MMHG

## 2022-06-25 DIAGNOSIS — L03.116 CELLULITIS OF LEFT LOWER EXTREMITY: Primary | ICD-10-CM

## 2022-06-25 PROCEDURE — 96365 THER/PROPH/DIAG IV INF INIT: CPT

## 2022-06-25 PROCEDURE — 25000003 PHARM REV CODE 250: Performed by: FAMILY MEDICINE

## 2022-06-25 PROCEDURE — 63600175 PHARM REV CODE 636 W HCPCS: Performed by: FAMILY MEDICINE

## 2022-06-25 RX ORDER — SODIUM CHLORIDE 0.9 % (FLUSH) 0.9 %
10 SYRINGE (ML) INJECTION
Status: CANCELLED | OUTPATIENT
Start: 2022-06-26

## 2022-06-25 RX ORDER — SODIUM CHLORIDE 0.9 % (FLUSH) 0.9 %
10 SYRINGE (ML) INJECTION
Status: DISCONTINUED | OUTPATIENT
Start: 2022-06-25 | End: 2022-06-25 | Stop reason: HOSPADM

## 2022-06-25 RX ORDER — ONDANSETRON 2 MG/ML
4 INJECTION INTRAMUSCULAR; INTRAVENOUS
Status: CANCELLED
Start: 2022-06-26

## 2022-06-25 RX ORDER — DIPHENHYDRAMINE HYDROCHLORIDE 50 MG/ML
25 INJECTION INTRAMUSCULAR; INTRAVENOUS
Status: CANCELLED
Start: 2022-06-26

## 2022-06-25 RX ORDER — LEVOFLOXACIN 5 MG/ML
750 INJECTION, SOLUTION INTRAVENOUS
Status: DISCONTINUED | OUTPATIENT
Start: 2022-06-25 | End: 2022-06-25 | Stop reason: HOSPADM

## 2022-06-25 RX ORDER — LEVOFLOXACIN 5 MG/ML
750 INJECTION, SOLUTION INTRAVENOUS
Status: CANCELLED
Start: 2022-06-26

## 2022-06-25 RX ADMIN — SODIUM CHLORIDE: 9 INJECTION, SOLUTION INTRAVENOUS at 08:06

## 2022-06-25 RX ADMIN — LEVOFLOXACIN 750 MG: 5 INJECTION, SOLUTION INTRAVENOUS at 08:06

## 2022-06-25 NOTE — PROGRESS NOTES
0810 Pt arrived to infusion center for Levaquin infusion and placed in room 2.  Family at bedside.    0830 PICC flushed    0835 Levaquin infusion initiated. Patient educated on s/s of reaction and verbalizes understanding.    1005 Infusion complete, no complications noted. PICC flushed and capped.     1010  Return to the Emergency Department for any chest pain, shortness of breath, difficulty breathing, reaction at the IV site, hives or swelling.  Return as scheduled for next infusion. Follow up with your PCP as needed.  Patient ambulatory upon discharge from department. Verbalizes understanding of discharge instructions. Denies any complaints or needs at this time.

## 2022-06-26 ENCOUNTER — INFUSION (OUTPATIENT)
Dept: INFUSION THERAPY | Facility: HOSPITAL | Age: 77
End: 2022-06-26
Attending: NURSE PRACTITIONER
Payer: MEDICARE

## 2022-06-26 VITALS
RESPIRATION RATE: 16 BRPM | TEMPERATURE: 98 F | OXYGEN SATURATION: 99 % | SYSTOLIC BLOOD PRESSURE: 139 MMHG | DIASTOLIC BLOOD PRESSURE: 63 MMHG | HEART RATE: 80 BPM

## 2022-06-26 DIAGNOSIS — L03.116 CELLULITIS OF LEFT LOWER EXTREMITY: Primary | ICD-10-CM

## 2022-06-26 PROCEDURE — 96365 THER/PROPH/DIAG IV INF INIT: CPT

## 2022-06-26 PROCEDURE — 63600175 PHARM REV CODE 636 W HCPCS: Performed by: FAMILY MEDICINE

## 2022-06-26 PROCEDURE — 25000003 PHARM REV CODE 250: Performed by: FAMILY MEDICINE

## 2022-06-26 RX ORDER — SODIUM CHLORIDE 0.9 % (FLUSH) 0.9 %
10 SYRINGE (ML) INJECTION
Status: CANCELLED | OUTPATIENT
Start: 2022-06-27

## 2022-06-26 RX ORDER — ONDANSETRON 2 MG/ML
4 INJECTION INTRAMUSCULAR; INTRAVENOUS
Status: CANCELLED
Start: 2022-06-27

## 2022-06-26 RX ORDER — LEVOFLOXACIN 5 MG/ML
750 INJECTION, SOLUTION INTRAVENOUS
Status: DISCONTINUED | OUTPATIENT
Start: 2022-06-26 | End: 2022-06-26 | Stop reason: HOSPADM

## 2022-06-26 RX ORDER — DIPHENHYDRAMINE HYDROCHLORIDE 50 MG/ML
25 INJECTION INTRAMUSCULAR; INTRAVENOUS
Status: CANCELLED
Start: 2022-06-27

## 2022-06-26 RX ORDER — LEVOFLOXACIN 5 MG/ML
750 INJECTION, SOLUTION INTRAVENOUS
Status: CANCELLED
Start: 2022-06-27

## 2022-06-26 RX ORDER — SODIUM CHLORIDE 0.9 % (FLUSH) 0.9 %
10 SYRINGE (ML) INJECTION
Status: DISCONTINUED | OUTPATIENT
Start: 2022-06-26 | End: 2022-06-26 | Stop reason: HOSPADM

## 2022-06-26 RX ADMIN — SODIUM CHLORIDE: 9 INJECTION, SOLUTION INTRAVENOUS at 08:06

## 2022-06-26 RX ADMIN — LEVOFLOXACIN 750 MG: 5 INJECTION, SOLUTION INTRAVENOUS at 08:06

## 2022-06-26 NOTE — PROGRESS NOTES
0755 Pt arrived to infusion center for antibiotic infusion and placed in room 2.     0815 PICC flushed    0835 Levaquin infusion initiated. Patient educated on s/s of reaction and verbalizes understanding.    1000     Infusion complete, no complications noted. PICC flushed and capped.  Return to the Emergency Department for any chest pain, shortness of breath, difficulty breathing, reaction at the IV site, hives or swelling.  Return as scheduled for next infusion. Follow up with your PCP as needed.  Patient ambulatory upon discharge from department. Verbalizes understanding of discharge instructions. Denies any complaints or needs at this time.

## 2022-06-27 ENCOUNTER — INFUSION (OUTPATIENT)
Dept: INFUSION THERAPY | Facility: HOSPITAL | Age: 77
End: 2022-06-27
Attending: FAMILY MEDICINE
Payer: MEDICARE

## 2022-06-27 VITALS
OXYGEN SATURATION: 99 % | HEART RATE: 87 BPM | TEMPERATURE: 99 F | SYSTOLIC BLOOD PRESSURE: 110 MMHG | RESPIRATION RATE: 16 BRPM | DIASTOLIC BLOOD PRESSURE: 47 MMHG

## 2022-06-27 DIAGNOSIS — L03.116 CELLULITIS OF LEFT LOWER EXTREMITY: Primary | ICD-10-CM

## 2022-06-27 PROCEDURE — 63600175 PHARM REV CODE 636 W HCPCS: Performed by: FAMILY MEDICINE

## 2022-06-27 PROCEDURE — 96365 THER/PROPH/DIAG IV INF INIT: CPT

## 2022-06-27 PROCEDURE — 25000003 PHARM REV CODE 250: Performed by: FAMILY MEDICINE

## 2022-06-27 RX ORDER — DIPHENHYDRAMINE HYDROCHLORIDE 50 MG/ML
25 INJECTION INTRAMUSCULAR; INTRAVENOUS
Status: CANCELLED
Start: 2022-06-28

## 2022-06-27 RX ORDER — LEVOFLOXACIN 5 MG/ML
750 INJECTION, SOLUTION INTRAVENOUS
Status: DISCONTINUED | OUTPATIENT
Start: 2022-06-27 | End: 2022-06-27 | Stop reason: HOSPADM

## 2022-06-27 RX ORDER — ONDANSETRON 2 MG/ML
4 INJECTION INTRAMUSCULAR; INTRAVENOUS
Status: CANCELLED
Start: 2022-06-28

## 2022-06-27 RX ORDER — SODIUM CHLORIDE 0.9 % (FLUSH) 0.9 %
10 SYRINGE (ML) INJECTION
Status: CANCELLED | OUTPATIENT
Start: 2022-06-28

## 2022-06-27 RX ORDER — LEVOFLOXACIN 5 MG/ML
750 INJECTION, SOLUTION INTRAVENOUS
Status: CANCELLED
Start: 2022-06-28

## 2022-06-27 RX ORDER — SODIUM CHLORIDE 0.9 % (FLUSH) 0.9 %
10 SYRINGE (ML) INJECTION
Status: DISCONTINUED | OUTPATIENT
Start: 2022-06-27 | End: 2022-06-27 | Stop reason: HOSPADM

## 2022-06-27 RX ADMIN — LEVOFLOXACIN 750 MG: 5 INJECTION, SOLUTION INTRAVENOUS at 08:06

## 2022-06-27 NOTE — PROGRESS NOTES
0815 pt here for infusion. Brought by daughter, resting in recliner, no complaints, TP released and pharmacy notified.   1015 infusion complete and flushed afterwards, pt ride called and PICC line secured, pt discharged ambulatory and will see tomorrow

## 2022-06-28 ENCOUNTER — INFUSION (OUTPATIENT)
Dept: INFUSION THERAPY | Facility: HOSPITAL | Age: 77
End: 2022-06-28
Attending: NURSE PRACTITIONER
Payer: MEDICARE

## 2022-06-28 VITALS
RESPIRATION RATE: 16 BRPM | SYSTOLIC BLOOD PRESSURE: 139 MMHG | OXYGEN SATURATION: 100 % | HEART RATE: 87 BPM | TEMPERATURE: 99 F | DIASTOLIC BLOOD PRESSURE: 58 MMHG

## 2022-06-28 DIAGNOSIS — L03.116 CELLULITIS OF LEFT LOWER EXTREMITY: Primary | ICD-10-CM

## 2022-06-28 PROCEDURE — 96365 THER/PROPH/DIAG IV INF INIT: CPT

## 2022-06-28 PROCEDURE — 96366 THER/PROPH/DIAG IV INF ADDON: CPT

## 2022-06-28 PROCEDURE — 63600175 PHARM REV CODE 636 W HCPCS: Performed by: FAMILY MEDICINE

## 2022-06-28 PROCEDURE — 25000003 PHARM REV CODE 250: Performed by: FAMILY MEDICINE

## 2022-06-28 RX ORDER — ONDANSETRON 2 MG/ML
4 INJECTION INTRAMUSCULAR; INTRAVENOUS
Status: CANCELLED
Start: 2022-06-29

## 2022-06-28 RX ORDER — LEVOFLOXACIN 5 MG/ML
750 INJECTION, SOLUTION INTRAVENOUS
Status: DISCONTINUED | OUTPATIENT
Start: 2022-06-28 | End: 2022-06-28 | Stop reason: HOSPADM

## 2022-06-28 RX ORDER — LEVOFLOXACIN 5 MG/ML
750 INJECTION, SOLUTION INTRAVENOUS
Status: CANCELLED
Start: 2022-06-29

## 2022-06-28 RX ORDER — SODIUM CHLORIDE 0.9 % (FLUSH) 0.9 %
10 SYRINGE (ML) INJECTION
Status: DISCONTINUED | OUTPATIENT
Start: 2022-06-28 | End: 2022-06-28 | Stop reason: HOSPADM

## 2022-06-28 RX ORDER — DIPHENHYDRAMINE HYDROCHLORIDE 50 MG/ML
25 INJECTION INTRAMUSCULAR; INTRAVENOUS
Status: CANCELLED
Start: 2022-06-29

## 2022-06-28 RX ORDER — SODIUM CHLORIDE 0.9 % (FLUSH) 0.9 %
10 SYRINGE (ML) INJECTION
Status: CANCELLED | OUTPATIENT
Start: 2022-06-29

## 2022-06-28 RX ADMIN — LEVOFLOXACIN 750 MG: 5 INJECTION, SOLUTION INTRAVENOUS at 08:06

## 2022-06-28 NOTE — PROGRESS NOTES
0805 pt here for infusion, resting in recliner, pt brought in by her daughter, will call when tx complete, TP released and pharmacy notified. VSS  1030 infusion complete, PICC lines flushed and new caps applied, wrapped for extra security. Daughter was called to come get pt and left ambulatory.  Will see tomorrow to continue tx

## 2022-06-29 ENCOUNTER — INFUSION (OUTPATIENT)
Dept: INFUSION THERAPY | Facility: HOSPITAL | Age: 77
End: 2022-06-29
Attending: NURSE PRACTITIONER
Payer: MEDICARE

## 2022-06-29 ENCOUNTER — OFFICE VISIT (OUTPATIENT)
Dept: VASCULAR SURGERY | Facility: CLINIC | Age: 77
End: 2022-06-29
Payer: MEDICARE

## 2022-06-29 VITALS
HEART RATE: 88 BPM | RESPIRATION RATE: 16 BRPM | OXYGEN SATURATION: 99 % | SYSTOLIC BLOOD PRESSURE: 125 MMHG | DIASTOLIC BLOOD PRESSURE: 57 MMHG | TEMPERATURE: 99 F

## 2022-06-29 VITALS
HEIGHT: 66 IN | DIASTOLIC BLOOD PRESSURE: 68 MMHG | RESPIRATION RATE: 12 BRPM | BODY MASS INDEX: 30.18 KG/M2 | HEART RATE: 76 BPM | WEIGHT: 187.81 LBS | SYSTOLIC BLOOD PRESSURE: 138 MMHG

## 2022-06-29 DIAGNOSIS — L97.929 VENOUS ULCER OF LEFT LEG: Primary | ICD-10-CM

## 2022-06-29 DIAGNOSIS — I87.2 VENOUS INSUFFICIENCY (CHRONIC) (PERIPHERAL): ICD-10-CM

## 2022-06-29 DIAGNOSIS — I83.029 VENOUS ULCER OF LEFT LEG: Primary | ICD-10-CM

## 2022-06-29 DIAGNOSIS — T14.8XXA WOUND INFECTION: ICD-10-CM

## 2022-06-29 DIAGNOSIS — L03.116 CELLULITIS OF LEFT LOWER EXTREMITY: Primary | ICD-10-CM

## 2022-06-29 DIAGNOSIS — L08.9 WOUND INFECTION: ICD-10-CM

## 2022-06-29 DIAGNOSIS — L81.9 HYPERPIGMENTATION OF SKIN: ICD-10-CM

## 2022-06-29 PROCEDURE — 1159F MED LIST DOCD IN RCRD: CPT | Mod: CPTII,,, | Performed by: FAMILY MEDICINE

## 2022-06-29 PROCEDURE — 99214 PR OFFICE/OUTPT VISIT, EST, LEVL IV, 30-39 MIN: ICD-10-PCS | Mod: S$PBB,,, | Performed by: FAMILY MEDICINE

## 2022-06-29 PROCEDURE — 99214 OFFICE O/P EST MOD 30 MIN: CPT | Mod: PBBFAC,25 | Performed by: FAMILY MEDICINE

## 2022-06-29 PROCEDURE — 3075F PR MOST RECENT SYSTOLIC BLOOD PRESS GE 130-139MM HG: ICD-10-PCS | Mod: CPTII,,, | Performed by: FAMILY MEDICINE

## 2022-06-29 PROCEDURE — 25000003 PHARM REV CODE 250

## 2022-06-29 PROCEDURE — 25000003 PHARM REV CODE 250: Performed by: FAMILY MEDICINE

## 2022-06-29 PROCEDURE — 1159F PR MEDICATION LIST DOCUMENTED IN MEDICAL RECORD: ICD-10-PCS | Mod: CPTII,,, | Performed by: FAMILY MEDICINE

## 2022-06-29 PROCEDURE — 1160F PR REVIEW ALL MEDS BY PRESCRIBER/CLIN PHARMACIST DOCUMENTED: ICD-10-PCS | Mod: CPTII,,, | Performed by: FAMILY MEDICINE

## 2022-06-29 PROCEDURE — 1160F RVW MEDS BY RX/DR IN RCRD: CPT | Mod: CPTII,,, | Performed by: FAMILY MEDICINE

## 2022-06-29 PROCEDURE — 3078F DIAST BP <80 MM HG: CPT | Mod: CPTII,,, | Performed by: FAMILY MEDICINE

## 2022-06-29 PROCEDURE — 96366 THER/PROPH/DIAG IV INF ADDON: CPT

## 2022-06-29 PROCEDURE — 3078F PR MOST RECENT DIASTOLIC BLOOD PRESSURE < 80 MM HG: ICD-10-PCS | Mod: CPTII,,, | Performed by: FAMILY MEDICINE

## 2022-06-29 PROCEDURE — 96365 THER/PROPH/DIAG IV INF INIT: CPT

## 2022-06-29 PROCEDURE — 99214 OFFICE O/P EST MOD 30 MIN: CPT | Mod: S$PBB,,, | Performed by: FAMILY MEDICINE

## 2022-06-29 PROCEDURE — 63600175 PHARM REV CODE 636 W HCPCS: Performed by: FAMILY MEDICINE

## 2022-06-29 PROCEDURE — 3075F SYST BP GE 130 - 139MM HG: CPT | Mod: CPTII,,, | Performed by: FAMILY MEDICINE

## 2022-06-29 RX ORDER — LEVOFLOXACIN 5 MG/ML
750 INJECTION, SOLUTION INTRAVENOUS
Status: CANCELLED
Start: 2022-06-30

## 2022-06-29 RX ORDER — SODIUM CHLORIDE 0.9 % (FLUSH) 0.9 %
10 SYRINGE (ML) INJECTION
Status: DISCONTINUED | OUTPATIENT
Start: 2022-06-29 | End: 2022-06-29 | Stop reason: HOSPADM

## 2022-06-29 RX ORDER — HYDROCODONE BITARTRATE AND ACETAMINOPHEN 5; 325 MG/1; MG/1
1 TABLET ORAL EVERY 8 HOURS PRN
Qty: 21 TABLET | Refills: 0 | Status: SHIPPED | OUTPATIENT
Start: 2022-06-29 | End: 2022-07-27

## 2022-06-29 RX ORDER — DIPHENHYDRAMINE HYDROCHLORIDE 50 MG/ML
25 INJECTION INTRAMUSCULAR; INTRAVENOUS
Status: CANCELLED
Start: 2022-06-30

## 2022-06-29 RX ORDER — LEVOFLOXACIN 5 MG/ML
750 INJECTION, SOLUTION INTRAVENOUS
Status: DISCONTINUED | OUTPATIENT
Start: 2022-06-29 | End: 2022-06-29 | Stop reason: HOSPADM

## 2022-06-29 RX ORDER — SILVER SULFADIAZINE 10 G/1000G
CREAM TOPICAL
Status: COMPLETED | OUTPATIENT
Start: 2022-06-29 | End: 2022-06-29

## 2022-06-29 RX ORDER — SODIUM CHLORIDE 0.9 % (FLUSH) 0.9 %
10 SYRINGE (ML) INJECTION
Status: CANCELLED | OUTPATIENT
Start: 2022-06-30

## 2022-06-29 RX ORDER — ONDANSETRON 2 MG/ML
4 INJECTION INTRAMUSCULAR; INTRAVENOUS
Status: CANCELLED
Start: 2022-06-30

## 2022-06-29 RX ADMIN — LEVOFLOXACIN 750 MG: 5 INJECTION, SOLUTION INTRAVENOUS at 08:06

## 2022-06-29 RX ADMIN — SILVER SULFADIAZINE 1 %: 10 CREAM TOPICAL at 11:06

## 2022-06-29 NOTE — PROGRESS NOTES
0815- pt arrived for infusion, ambulatory, vss.   0840 infusion started  1010 infusion complete, ride called  1045 pt discharged ambulatory with daughter, notified to go to out pt to have infusion on Thursday and Friday and to ER for infusion on Saturday, Sunday and Monday due to holiday.  Will continue therapy on Tuesday in infusion clinic.

## 2022-06-29 NOTE — PROGRESS NOTES
[facility] CLINIC NOTE    Patient ID: Umesh Lassiter is a 77 y.o. female.    I. HISTORY     Chief Complaint:   Chief Complaint   Patient presents with    Wound Check     Procedure 1  2 week wound check, daily Levaquin infusions via [facility], check BMP.        HPI: Umesh Lassiter is a 77 y.o. female who presents today for a 2 week wound check.  The patient is currently receiving Levaquin infusion through her PICC line and has received 1 week worth of infusions at this point.  The wound to her left lower extremity looks much better and is healing nicely.  She continues to have scattered superficial skin breakdown with hyperpigmentation of the periwound area.  She also continues with wound dressing and wrap changes by home health.  She is also having pain in and around the wound area.  She is pleased with the progress so far.    Clinical summary:  The patient is known to our clinic but has not been seen since 03/02/2021 for a one-month follow-up status post left great saphenous vein ablation.  She has been lost to follow-up since.  She states that this ulceration of her left lower extremity has been present for approximately 6 months.  She states that his been persistent and worsening during that time.  She has not been wearing compression daily.  She has pitting edema, hyperpigmentation along with the ulceration of the left medial Gator region.  No signs of infection, including purulent drainage, increased temperature or foul odor.  She denies complaints of her right lower extremity.  Do not use Unna boot with patient, she does not tolerate well.    Reflux study 12/21/2021 shows no evidence of DVT or superficial venous reflux.  Shows a well ablated proximal left great saphenous vein and ablated left small saphenous vein.     Left great saphenous vein laser ablation 01/08/2021.  Right great saphenous vein, duplicate, laser ablation 12/17/2020.  Right distal great saphenous vein erythema ablation  02/28/2020.  Left small saphenous vein laser ablation 04/27/2018.  Right great saphenous vein laser ablation 04/20/2018.    Past Medical History:   Diagnosis Date    Anxiety     Asthma     CVA (cerebral vascular accident) 05/23/2018    DVT (deep venous thrombosis) 2019    Epilepsy     Hyperlipidemia     Hypertension     Hypothyroid     PVD (peripheral vascular disease)     Venous stasis         Past Surgical History:   Procedure Laterality Date    KNEE ARTHROSCOPY Left     x 2    LASER ABLATION Right 04/20/2018    [facility] LASER ABLATION PERFORMED BY DR. KARMA BUTCHER.    LASER ABLATION Left 04/27/2018    [facility] LASER ABLATION PERFORMED BY DR. KARMA BUTCHER.    LASER ABLATION Right 12/17/2020    [facility] ( DUPLICATED )LASER ABLATION PERFORMED BY DR. KARMA BUTCHER.    LASER ABLATION Left 01/08/2021    [facility] LASER ABLATION PERFORMED BY DR. KARMA BUTCHER.    Remove cataracts, insert lens Bilateral     SHOULDER ARTHROSCOPY Left     SINUS SURGERY      TOTAL ABDOMINAL HYSTERECTOMY W/ BILATERAL SALPINGOOPHORECTOMY      VENOUS ABLATION Right 02/28/2020    DISTAL [facility] VARITHENA ABLATION PERFORMED BY DR. KARMA BUTCHER.       Social History     Tobacco Use   Smoking Status Never Smoker   Smokeless Tobacco Never Used         Current Outpatient Medications:     albuterol (PROVENTIL/VENTOLIN HFA) 90 mcg/actuation inhaler, Inhale 2 puffs into the lungs every 6 (six) hours as needed for Wheezing. Rescue, Disp: 18 g, Rfl: 1    albuterol 2 mg/5 mL syrup, Take 5 mLs (2 mg total) by mouth 3 (three) times daily., Disp: 450 mL, Rfl: 11    cefdinir (OMNICEF) 300 MG capsule, TAKE 1 CAPSULE BY MOUTH TWICE A DAY WITH FOOD FOR 10 DAYS, Disp: , Rfl:     cilostazoL (PLETAL) 50 MG Tab, Take 1 tablet (50 mg total) by mouth 2 (two) times daily., Disp: 180 tablet, Rfl: 3    clorazepate (TRANXENE) 3.75 MG Tab, TAKE 1 TABLET BY MOUTH TWICE A DAY AS NEEDED FOR ANXIETY *CAUSES DROWSINESS-AVOID ALCOHOL!!, Disp: 60 tablet, Rfl: 5    divalproex  (DEPAKOTE) 250 MG EC tablet, Take 250 mg by mouth Daily., Disp: , Rfl:     divalproex ER (DEPAKOTE) 500 MG Tb24, TAKE 2 TABLETS BY MOUTH EACH NIGHT AT BEDTIME FOR SEIZURES, Disp: 180 tablet, Rfl: 1    ferrous sulfate (FEOSOL) 325 mg (65 mg iron) Tab tablet, Take 1 tablet (325 mg total) by mouth daily with breakfast., Disp: 90 tablet, Rfl: 0    furosemide (LASIX) 20 MG tablet, Take 1 tablet (20 mg total) by mouth once daily., Disp: 90 tablet, Rfl: 3    levothyroxine (SYNTHROID) 50 MCG tablet, Take 1 tablet (50 mcg total) by mouth before breakfast. Take on an empty stomach with a full glass of water. Nothing else to eat or drink for 30 mins, Disp: 90 tablet, Rfl: 3    losartan (COZAAR) 50 MG tablet, Take 1 tablet (50 mg total) by mouth once daily., Disp: 90 tablet, Rfl: 3    SANTYL ointment, APPLY TOPICALLY TO AFFECTED AREA EVERY DAY ...APPLY DRESSING CHANGES, Disp: 90 g, Rfl: 2    silver sulfADIAZINE 1% (SILVADENE) 1 % cream, Apply topically once daily., Disp: 90 g, Rfl: 2    HYDROcodone-acetaminophen (NORCO) 5-325 mg per tablet, Take 1 tablet by mouth every 8 (eight) hours as needed for Pain., Disp: 21 tablet, Rfl: 0  No current facility-administered medications for this visit.    Review of Systems   Constitutional: Negative for activity change, chills, diaphoresis, fatigue and fever.   Respiratory: Negative for cough and shortness of breath.    Cardiovascular: Positive for leg swelling. Negative for chest pain and claudication.        Hyperpigmentation LE   Gastrointestinal: Negative for nausea and vomiting.   Musculoskeletal: Positive for leg pain. Negative for joint swelling.   Integumentary:  Negative for rash and wound.   Neurological: Negative for weakness and numbness.       II. PHYSICAL EXAM     Physical Exam  Constitutional:       General: She is awake. She is not in acute distress.     Appearance: Normal appearance. She is obese. She is not ill-appearing or toxic-appearing.   HENT:      Head:  Normocephalic and atraumatic.   Eyes:      Extraocular Movements: Extraocular movements intact.      Conjunctiva/sclera: Conjunctivae normal.      Pupils: Pupils are equal, round, and reactive to light.   Neck:      Vascular: No carotid bruit or JVD.   Cardiovascular:      Rate and Rhythm: Normal rate and regular rhythm.      Pulses:           Dorsalis pedis pulses are detected w/ Doppler on the left side.        Posterior tibial pulses are detected w/ Doppler on the left side.      Heart sounds: No murmur heard.  Pulmonary:      Effort: Pulmonary effort is normal. No respiratory distress.      Breath sounds: No stridor. No wheezing, rhonchi or rales.   Musculoskeletal:         General: No swelling, tenderness or deformity.      Right lower le+ Edema present.      Left lower le+ Edema present.        Legs:       Comments: Ulcer of left medial Gator region shallow in appearance with several small areas of skin breakdown.     Hyperpigmentation of the left lower Gator region along with +2 edema.   Feet:      Comments: Triphasic dopplerable pulses of the left lower extremity.  Skin:     General: Skin is warm.      Capillary Refill: Capillary refill takes less than 2 seconds.      Coloration: Skin is not ashen.      Findings: No bruising, erythema, lesion, rash or wound.   Neurological:      Mental Status: She is alert and oriented to person, place, and time.      Motor: No weakness.   Psychiatric:         Speech: Speech normal.         Behavior: Behavior normal. Behavior is cooperative.       Reticular/Spider veins noted:  RLE: medial calf  LLE: none    Varicose veins noted:  RLE: medial calf  LLE:  medial calf    CEAP Classification  Clinical Signs: Class 6 - Leg ulceration, skin changes as defined above  Etiologic Classification: Primary  Anatomic distribution: Superficial  Pathophysiologic dysfunction: Reflux       Venous Clinical Severity Score  Pain:2=Daily, moderate activity limitation, occasional  analgesics  Varicose Veins: 1=Few, scattered. Branch varicose veins  Venous Edema: 2=Afternoon edema, above ankle  Pigmentation: 2=Diffuse over most of gaiter distribution (lower 1/3) or recent pigmentation (purple)  Inflammation: 1=Mild cellulitis, limited to marginal area around ulcer  Induration: 1=Focal, circummalleolar (< 5 cm)  Number of Active Ulcers: 2=2  Active Ulceration, Duration: 2=>3 months, <1 year  Active Ulcer Size: 2=2 - 6 cm diameter  Compressive Therapy: 3=Full compliance, stockings + elevation  Total Score: 18       III. ASSESSMENT & PLAN (MEDICAL DECISION MAKING)     1. Wound infection    2. Venous insufficiency (chronic) (peripheral)    3. Hyperpigmentation of skin    4. Venous ulcer of left leg        Assessment/Diagnosis and Plan:  Continue with antibiotic infusion at infusion center daily for the next 2 weeks.  Apply dressing consisting of Silvadene, Telfa, Kerlix and Ace wrap.  Continue with home health dressing and wrap changes.  Follow-up in 2 weeks for wound check follow-up.    Orders Placed This Encounter    Basic Metabolic Panel    HYDROcodone-acetaminophen (NORCO) 5-325 mg per tablet      Minh Cisneros DO

## 2022-06-30 ENCOUNTER — INFUSION (OUTPATIENT)
Dept: INFUSION THERAPY | Facility: HOSPITAL | Age: 77
End: 2022-06-30
Attending: NURSE PRACTITIONER
Payer: MEDICARE

## 2022-06-30 VITALS
DIASTOLIC BLOOD PRESSURE: 62 MMHG | OXYGEN SATURATION: 98 % | SYSTOLIC BLOOD PRESSURE: 122 MMHG | HEART RATE: 79 BPM | TEMPERATURE: 99 F | RESPIRATION RATE: 16 BRPM

## 2022-06-30 DIAGNOSIS — L03.116 CELLULITIS OF LEFT LOWER EXTREMITY: Primary | ICD-10-CM

## 2022-06-30 PROCEDURE — 96365 THER/PROPH/DIAG IV INF INIT: CPT

## 2022-06-30 PROCEDURE — 25000003 PHARM REV CODE 250: Performed by: FAMILY MEDICINE

## 2022-06-30 PROCEDURE — 96366 THER/PROPH/DIAG IV INF ADDON: CPT

## 2022-06-30 PROCEDURE — 63600175 PHARM REV CODE 636 W HCPCS: Performed by: FAMILY MEDICINE

## 2022-06-30 RX ORDER — LEVOFLOXACIN 5 MG/ML
750 INJECTION, SOLUTION INTRAVENOUS
Status: CANCELLED
Start: 2022-07-01

## 2022-06-30 RX ORDER — LEVOFLOXACIN 5 MG/ML
750 INJECTION, SOLUTION INTRAVENOUS
Status: DISCONTINUED | OUTPATIENT
Start: 2022-06-30 | End: 2022-06-30 | Stop reason: HOSPADM

## 2022-06-30 RX ORDER — ONDANSETRON 2 MG/ML
4 INJECTION INTRAMUSCULAR; INTRAVENOUS
Status: CANCELLED
Start: 2022-07-01

## 2022-06-30 RX ORDER — SODIUM CHLORIDE 0.9 % (FLUSH) 0.9 %
10 SYRINGE (ML) INJECTION
Status: DISCONTINUED | OUTPATIENT
Start: 2022-06-30 | End: 2022-06-30 | Stop reason: HOSPADM

## 2022-06-30 RX ORDER — DIPHENHYDRAMINE HYDROCHLORIDE 50 MG/ML
25 INJECTION INTRAMUSCULAR; INTRAVENOUS
Status: CANCELLED
Start: 2022-07-01

## 2022-06-30 RX ORDER — SODIUM CHLORIDE 0.9 % (FLUSH) 0.9 %
10 SYRINGE (ML) INJECTION
Status: CANCELLED | OUTPATIENT
Start: 2022-07-01

## 2022-06-30 RX ADMIN — LEVOFLOXACIN 750 MG: 5 INJECTION, SOLUTION INTRAVENOUS at 08:06

## 2022-07-01 ENCOUNTER — INFUSION (OUTPATIENT)
Dept: INFUSION THERAPY | Facility: HOSPITAL | Age: 77
End: 2022-07-01
Attending: NURSE PRACTITIONER
Payer: MEDICARE

## 2022-07-01 VITALS
OXYGEN SATURATION: 98 % | TEMPERATURE: 99 F | HEART RATE: 82 BPM | RESPIRATION RATE: 17 BRPM | SYSTOLIC BLOOD PRESSURE: 121 MMHG | DIASTOLIC BLOOD PRESSURE: 61 MMHG

## 2022-07-01 DIAGNOSIS — L03.116 CELLULITIS OF LEFT LOWER EXTREMITY: Primary | ICD-10-CM

## 2022-07-01 PROCEDURE — 25000003 PHARM REV CODE 250: Performed by: FAMILY MEDICINE

## 2022-07-01 PROCEDURE — 63600175 PHARM REV CODE 636 W HCPCS: Performed by: FAMILY MEDICINE

## 2022-07-01 PROCEDURE — 96365 THER/PROPH/DIAG IV INF INIT: CPT

## 2022-07-01 RX ORDER — SODIUM CHLORIDE 0.9 % (FLUSH) 0.9 %
10 SYRINGE (ML) INJECTION
Status: DISCONTINUED | OUTPATIENT
Start: 2022-07-01 | End: 2022-07-01 | Stop reason: HOSPADM

## 2022-07-01 RX ORDER — SODIUM CHLORIDE 0.9 % (FLUSH) 0.9 %
10 SYRINGE (ML) INJECTION
Status: CANCELLED | OUTPATIENT
Start: 2022-07-02

## 2022-07-01 RX ORDER — LEVOFLOXACIN 5 MG/ML
750 INJECTION, SOLUTION INTRAVENOUS
Status: DISCONTINUED | OUTPATIENT
Start: 2022-07-01 | End: 2022-07-01 | Stop reason: HOSPADM

## 2022-07-01 RX ORDER — LEVOFLOXACIN 5 MG/ML
750 INJECTION, SOLUTION INTRAVENOUS
Status: CANCELLED
Start: 2022-07-02

## 2022-07-01 RX ORDER — ONDANSETRON 2 MG/ML
4 INJECTION INTRAMUSCULAR; INTRAVENOUS
Status: CANCELLED
Start: 2022-07-02

## 2022-07-01 RX ORDER — DIPHENHYDRAMINE HYDROCHLORIDE 50 MG/ML
25 INJECTION INTRAMUSCULAR; INTRAVENOUS
Status: CANCELLED
Start: 2022-07-02

## 2022-07-01 RX ADMIN — LEVOFLOXACIN 750 MG: 5 INJECTION, SOLUTION INTRAVENOUS at 08:07

## 2022-07-02 ENCOUNTER — INFUSION (OUTPATIENT)
Dept: INFUSION THERAPY | Facility: HOSPITAL | Age: 77
End: 2022-07-02
Attending: NURSE PRACTITIONER
Payer: MEDICARE

## 2022-07-02 DIAGNOSIS — L03.116 CELLULITIS OF LEFT LOWER EXTREMITY: Primary | ICD-10-CM

## 2022-07-02 PROCEDURE — 96365 THER/PROPH/DIAG IV INF INIT: CPT

## 2022-07-02 PROCEDURE — 63600175 PHARM REV CODE 636 W HCPCS: Performed by: FAMILY MEDICINE

## 2022-07-02 PROCEDURE — 25000003 PHARM REV CODE 250: Performed by: FAMILY MEDICINE

## 2022-07-02 PROCEDURE — 96366 THER/PROPH/DIAG IV INF ADDON: CPT

## 2022-07-02 RX ORDER — LEVOFLOXACIN 5 MG/ML
750 INJECTION, SOLUTION INTRAVENOUS
Status: CANCELLED
Start: 2022-07-03

## 2022-07-02 RX ORDER — ONDANSETRON 2 MG/ML
4 INJECTION INTRAMUSCULAR; INTRAVENOUS
Status: CANCELLED
Start: 2022-07-03

## 2022-07-02 RX ORDER — SODIUM CHLORIDE 0.9 % (FLUSH) 0.9 %
10 SYRINGE (ML) INJECTION
Status: CANCELLED | OUTPATIENT
Start: 2022-07-03

## 2022-07-02 RX ORDER — SODIUM CHLORIDE 0.9 % (FLUSH) 0.9 %
10 SYRINGE (ML) INJECTION
Status: DISCONTINUED | OUTPATIENT
Start: 2022-07-02 | End: 2022-07-02 | Stop reason: HOSPADM

## 2022-07-02 RX ORDER — LEVOFLOXACIN 5 MG/ML
750 INJECTION, SOLUTION INTRAVENOUS
Status: DISCONTINUED | OUTPATIENT
Start: 2022-07-02 | End: 2022-07-02 | Stop reason: HOSPADM

## 2022-07-02 RX ORDER — DIPHENHYDRAMINE HYDROCHLORIDE 50 MG/ML
25 INJECTION INTRAMUSCULAR; INTRAVENOUS
Status: CANCELLED
Start: 2022-07-03

## 2022-07-02 RX ADMIN — LEVOFLOXACIN 750 MG: 5 INJECTION, SOLUTION INTRAVENOUS at 08:07

## 2022-07-03 ENCOUNTER — INFUSION (OUTPATIENT)
Dept: INFUSION THERAPY | Facility: HOSPITAL | Age: 77
End: 2022-07-03
Attending: NURSE PRACTITIONER
Payer: MEDICARE

## 2022-07-03 VITALS
HEART RATE: 80 BPM | OXYGEN SATURATION: 98 % | DIASTOLIC BLOOD PRESSURE: 61 MMHG | SYSTOLIC BLOOD PRESSURE: 123 MMHG | TEMPERATURE: 99 F | RESPIRATION RATE: 16 BRPM

## 2022-07-03 DIAGNOSIS — L03.116 CELLULITIS OF LEFT LOWER EXTREMITY: Primary | ICD-10-CM

## 2022-07-03 PROCEDURE — 96365 THER/PROPH/DIAG IV INF INIT: CPT

## 2022-07-03 PROCEDURE — 63600175 PHARM REV CODE 636 W HCPCS: Performed by: FAMILY MEDICINE

## 2022-07-03 PROCEDURE — 96366 THER/PROPH/DIAG IV INF ADDON: CPT

## 2022-07-03 PROCEDURE — 25000003 PHARM REV CODE 250: Performed by: FAMILY MEDICINE

## 2022-07-03 RX ORDER — LEVOFLOXACIN 5 MG/ML
750 INJECTION, SOLUTION INTRAVENOUS
Status: DISCONTINUED | OUTPATIENT
Start: 2022-07-03 | End: 2022-07-03 | Stop reason: HOSPADM

## 2022-07-03 RX ORDER — DIPHENHYDRAMINE HYDROCHLORIDE 50 MG/ML
25 INJECTION INTRAMUSCULAR; INTRAVENOUS
Status: CANCELLED
Start: 2022-07-04

## 2022-07-03 RX ORDER — SODIUM CHLORIDE 0.9 % (FLUSH) 0.9 %
10 SYRINGE (ML) INJECTION
Status: CANCELLED | OUTPATIENT
Start: 2022-07-04

## 2022-07-03 RX ORDER — SODIUM CHLORIDE 9 MG/ML
INJECTION, SOLUTION INTRAVENOUS
Status: DISCONTINUED
Start: 2022-07-03 | End: 2022-07-03 | Stop reason: HOSPADM

## 2022-07-03 RX ORDER — ONDANSETRON 2 MG/ML
4 INJECTION INTRAMUSCULAR; INTRAVENOUS
Status: CANCELLED
Start: 2022-07-04

## 2022-07-03 RX ORDER — SODIUM CHLORIDE 0.9 % (FLUSH) 0.9 %
10 SYRINGE (ML) INJECTION
Status: DISCONTINUED | OUTPATIENT
Start: 2022-07-03 | End: 2022-07-03 | Stop reason: HOSPADM

## 2022-07-03 RX ORDER — LEVOFLOXACIN 5 MG/ML
750 INJECTION, SOLUTION INTRAVENOUS
Status: CANCELLED
Start: 2022-07-04

## 2022-07-03 RX ADMIN — LEVOFLOXACIN 750 MG: 5 INJECTION, SOLUTION INTRAVENOUS at 08:07

## 2022-07-04 ENCOUNTER — INFUSION (OUTPATIENT)
Dept: INFUSION THERAPY | Facility: HOSPITAL | Age: 77
End: 2022-07-04
Attending: NURSE PRACTITIONER
Payer: MEDICARE

## 2022-07-04 VITALS
TEMPERATURE: 98 F | HEART RATE: 99 BPM | DIASTOLIC BLOOD PRESSURE: 53 MMHG | RESPIRATION RATE: 18 BRPM | SYSTOLIC BLOOD PRESSURE: 132 MMHG | OXYGEN SATURATION: 98 %

## 2022-07-04 DIAGNOSIS — L03.116 CELLULITIS OF LEFT LOWER EXTREMITY: Primary | ICD-10-CM

## 2022-07-04 PROCEDURE — 96365 THER/PROPH/DIAG IV INF INIT: CPT

## 2022-07-04 PROCEDURE — 63600175 PHARM REV CODE 636 W HCPCS: Performed by: FAMILY MEDICINE

## 2022-07-04 PROCEDURE — 96366 THER/PROPH/DIAG IV INF ADDON: CPT

## 2022-07-04 PROCEDURE — 25000003 PHARM REV CODE 250: Performed by: FAMILY MEDICINE

## 2022-07-04 RX ORDER — ONDANSETRON 2 MG/ML
4 INJECTION INTRAMUSCULAR; INTRAVENOUS
Status: CANCELLED
Start: 2022-07-05

## 2022-07-04 RX ORDER — LEVOFLOXACIN 5 MG/ML
750 INJECTION, SOLUTION INTRAVENOUS
Status: DISCONTINUED | OUTPATIENT
Start: 2022-07-04 | End: 2022-07-04 | Stop reason: HOSPADM

## 2022-07-04 RX ORDER — SODIUM CHLORIDE 0.9 % (FLUSH) 0.9 %
10 SYRINGE (ML) INJECTION
Status: DISCONTINUED | OUTPATIENT
Start: 2022-07-04 | End: 2022-07-04 | Stop reason: HOSPADM

## 2022-07-04 RX ORDER — LEVOFLOXACIN 5 MG/ML
750 INJECTION, SOLUTION INTRAVENOUS
Status: CANCELLED
Start: 2022-07-05

## 2022-07-04 RX ORDER — SODIUM CHLORIDE 0.9 % (FLUSH) 0.9 %
10 SYRINGE (ML) INJECTION
Status: CANCELLED | OUTPATIENT
Start: 2022-07-05

## 2022-07-04 RX ORDER — DIPHENHYDRAMINE HYDROCHLORIDE 50 MG/ML
25 INJECTION INTRAMUSCULAR; INTRAVENOUS
Status: CANCELLED
Start: 2022-07-05

## 2022-07-04 RX ADMIN — LEVOFLOXACIN 750 MG: 5 INJECTION, SOLUTION INTRAVENOUS at 08:07

## 2022-07-05 ENCOUNTER — INFUSION (OUTPATIENT)
Dept: INFUSION THERAPY | Facility: HOSPITAL | Age: 77
End: 2022-07-05
Attending: NURSE PRACTITIONER
Payer: MEDICARE

## 2022-07-05 VITALS
HEIGHT: 66 IN | HEART RATE: 91 BPM | DIASTOLIC BLOOD PRESSURE: 44 MMHG | BODY MASS INDEX: 30.18 KG/M2 | RESPIRATION RATE: 16 BRPM | WEIGHT: 187.81 LBS | TEMPERATURE: 98 F | OXYGEN SATURATION: 97 % | SYSTOLIC BLOOD PRESSURE: 139 MMHG

## 2022-07-05 DIAGNOSIS — L03.116 CELLULITIS OF LEFT LOWER EXTREMITY: Primary | ICD-10-CM

## 2022-07-05 PROCEDURE — 63600175 PHARM REV CODE 636 W HCPCS: Performed by: FAMILY MEDICINE

## 2022-07-05 PROCEDURE — 96365 THER/PROPH/DIAG IV INF INIT: CPT

## 2022-07-05 PROCEDURE — 25000003 PHARM REV CODE 250: Performed by: FAMILY MEDICINE

## 2022-07-05 PROCEDURE — 96366 THER/PROPH/DIAG IV INF ADDON: CPT

## 2022-07-05 RX ORDER — SODIUM CHLORIDE 0.9 % (FLUSH) 0.9 %
10 SYRINGE (ML) INJECTION
Status: DISCONTINUED | OUTPATIENT
Start: 2022-07-05 | End: 2022-07-05 | Stop reason: HOSPADM

## 2022-07-05 RX ORDER — LEVOFLOXACIN 5 MG/ML
750 INJECTION, SOLUTION INTRAVENOUS
Status: DISCONTINUED | OUTPATIENT
Start: 2022-07-05 | End: 2022-07-05 | Stop reason: HOSPADM

## 2022-07-05 RX ORDER — LEVOFLOXACIN 5 MG/ML
750 INJECTION, SOLUTION INTRAVENOUS
Status: CANCELLED
Start: 2022-07-06

## 2022-07-05 RX ORDER — ONDANSETRON 2 MG/ML
4 INJECTION INTRAMUSCULAR; INTRAVENOUS
Status: CANCELLED
Start: 2022-07-06

## 2022-07-05 RX ORDER — DIPHENHYDRAMINE HYDROCHLORIDE 50 MG/ML
25 INJECTION INTRAMUSCULAR; INTRAVENOUS
Status: CANCELLED
Start: 2022-07-06

## 2022-07-05 RX ORDER — SODIUM CHLORIDE 0.9 % (FLUSH) 0.9 %
10 SYRINGE (ML) INJECTION
Status: CANCELLED | OUTPATIENT
Start: 2022-07-06

## 2022-07-05 RX ADMIN — LEVOFLOXACIN 750 MG: 5 INJECTION, SOLUTION INTRAVENOUS at 08:07

## 2022-07-05 NOTE — PROGRESS NOTES
[facility]  Pharmacy notified of patient's arrival.    0810 - Levaquin infusing without difficulty.  No reactions noted.      0945 - Infusion complete and PICC line flushed per protocol.    0955 - Patient left ambulatory in stable condition.  No reactions noted.

## 2022-07-06 ENCOUNTER — INFUSION (OUTPATIENT)
Dept: INFUSION THERAPY | Facility: HOSPITAL | Age: 77
End: 2022-07-06
Attending: NURSE PRACTITIONER
Payer: MEDICARE

## 2022-07-06 VITALS
TEMPERATURE: 99 F | SYSTOLIC BLOOD PRESSURE: 120 MMHG | DIASTOLIC BLOOD PRESSURE: 50 MMHG | RESPIRATION RATE: 16 BRPM | HEART RATE: 88 BPM | OXYGEN SATURATION: 100 %

## 2022-07-06 DIAGNOSIS — R56.9 SEIZURE: ICD-10-CM

## 2022-07-06 DIAGNOSIS — L03.116 CELLULITIS OF LEFT LOWER EXTREMITY: Primary | ICD-10-CM

## 2022-07-06 PROCEDURE — 96365 THER/PROPH/DIAG IV INF INIT: CPT

## 2022-07-06 PROCEDURE — 96366 THER/PROPH/DIAG IV INF ADDON: CPT

## 2022-07-06 PROCEDURE — 63600175 PHARM REV CODE 636 W HCPCS: Performed by: FAMILY MEDICINE

## 2022-07-06 PROCEDURE — 25000003 PHARM REV CODE 250: Performed by: FAMILY MEDICINE

## 2022-07-06 RX ORDER — ONDANSETRON 2 MG/ML
4 INJECTION INTRAMUSCULAR; INTRAVENOUS
Status: CANCELLED
Start: 2022-07-07

## 2022-07-06 RX ORDER — DIVALPROEX SODIUM 500 MG/1
TABLET, FILM COATED, EXTENDED RELEASE ORAL
Qty: 180 TABLET | Refills: 1 | Status: SHIPPED | OUTPATIENT
Start: 2022-07-06 | End: 2022-10-11 | Stop reason: SDUPTHER

## 2022-07-06 RX ORDER — DIPHENHYDRAMINE HYDROCHLORIDE 50 MG/ML
25 INJECTION INTRAMUSCULAR; INTRAVENOUS
Status: CANCELLED
Start: 2022-07-07

## 2022-07-06 RX ORDER — SODIUM CHLORIDE 0.9 % (FLUSH) 0.9 %
10 SYRINGE (ML) INJECTION
Status: DISCONTINUED | OUTPATIENT
Start: 2022-07-06 | End: 2022-07-06 | Stop reason: HOSPADM

## 2022-07-06 RX ORDER — LEVOFLOXACIN 5 MG/ML
750 INJECTION, SOLUTION INTRAVENOUS
Status: CANCELLED
Start: 2022-07-07

## 2022-07-06 RX ORDER — SODIUM CHLORIDE 0.9 % (FLUSH) 0.9 %
10 SYRINGE (ML) INJECTION
Status: CANCELLED | OUTPATIENT
Start: 2022-07-07

## 2022-07-06 RX ORDER — LEVOFLOXACIN 5 MG/ML
750 INJECTION, SOLUTION INTRAVENOUS
Status: DISCONTINUED | OUTPATIENT
Start: 2022-07-06 | End: 2022-07-06 | Stop reason: HOSPADM

## 2022-07-06 RX ADMIN — LEVOFLOXACIN 750 MG: 5 INJECTION, SOLUTION INTRAVENOUS at 08:07

## 2022-07-06 NOTE — PROGRESS NOTES
0754 pt here for infusion, resting in recliner, TP released and pharmacy notified, VSS  1000 pt infusion and flush complete. Pt discharged ambulatory and will see tomorrow

## 2022-07-07 ENCOUNTER — INFUSION (OUTPATIENT)
Dept: INFUSION THERAPY | Facility: HOSPITAL | Age: 77
End: 2022-07-07
Attending: NURSE PRACTITIONER
Payer: MEDICARE

## 2022-07-07 VITALS
SYSTOLIC BLOOD PRESSURE: 120 MMHG | TEMPERATURE: 98 F | OXYGEN SATURATION: 99 % | RESPIRATION RATE: 16 BRPM | DIASTOLIC BLOOD PRESSURE: 61 MMHG | HEART RATE: 88 BPM

## 2022-07-07 DIAGNOSIS — J45.909 ASTHMA, UNSPECIFIED ASTHMA SEVERITY, UNSPECIFIED WHETHER COMPLICATED, UNSPECIFIED WHETHER PERSISTENT: ICD-10-CM

## 2022-07-07 DIAGNOSIS — L03.116 CELLULITIS OF LEFT LOWER EXTREMITY: Primary | ICD-10-CM

## 2022-07-07 PROCEDURE — 25000003 PHARM REV CODE 250: Performed by: FAMILY MEDICINE

## 2022-07-07 PROCEDURE — 63600175 PHARM REV CODE 636 W HCPCS: Performed by: FAMILY MEDICINE

## 2022-07-07 PROCEDURE — 96366 THER/PROPH/DIAG IV INF ADDON: CPT

## 2022-07-07 PROCEDURE — 96365 THER/PROPH/DIAG IV INF INIT: CPT

## 2022-07-07 RX ORDER — DIPHENHYDRAMINE HYDROCHLORIDE 50 MG/ML
25 INJECTION INTRAMUSCULAR; INTRAVENOUS
Status: CANCELLED
Start: 2022-07-08

## 2022-07-07 RX ORDER — LEVOFLOXACIN 5 MG/ML
750 INJECTION, SOLUTION INTRAVENOUS
Status: DISCONTINUED | OUTPATIENT
Start: 2022-07-07 | End: 2022-07-07 | Stop reason: HOSPADM

## 2022-07-07 RX ORDER — ONDANSETRON 2 MG/ML
4 INJECTION INTRAMUSCULAR; INTRAVENOUS
Status: CANCELLED
Start: 2022-07-08

## 2022-07-07 RX ORDER — LEVOFLOXACIN 5 MG/ML
750 INJECTION, SOLUTION INTRAVENOUS
Status: CANCELLED
Start: 2022-07-08

## 2022-07-07 RX ORDER — SODIUM CHLORIDE 0.9 % (FLUSH) 0.9 %
10 SYRINGE (ML) INJECTION
Status: DISCONTINUED | OUTPATIENT
Start: 2022-07-07 | End: 2022-07-07 | Stop reason: HOSPADM

## 2022-07-07 RX ORDER — SODIUM CHLORIDE 0.9 % (FLUSH) 0.9 %
10 SYRINGE (ML) INJECTION
Status: CANCELLED | OUTPATIENT
Start: 2022-07-08

## 2022-07-07 RX ORDER — ALBUTEROL SULFATE 90 UG/1
AEROSOL, METERED RESPIRATORY (INHALATION)
Qty: 18 G | Refills: 1 | OUTPATIENT
Start: 2022-07-07

## 2022-07-07 RX ADMIN — LEVOFLOXACIN 750 MG: 5 INJECTION, SOLUTION INTRAVENOUS at 08:07

## 2022-07-07 NOTE — PROGRESS NOTES
0745 pt here for infusion, resting in recliner, VSS, TP released and pharmacy notified.   1000 antibiotic and flush complete, ride called, picc both lines flushed and new cap applied, pt ambulatory at discharge

## 2022-07-08 ENCOUNTER — INFUSION (OUTPATIENT)
Dept: INFUSION THERAPY | Facility: HOSPITAL | Age: 77
End: 2022-07-08
Attending: NURSE PRACTITIONER
Payer: MEDICARE

## 2022-07-08 VITALS
TEMPERATURE: 98 F | DIASTOLIC BLOOD PRESSURE: 54 MMHG | OXYGEN SATURATION: 100 % | HEART RATE: 94 BPM | RESPIRATION RATE: 16 BRPM | SYSTOLIC BLOOD PRESSURE: 127 MMHG

## 2022-07-08 DIAGNOSIS — L03.116 CELLULITIS OF LEFT LOWER EXTREMITY: Primary | ICD-10-CM

## 2022-07-08 PROCEDURE — 25000003 PHARM REV CODE 250: Performed by: FAMILY MEDICINE

## 2022-07-08 PROCEDURE — 63600175 PHARM REV CODE 636 W HCPCS: Performed by: FAMILY MEDICINE

## 2022-07-08 PROCEDURE — 96366 THER/PROPH/DIAG IV INF ADDON: CPT

## 2022-07-08 PROCEDURE — 96365 THER/PROPH/DIAG IV INF INIT: CPT

## 2022-07-08 RX ORDER — LEVOFLOXACIN 5 MG/ML
750 INJECTION, SOLUTION INTRAVENOUS
Status: DISCONTINUED | OUTPATIENT
Start: 2022-07-08 | End: 2022-07-08 | Stop reason: HOSPADM

## 2022-07-08 RX ORDER — DIPHENHYDRAMINE HYDROCHLORIDE 50 MG/ML
25 INJECTION INTRAMUSCULAR; INTRAVENOUS
Status: CANCELLED
Start: 2022-07-09

## 2022-07-08 RX ORDER — SODIUM CHLORIDE 0.9 % (FLUSH) 0.9 %
10 SYRINGE (ML) INJECTION
Status: DISCONTINUED | OUTPATIENT
Start: 2022-07-08 | End: 2022-07-08 | Stop reason: HOSPADM

## 2022-07-08 RX ORDER — ALBUTEROL SULFATE 90 UG/1
2 AEROSOL, METERED RESPIRATORY (INHALATION) EVERY 6 HOURS PRN
Qty: 6.7 G | Refills: 0 | Status: SHIPPED | OUTPATIENT
Start: 2022-07-08 | End: 2023-02-07 | Stop reason: SDUPTHER

## 2022-07-08 RX ORDER — ONDANSETRON 2 MG/ML
4 INJECTION INTRAMUSCULAR; INTRAVENOUS
Status: CANCELLED
Start: 2022-07-09

## 2022-07-08 RX ORDER — LEVOFLOXACIN 5 MG/ML
750 INJECTION, SOLUTION INTRAVENOUS
Status: CANCELLED
Start: 2022-07-09

## 2022-07-08 RX ORDER — ALBUTEROL SULFATE 2 MG/5ML
2 SYRUP ORAL 3 TIMES DAILY
Qty: 473 ML | Refills: 0 | Status: SHIPPED | OUTPATIENT
Start: 2022-07-08 | End: 2022-08-09

## 2022-07-08 RX ORDER — SODIUM CHLORIDE 0.9 % (FLUSH) 0.9 %
10 SYRINGE (ML) INJECTION
Status: CANCELLED | OUTPATIENT
Start: 2022-07-09

## 2022-07-08 RX ADMIN — LEVOFLOXACIN 750 MG: 5 INJECTION, SOLUTION INTRAVENOUS at 08:07

## 2022-07-08 NOTE — PROGRESS NOTES
0805 pt here for infusion, resting in recliner, TP released and pharmacy  Notified. VSS  1000 pt infusion and flush complete, both lines of PICC flushed and new caps applied, pt discharged ambulatory, will go to ER for therapy this weekend and will see back here Monday.

## 2022-07-09 ENCOUNTER — INFUSION (OUTPATIENT)
Dept: INFUSION THERAPY | Facility: HOSPITAL | Age: 77
End: 2022-07-09
Attending: FAMILY MEDICINE
Payer: MEDICARE

## 2022-07-09 VITALS
DIASTOLIC BLOOD PRESSURE: 45 MMHG | BODY MASS INDEX: 30.05 KG/M2 | RESPIRATION RATE: 20 BRPM | HEART RATE: 95 BPM | TEMPERATURE: 98 F | WEIGHT: 187 LBS | HEIGHT: 66 IN | OXYGEN SATURATION: 100 % | SYSTOLIC BLOOD PRESSURE: 125 MMHG

## 2022-07-09 DIAGNOSIS — L03.116 CELLULITIS OF LEFT LOWER EXTREMITY: Primary | ICD-10-CM

## 2022-07-09 PROCEDURE — 63600175 PHARM REV CODE 636 W HCPCS: Performed by: FAMILY MEDICINE

## 2022-07-09 PROCEDURE — 96365 THER/PROPH/DIAG IV INF INIT: CPT

## 2022-07-09 PROCEDURE — 25000003 PHARM REV CODE 250: Performed by: FAMILY MEDICINE

## 2022-07-09 RX ORDER — SODIUM CHLORIDE 0.9 % (FLUSH) 0.9 %
10 SYRINGE (ML) INJECTION
Status: DISCONTINUED | OUTPATIENT
Start: 2022-07-09 | End: 2022-07-09 | Stop reason: HOSPADM

## 2022-07-09 RX ORDER — LEVOFLOXACIN 5 MG/ML
750 INJECTION, SOLUTION INTRAVENOUS
Status: DISCONTINUED | OUTPATIENT
Start: 2022-07-09 | End: 2022-07-09 | Stop reason: HOSPADM

## 2022-07-09 RX ORDER — ONDANSETRON 2 MG/ML
4 INJECTION INTRAMUSCULAR; INTRAVENOUS
Status: CANCELLED
Start: 2022-07-10

## 2022-07-09 RX ORDER — SODIUM CHLORIDE 0.9 % (FLUSH) 0.9 %
10 SYRINGE (ML) INJECTION
Status: CANCELLED | OUTPATIENT
Start: 2022-07-10

## 2022-07-09 RX ORDER — LEVOFLOXACIN 5 MG/ML
750 INJECTION, SOLUTION INTRAVENOUS
Status: CANCELLED
Start: 2022-07-10

## 2022-07-09 RX ORDER — DIPHENHYDRAMINE HYDROCHLORIDE 50 MG/ML
25 INJECTION INTRAMUSCULAR; INTRAVENOUS
Status: CANCELLED
Start: 2022-07-10

## 2022-07-09 RX ADMIN — LEVOFLOXACIN 750 MG: 5 INJECTION, SOLUTION INTRAVENOUS at 09:07

## 2022-07-09 RX ADMIN — SODIUM CHLORIDE: 9 INJECTION, SOLUTION INTRAVENOUS at 09:07

## 2022-07-09 NOTE — PROGRESS NOTES
0800 Pt arrived to infusion center for antibiotic infusion and placed in room 2.     0810  PICC flushed    0840 attempted to start abx; unable to flush PICC.    0930 INT started to right forearm.  Levaquin infusion initiated. Patient educated on s/s of reaction and verbalizes understanding.    Andi Felder RN at bedside.  PICC declotted and flushing well.  Capped both ports.    1100  Infusion complete, no complications noted. Peripheral flushed and capped. Return to the Emergency Department for any chest pain, shortness of breath, difficulty breathing, reaction at the IV site, hives or swelling.  Return as scheduled for next infusion. Follow up with your PCP as needed.  Patient ambulatory upon discharge from department. Verbalizes understanding of discharge instructions. Denies any complaints or needs at this time.

## 2022-07-10 ENCOUNTER — INFUSION (OUTPATIENT)
Dept: INFUSION THERAPY | Facility: HOSPITAL | Age: 77
End: 2022-07-10
Attending: INTERNAL MEDICINE
Payer: MEDICARE

## 2022-07-10 VITALS
OXYGEN SATURATION: 99 % | TEMPERATURE: 98 F | SYSTOLIC BLOOD PRESSURE: 159 MMHG | HEART RATE: 104 BPM | DIASTOLIC BLOOD PRESSURE: 62 MMHG | RESPIRATION RATE: 18 BRPM

## 2022-07-10 DIAGNOSIS — L03.116 CELLULITIS OF LEFT LOWER EXTREMITY: Primary | ICD-10-CM

## 2022-07-10 PROCEDURE — 63600175 PHARM REV CODE 636 W HCPCS: Performed by: FAMILY MEDICINE

## 2022-07-10 PROCEDURE — 96365 THER/PROPH/DIAG IV INF INIT: CPT

## 2022-07-10 PROCEDURE — 25000003 PHARM REV CODE 250: Performed by: FAMILY MEDICINE

## 2022-07-10 RX ORDER — DIPHENHYDRAMINE HYDROCHLORIDE 50 MG/ML
25 INJECTION INTRAMUSCULAR; INTRAVENOUS
Status: CANCELLED
Start: 2022-07-11

## 2022-07-10 RX ORDER — LEVOFLOXACIN 5 MG/ML
750 INJECTION, SOLUTION INTRAVENOUS
Status: CANCELLED
Start: 2022-07-11

## 2022-07-10 RX ORDER — SODIUM CHLORIDE 0.9 % (FLUSH) 0.9 %
10 SYRINGE (ML) INJECTION
Status: CANCELLED | OUTPATIENT
Start: 2022-07-11

## 2022-07-10 RX ORDER — ONDANSETRON 2 MG/ML
4 INJECTION INTRAMUSCULAR; INTRAVENOUS
Status: CANCELLED
Start: 2022-07-11

## 2022-07-10 RX ORDER — LEVOFLOXACIN 5 MG/ML
750 INJECTION, SOLUTION INTRAVENOUS
Status: DISCONTINUED | OUTPATIENT
Start: 2022-07-10 | End: 2022-07-10 | Stop reason: HOSPADM

## 2022-07-10 RX ORDER — SODIUM CHLORIDE 0.9 % (FLUSH) 0.9 %
10 SYRINGE (ML) INJECTION
Status: DISCONTINUED | OUTPATIENT
Start: 2022-07-10 | End: 2022-07-10 | Stop reason: HOSPADM

## 2022-07-10 RX ADMIN — SODIUM CHLORIDE: 9 INJECTION, SOLUTION INTRAVENOUS at 08:07

## 2022-07-10 RX ADMIN — LEVOFLOXACIN 750 MG: 5 INJECTION, SOLUTION INTRAVENOUS at 08:07

## 2022-07-10 NOTE — PROGRESS NOTES
0820 Pt arrived to infusion center for antibiotic infusion and placed in room 2.     0825  PICC and INT flushed    0855 Levaquin infusion initiated. Patient educated on s/s of reaction and verbalizes understanding.    0930 INT removed.    1020 Infusion complete, no complications noted. PICC flushed and capped.  Return to the Emergency Department for any chest pain, shortness of breath, difficulty breathing, reaction at the IV site, hives or swelling.  Return as scheduled for next infusion. Follow up with your PCP as needed.  Patient ambulatory upon discharge from department. Verbalizes understanding of discharge instructions. Denies any complaints or needs at this time.

## 2022-07-11 ENCOUNTER — INFUSION (OUTPATIENT)
Dept: INFUSION THERAPY | Facility: HOSPITAL | Age: 77
End: 2022-07-11
Attending: NURSE PRACTITIONER
Payer: MEDICARE

## 2022-07-11 VITALS
SYSTOLIC BLOOD PRESSURE: 140 MMHG | DIASTOLIC BLOOD PRESSURE: 61 MMHG | HEART RATE: 94 BPM | OXYGEN SATURATION: 99 % | TEMPERATURE: 98 F | RESPIRATION RATE: 17 BRPM

## 2022-07-11 DIAGNOSIS — L03.116 CELLULITIS OF LEFT LOWER EXTREMITY: Primary | ICD-10-CM

## 2022-07-11 PROCEDURE — 96365 THER/PROPH/DIAG IV INF INIT: CPT

## 2022-07-11 PROCEDURE — 25000003 PHARM REV CODE 250: Performed by: FAMILY MEDICINE

## 2022-07-11 PROCEDURE — 63600175 PHARM REV CODE 636 W HCPCS: Performed by: FAMILY MEDICINE

## 2022-07-11 PROCEDURE — 96366 THER/PROPH/DIAG IV INF ADDON: CPT

## 2022-07-11 RX ORDER — LEVOFLOXACIN 5 MG/ML
750 INJECTION, SOLUTION INTRAVENOUS
Status: DISCONTINUED | OUTPATIENT
Start: 2022-07-11 | End: 2022-07-11 | Stop reason: HOSPADM

## 2022-07-11 RX ORDER — SODIUM CHLORIDE 0.9 % (FLUSH) 0.9 %
10 SYRINGE (ML) INJECTION
Status: CANCELLED | OUTPATIENT
Start: 2022-07-12

## 2022-07-11 RX ORDER — LEVOFLOXACIN 5 MG/ML
750 INJECTION, SOLUTION INTRAVENOUS
Status: CANCELLED
Start: 2022-07-12

## 2022-07-11 RX ORDER — DIPHENHYDRAMINE HYDROCHLORIDE 50 MG/ML
25 INJECTION INTRAMUSCULAR; INTRAVENOUS
Status: CANCELLED
Start: 2022-07-12

## 2022-07-11 RX ORDER — ONDANSETRON 2 MG/ML
4 INJECTION INTRAMUSCULAR; INTRAVENOUS
Status: CANCELLED
Start: 2022-07-12

## 2022-07-11 RX ORDER — SODIUM CHLORIDE 0.9 % (FLUSH) 0.9 %
10 SYRINGE (ML) INJECTION
Status: DISCONTINUED | OUTPATIENT
Start: 2022-07-11 | End: 2022-07-11 | Stop reason: HOSPADM

## 2022-07-11 RX ADMIN — LEVOFLOXACIN 750 MG: 5 INJECTION, SOLUTION INTRAVENOUS at 08:07

## 2022-07-11 NOTE — PROGRESS NOTES
0755 pt here for infusion, resting in recliner, VSS, TP released and pharmacy notified.    1000 pt infusion and flush complete, both lines flushed and new caps applied  pt daughter waiting in car. Pt discharged amblatory and will see tomorrow to cont tx

## 2022-07-12 ENCOUNTER — INFUSION (OUTPATIENT)
Dept: INFUSION THERAPY | Facility: HOSPITAL | Age: 77
End: 2022-07-12
Attending: NURSE PRACTITIONER
Payer: MEDICARE

## 2022-07-12 VITALS
DIASTOLIC BLOOD PRESSURE: 55 MMHG | HEART RATE: 94 BPM | RESPIRATION RATE: 16 BRPM | SYSTOLIC BLOOD PRESSURE: 151 MMHG | TEMPERATURE: 99 F | OXYGEN SATURATION: 100 %

## 2022-07-12 DIAGNOSIS — L03.116 CELLULITIS OF LEFT LOWER EXTREMITY: Primary | ICD-10-CM

## 2022-07-12 PROCEDURE — 96365 THER/PROPH/DIAG IV INF INIT: CPT

## 2022-07-12 PROCEDURE — 96366 THER/PROPH/DIAG IV INF ADDON: CPT

## 2022-07-12 PROCEDURE — 63600175 PHARM REV CODE 636 W HCPCS: Performed by: FAMILY MEDICINE

## 2022-07-12 PROCEDURE — 25000003 PHARM REV CODE 250: Performed by: FAMILY MEDICINE

## 2022-07-12 RX ORDER — LEVOFLOXACIN 5 MG/ML
750 INJECTION, SOLUTION INTRAVENOUS
Status: DISCONTINUED | OUTPATIENT
Start: 2022-07-12 | End: 2022-07-12 | Stop reason: HOSPADM

## 2022-07-12 RX ORDER — DIPHENHYDRAMINE HYDROCHLORIDE 50 MG/ML
25 INJECTION INTRAMUSCULAR; INTRAVENOUS
Status: CANCELLED
Start: 2022-07-13

## 2022-07-12 RX ORDER — SODIUM CHLORIDE 0.9 % (FLUSH) 0.9 %
10 SYRINGE (ML) INJECTION
Status: DISCONTINUED | OUTPATIENT
Start: 2022-07-12 | End: 2022-07-12 | Stop reason: HOSPADM

## 2022-07-12 RX ORDER — ONDANSETRON 2 MG/ML
4 INJECTION INTRAMUSCULAR; INTRAVENOUS
Status: CANCELLED
Start: 2022-07-13

## 2022-07-12 RX ORDER — SODIUM CHLORIDE 0.9 % (FLUSH) 0.9 %
10 SYRINGE (ML) INJECTION
Status: CANCELLED | OUTPATIENT
Start: 2022-07-13

## 2022-07-12 RX ORDER — LEVOFLOXACIN 5 MG/ML
750 INJECTION, SOLUTION INTRAVENOUS
Status: CANCELLED
Start: 2022-07-13

## 2022-07-12 RX ADMIN — LEVOFLOXACIN 750 MG: 5 INJECTION, SOLUTION INTRAVENOUS at 08:07

## 2022-07-12 NOTE — PROGRESS NOTES
0805 pt here for infusion, resting in recliner, VSS, TP released and pharmacy notified.   1030 pt antibiotic and flush complete, both lines flushed and new caps applied, pt discharged ambulatory. Pt ride called to pull up to door, will cont therapy tomorrow

## 2022-07-13 ENCOUNTER — INFUSION (OUTPATIENT)
Dept: INFUSION THERAPY | Facility: HOSPITAL | Age: 77
End: 2022-07-13
Attending: NURSE PRACTITIONER
Payer: MEDICARE

## 2022-07-13 ENCOUNTER — OFFICE VISIT (OUTPATIENT)
Dept: VASCULAR SURGERY | Facility: CLINIC | Age: 77
End: 2022-07-13
Payer: MEDICARE

## 2022-07-13 VITALS
HEIGHT: 66 IN | HEART RATE: 88 BPM | SYSTOLIC BLOOD PRESSURE: 150 MMHG | BODY MASS INDEX: 30.24 KG/M2 | RESPIRATION RATE: 16 BRPM | WEIGHT: 188.19 LBS | DIASTOLIC BLOOD PRESSURE: 70 MMHG

## 2022-07-13 VITALS
WEIGHT: 187 LBS | TEMPERATURE: 98 F | SYSTOLIC BLOOD PRESSURE: 128 MMHG | HEART RATE: 87 BPM | OXYGEN SATURATION: 99 % | BODY MASS INDEX: 30.05 KG/M2 | RESPIRATION RATE: 16 BRPM | DIASTOLIC BLOOD PRESSURE: 59 MMHG | HEIGHT: 66 IN

## 2022-07-13 DIAGNOSIS — R60.0 EDEMA, LOWER EXTREMITY: ICD-10-CM

## 2022-07-13 DIAGNOSIS — I87.2 VENOUS INSUFFICIENCY (CHRONIC) (PERIPHERAL): Primary | ICD-10-CM

## 2022-07-13 DIAGNOSIS — L81.9 HYPERPIGMENTATION OF SKIN: ICD-10-CM

## 2022-07-13 DIAGNOSIS — L97.929 VENOUS ULCER OF LEFT LEG: ICD-10-CM

## 2022-07-13 DIAGNOSIS — I83.029 VENOUS ULCER OF LEFT LEG: ICD-10-CM

## 2022-07-13 DIAGNOSIS — L03.116 CELLULITIS OF LEFT LOWER EXTREMITY: Primary | ICD-10-CM

## 2022-07-13 PROCEDURE — 96365 THER/PROPH/DIAG IV INF INIT: CPT

## 2022-07-13 PROCEDURE — 99214 PR OFFICE/OUTPT VISIT, EST, LEVL IV, 30-39 MIN: ICD-10-PCS | Mod: S$PBB,,, | Performed by: FAMILY MEDICINE

## 2022-07-13 PROCEDURE — 3077F SYST BP >= 140 MM HG: CPT | Mod: CPTII,,, | Performed by: FAMILY MEDICINE

## 2022-07-13 PROCEDURE — 3077F PR MOST RECENT SYSTOLIC BLOOD PRESSURE >= 140 MM HG: ICD-10-PCS | Mod: CPTII,,, | Performed by: FAMILY MEDICINE

## 2022-07-13 PROCEDURE — 1159F PR MEDICATION LIST DOCUMENTED IN MEDICAL RECORD: ICD-10-PCS | Mod: CPTII,,, | Performed by: FAMILY MEDICINE

## 2022-07-13 PROCEDURE — 63600175 PHARM REV CODE 636 W HCPCS: Performed by: FAMILY MEDICINE

## 2022-07-13 PROCEDURE — 99214 OFFICE O/P EST MOD 30 MIN: CPT | Mod: S$PBB,,, | Performed by: FAMILY MEDICINE

## 2022-07-13 PROCEDURE — 1159F MED LIST DOCD IN RCRD: CPT | Mod: CPTII,,, | Performed by: FAMILY MEDICINE

## 2022-07-13 PROCEDURE — 3078F PR MOST RECENT DIASTOLIC BLOOD PRESSURE < 80 MM HG: ICD-10-PCS | Mod: CPTII,,, | Performed by: FAMILY MEDICINE

## 2022-07-13 PROCEDURE — 1160F PR REVIEW ALL MEDS BY PRESCRIBER/CLIN PHARMACIST DOCUMENTED: ICD-10-PCS | Mod: CPTII,,, | Performed by: FAMILY MEDICINE

## 2022-07-13 PROCEDURE — 25000003 PHARM REV CODE 250: Performed by: FAMILY MEDICINE

## 2022-07-13 PROCEDURE — 3078F DIAST BP <80 MM HG: CPT | Mod: CPTII,,, | Performed by: FAMILY MEDICINE

## 2022-07-13 PROCEDURE — 96366 THER/PROPH/DIAG IV INF ADDON: CPT

## 2022-07-13 PROCEDURE — 99215 OFFICE O/P EST HI 40 MIN: CPT | Mod: PBBFAC,25 | Performed by: FAMILY MEDICINE

## 2022-07-13 PROCEDURE — 1160F RVW MEDS BY RX/DR IN RCRD: CPT | Mod: CPTII,,, | Performed by: FAMILY MEDICINE

## 2022-07-13 RX ORDER — ONDANSETRON 2 MG/ML
4 INJECTION INTRAMUSCULAR; INTRAVENOUS
Status: CANCELLED
Start: 2022-07-14

## 2022-07-13 RX ORDER — SODIUM CHLORIDE 0.9 % (FLUSH) 0.9 %
10 SYRINGE (ML) INJECTION
Status: CANCELLED | OUTPATIENT
Start: 2022-07-14

## 2022-07-13 RX ORDER — DIPHENHYDRAMINE HYDROCHLORIDE 50 MG/ML
25 INJECTION INTRAMUSCULAR; INTRAVENOUS
Status: CANCELLED
Start: 2022-07-14

## 2022-07-13 RX ORDER — SODIUM CHLORIDE 0.9 % (FLUSH) 0.9 %
10 SYRINGE (ML) INJECTION
Status: DISCONTINUED | OUTPATIENT
Start: 2022-07-13 | End: 2022-07-13 | Stop reason: HOSPADM

## 2022-07-13 RX ORDER — LEVOFLOXACIN 5 MG/ML
750 INJECTION, SOLUTION INTRAVENOUS
Status: CANCELLED
Start: 2022-07-14

## 2022-07-13 RX ORDER — LEVOFLOXACIN 5 MG/ML
INJECTION, SOLUTION INTRAVENOUS
COMMUNITY
Start: 2022-06-15 | End: 2022-07-27

## 2022-07-13 RX ORDER — LEVOFLOXACIN 5 MG/ML
750 INJECTION, SOLUTION INTRAVENOUS
Status: DISCONTINUED | OUTPATIENT
Start: 2022-07-13 | End: 2022-07-13 | Stop reason: HOSPADM

## 2022-07-13 RX ADMIN — LEVOFLOXACIN 750 MG: 5 INJECTION, SOLUTION INTRAVENOUS at 08:07

## 2022-07-13 NOTE — PROGRESS NOTES
0800 pt here for infusion, friend at side, resting in recliner, VSS, TP released and pharmacy notified.

## 2022-07-13 NOTE — PROGRESS NOTES
[facility] CLINIC NOTE    Patient ID: Umesh Lassiter is a 77 y.o. female.    I. HISTORY     Chief Complaint:   Chief Complaint   Patient presents with    Wound Check     Procedure room 1.  2 weeks wound check and patient is receiving Levaquin infusion via [facility].        HPI: Umesh Lassiter is a 77 y.o. female who presents today for a 2 week wound check.  The patient is currently receiving Levaquin infusion through her PICC line and has proximally 1 week of infusion left.  She has a persistent wound of the left lower extremity that consist of several scattered superficial skin ulcerations.  No odor noted today, very minimal active weeping and no purulent material noted.  Patient also continues to have persistent left lower extremity edema with hyperpigmentation.  She states she is now changing the wound dressings approximally every 8 hours secondary to excessive exudate.  The patient lives by herself in a small apartment and this wound seems to be heavily pre occupying her.  She believes the infection is leaving her leg through this drainage and is obsessed with changing the dressings frequently.  She also states that home health is not coming out enough to help her.  I feel these excessive dressing changes and lack of control of a clean environment for wound care is leading to prolonged infection and non wound healing conditions.  I feel freaking gain better control of her wound dressing changes and wound cleaning environment, we could make more positive progress.    Clinical summary:  The patient is known to our clinic but has not been seen since 03/02/2021 for a one-month follow-up status post left great saphenous vein ablation.  She has been lost to follow-up since.  She states that this ulceration of her left lower extremity has been present for approximately 6 months.  She states that his been persistent and worsening during that time.  She has not been wearing compression daily.  She has  pitting edema, hyperpigmentation along with the ulceration of the left medial Gator region.  No signs of infection, including purulent drainage, increased temperature or foul odor.  She denies complaints of her right lower extremity.  Do not use Unna boot with patient, she does not tolerate well.    Reflux study 12/21/2021 shows no evidence of DVT or superficial venous reflux.  Shows a well ablated proximal left great saphenous vein and ablated left small saphenous vein.     Left great saphenous vein laser ablation 01/08/2021.  Right great saphenous vein, duplicate, laser ablation 12/17/2020.  Right distal great saphenous vein erythema ablation 02/28/2020.  Left small saphenous vein laser ablation 04/27/2018.  Right great saphenous vein laser ablation 04/20/2018.    Past Medical History:   Diagnosis Date    Anxiety     Asthma     CVA (cerebral vascular accident) 05/23/2018    DVT (deep venous thrombosis) 2019    Epilepsy     Hyperlipidemia     Hypertension     Hypothyroid     PVD (peripheral vascular disease)     Venous stasis         Past Surgical History:   Procedure Laterality Date    KNEE ARTHROSCOPY Left     x 2    LASER ABLATION Right 04/20/2018    [facility] LASER ABLATION PERFORMED BY DR. KARMA BUTCHER.    LASER ABLATION Left 04/27/2018    [facility] LASER ABLATION PERFORMED BY DR. KARMA BUTCHER.    LASER ABLATION Right 12/17/2020    [facility] ( DUPLICATED )LASER ABLATION PERFORMED BY DR. KARMA BUTCHER.    LASER ABLATION Left 01/08/2021    [facility] LASER ABLATION PERFORMED BY DR. KARMA BUTCHER.    Remove cataracts, insert lens Bilateral     SHOULDER ARTHROSCOPY Left     SINUS SURGERY      TOTAL ABDOMINAL HYSTERECTOMY W/ BILATERAL SALPINGOOPHORECTOMY      VENOUS ABLATION Right 02/28/2020    DISTAL [facility] VARITHENA ABLATION PERFORMED BY DR. KARMA BUTCHER.       Social History     Tobacco Use   Smoking Status Never Smoker   Smokeless Tobacco Never Used         Current Outpatient Medications:     albuterol  (PROVENTIL/VENTOLIN HFA) 90 mcg/actuation inhaler, Inhale 2 puffs into the lungs every 6 (six) hours as needed for Wheezing. Rescue, Disp: 6.7 g, Rfl: 0    albuterol 2 mg/5 mL syrup, Take 5 mLs (2 mg total) by mouth 3 (three) times daily., Disp: 473 mL, Rfl: 0    cilostazoL (PLETAL) 50 MG Tab, Take 1 tablet (50 mg total) by mouth 2 (two) times daily., Disp: 180 tablet, Rfl: 3    clorazepate (TRANXENE) 3.75 MG Tab, TAKE 1 TABLET BY MOUTH TWICE A DAY AS NEEDED FOR ANXIETY *CAUSES DROWSINESS-AVOID ALCOHOL!!, Disp: 60 tablet, Rfl: 5    divalproex (DEPAKOTE) 250 MG EC tablet, Take 250 mg by mouth Daily., Disp: , Rfl:     divalproex ER (DEPAKOTE) 500 MG Tb24, TAKE 2 TABLETS BY MOUTH EACH NIGHT AT BEDTIME FOR SEIZURES, Disp: 180 tablet, Rfl: 1    ferrous sulfate (FEOSOL) 325 mg (65 mg iron) Tab tablet, Take 1 tablet (325 mg total) by mouth daily with breakfast., Disp: 90 tablet, Rfl: 0    furosemide (LASIX) 20 MG tablet, Take 1 tablet (20 mg total) by mouth once daily., Disp: 90 tablet, Rfl: 3    HYDROcodone-acetaminophen (NORCO) 5-325 mg per tablet, Take 1 tablet by mouth every 8 (eight) hours as needed for Pain., Disp: 21 tablet, Rfl: 0    levoFLOXacin (LEVAQUIN) 750 mg/150 mL PgBk, Inject into the vein., Disp: , Rfl:     levothyroxine (SYNTHROID) 50 MCG tablet, Take 1 tablet (50 mcg total) by mouth before breakfast. Take on an empty stomach with a full glass of water. Nothing else to eat or drink for 30 mins, Disp: 90 tablet, Rfl: 3    losartan (COZAAR) 50 MG tablet, Take 1 tablet (50 mg total) by mouth once daily., Disp: 90 tablet, Rfl: 3    SANTYL ointment, APPLY TOPICALLY TO AFFECTED AREA EVERY DAY ...APPLY DRESSING CHANGES, Disp: 90 g, Rfl: 2    silver sulfADIAZINE 1% (SILVADENE) 1 % cream, Apply topically once daily., Disp: 90 g, Rfl: 2  No current facility-administered medications for this visit.    Facility-Administered Medications Ordered in Other Visits:     levoFLOXacin 750 mg/150 mL IVPB 750 mg,  750 mg, Intravenous, Q24H, Minh Cisneros DO, Stopped at 22 0950    sodium chloride 0.9% 250 mL flush bag, , Intravenous, 1 time in Clinic/HOD, Minh Cisneros DO, Stopped at 22 0955    sodium chloride 0.9% flush 10 mL, 10 mL, Intravenous, PRN, Minh Cisneros DO    Review of Systems   Constitutional: Negative for activity change, chills, diaphoresis, fatigue and fever.   Respiratory: Negative for cough and shortness of breath.    Cardiovascular: Positive for leg swelling. Negative for chest pain and claudication.        Hyperpigmentation LE   Gastrointestinal: Negative for nausea and vomiting.   Musculoskeletal: Positive for leg pain. Negative for joint swelling.   Integumentary:  Negative for rash and wound.   Neurological: Negative for weakness and numbness.       II. PHYSICAL EXAM     Physical Exam  Constitutional:       General: She is awake. She is not in acute distress.     Appearance: Normal appearance. She is obese. She is not ill-appearing or toxic-appearing.   HENT:      Head: Normocephalic and atraumatic.   Eyes:      Extraocular Movements: Extraocular movements intact.      Conjunctiva/sclera: Conjunctivae normal.      Pupils: Pupils are equal, round, and reactive to light.   Neck:      Vascular: No carotid bruit or JVD.   Cardiovascular:      Rate and Rhythm: Normal rate and regular rhythm.      Pulses:           Dorsalis pedis pulses are detected w/ Doppler on the left side.        Posterior tibial pulses are detected w/ Doppler on the left side.      Heart sounds: No murmur heard.  Pulmonary:      Effort: Pulmonary effort is normal. No respiratory distress.      Breath sounds: No stridor. No wheezing, rhonchi or rales.   Musculoskeletal:         General: No swelling, tenderness or deformity.      Right lower le+ Edema present.      Left lower le+ Edema present.        Legs:       Comments: Ulcer of left medial Gator region shallow in appearance with several small areas of  skin breakdown.     Hyperpigmentation of the left lower Gator region along with +2 edema.   Feet:      Comments: Triphasic dopplerable pulses of the left lower extremity.  Skin:     General: Skin is warm.      Capillary Refill: Capillary refill takes less than 2 seconds.      Coloration: Skin is not ashen.      Findings: No bruising, erythema, lesion, rash or wound.   Neurological:      Mental Status: She is alert and oriented to person, place, and time.      Motor: No weakness.   Psychiatric:         Speech: Speech normal.         Behavior: Behavior normal. Behavior is cooperative.       Reticular/Spider veins noted:  RLE: medial calf  LLE: none    Varicose veins noted:  RLE: medial calf  LLE:  medial calf            CEAP Classification  Clinical Signs: Class 6 - Leg ulceration, skin changes as defined above  Etiologic Classification: Primary  Anatomic distribution: Superficial  Pathophysiologic dysfunction: Reflux       Venous Clinical Severity Score  Pain:2=Daily, moderate activity limitation, occasional analgesics  Varicose Veins: 1=Few, scattered. Branch varicose veins  Venous Edema: 2=Afternoon edema, above ankle  Pigmentation: 2=Diffuse over most of gaiter distribution (lower 1/3) or recent pigmentation (purple)  Inflammation: 1=Mild cellulitis, limited to marginal area around ulcer  Induration: 1=Focal, circummalleolar (< 5 cm)  Number of Active Ulcers: 2=2  Active Ulceration, Duration: 2=>3 months, <1 year  Active Ulcer Size: 2=2 - 6 cm diameter  Compressive Therapy: 3=Full compliance, stockings + elevation  Total Score: 18       III. ASSESSMENT & PLAN (MEDICAL DECISION MAKING)     1. Venous insufficiency (chronic) (peripheral)    2. Venous ulcer of left leg    3. Edema, lower extremity    4. Hyperpigmentation of skin        Assessment/Diagnosis and Plan:  Continue with antibiotic infusion at infusion center daily for the next week.  Apply dressing consisting of Silvadene, Telfa, Kerlix, ABD pad and Ace  wrap.  ABD pad added for extra absorbent.  We will call home health about the patient's visit frequency.  Continue with home health dressing and wrap changes.  Follow-up in 2 weeks for wound check follow-up.      Minh Cisneros, DO

## 2022-07-13 NOTE — PROGRESS NOTES
0800 - Arrived ambulatory in stable condition for Levaquin infusion.  Pharmacy notified of patient's arrival.    0850 -  Levaquin infusing without difficulty.  No reactions noted.    0925 - In recliner.  No request at present.  No reactions to infusion noted.    0955 - Infusion complete and PICC line flushed per protocol.  Dressing changed using sterile technique.    1000 - Patient left ambulatory in stable condition.  No reactions to infusion noted.

## 2022-07-14 ENCOUNTER — INFUSION (OUTPATIENT)
Dept: INFUSION THERAPY | Facility: HOSPITAL | Age: 77
End: 2022-07-14
Attending: NURSE PRACTITIONER
Payer: MEDICARE

## 2022-07-14 VITALS
SYSTOLIC BLOOD PRESSURE: 151 MMHG | TEMPERATURE: 99 F | HEART RATE: 88 BPM | OXYGEN SATURATION: 98 % | DIASTOLIC BLOOD PRESSURE: 70 MMHG | RESPIRATION RATE: 16 BRPM

## 2022-07-14 DIAGNOSIS — L03.116 CELLULITIS OF LEFT LOWER EXTREMITY: Primary | ICD-10-CM

## 2022-07-14 PROCEDURE — 96365 THER/PROPH/DIAG IV INF INIT: CPT

## 2022-07-14 PROCEDURE — 96366 THER/PROPH/DIAG IV INF ADDON: CPT

## 2022-07-14 PROCEDURE — 63600175 PHARM REV CODE 636 W HCPCS: Performed by: FAMILY MEDICINE

## 2022-07-14 PROCEDURE — 25000003 PHARM REV CODE 250: Performed by: FAMILY MEDICINE

## 2022-07-14 RX ORDER — ONDANSETRON 2 MG/ML
4 INJECTION INTRAMUSCULAR; INTRAVENOUS
Status: CANCELLED
Start: 2022-07-15

## 2022-07-14 RX ORDER — DIPHENHYDRAMINE HYDROCHLORIDE 50 MG/ML
25 INJECTION INTRAMUSCULAR; INTRAVENOUS
Status: CANCELLED
Start: 2022-07-15

## 2022-07-14 RX ORDER — LEVOFLOXACIN 5 MG/ML
750 INJECTION, SOLUTION INTRAVENOUS
Status: CANCELLED
Start: 2022-07-15

## 2022-07-14 RX ORDER — SODIUM CHLORIDE 0.9 % (FLUSH) 0.9 %
10 SYRINGE (ML) INJECTION
Status: CANCELLED | OUTPATIENT
Start: 2022-07-15

## 2022-07-14 RX ORDER — SODIUM CHLORIDE 0.9 % (FLUSH) 0.9 %
10 SYRINGE (ML) INJECTION
Status: DISCONTINUED | OUTPATIENT
Start: 2022-07-14 | End: 2022-07-14 | Stop reason: HOSPADM

## 2022-07-14 RX ORDER — LEVOFLOXACIN 5 MG/ML
750 INJECTION, SOLUTION INTRAVENOUS
Status: DISCONTINUED | OUTPATIENT
Start: 2022-07-14 | End: 2022-07-14 | Stop reason: HOSPADM

## 2022-07-14 RX ADMIN — LEVOFLOXACIN 750 MG: 5 INJECTION, SOLUTION INTRAVENOUS at 08:07

## 2022-07-14 NOTE — PROGRESS NOTES
0810 pt here for infusion, resting in recliner, TP released and pharmacy notified.   1020 pt discharged ambulatory with daughter. Picc lines flushed and new caps applied, will see pt tomorrow to cont therapy

## 2022-07-15 ENCOUNTER — INFUSION (OUTPATIENT)
Dept: INFUSION THERAPY | Facility: HOSPITAL | Age: 77
End: 2022-07-15
Attending: FAMILY MEDICINE
Payer: MEDICARE

## 2022-07-15 VITALS
SYSTOLIC BLOOD PRESSURE: 140 MMHG | TEMPERATURE: 99 F | RESPIRATION RATE: 16 BRPM | DIASTOLIC BLOOD PRESSURE: 70 MMHG | OXYGEN SATURATION: 100 % | HEART RATE: 96 BPM

## 2022-07-15 DIAGNOSIS — L03.116 CELLULITIS OF LEFT LOWER EXTREMITY: Primary | ICD-10-CM

## 2022-07-15 PROCEDURE — 25000003 PHARM REV CODE 250: Performed by: FAMILY MEDICINE

## 2022-07-15 PROCEDURE — 96366 THER/PROPH/DIAG IV INF ADDON: CPT

## 2022-07-15 PROCEDURE — 96365 THER/PROPH/DIAG IV INF INIT: CPT

## 2022-07-15 PROCEDURE — 63600175 PHARM REV CODE 636 W HCPCS: Performed by: FAMILY MEDICINE

## 2022-07-15 RX ORDER — LEVOFLOXACIN 5 MG/ML
750 INJECTION, SOLUTION INTRAVENOUS
Status: CANCELLED
Start: 2022-07-16

## 2022-07-15 RX ORDER — LEVOFLOXACIN 5 MG/ML
750 INJECTION, SOLUTION INTRAVENOUS
Status: DISCONTINUED | OUTPATIENT
Start: 2022-07-15 | End: 2022-07-15 | Stop reason: HOSPADM

## 2022-07-15 RX ORDER — ONDANSETRON 2 MG/ML
4 INJECTION INTRAMUSCULAR; INTRAVENOUS
Status: CANCELLED
Start: 2022-07-16

## 2022-07-15 RX ORDER — DIPHENHYDRAMINE HYDROCHLORIDE 50 MG/ML
25 INJECTION INTRAMUSCULAR; INTRAVENOUS
Status: CANCELLED
Start: 2022-07-16

## 2022-07-15 RX ORDER — SODIUM CHLORIDE 0.9 % (FLUSH) 0.9 %
10 SYRINGE (ML) INJECTION
Status: CANCELLED | OUTPATIENT
Start: 2022-07-16

## 2022-07-15 RX ORDER — SODIUM CHLORIDE 0.9 % (FLUSH) 0.9 %
10 SYRINGE (ML) INJECTION
Status: DISCONTINUED | OUTPATIENT
Start: 2022-07-15 | End: 2022-07-15 | Stop reason: HOSPADM

## 2022-07-15 RX ADMIN — LEVOFLOXACIN 750 MG: 5 INJECTION, SOLUTION INTRAVENOUS at 08:07

## 2022-07-15 NOTE — PROGRESS NOTES
0810 pt here for infusion, resting in recliner, TP released and pharmacy notified  1045 infusion and flush complete, both lines of PICC flushed and new cap applied, daughter called for ride, pt will go to ER for infusion over the weekend and will come back to me Monday to complete tx.

## 2022-07-16 ENCOUNTER — HOSPITAL ENCOUNTER (EMERGENCY)
Facility: HOSPITAL | Age: 77
Discharge: HOME | End: 2022-07-16
Attending: FAMILY MEDICINE
Payer: MEDICARE

## 2022-07-16 VITALS
HEART RATE: 79 BPM | WEIGHT: 188 LBS | DIASTOLIC BLOOD PRESSURE: 70 MMHG | RESPIRATION RATE: 16 BRPM | TEMPERATURE: 98 F | SYSTOLIC BLOOD PRESSURE: 150 MMHG | HEIGHT: 66 IN | BODY MASS INDEX: 30.22 KG/M2 | OXYGEN SATURATION: 100 %

## 2022-07-16 DIAGNOSIS — R52 PAIN: Primary | ICD-10-CM

## 2022-07-16 DIAGNOSIS — M25.511 CHRONIC RIGHT SHOULDER PAIN: ICD-10-CM

## 2022-07-16 DIAGNOSIS — R53.1 WEAKNESS: ICD-10-CM

## 2022-07-16 DIAGNOSIS — G89.29 CHRONIC RIGHT SHOULDER PAIN: ICD-10-CM

## 2022-07-16 DIAGNOSIS — S81.809D OPEN WOUND OF LOWER EXTREMITY, UNSPECIFIED LATERALITY, SUBSEQUENT ENCOUNTER: ICD-10-CM

## 2022-07-16 PROBLEM — S81.809A OPEN WOUND OF LOWER LIMB: Status: ACTIVE | Noted: 2022-07-16

## 2022-07-16 LAB
ALBUMIN SERPL BCP-MCNC: 3 G/DL (ref 3.5–5)
ALBUMIN/GLOB SERPL: 0.8 {RATIO}
ALP SERPL-CCNC: 45 U/L (ref 55–142)
ALT SERPL W P-5'-P-CCNC: 11 U/L (ref 13–56)
ANION GAP SERPL CALCULATED.3IONS-SCNC: 12 MMOL/L (ref 7–16)
AST SERPL W P-5'-P-CCNC: 12 U/L (ref 15–37)
BASOPHILS # BLD AUTO: 0.03 K/UL (ref 0–0.2)
BASOPHILS NFR BLD AUTO: 0.5 % (ref 0–1)
BILIRUB SERPL-MCNC: 0.2 MG/DL (ref 0–1.2)
BILIRUB UR QL STRIP: NEGATIVE
BUN SERPL-MCNC: 12 MG/DL (ref 7–18)
BUN/CREAT SERPL: 10 (ref 6–20)
CALCIUM SERPL-MCNC: 9.2 MG/DL (ref 8.5–10.1)
CHLORIDE SERPL-SCNC: 108 MMOL/L (ref 98–107)
CLARITY UR: CLEAR
CO2 SERPL-SCNC: 26 MMOL/L (ref 21–32)
COLOR UR: COLORLESS
CREAT SERPL-MCNC: 1.17 MG/DL (ref 0.55–1.02)
DIFFERENTIAL METHOD BLD: ABNORMAL
EOSINOPHIL # BLD AUTO: 0.15 K/UL (ref 0–0.5)
EOSINOPHIL NFR BLD AUTO: 2.7 % (ref 1–4)
ERYTHROCYTE [DISTWIDTH] IN BLOOD BY AUTOMATED COUNT: 15.3 % (ref 11.5–14.5)
FLUAV AG UPPER RESP QL IA.RAPID: NEGATIVE
FLUBV AG UPPER RESP QL IA.RAPID: NEGATIVE
GLOBULIN SER-MCNC: 3.9 G/DL (ref 2–4)
GLUCOSE SERPL-MCNC: 87 MG/DL (ref 74–106)
GLUCOSE UR STRIP-MCNC: NORMAL MG/DL
HCT VFR BLD AUTO: 32.9 % (ref 38–47)
HGB BLD-MCNC: 10.5 G/DL (ref 12–16)
IMM GRANULOCYTES # BLD AUTO: 0.04 K/UL (ref 0–0.04)
IMM GRANULOCYTES NFR BLD: 0.7 % (ref 0–0.4)
KETONES UR STRIP-SCNC: NEGATIVE MG/DL
LEUKOCYTE ESTERASE UR QL STRIP: NEGATIVE
LYMPHOCYTES # BLD AUTO: 2.02 K/UL (ref 1–4.8)
LYMPHOCYTES NFR BLD AUTO: 36.1 % (ref 27–41)
MCH RBC QN AUTO: 24.9 PG (ref 27–31)
MCHC RBC AUTO-ENTMCNC: 31.9 G/DL (ref 32–36)
MCV RBC AUTO: 78 FL (ref 80–96)
MONOCYTES # BLD AUTO: 0.66 K/UL (ref 0–0.8)
MONOCYTES NFR BLD AUTO: 11.8 % (ref 2–6)
MPC BLD CALC-MCNC: 9.7 FL (ref 9.4–12.4)
NEUTROPHILS # BLD AUTO: 2.7 K/UL (ref 1.8–7.7)
NEUTROPHILS NFR BLD AUTO: 48.2 % (ref 53–65)
NITRITE UR QL STRIP: NEGATIVE
NRBC # BLD AUTO: 0 X10E3/UL
NRBC, AUTO (.00): 0 %
PH UR STRIP: 7 PH UNITS
PLATELET # BLD AUTO: 278 K/UL (ref 150–400)
POTASSIUM SERPL-SCNC: 3.9 MMOL/L (ref 3.5–5.1)
PROT SERPL-MCNC: 6.9 G/DL (ref 6.4–8.2)
PROT UR QL STRIP: NEGATIVE
RBC # BLD AUTO: 4.22 M/UL (ref 4.2–5.4)
RBC # UR STRIP: NEGATIVE /UL
SARS-COV+SARS-COV-2 AG RESP QL IA.RAPID: NEGATIVE
SODIUM SERPL-SCNC: 142 MMOL/L (ref 136–145)
SP GR UR STRIP: 1.01
UROBILINOGEN UR STRIP-ACNC: NORMAL MG/DL
WBC # BLD AUTO: 5.6 K/UL (ref 4.5–11)

## 2022-07-16 PROCEDURE — 99284 PR EMERGENCY DEPT VISIT,LEVEL IV: ICD-10-PCS | Mod: ,,, | Performed by: EMERGENCY MEDICINE

## 2022-07-16 PROCEDURE — 80053 COMPREHEN METABOLIC PANEL: CPT | Performed by: FAMILY MEDICINE

## 2022-07-16 PROCEDURE — 93010 EKG 12-LEAD: ICD-10-PCS | Mod: ,,, | Performed by: HOSPITALIST

## 2022-07-16 PROCEDURE — 85025 COMPLETE CBC W/AUTO DIFF WBC: CPT | Performed by: FAMILY MEDICINE

## 2022-07-16 PROCEDURE — 96366 THER/PROPH/DIAG IV INF ADDON: CPT

## 2022-07-16 PROCEDURE — 93010 ELECTROCARDIOGRAM REPORT: CPT | Mod: ,,, | Performed by: HOSPITALIST

## 2022-07-16 PROCEDURE — 99285 EMERGENCY DEPT VISIT HI MDM: CPT | Mod: 25

## 2022-07-16 PROCEDURE — 81003 URINALYSIS AUTO W/O SCOPE: CPT | Performed by: FAMILY MEDICINE

## 2022-07-16 PROCEDURE — 36415 COLL VENOUS BLD VENIPUNCTURE: CPT | Performed by: FAMILY MEDICINE

## 2022-07-16 PROCEDURE — 93005 ELECTROCARDIOGRAM TRACING: CPT

## 2022-07-16 PROCEDURE — 25000003 PHARM REV CODE 250: Performed by: FAMILY MEDICINE

## 2022-07-16 PROCEDURE — 99284 EMERGENCY DEPT VISIT MOD MDM: CPT | Mod: ,,, | Performed by: EMERGENCY MEDICINE

## 2022-07-16 PROCEDURE — 63600175 PHARM REV CODE 636 W HCPCS: Performed by: EMERGENCY MEDICINE

## 2022-07-16 PROCEDURE — 96365 THER/PROPH/DIAG IV INF INIT: CPT

## 2022-07-16 PROCEDURE — 87428 SARSCOV & INF VIR A&B AG IA: CPT | Performed by: FAMILY MEDICINE

## 2022-07-16 RX ORDER — LEVOFLOXACIN 5 MG/ML
750 INJECTION, SOLUTION INTRAVENOUS
Status: DISCONTINUED | OUTPATIENT
Start: 2022-07-16 | End: 2022-07-16

## 2022-07-16 RX ORDER — HYDROCODONE BITARTRATE AND ACETAMINOPHEN 10; 325 MG/1; MG/1
1 TABLET ORAL
Status: COMPLETED | OUTPATIENT
Start: 2022-07-16 | End: 2022-07-16

## 2022-07-16 RX ORDER — LEVOFLOXACIN 5 MG/ML
750 INJECTION, SOLUTION INTRAVENOUS
Status: COMPLETED | OUTPATIENT
Start: 2022-07-16 | End: 2022-07-16

## 2022-07-16 RX ADMIN — LEVOFLOXACIN 750 MG: 5 INJECTION, SOLUTION INTRAVENOUS at 07:07

## 2022-07-16 RX ADMIN — HYDROCODONE BITARTRATE AND ACETAMINOPHEN 1 TABLET: 10; 325 TABLET ORAL at 07:07

## 2022-07-16 NOTE — ED TRIAGE NOTES
Patient complains of generalized pain that started Monday. Currently receiving IV antibiotics for wound to left lower leg.

## 2022-07-16 NOTE — ED PROVIDER NOTES
Encounter Date: 7/16/2022       History     Chief Complaint   Patient presents with    Weakness     Ems from home - has been on iv abx x 3 weeks - wound to L leg - here for abx tx     Patient is a 77-year-old female, with a past medical history of chronic venous stasis and cellulitis, presents emergency department for whole-body pain.  Patient has been undergoing 28 days of Levaquin IV outpatient for her cellulitis in her left leg.  She sees Dr. Butcher for her management.  Patient states she has become very tired over the last few weeks with going to and from the hospital.  She feels that her back has given out on her.  She states that her whole body is currently hurting.  She denies any fever or other problems.        Review of patient's allergies indicates:   Allergen Reactions    Influenza virus vaccines      Pt states had to be admitted to hospital after last flu vaccine     Lexapro [escitalopram]      Past Medical History:   Diagnosis Date    Anxiety     Asthma     CVA (cerebral vascular accident) 05/23/2018    DVT (deep venous thrombosis) 2019    Epilepsy     Hyperlipidemia     Hypertension     Hypothyroid     PVD (peripheral vascular disease)     Venous stasis      Past Surgical History:   Procedure Laterality Date    KNEE ARTHROSCOPY Left     x 2    LASER ABLATION Right 04/20/2018    [facility] LASER ABLATION PERFORMED BY DR. KARMA BUTCHER.    LASER ABLATION Left 04/27/2018    [facility] LASER ABLATION PERFORMED BY DR. KARMA BUTCHER.    LASER ABLATION Right 12/17/2020    [facility] ( DUPLICATED )LASER ABLATION PERFORMED BY DR. KARMA BUTCHER.    LASER ABLATION Left 01/08/2021    [facility]V LASER ABLATION PERFORMED BY DR. KARMA BUTCHER.    Remove cataracts, insert lens Bilateral     SHOULDER ARTHROSCOPY Left     SINUS SURGERY      TOTAL ABDOMINAL HYSTERECTOMY W/ BILATERAL SALPINGOOPHORECTOMY      VENOUS ABLATION Right 02/28/2020    DISTAL [facility] VARITHENA ABLATION PERFORMED BY DR. KARMA BUTCHER.     Family History    Problem Relation Age of Onset    Alzheimer's disease Mother     Endometrial cancer Mother     Heart disease Mother     Lung cancer Father     Diabetes Sister     Endometrial cancer Sister     Hypertension Sister     Polycystic kidney disease Sister     Diabetes Brother     Sickle cell anemia Daughter     Thyroid disease Sister     Seizures Brother     Heart disease Daughter         7 MONTHS    No Known Problems Son     No Known Problems Son     No Known Problems Son     Cancer Brother     No Known Problems Sister     No Known Problems Sister     No Known Problems Sister     No Known Problems Sister     No Known Problems Sister      Social History     Tobacco Use    Smoking status: Never Smoker    Smokeless tobacco: Never Used   Substance Use Topics    Alcohol use: Never    Drug use: Never     Review of Systems   Constitutional: Negative for fever.   Skin: Positive for wound.   All other systems reviewed and are negative.      Physical Exam     Initial Vitals [07/16/22 0502]   BP Pulse Resp Temp SpO2   (!) 173/76 95 17 98.1 °F (36.7 °C) 100 %      MAP       --         Physical Exam    Vitals reviewed.  Constitutional: She appears well-developed and well-nourished.   HENT:   Head: Normocephalic.   Eyes: Pupils are equal, round, and reactive to light.   Cardiovascular: Normal rate, regular rhythm and normal heart sounds.   Pulmonary/Chest: Breath sounds normal. No respiratory distress. She has no wheezes.   Abdominal: Abdomen is soft. Bowel sounds are normal.     Neurological: She is alert and oriented to person, place, and time.   Skin:   Healing wound to dorsum of left lower leg.  No erythema or drainage.   Psychiatric: She has a normal mood and affect.         Medical Screening Exam   See Full Note    ED Course   Procedures  Labs Reviewed   COMPREHENSIVE METABOLIC PANEL - Abnormal; Notable for the following components:       Result Value    Chloride 108 (*)     Creatinine 1.17 (*)      Albumin 3.0 (*)     Alk Phos 45 (*)     ALT 11 (*)     AST 12 (*)     eGFR 47 (*)     All other components within normal limits   CBC WITH DIFFERENTIAL - Abnormal; Notable for the following components:    Hemoglobin 10.5 (*)     Hematocrit 32.9 (*)     MCV 78.0 (*)     MCH 24.9 (*)     MCHC 31.9 (*)     RDW 15.3 (*)     Neutrophils % 48.2 (*)     Monocytes % 11.8 (*)     Immature Granulocytes % 0.7 (*)     All other components within normal limits   SARS-COV2 (COVID) W/ FLU ANTIGEN - Normal    Narrative:     Negative SARS-CoV results should not be used as the sole basis for treatment or patient management decisions; negative results should be considered in the context of a patient's recent exposures, history and the presene of clinical signs and symptoms consistent with COVID-19.  Negative results should be treated as presumptive and confirmed by molecular assay, if necessary for patient management.   CBC W/ AUTO DIFFERENTIAL    Narrative:     The following orders were created for panel order CBC auto differential.  Procedure                               Abnormality         Status                     ---------                               -----------         ------                     CBC with Differential[[id number]]        Abnormal            Final result                 Please view results for these tests on the individual orders.   URINALYSIS, REFLEX TO URINE CULTURE        ECG Results          EKG 12-lead (Final result)  Result time 07/16/22 06:26:24    Final result by Interface, Lab In [facility] (07/16/22 06:26:24)                 Narrative:    Test Reason : R52,    Vent. Rate : 096 BPM     Atrial Rate : 000 BPM     P-R Int : 180 ms          QRS Dur : 076 ms      QT Int : 340 ms       P-R-T Axes : 062 051 024 degrees     QTc Int : 403 ms    Sinus rhythm  Possible faulty V3 - omitted from analysis  ST junctional depression  is nonspecific  Low QRS voltages in precordial leads  Borderline ECG    Confirmed by  Jose Roberto SNIDER, Veda JOE ([id number]) on 7/16/2022 6:26:16 AM    Referred By: [email or web address]   SELF           Confirmed By:Veda Cid MD                            Imaging Results          CT Head Without Contrast (Final result)  Result time 07/16/22 09:11:11    Final result by Sebas Saleem MD (07/16/22 09:11:11)                 Impression:      1. No acute intracranial abnormality.    2.  Mild generalized parenchymal atrophy and findings suggestive of sequela of chronic microvascular ischemia.    3.  Mild left maxillary sinus mucosal thickening and postsurgical changes are noted.  There is a partially calcified and or bony nodular lesion within the left nasal passage which is of uncertain significance.  Correlate with surgical history and direct examination to exclude an enlarging nasal polyp.    Place of service: [facility]      Electronically signed by: Sebas Saleem  Date:    07/16/2022  Time:    09:11             Narrative:    EXAMINATION:  CT HEAD WITHOUT CONTRAST    CLINICAL HISTORY:  Neuro deficit, acute, stroke suspected;    TECHNIQUE:  Axial CT imaging from the vertex to skull the skull base was performed without contrast. Total DLP: 949 mGy*cm    Dose reduction:    The CT exam was performed using one or more dose reduction techniques: Automatic exposure control, automated adjustment of the MA and/or kVP according to patient size, or use of iterative reconstruction technique.    COMPARISON:  Prior CT dated February 13, 2015.    FINDINGS:  Mild generalized parenchymal atrophy is noted with prominence of sulci and ventricles.  The basilar cisterns are within normal limits in appearance. There is no evidence of hydrocephalus, midline shift or mass effect.  Periventricular hypodensity changes are noted and are nonspecific and favored to represent sequela of chronic microvascular ischemia.  Otherwise, gray and white matter differentiation is preserved.  Minimal dystrophic calcification is noted within  the bilateral basal ganglia, which is similar to prior.  There is no CT evidence of acute intracranial hemorrhage or infarction.    The calvarium is intact. The visualized orbits and globes appear within normal limits.  Somewhat prominent mucosal thickening is noted within the left maxillary sinus with postsurgical changes of maxillary antrostomy again noted.  Calcific/ossific 2 cm lesion along the posterior aspect of the left nasal passage is of uncertain significance but may represent a residual polyp.    Scalp soft tissues appear unremarkable.                               X-Ray Chest AP Portable (Final result)  Result time 07/16/22 08:55:07    Final result by Aryan Wilkerson DO (07/16/22 08:55:07)                 Impression:      Bibasilar atelectasis      Electronically signed by: Aryan Wilkerson  Date:    07/16/2022  Time:    08:55             Narrative:    EXAMINATION:  XR CHEST AP PORTABLE    CLINICAL HISTORY:  Weakness    TECHNIQUE:  XR CHEST AP PORTABLE    COMPARISON:  2019    FINDINGS:  Left upper extremity PICC in expected position    Bibasilar atelectasis    Normal pleura.    Cardiac silhouette is similar to comparison exam.    No new acute bone findings.                                 Medications   HYDROcodone-acetaminophen  mg per tablet 1 tablet (1 tablet Oral Given 7/16/22 0739)   levoFLOXacin 750 mg/150 mL IVPB 750 mg (0 mg Intravenous Stopped 7/16/22 0905)                       Clinical Impression:   Final diagnoses:  [R52] Pain (Primary)  [S81.809D] Open wound of lower extremity, unspecified laterality, subsequent encounter  [R53.1] Weakness  [M25.511, G89.29] Chronic right shoulder pain          ED Disposition Condition    Discharge Stable        ED Prescriptions     None        Follow-up Information     Follow up With Specialties Details Why Contact Info    [facility]  As needed [address]  [facility]  [facility]  [address]  [phone]             Candelario Scott MD  07/16/22 0710       Candelario Scott MD  07/16/22 1024

## 2022-07-16 NOTE — DISCHARGE INSTRUCTIONS
COMPLETE ANTIBIOTIC COURSE.  FOLLOW UP WITH YOUR REGULAR PROVIDER.  RETURN TO THE EMERGENCY DEPARTMENT AS NEEDED.

## 2022-07-17 ENCOUNTER — INFUSION (OUTPATIENT)
Dept: INFUSION THERAPY | Facility: HOSPITAL | Age: 77
End: 2022-07-17
Attending: NURSE PRACTITIONER
Payer: MEDICARE

## 2022-07-17 VITALS
DIASTOLIC BLOOD PRESSURE: 56 MMHG | TEMPERATURE: 98 F | SYSTOLIC BLOOD PRESSURE: 123 MMHG | HEART RATE: 88 BPM | RESPIRATION RATE: 20 BRPM

## 2022-07-17 DIAGNOSIS — L03.116 CELLULITIS OF LEFT LOWER EXTREMITY: Primary | ICD-10-CM

## 2022-07-17 PROCEDURE — 63600175 PHARM REV CODE 636 W HCPCS: Performed by: FAMILY MEDICINE

## 2022-07-17 PROCEDURE — 96365 THER/PROPH/DIAG IV INF INIT: CPT

## 2022-07-17 PROCEDURE — 25000003 PHARM REV CODE 250: Performed by: FAMILY MEDICINE

## 2022-07-17 RX ORDER — LEVOFLOXACIN 5 MG/ML
750 INJECTION, SOLUTION INTRAVENOUS
Status: CANCELLED
Start: 2022-07-18

## 2022-07-17 RX ORDER — DIPHENHYDRAMINE HYDROCHLORIDE 50 MG/ML
25 INJECTION INTRAMUSCULAR; INTRAVENOUS
Status: CANCELLED
Start: 2022-07-18

## 2022-07-17 RX ORDER — LEVOFLOXACIN 5 MG/ML
750 INJECTION, SOLUTION INTRAVENOUS
Status: DISCONTINUED | OUTPATIENT
Start: 2022-07-17 | End: 2022-07-17 | Stop reason: HOSPADM

## 2022-07-17 RX ORDER — ONDANSETRON 2 MG/ML
4 INJECTION INTRAMUSCULAR; INTRAVENOUS
Status: CANCELLED
Start: 2022-07-18

## 2022-07-17 RX ORDER — SODIUM CHLORIDE 0.9 % (FLUSH) 0.9 %
10 SYRINGE (ML) INJECTION
Status: DISCONTINUED | OUTPATIENT
Start: 2022-07-17 | End: 2022-07-17 | Stop reason: HOSPADM

## 2022-07-17 RX ORDER — SODIUM CHLORIDE 0.9 % (FLUSH) 0.9 %
10 SYRINGE (ML) INJECTION
Status: CANCELLED | OUTPATIENT
Start: 2022-07-18

## 2022-07-17 RX ADMIN — SODIUM CHLORIDE: 9 INJECTION, SOLUTION INTRAVENOUS at 08:07

## 2022-07-17 RX ADMIN — LEVOFLOXACIN 750 MG: 5 INJECTION, SOLUTION INTRAVENOUS at 08:07

## 2022-07-17 NOTE — PROGRESS NOTES
0820 Pt arrived to infusion center for antibiotic infusion and placed in room 1.     0830 Levaquin infusion initiated. Patient educated on s/s of reaction and verbalizes understanding.    1000 Infusion complete, no complications noted. PICC flushed and capped.     1005 Return to the Emergency Department for any chest pain, shortness of breath, difficulty breathing, reaction at the IV site, hives or swelling.  Return as scheduled for next infusion. Follow up with your PCP as needed.  Patient discharged from department via wheelchair to private vehicle. Verbalizes understanding of discharge instructions. Denies any complaints or needs at this time.

## 2022-07-18 ENCOUNTER — TELEPHONE (OUTPATIENT)
Dept: EMERGENCY MEDICINE | Facility: HOSPITAL | Age: 77
End: 2022-07-18
Payer: MEDICARE

## 2022-07-18 ENCOUNTER — INFUSION (OUTPATIENT)
Dept: INFUSION THERAPY | Facility: HOSPITAL | Age: 77
End: 2022-07-18
Attending: NURSE PRACTITIONER
Payer: MEDICARE

## 2022-07-18 VITALS
RESPIRATION RATE: 16 BRPM | OXYGEN SATURATION: 100 % | DIASTOLIC BLOOD PRESSURE: 57 MMHG | TEMPERATURE: 99 F | SYSTOLIC BLOOD PRESSURE: 133 MMHG | HEART RATE: 94 BPM

## 2022-07-18 DIAGNOSIS — L03.116 CELLULITIS OF LEFT LOWER EXTREMITY: Primary | ICD-10-CM

## 2022-07-18 PROCEDURE — 96366 THER/PROPH/DIAG IV INF ADDON: CPT

## 2022-07-18 PROCEDURE — 96365 THER/PROPH/DIAG IV INF INIT: CPT

## 2022-07-18 PROCEDURE — 63600175 PHARM REV CODE 636 W HCPCS: Performed by: FAMILY MEDICINE

## 2022-07-18 RX ORDER — DIPHENHYDRAMINE HYDROCHLORIDE 50 MG/ML
25 INJECTION INTRAMUSCULAR; INTRAVENOUS
Status: CANCELLED
Start: 2022-07-19

## 2022-07-18 RX ORDER — SODIUM CHLORIDE 0.9 % (FLUSH) 0.9 %
10 SYRINGE (ML) INJECTION
Status: DISCONTINUED | OUTPATIENT
Start: 2022-07-18 | End: 2022-07-18 | Stop reason: HOSPADM

## 2022-07-18 RX ORDER — LEVOFLOXACIN 5 MG/ML
750 INJECTION, SOLUTION INTRAVENOUS
Status: CANCELLED
Start: 2022-07-19

## 2022-07-18 RX ORDER — LEVOFLOXACIN 5 MG/ML
750 INJECTION, SOLUTION INTRAVENOUS
Status: DISCONTINUED | OUTPATIENT
Start: 2022-07-18 | End: 2022-07-18 | Stop reason: HOSPADM

## 2022-07-18 RX ORDER — ONDANSETRON 2 MG/ML
4 INJECTION INTRAMUSCULAR; INTRAVENOUS
Status: CANCELLED
Start: 2022-07-19

## 2022-07-18 RX ORDER — SODIUM CHLORIDE 0.9 % (FLUSH) 0.9 %
10 SYRINGE (ML) INJECTION
Status: CANCELLED | OUTPATIENT
Start: 2022-07-19

## 2022-07-18 RX ADMIN — LEVOFLOXACIN 750 MG: 5 INJECTION, SOLUTION INTRAVENOUS at 08:07

## 2022-07-18 NOTE — PROGRESS NOTES
0800 pt here for infusion, Resting in recliner, TP released and pharmacy notified.  Today is her last tx.  Spoke with Radha devries at dr garcia office and she said ok to pull picc line today.    1000 infusion complete and flushed, pt picc line removed and pressure dressing applied.  No bleeding noted after 10 min.   Pt discharged home ambulatory with cane, daughter called to  at door.  Therapy complete

## 2022-07-22 ENCOUNTER — TELEPHONE (OUTPATIENT)
Dept: VASCULAR SURGERY | Facility: CLINIC | Age: 77
End: 2022-07-22
Payer: MEDICARE

## 2022-07-27 ENCOUNTER — HOSPITAL ENCOUNTER (OUTPATIENT)
Dept: RADIOLOGY | Facility: HOSPITAL | Age: 77
Discharge: HOME | End: 2022-07-27
Attending: NURSE PRACTITIONER
Payer: MEDICARE

## 2022-07-27 ENCOUNTER — OFFICE VISIT (OUTPATIENT)
Dept: PRIMARY CARE CLINIC | Facility: CLINIC | Age: 77
End: 2022-07-27
Payer: MEDICARE

## 2022-07-27 VITALS
TEMPERATURE: 98 F | DIASTOLIC BLOOD PRESSURE: 64 MMHG | WEIGHT: 186 LBS | BODY MASS INDEX: 29.89 KG/M2 | RESPIRATION RATE: 16 BRPM | HEIGHT: 66 IN | SYSTOLIC BLOOD PRESSURE: 122 MMHG | HEART RATE: 95 BPM | OXYGEN SATURATION: 99 %

## 2022-07-27 DIAGNOSIS — E78.5 HYPERLIPIDEMIA, UNSPECIFIED HYPERLIPIDEMIA TYPE: ICD-10-CM

## 2022-07-27 DIAGNOSIS — G89.29 CHRONIC RIGHT SHOULDER PAIN: Primary | ICD-10-CM

## 2022-07-27 DIAGNOSIS — M25.511 CHRONIC RIGHT SHOULDER PAIN: ICD-10-CM

## 2022-07-27 DIAGNOSIS — G40.309 GENERALIZED EPILEPSY: ICD-10-CM

## 2022-07-27 DIAGNOSIS — I10 PRIMARY HYPERTENSION: ICD-10-CM

## 2022-07-27 DIAGNOSIS — G89.29 CHRONIC RIGHT SHOULDER PAIN: ICD-10-CM

## 2022-07-27 DIAGNOSIS — M25.511 CHRONIC RIGHT SHOULDER PAIN: Primary | ICD-10-CM

## 2022-07-27 DIAGNOSIS — I87.2 VENOUS INSUFFICIENCY (CHRONIC) (PERIPHERAL): ICD-10-CM

## 2022-07-27 DIAGNOSIS — E03.9 HYPOTHYROIDISM, UNSPECIFIED TYPE: ICD-10-CM

## 2022-07-27 PROCEDURE — 99213 PR OFFICE/OUTPT VISIT, EST, LEVL III, 20-29 MIN: ICD-10-PCS | Mod: ,,, | Performed by: NURSE PRACTITIONER

## 2022-07-27 PROCEDURE — 73030 X-RAY EXAM OF SHOULDER: CPT | Mod: 26,RT,, | Performed by: RADIOLOGY

## 2022-07-27 PROCEDURE — 73030 X-RAY EXAM OF SHOULDER: CPT | Mod: TC,RT

## 2022-07-27 PROCEDURE — 3074F SYST BP LT 130 MM HG: CPT | Mod: ,,, | Performed by: NURSE PRACTITIONER

## 2022-07-27 PROCEDURE — 3074F PR MOST RECENT SYSTOLIC BLOOD PRESSURE < 130 MM HG: ICD-10-PCS | Mod: ,,, | Performed by: NURSE PRACTITIONER

## 2022-07-27 PROCEDURE — 1101F PT FALLS ASSESS-DOCD LE1/YR: CPT | Mod: ,,, | Performed by: NURSE PRACTITIONER

## 2022-07-27 PROCEDURE — 3288F FALL RISK ASSESSMENT DOCD: CPT | Mod: ,,, | Performed by: NURSE PRACTITIONER

## 2022-07-27 PROCEDURE — 1101F PR PT FALLS ASSESS DOC 0-1 FALLS W/OUT INJ PAST YR: ICD-10-PCS | Mod: ,,, | Performed by: NURSE PRACTITIONER

## 2022-07-27 PROCEDURE — 73030 XR SHOULDER COMPLETE 2 OR MORE VIEWS RIGHT: ICD-10-PCS | Mod: 26,RT,, | Performed by: RADIOLOGY

## 2022-07-27 PROCEDURE — 99213 OFFICE O/P EST LOW 20 MIN: CPT | Mod: ,,, | Performed by: NURSE PRACTITIONER

## 2022-07-27 PROCEDURE — 3078F DIAST BP <80 MM HG: CPT | Mod: ,,, | Performed by: NURSE PRACTITIONER

## 2022-07-27 PROCEDURE — 3288F PR FALLS RISK ASSESSMENT DOCUMENTED: ICD-10-PCS | Mod: ,,, | Performed by: NURSE PRACTITIONER

## 2022-07-27 PROCEDURE — 1159F PR MEDICATION LIST DOCUMENTED IN MEDICAL RECORD: ICD-10-PCS | Mod: ,,, | Performed by: NURSE PRACTITIONER

## 2022-07-27 PROCEDURE — 3078F PR MOST RECENT DIASTOLIC BLOOD PRESSURE < 80 MM HG: ICD-10-PCS | Mod: ,,, | Performed by: NURSE PRACTITIONER

## 2022-07-27 PROCEDURE — 1159F MED LIST DOCD IN RCRD: CPT | Mod: ,,, | Performed by: NURSE PRACTITIONER

## 2022-07-27 RX ORDER — HYDROCODONE BITARTRATE AND ACETAMINOPHEN 5; 325 MG/1; MG/1
1 TABLET ORAL EVERY 6 HOURS PRN
Qty: 20 TABLET | Refills: 0 | Status: SHIPPED | OUTPATIENT
Start: 2022-07-27 | End: 2023-02-07

## 2022-07-27 NOTE — PROGRESS NOTES
Subjective:       Patient ID: Umesh Lassiter is a 77 y.o. female.    Chief Complaint: Follow-up (Check up), Shoulder Pain (right), and Arm Pain (right)    HPI Umesh Lassiter presents today for routine follow up  Patient reports severe right arm and shoulder pain. Prior history of right shoulder surgery. Denies recent injury. Endorses decreased ROM  Patient has limited family support and is having more difficulty taking care of herself due to limiting factors such as pain, mobility and chronic wounds.    Review of Systems   Constitutional: Negative for fatigue, fever and unexpected weight change.   HENT: Negative for nasal congestion, postnasal drip and sore throat.    Eyes: Negative for pain and redness.   Respiratory: Negative for cough, shortness of breath and wheezing.    Cardiovascular: Positive for leg swelling. Negative for chest pain.   Gastrointestinal: Negative for abdominal pain, constipation, diarrhea and nausea.   Genitourinary: Negative for dysuria and hematuria.   Musculoskeletal: Positive for arthralgias. Negative for gait problem.   Integumentary:  Negative for color change and rash.   Neurological: Negative for vertigo, syncope and headaches.         Objective:      Physical Exam  Constitutional:       Appearance: Normal appearance.   HENT:      Head: Normocephalic.   Eyes:      Pupils: Pupils are equal, round, and reactive to light.   Cardiovascular:      Rate and Rhythm: Normal rate and regular rhythm.      Pulses: Normal pulses.      Heart sounds: Normal heart sounds.   Pulmonary:      Effort: Pulmonary effort is normal. No respiratory distress.      Breath sounds: Normal breath sounds. No wheezing, rhonchi or rales.   Abdominal:      General: Bowel sounds are normal.      Palpations: Abdomen is soft.      Tenderness: There is no abdominal tenderness.   Musculoskeletal:      Right shoulder: Tenderness present. Decreased range of motion. Decreased strength. Normal pulse.      Cervical back:  Normal range of motion and neck supple. No tenderness.      Right lower leg: Edema present.      Left lower leg: Edema present.   Skin:     General: Skin is warm and dry.   Neurological:      General: No focal deficit present.      Mental Status: She is alert.      Cranial Nerves: No cranial nerve deficit.      Gait: Gait abnormal.         Assessment:       Problem List Items Addressed This Visit        Neuro    Generalized epilepsy       Cardiac/Vascular    Venous insufficiency (chronic) (peripheral)    Hypertension    Hyperlipemia       Endocrine    Hypothyroidism       Orthopedic    Chronic right shoulder pain - Primary    Relevant Orders    X-ray Shoulder 2 or More Views Right (Completed)          Plan:       X-ray right shoulder, will refer to ortho as needed  Vendor #[id number] PRN

## 2022-07-28 ENCOUNTER — OUTSIDE PLACE OF SERVICE (OUTPATIENT)
Dept: VASCULAR SURGERY | Facility: CLINIC | Age: 77
End: 2022-07-28
Payer: MEDICARE

## 2022-07-28 DIAGNOSIS — I83.029 VENOUS ULCER OF LEFT LEG: ICD-10-CM

## 2022-07-28 DIAGNOSIS — I87.2 VENOUS INSUFFICIENCY (CHRONIC) (PERIPHERAL): ICD-10-CM

## 2022-07-28 DIAGNOSIS — L97.929 VENOUS ULCER OF LEFT LEG: ICD-10-CM

## 2022-08-01 ENCOUNTER — TELEPHONE (OUTPATIENT)
Dept: VASCULAR SURGERY | Facility: CLINIC | Age: 77
End: 2022-08-01
Payer: MEDICARE

## 2022-08-01 PROCEDURE — G0179 MD RECERTIFICATION HHA PT: HCPCS | Mod: ,,, | Performed by: FAMILY MEDICINE

## 2022-08-01 PROCEDURE — G0179 PR HOME HEALTH MD RECERTIFICATION: ICD-10-PCS | Mod: ,,, | Performed by: FAMILY MEDICINE

## 2022-08-03 DIAGNOSIS — G89.29 CHRONIC RIGHT SHOULDER PAIN: Primary | ICD-10-CM

## 2022-08-03 DIAGNOSIS — M25.511 CHRONIC RIGHT SHOULDER PAIN: Primary | ICD-10-CM

## 2022-08-09 DIAGNOSIS — R56.9 UNSPECIFIED CONVULSIONS: ICD-10-CM

## 2022-08-09 RX ORDER — DIVALPROEX SODIUM 250 MG/1
TABLET, DELAYED RELEASE ORAL
Qty: 90 TABLET | Refills: 3 | OUTPATIENT
Start: 2022-08-09

## 2022-08-10 PROBLEM — M75.41 IMPINGEMENT SYNDROME, SHOULDER, RIGHT: Status: ACTIVE | Noted: 2022-08-10

## 2022-08-12 DIAGNOSIS — R56.9 UNSPECIFIED CONVULSIONS: ICD-10-CM

## 2022-08-13 RX ORDER — DIVALPROEX SODIUM 250 MG/1
TABLET, DELAYED RELEASE ORAL
Qty: 90 TABLET | Refills: 3 | Status: SHIPPED | OUTPATIENT
Start: 2022-08-13 | End: 2022-08-15 | Stop reason: SDUPTHER

## 2022-08-15 DIAGNOSIS — R56.9 UNSPECIFIED CONVULSIONS: ICD-10-CM

## 2022-08-15 RX ORDER — DIVALPROEX SODIUM 250 MG/1
250 TABLET, DELAYED RELEASE ORAL EVERY MORNING
Qty: 90 TABLET | Refills: 3 | Status: SHIPPED | OUTPATIENT
Start: 2022-08-15 | End: 2022-08-15 | Stop reason: SDUPTHER

## 2022-08-16 ENCOUNTER — TELEPHONE (OUTPATIENT)
Dept: VASCULAR SURGERY | Facility: CLINIC | Age: 77
End: 2022-08-16
Payer: MEDICAID

## 2022-08-24 ENCOUNTER — TELEPHONE (OUTPATIENT)
Dept: PRIMARY CARE CLINIC | Facility: CLINIC | Age: 77
End: 2022-08-24
Payer: MEDICAID

## 2022-08-24 RX ORDER — CLORAZEPATE DIPOTASSIUM 3.75 MG/1
TABLET ORAL
Qty: 60 TABLET | Refills: 5 | Status: SHIPPED | OUTPATIENT
Start: 2022-08-24 | End: 2022-10-27 | Stop reason: SDUPTHER

## 2022-08-24 NOTE — TELEPHONE ENCOUNTER
----- Message from Lou Hickey sent at 8/24/2022 11:54 AM CDT -----  PATIENT NEEDS REFILL ON NERVE PILL CALLED INTO

## 2022-09-07 ENCOUNTER — OFFICE VISIT (OUTPATIENT)
Dept: VASCULAR SURGERY | Facility: CLINIC | Age: 77
End: 2022-09-07
Payer: MEDICARE

## 2022-09-07 VITALS
RESPIRATION RATE: 16 BRPM | DIASTOLIC BLOOD PRESSURE: 84 MMHG | SYSTOLIC BLOOD PRESSURE: 146 MMHG | BODY MASS INDEX: 29.79 KG/M2 | HEIGHT: 66 IN | HEART RATE: 84 BPM | WEIGHT: 185.38 LBS

## 2022-09-07 DIAGNOSIS — I83.029 VENOUS ULCER OF LEFT LEG: Primary | ICD-10-CM

## 2022-09-07 DIAGNOSIS — I87.2 VENOUS INSUFFICIENCY (CHRONIC) (PERIPHERAL): ICD-10-CM

## 2022-09-07 DIAGNOSIS — L81.9 HYPERPIGMENTATION OF SKIN: ICD-10-CM

## 2022-09-07 DIAGNOSIS — R60.0 EDEMA, LOWER EXTREMITY: ICD-10-CM

## 2022-09-07 DIAGNOSIS — L97.929 VENOUS ULCER OF LEFT LEG: Primary | ICD-10-CM

## 2022-09-07 PROCEDURE — 3077F SYST BP >= 140 MM HG: CPT | Mod: CPTII,,, | Performed by: FAMILY MEDICINE

## 2022-09-07 PROCEDURE — 3077F PR MOST RECENT SYSTOLIC BLOOD PRESSURE >= 140 MM HG: ICD-10-PCS | Mod: CPTII,,, | Performed by: FAMILY MEDICINE

## 2022-09-07 PROCEDURE — 99214 PR OFFICE/OUTPT VISIT, EST, LEVL IV, 30-39 MIN: ICD-10-PCS | Mod: S$PBB,,, | Performed by: FAMILY MEDICINE

## 2022-09-07 PROCEDURE — 99214 OFFICE O/P EST MOD 30 MIN: CPT | Mod: PBBFAC | Performed by: FAMILY MEDICINE

## 2022-09-07 PROCEDURE — 99214 OFFICE O/P EST MOD 30 MIN: CPT | Mod: S$PBB,,, | Performed by: FAMILY MEDICINE

## 2022-09-07 PROCEDURE — 3079F PR MOST RECENT DIASTOLIC BLOOD PRESSURE 80-89 MM HG: ICD-10-PCS | Mod: CPTII,,, | Performed by: FAMILY MEDICINE

## 2022-09-07 PROCEDURE — 1160F PR REVIEW ALL MEDS BY PRESCRIBER/CLIN PHARMACIST DOCUMENTED: ICD-10-PCS | Mod: CPTII,,, | Performed by: FAMILY MEDICINE

## 2022-09-07 PROCEDURE — 3079F DIAST BP 80-89 MM HG: CPT | Mod: CPTII,,, | Performed by: FAMILY MEDICINE

## 2022-09-07 PROCEDURE — 1159F MED LIST DOCD IN RCRD: CPT | Mod: CPTII,,, | Performed by: FAMILY MEDICINE

## 2022-09-07 PROCEDURE — 1159F PR MEDICATION LIST DOCUMENTED IN MEDICAL RECORD: ICD-10-PCS | Mod: CPTII,,, | Performed by: FAMILY MEDICINE

## 2022-09-07 PROCEDURE — 1160F RVW MEDS BY RX/DR IN RCRD: CPT | Mod: CPTII,,, | Performed by: FAMILY MEDICINE

## 2022-09-07 RX ORDER — SILVER SULFADIAZINE 10 G/1000G
CREAM TOPICAL DAILY
Qty: 90 G | Refills: 2 | Status: SHIPPED | OUTPATIENT
Start: 2022-09-07 | End: 2023-01-17 | Stop reason: ALTCHOICE

## 2022-09-07 RX ORDER — SILVER SULFADIAZINE 10 G/1000G
CREAM TOPICAL
Status: COMPLETED | OUTPATIENT
Start: 2022-09-07 | End: 2022-09-07

## 2022-09-07 RX ADMIN — SILVER SULFADIAZINE 1 %: 10 CREAM TOPICAL at 09:09

## 2022-09-07 NOTE — PROGRESS NOTES
[facility] CLINIC NOTE    Patient ID: Umesh Lassiter is a 77 y.o. female.    I. HISTORY     Chief Complaint:   Chief Complaint   Patient presents with    Wound Check     Procedure room 2.  2 months wound check.          HPI: Umesh Lassiter is a 77 y.o. female who presents today following a 2 month absence from our clinic.  On last visit, she had 1 week remaining in her outpatient Levaquin infusion series.  Today, her wound looks much better, as does her left leg overall.  She has less edema, erythema and exudate.  No odor noted from the wound today.  She states the wound is much less tender to palpation and is not hurting her as it was previously.  She she the wound now consist of several smaller scattered scabbed over open areas with moderate yellow slough.  She continues to clean the wound regularly, apply Silvadene and wrap with a wound dressing.  Overall much improved.  She denies fever, sweats or chills.    Clinical summary:  The patient is known to our clinic but has not been seen since 03/02/2021 for a one-month follow-up status post left great saphenous vein ablation.  She has been lost to follow-up since.  She states that this ulceration of her left lower extremity has been present for approximately 6 months.  She states that his been persistent and worsening during that time.  She has not been wearing compression daily.  She has pitting edema, hyperpigmentation along with the ulceration of the left medial Gator region.  No signs of infection, including purulent drainage, increased temperature or foul odor.  She denies complaints of her right lower extremity.  Do not use Unna boot with patient, she does not tolerate well.    Reflux study 12/21/2021 shows no evidence of DVT or superficial venous reflux.  Shows a well ablated proximal left great saphenous vein and ablated left small saphenous vein.     Left great saphenous vein laser ablation 01/08/2021.  Right great saphenous vein, duplicate, laser  ablation 12/17/2020.  Right distal great saphenous vein erythema ablation 02/28/2020.  Left small saphenous vein laser ablation 04/27/2018.  Right great saphenous vein laser ablation 04/20/2018.    Past Medical History:   Diagnosis Date    Anxiety     Asthma     CVA (cerebral vascular accident) 05/23/2018    DVT (deep venous thrombosis) 2019    Epilepsy     Hyperlipidemia     Hypertension     Hypothyroid     PVD (peripheral vascular disease)     Venous stasis     Wound infection     left lower leg        Past Surgical History:   Procedure Laterality Date    KNEE ARTHROSCOPY Left     x 2    LASER ABLATION Right 04/20/2018    GSV LASER ABLATION PERFORMED BY DR. KARMA BUTCHER.    LASER ABLATION Left 04/27/2018    SSV LASER ABLATION PERFORMED BY DR. KARMA BUTCHER.    LASER ABLATION Right 12/17/2020    GSV ( DUPLICATED )LASER ABLATION PERFORMED BY DR. KARMA BUTCHER.    LASER ABLATION Left 01/08/2021    GSV LASER ABLATION PERFORMED BY DR. KARMA BUTCHER.    Remove cataracts, insert lens Bilateral     SHOULDER ARTHROSCOPY Left     SINUS SURGERY      TOTAL ABDOMINAL HYSTERECTOMY W/ BILATERAL SALPINGOOPHORECTOMY      VENOUS ABLATION Right 02/28/2020    DISTAL GSV VARITHENA ABLATION PERFORMED BY DR. KARMA BUTCHER.       Social History     Tobacco Use   Smoking Status Never   Smokeless Tobacco Never         Current Outpatient Medications:     albuterol (PROVENTIL/VENTOLIN HFA) 90 mcg/actuation inhaler, Inhale 2 puffs into the lungs every 6 (six) hours as needed for Wheezing. Rescue, Disp: 6.7 g, Rfl: 0    cilostazoL (PLETAL) 50 MG Tab, Take 1 tablet (50 mg total) by mouth 2 (two) times daily., Disp: 180 tablet, Rfl: 3    clorazepate (TRANXENE) 3.75 MG Tab, TAKE 1 TABLET BY MOUTH TWICE A DAY AS NEEDED FOR ANXIETY *CAUSES DROWSINESS-AVOID ALCOHOL!!, Disp: 60 tablet, Rfl: 5    divalproex ER (DEPAKOTE) 500 MG Tb24, TAKE 2 TABLETS BY MOUTH EACH NIGHT AT BEDTIME FOR SEIZURES, Disp: 180 tablet, Rfl: 1    ferrous sulfate (FEOSOL) 325 mg (65  mg iron) Tab tablet, Take 1 tablet (325 mg total) by mouth daily with breakfast., Disp: 90 tablet, Rfl: 0    furosemide (LASIX) 20 MG tablet, Take 1 tablet (20 mg total) by mouth once daily., Disp: 90 tablet, Rfl: 3    HYDROcodone-acetaminophen (NORCO) 5-325 mg per tablet, Take 1 tablet by mouth every 6 (six) hours as needed for Pain., Disp: 20 tablet, Rfl: 0    levothyroxine (SYNTHROID) 50 MCG tablet, Take 1 tablet (50 mcg total) by mouth before breakfast. Take on an empty stomach with a full glass of water. Nothing else to eat or drink for 30 mins, Disp: 90 tablet, Rfl: 3    losartan (COZAAR) 50 MG tablet, Take 1 tablet (50 mg total) by mouth once daily., Disp: 90 tablet, Rfl: 3    meloxicam (MOBIC) 15 MG tablet, Take 1 tablet (15 mg total) by mouth daily as needed for Pain. I po daily prn pain.  Avoid all NSAIDs while taking Mobic, Disp: 30 tablet, Rfl: 2    silver sulfADIAZINE 1% (SILVADENE) 1 % cream, Apply topically once daily., Disp: 90 g, Rfl: 2  No current facility-administered medications for this visit.    Review of Systems   Constitutional:  Negative for activity change, chills, diaphoresis, fatigue and fever.   Respiratory:  Negative for cough and shortness of breath.    Cardiovascular:  Positive for leg swelling. Negative for chest pain and claudication.        Hyperpigmentation LE   Gastrointestinal:  Negative for nausea and vomiting.   Musculoskeletal:  Positive for leg pain. Negative for joint swelling.   Integumentary:  Negative for rash and wound.   Neurological:  Negative for weakness and numbness.     II. PHYSICAL EXAM     Physical Exam  Constitutional:       General: She is awake. She is not in acute distress.     Appearance: Normal appearance. She is obese. She is not ill-appearing or toxic-appearing.   HENT:      Head: Normocephalic and atraumatic.   Eyes:      Extraocular Movements: Extraocular movements intact.      Conjunctiva/sclera: Conjunctivae normal.      Pupils: Pupils are equal,  round, and reactive to light.   Neck:      Vascular: No carotid bruit or JVD.   Cardiovascular:      Rate and Rhythm: Normal rate and regular rhythm.      Pulses:           Dorsalis pedis pulses are detected w/ Doppler on the left side.        Posterior tibial pulses are detected w/ Doppler on the left side.      Heart sounds: No murmur heard.  Pulmonary:      Effort: Pulmonary effort is normal. No respiratory distress.      Breath sounds: No stridor. No wheezing, rhonchi or rales.   Musculoskeletal:         General: No swelling, tenderness or deformity.      Right lower le+ Edema present.      Left lower le+ Edema present.        Legs:       Comments: Ulcer of left medial Gator region shallow in appearance with several small areas of skin breakdown.     Hyperpigmentation of the left lower Gator region along with +2 edema.   Feet:      Comments: Triphasic dopplerable pulses of the left lower extremity.  Skin:     General: Skin is warm.      Capillary Refill: Capillary refill takes less than 2 seconds.      Coloration: Skin is not ashen.      Findings: No bruising, erythema, lesion, rash or wound.   Neurological:      Mental Status: She is alert and oriented to person, place, and time.      Motor: No weakness.   Psychiatric:         Speech: Speech normal.         Behavior: Behavior normal. Behavior is cooperative.     Reticular/Spider veins noted:  RLE: medial calf  LLE: none    Varicose veins noted:  RLE: medial calf  LLE:  medial calf                  CEAP Classification  Clinical Signs: Class 6 - Leg ulceration, skin changes as defined above  Etiologic Classification: Primary  Anatomic distribution: Superficial  Pathophysiologic dysfunction: Reflux     Venous Clinical Severity Score  Pain:2=Daily, moderate activity limitation, occasional analgesics  Varicose Veins: 1=Few, scattered. Branch varicose veins  Venous Edema: 2=Afternoon edema, above ankle  Pigmentation: 2=Diffuse over most of gaiter distribution  (lower 1/3) or recent pigmentation (purple)  Inflammation: 1=Mild cellulitis, limited to marginal area around ulcer  Induration: 1=Focal, circummalleolar (< 5 cm)  Number of Active Ulcers: 2=2  Active Ulceration, Duration: 2=>3 months, <1 year  Active Ulcer Size: 2=2 - 6 cm diameter  Compressive Therapy: 3=Full compliance, stockings + elevation  Total Score: 18     III. ASSESSMENT & PLAN (MEDICAL DECISION MAKING)     1. Venous ulcer of left leg    2. Venous insufficiency (chronic) (peripheral)    3. Edema, lower extremity    4. Hyperpigmentation of skin          Assessment/Diagnosis and Plan:  Apply dressing consisting of Silvadene, Telfa, Kerlix, ABD pad and Ace wrap.     She may continue to clean wound at home with mild soap, apply Silvadene daily and apply nonadherent dressing with a light Ace wrap.    Follow-up in 4 weeks for wound check follow-up.    Orders Placed This Encounter    silver sulfADIAZINE 1% cream    silver sulfADIAZINE 1% (SILVADENE) 1 % cream      Minh Cisneros DO

## 2022-09-20 ENCOUNTER — TELEPHONE (OUTPATIENT)
Dept: PRIMARY CARE CLINIC | Facility: CLINIC | Age: 77
End: 2022-09-20
Payer: MEDICAID

## 2022-09-20 RX ORDER — ALBUTEROL SULFATE 2 MG/5ML
2 SYRUP ORAL 3 TIMES DAILY
Qty: 473 ML | Refills: 5 | Status: SHIPPED | OUTPATIENT
Start: 2022-09-20 | End: 2022-09-22 | Stop reason: SDUPTHER

## 2022-09-20 RX ORDER — ALBUTEROL SULFATE 2 MG/5ML
2 SYRUP ORAL 3 TIMES DAILY
COMMUNITY
End: 2022-09-20 | Stop reason: SDUPTHER

## 2022-09-20 NOTE — TELEPHONE ENCOUNTER
----- Message from Malina Gloria sent at 9/20/2022 10:43 AM CDT -----  Regarding: MED REFILL  Caller patient's maria luisa Burks [phone]  Refill [facility] Allergy Syrup    Thanks

## 2022-09-22 ENCOUNTER — TELEPHONE (OUTPATIENT)
Dept: PRIMARY CARE CLINIC | Facility: CLINIC | Age: 77
End: 2022-09-22
Payer: MEDICAID

## 2022-09-22 RX ORDER — ALBUTEROL SULFATE 2 MG/5ML
2 SYRUP ORAL 3 TIMES DAILY
Qty: 473 ML | Refills: 5 | Status: SHIPPED | OUTPATIENT
Start: 2022-09-22 | End: 2022-10-27 | Stop reason: SDUPTHER

## 2022-09-22 NOTE — TELEPHONE ENCOUNTER
----- Message from Malina Gloria sent at 9/22/2022 11:17 AM CDT -----  Regarding: PLEASE RE-SEND MEDICATION  Caller maria luisa Burks [phone]  RE: Medication not at [facility]  Please re-send Albuterol Syrup    Thanks

## 2022-10-06 ENCOUNTER — OFFICE VISIT (OUTPATIENT)
Dept: VASCULAR SURGERY | Facility: CLINIC | Age: 77
End: 2022-10-06
Payer: MEDICARE

## 2022-10-06 VITALS
SYSTOLIC BLOOD PRESSURE: 118 MMHG | RESPIRATION RATE: 16 BRPM | WEIGHT: 187.19 LBS | BODY MASS INDEX: 30.08 KG/M2 | DIASTOLIC BLOOD PRESSURE: 84 MMHG | HEART RATE: 88 BPM | HEIGHT: 66 IN

## 2022-10-06 DIAGNOSIS — L97.929 VENOUS ULCER OF LEFT LEG: Primary | ICD-10-CM

## 2022-10-06 DIAGNOSIS — I83.029 VENOUS ULCER OF LEFT LEG: Primary | ICD-10-CM

## 2022-10-06 DIAGNOSIS — L81.9 HYPERPIGMENTATION OF SKIN: ICD-10-CM

## 2022-10-06 DIAGNOSIS — I87.2 VENOUS INSUFFICIENCY: ICD-10-CM

## 2022-10-06 DIAGNOSIS — R60.0 EDEMA, LOWER EXTREMITY: ICD-10-CM

## 2022-10-06 PROCEDURE — 3079F DIAST BP 80-89 MM HG: CPT | Mod: CPTII,,, | Performed by: FAMILY MEDICINE

## 2022-10-06 PROCEDURE — 3079F PR MOST RECENT DIASTOLIC BLOOD PRESSURE 80-89 MM HG: ICD-10-PCS | Mod: CPTII,,, | Performed by: FAMILY MEDICINE

## 2022-10-06 PROCEDURE — 1160F PR REVIEW ALL MEDS BY PRESCRIBER/CLIN PHARMACIST DOCUMENTED: ICD-10-PCS | Mod: CPTII,,, | Performed by: FAMILY MEDICINE

## 2022-10-06 PROCEDURE — 99214 PR OFFICE/OUTPT VISIT, EST, LEVL IV, 30-39 MIN: ICD-10-PCS | Mod: S$PBB,,, | Performed by: FAMILY MEDICINE

## 2022-10-06 PROCEDURE — 99214 OFFICE O/P EST MOD 30 MIN: CPT | Mod: S$PBB,,, | Performed by: FAMILY MEDICINE

## 2022-10-06 PROCEDURE — 1159F MED LIST DOCD IN RCRD: CPT | Mod: CPTII,,, | Performed by: FAMILY MEDICINE

## 2022-10-06 PROCEDURE — 99214 OFFICE O/P EST MOD 30 MIN: CPT | Mod: PBBFAC | Performed by: FAMILY MEDICINE

## 2022-10-06 PROCEDURE — 3074F PR MOST RECENT SYSTOLIC BLOOD PRESSURE < 130 MM HG: ICD-10-PCS | Mod: CPTII,,, | Performed by: FAMILY MEDICINE

## 2022-10-06 PROCEDURE — 3074F SYST BP LT 130 MM HG: CPT | Mod: CPTII,,, | Performed by: FAMILY MEDICINE

## 2022-10-06 PROCEDURE — 1160F RVW MEDS BY RX/DR IN RCRD: CPT | Mod: CPTII,,, | Performed by: FAMILY MEDICINE

## 2022-10-06 PROCEDURE — 1159F PR MEDICATION LIST DOCUMENTED IN MEDICAL RECORD: ICD-10-PCS | Mod: CPTII,,, | Performed by: FAMILY MEDICINE

## 2022-10-06 RX ORDER — LIDOCAINE HYDROCHLORIDE 20 MG/ML
JELLY TOPICAL
Status: COMPLETED | OUTPATIENT
Start: 2022-10-06 | End: 2022-10-06

## 2022-10-06 RX ADMIN — LIDOCAINE HYDROCHLORIDE 11 ML: 20 JELLY TOPICAL at 09:10

## 2022-10-06 NOTE — PROGRESS NOTES
[facility] CLINIC NOTE    Patient ID: Umesh Lassiter is a 77 y.o. female.    I. HISTORY     Chief Complaint:   Chief Complaint   Patient presents with    Wound Check     Procedure room 2.  1 month wound check.          HPI: Umesh Lassiter is a 77 y.o. female who presents today for a one-month follow-up and wound check.  The patient has been performing wound care at home washing her wound with mild soap applying Silvadene, Telfa an Ace wrap.  She states that her wound continues to look better and denies pain at this time.  She denies any signs of infection today.  She denies fever, sweats and chills.  The wound now consist of several smaller scattered scabbed over open areas with moderate yellow slough.      Clinical summary:  The patient is known to our clinic but has not been seen since 03/02/2021 for a one-month follow-up status post left great saphenous vein ablation.  She has been lost to follow-up since.  She states that this ulceration of her left lower extremity has been present for approximately 6 months.  She states that his been persistent and worsening during that time.  She has not been wearing compression daily.  She has pitting edema, hyperpigmentation along with the ulceration of the left medial Gator region.  No signs of infection, including purulent drainage, increased temperature or foul odor.  She denies complaints of her right lower extremity.  Do not use Unna boot with patient, she does not tolerate well.    Reflux study 12/21/2021 shows no evidence of DVT or superficial venous reflux.  Shows a well ablated proximal left great saphenous vein and ablated left small saphenous vein.     Left great saphenous vein laser ablation 01/08/2021.  Right great saphenous vein, duplicate, laser ablation 12/17/2020.  Right distal great saphenous vein erythema ablation 02/28/2020.  Left small saphenous vein laser ablation 04/27/2018.  Right great saphenous vein laser ablation 04/20/2018.    Past Medical  History:   Diagnosis Date    Anxiety     Asthma     CVA (cerebral vascular accident) 05/23/2018    DVT (deep venous thrombosis) 2019    Epilepsy     Hyperlipidemia     Hypertension     Hypothyroid     PVD (peripheral vascular disease)     Venous stasis     Wound infection     left lower leg        Past Surgical History:   Procedure Laterality Date    KNEE ARTHROSCOPY Left     x 2    LASER ABLATION Right 04/20/2018    [facility] LASER ABLATION PERFORMED BY DR. KARMA BUTCHER.    LASER ABLATION Left 04/27/2018    [facility] LASER ABLATION PERFORMED BY DR. KARMA BUTCHER.    LASER ABLATION Right 12/17/2020    [facility] ( DUPLICATED )LASER ABLATION PERFORMED BY DR. KARMA BUTCHER.    LASER ABLATION Left 01/08/2021    [facility] LASER ABLATION PERFORMED BY DR. KARMA BUTCHER.    Remove cataracts, insert lens Bilateral     SHOULDER ARTHROSCOPY Left     SINUS SURGERY      TOTAL ABDOMINAL HYSTERECTOMY W/ BILATERAL SALPINGOOPHORECTOMY      VENOUS ABLATION Right 02/28/2020    DISTAL [facility] VARITHENA ABLATION PERFORMED BY DR. KARMA BUTCHER.       Social History     Tobacco Use   Smoking Status Never   Smokeless Tobacco Never         Current Outpatient Medications:     albuterol (PROVENTIL/VENTOLIN HFA) 90 mcg/actuation inhaler, Inhale 2 puffs into the lungs every 6 (six) hours as needed for Wheezing. Rescue, Disp: 6.7 g, Rfl: 0    albuterol 2 mg/5 mL syrup, Take 5 mLs (2 mg total) by mouth 3 (three) times daily., Disp: 473 mL, Rfl: 5    cilostazoL (PLETAL) 50 MG Tab, Take 1 tablet (50 mg total) by mouth 2 (two) times daily., Disp: 180 tablet, Rfl: 3    clorazepate (TRANXENE) 3.75 MG Tab, TAKE 1 TABLET BY MOUTH TWICE A DAY AS NEEDED FOR ANXIETY *CAUSES DROWSINESS-AVOID ALCOHOL!!, Disp: 60 tablet, Rfl: 5    divalproex ER (DEPAKOTE) 500 MG Tb24, TAKE 2 TABLETS BY MOUTH EACH NIGHT AT BEDTIME FOR SEIZURES, Disp: 180 tablet, Rfl: 1    ferrous sulfate (FEOSOL) 325 mg (65 mg iron) Tab tablet, Take 1 tablet (325 mg total) by mouth daily with breakfast., Disp: 90 tablet,  Rfl: 0    furosemide (LASIX) 20 MG tablet, Take 1 tablet (20 mg total) by mouth once daily., Disp: 90 tablet, Rfl: 3    HYDROcodone-acetaminophen (NORCO) 5-325 mg per tablet, Take 1 tablet by mouth every 6 (six) hours as needed for Pain., Disp: 20 tablet, Rfl: 0    levothyroxine (SYNTHROID) 50 MCG tablet, Take 1 tablet (50 mcg total) by mouth before breakfast. Take on an empty stomach with a full glass of water. Nothing else to eat or drink for 30 mins, Disp: 90 tablet, Rfl: 3    losartan (COZAAR) 50 MG tablet, Take 1 tablet (50 mg total) by mouth once daily., Disp: 90 tablet, Rfl: 3    silver sulfADIAZINE 1% (SILVADENE) 1 % cream, Apply topically once daily., Disp: 90 g, Rfl: 2  No current facility-administered medications for this visit.    Review of Systems   Constitutional:  Negative for activity change, chills, diaphoresis, fatigue and fever.   Respiratory:  Negative for cough and shortness of breath.    Cardiovascular:  Positive for leg swelling. Negative for chest pain and claudication.        Hyperpigmentation LE   Gastrointestinal:  Negative for nausea and vomiting.   Musculoskeletal:  Positive for leg pain. Negative for joint swelling.   Integumentary:  Negative for rash and wound.   Neurological:  Negative for weakness and numbness.     II. PHYSICAL EXAM     Physical Exam  Constitutional:       General: She is awake. She is not in acute distress.     Appearance: Normal appearance. She is obese. She is not ill-appearing or toxic-appearing.   HENT:      Head: Normocephalic and atraumatic.   Eyes:      Extraocular Movements: Extraocular movements intact.      Conjunctiva/sclera: Conjunctivae normal.      Pupils: Pupils are equal, round, and reactive to light.   Neck:      Vascular: No carotid bruit or JVD.   Cardiovascular:      Rate and Rhythm: Normal rate and regular rhythm.      Pulses:           Dorsalis pedis pulses are detected w/ Doppler on the left side.        Posterior tibial pulses are detected w/  Doppler on the left side.      Heart sounds: No murmur heard.  Pulmonary:      Effort: Pulmonary effort is normal. No respiratory distress.      Breath sounds: No stridor. No wheezing, rhonchi or rales.   Musculoskeletal:         General: No swelling, tenderness or deformity.      Right lower le+ Edema present.      Left lower le+ Edema present.        Legs:       Comments: Ulcer of left medial Gator region shallow in appearance with several small areas of skin breakdown.     Hyperpigmentation of the left lower Gator region along with +2 edema.   Feet:      Comments: Triphasic dopplerable pulses of the left lower extremity.  Skin:     General: Skin is warm.      Capillary Refill: Capillary refill takes less than 2 seconds.      Coloration: Skin is not ashen.      Findings: No bruising, erythema, lesion, rash or wound.   Neurological:      Mental Status: She is alert and oriented to person, place, and time.      Motor: No weakness.   Psychiatric:         Speech: Speech normal.         Behavior: Behavior normal. Behavior is cooperative.     Reticular/Spider veins noted:  RLE: medial calf  LLE: none    Varicose veins noted:  RLE: medial calf  LLE:  medial calf    CEAP Classification  Clinical Signs: Class 6 - Leg ulceration, skin changes as defined above  Etiologic Classification: Primary  Anatomic distribution: Superficial  Pathophysiologic dysfunction: Reflux     Venous Clinical Severity Score  Pain:2=Daily, moderate activity limitation, occasional analgesics  Varicose Veins: 1=Few, scattered. Branch varicose veins  Venous Edema: 2=Afternoon edema, above ankle  Pigmentation: 2=Diffuse over most of gaiter distribution (lower 1/3) or recent pigmentation (purple)  Inflammation: 1=Mild cellulitis, limited to marginal area around ulcer  Induration: 1=Focal, circummalleolar (< 5 cm)  Number of Active Ulcers: 2=2  Active Ulceration, Duration: 2=>3 months, <1 year  Active Ulcer Size: 2=2 - 6 cm diameter  Compressive  Therapy: 3=Full compliance, stockings + elevation  Total Score: 18     III. ASSESSMENT & PLAN (MEDICAL DECISION MAKING)     1. Venous ulcer of left leg    2. Hyperpigmentation of skin    3. Edema, lower extremity    4. Venous insufficiency          Assessment/Diagnosis and Plan:  Her wound is overall improved.  The wound was cleaned with wounds Felch and skin protection applied.  Apply dressing consisting of Silvadene, Telfa, Kerlix, ABD pad and Ace wrap.     She may continue to clean wound at home with mild soap, apply Silvadene daily and apply nonadherent dressing with a light Ace wrap.    Follow-up in 4 weeks for wound check follow-up.    Orders Placed This Encounter    LIDOcaine HCl 2% urojet      Minh Cisneros, DO

## 2022-10-11 ENCOUNTER — OFFICE VISIT (OUTPATIENT)
Dept: NEUROLOGY | Facility: CLINIC | Age: 77
End: 2022-10-11
Payer: MEDICARE

## 2022-10-11 VITALS
HEIGHT: 66 IN | BODY MASS INDEX: 30.05 KG/M2 | HEART RATE: 80 BPM | RESPIRATION RATE: 18 BRPM | OXYGEN SATURATION: 99 % | DIASTOLIC BLOOD PRESSURE: 68 MMHG | SYSTOLIC BLOOD PRESSURE: 114 MMHG | WEIGHT: 187 LBS

## 2022-10-11 DIAGNOSIS — Z79.899 ENCOUNTER FOR LONG-TERM CURRENT USE OF MEDICATION: Primary | ICD-10-CM

## 2022-10-11 DIAGNOSIS — R56.9 SEIZURE: ICD-10-CM

## 2022-10-11 DIAGNOSIS — G40.309 GENERALIZED EPILEPSY: ICD-10-CM

## 2022-10-11 PROCEDURE — 3074F SYST BP LT 130 MM HG: CPT | Mod: CPTII,,, | Performed by: NURSE PRACTITIONER

## 2022-10-11 PROCEDURE — 3078F DIAST BP <80 MM HG: CPT | Mod: CPTII,,, | Performed by: NURSE PRACTITIONER

## 2022-10-11 PROCEDURE — 99213 PR OFFICE/OUTPT VISIT, EST, LEVL III, 20-29 MIN: ICD-10-PCS | Mod: S$PBB,,, | Performed by: NURSE PRACTITIONER

## 2022-10-11 PROCEDURE — 1159F MED LIST DOCD IN RCRD: CPT | Mod: CPTII,,, | Performed by: NURSE PRACTITIONER

## 2022-10-11 PROCEDURE — 99213 OFFICE O/P EST LOW 20 MIN: CPT | Mod: S$PBB,,, | Performed by: NURSE PRACTITIONER

## 2022-10-11 PROCEDURE — 99214 OFFICE O/P EST MOD 30 MIN: CPT | Mod: PBBFAC | Performed by: NURSE PRACTITIONER

## 2022-10-11 PROCEDURE — 1160F PR REVIEW ALL MEDS BY PRESCRIBER/CLIN PHARMACIST DOCUMENTED: ICD-10-PCS | Mod: CPTII,,, | Performed by: NURSE PRACTITIONER

## 2022-10-11 PROCEDURE — 3074F PR MOST RECENT SYSTOLIC BLOOD PRESSURE < 130 MM HG: ICD-10-PCS | Mod: CPTII,,, | Performed by: NURSE PRACTITIONER

## 2022-10-11 PROCEDURE — 3078F PR MOST RECENT DIASTOLIC BLOOD PRESSURE < 80 MM HG: ICD-10-PCS | Mod: CPTII,,, | Performed by: NURSE PRACTITIONER

## 2022-10-11 PROCEDURE — 1160F RVW MEDS BY RX/DR IN RCRD: CPT | Mod: CPTII,,, | Performed by: NURSE PRACTITIONER

## 2022-10-11 PROCEDURE — 1159F PR MEDICATION LIST DOCUMENTED IN MEDICAL RECORD: ICD-10-PCS | Mod: CPTII,,, | Performed by: NURSE PRACTITIONER

## 2022-10-11 RX ORDER — DIVALPROEX SODIUM 500 MG/1
TABLET, FILM COATED, EXTENDED RELEASE ORAL
Qty: 180 TABLET | Refills: 1 | Status: SHIPPED | OUTPATIENT
Start: 2022-10-11 | End: 2023-04-11

## 2022-10-11 RX ORDER — DIVALPROEX SODIUM 250 MG/1
250 TABLET, DELAYED RELEASE ORAL EVERY MORNING
COMMUNITY
End: 2022-10-11

## 2022-10-11 NOTE — PATIENT INSTRUCTIONS
Continue current Depakote ER 1000mg at night  CMP and depakote level  Good eating and sleep habits encouraged  Notify clinic if any seizures      Seizure Precautions:  1. Take medications as directed  2. Avoid open flames and hot surfaces such as fires and grills  3. Avoid elevations such as ladders or stools  4. Avoid swimming alone  5. Make sure getting plenty of sleep, recommend 7-8 hours per day  6. Avoid alcohol and illicit drugs  7. No driving or operating heavy machinery for 6 months after last known seizure

## 2022-10-11 NOTE — PROGRESS NOTES
Subjective:       Patient ID: Umesh Lassiter is a 77 y.o. female     Chief Complaint:    Chief Complaint   Patient presents with    Follow-up    Seizures          Allergies:  Influenza virus vaccines and Lexapro [escitalopram]    Current Medications:    Outpatient Encounter Medications as of 10/11/2022   Medication Sig Dispense Refill    albuterol (PROVENTIL/VENTOLIN HFA) 90 mcg/actuation inhaler Inhale 2 puffs into the lungs every 6 (six) hours as needed for Wheezing. Rescue 6.7 g 0    albuterol 2 mg/5 mL syrup Take 5 mLs (2 mg total) by mouth 3 (three) times daily. 473 mL 5    cilostazoL (PLETAL) 50 MG Tab Take 1 tablet (50 mg total) by mouth 2 (two) times daily. 180 tablet 3    clorazepate (TRANXENE) 3.75 MG Tab TAKE 1 TABLET BY MOUTH TWICE A DAY AS NEEDED FOR ANXIETY *CAUSES DROWSINESS-AVOID ALCOHOL!! 60 tablet 5    ferrous sulfate (FEOSOL) 325 mg (65 mg iron) Tab tablet Take 1 tablet (325 mg total) by mouth daily with breakfast. 90 tablet 0    furosemide (LASIX) 20 MG tablet Take 1 tablet (20 mg total) by mouth once daily. 90 tablet 3    HYDROcodone-acetaminophen (NORCO) 5-325 mg per tablet Take 1 tablet by mouth every 6 (six) hours as needed for Pain. 20 tablet 0    levothyroxine (SYNTHROID) 50 MCG tablet Take 1 tablet (50 mcg total) by mouth before breakfast. Take on an empty stomach with a full glass of water. Nothing else to eat or drink for 30 mins 90 tablet 3    losartan (COZAAR) 50 MG tablet Take 1 tablet (50 mg total) by mouth once daily. 90 tablet 3    silver sulfADIAZINE 1% (SILVADENE) 1 % cream Apply topically once daily. 90 g 2    [DISCONTINUED] divalproex (DEPAKOTE) 250 MG EC tablet Take 250 mg by mouth every morning.      [DISCONTINUED] divalproex ER (DEPAKOTE) 500 MG Tb24 TAKE 2 TABLETS BY MOUTH EACH NIGHT AT BEDTIME FOR SEIZURES 180 tablet 1    divalproex 500 MG Tb24 TAKE 2 TABLETS BY MOUTH EACH NIGHT AT BEDTIME FOR SEIZURES 180 tablet 1    [DISCONTINUED] albuterol 2 mg/5 mL syrup Take 5  mLs (2 mg total) by mouth 3 (three) times daily. 473 mL 5     No facility-administered encounter medications on file as of 10/11/2022.       History of Present Illness  78 y/o female following for history of seizures    Last visit reported last seizure was February 2015.  Continued on depakote ER 1000mg QPM.    She has slight bilateral hand tremor on exam, she denies this causes her any complications.  Likely due to the depakote.  It is actually better today than it was at last visit.    Last depakote level in January of 2022, it was 67.  Needs level and CMP today.           Review of Systems  Review of Systems   Constitutional:  Negative for diaphoresis and fever.   HENT:  Negative for congestion, hearing loss and tinnitus.    Eyes:  Negative for blurred vision, double vision, photophobia, discharge and redness.   Respiratory:  Negative for cough and shortness of breath.    Cardiovascular:  Negative for chest pain.   Gastrointestinal:  Negative for abdominal pain, nausea and vomiting.   Musculoskeletal:  Negative for back pain, joint pain, myalgias and neck pain.   Skin:  Negative for itching and rash.   Neurological:  Positive for tremors and seizures. Negative for dizziness, sensory change, speech change, focal weakness, loss of consciousness, weakness and headaches.   Psychiatric/Behavioral:  Negative for depression, hallucinations and memory loss. The patient does not have insomnia.    All other systems reviewed and are negative.   Objective:     NEUROLOGICAL EXAMINATION:     MENTAL STATUS   Oriented to person, place, and time.   Registration: recalls 3 of 3 objects. Recall at 5 minutes: recalls 3 of 3 objects.   Attention: normal. Concentration: normal.   Speech: speech is normal   Level of consciousness: alert  Knowledge: good and consistent with education.   Normal comprehension.     CRANIAL NERVES     CN II   Visual fields full to confrontation.   Visual acuity: normal  Right visual field deficit:  none  Left visual field deficit: none     CN III, IV, VI   Pupils are equal, round, and reactive to light.  Extraocular motions are normal.   Right pupil: Size: 3 mm. Shape: regular. Reactivity: brisk. Consensual response: intact. Accommodation: intact.   Left pupil: Size: 3 mm. Shape: regular. Reactivity: brisk. Consensual response: intact. Accommodation: intact.   CN III: no CN III palsy  CN VI: no CN VI palsy  Nystagmus: none   Diplopia: none  Upgaze: normal  Downgaze: normal  Conjugate gaze: present  Vestibulo-ocular reflex: present    CN V   Facial sensation intact.   Right facial sensation deficit: none  Left facial sensation deficit: none  Right corneal reflex: normal  Left corneal reflex: normal    CN VII   Facial expression full, symmetric.   Right facial weakness: none  Left facial weakness: none  Right taste: normal  Left taste: normal    CN VIII   CN VIII normal.   Hearing: intact    CN IX, X   CN IX normal.   CN X normal.   Palate: symmetric    CN XI   CN XI normal.   Right sternocleidomastoid strength: normal  Left sternocleidomastoid strength: normal  Right trapezius strength: normal  Left trapezius strength: normal    CN XII   CN XII normal.   Tongue: not atrophic  Fasciculations: absent  Tongue deviation: none    MOTOR EXAM   Muscle bulk: normal  Overall muscle tone: normal  Right arm tone: normal  Left arm tone: normal  Right arm pronator drift: absent  Left arm pronator drift: absent  Right leg tone: normal  Left leg tone: normal    Strength   Right neck flexion: 5/5  Left neck flexion: 5/5  Right neck extension: 5/5  Left neck extension: 5/5  Right deltoid: 5/5  Left deltoid: 5/5  Right biceps: 5/5  Left biceps: 5/5  Right triceps: 5/5  Left triceps: 5/5  Right wrist flexion: 5/5  Left wrist flexion: 5/5  Right wrist extension: 5/5  Left wrist extension: 5/5  Right interossei: 5/5  Left interossei: 5/5  Right iliopsoas: 5/5  Left iliopsoas: 5/5  Right quadriceps: 5/5  Left quadriceps: 5/5  Right  hamstrin/5  Left hamstrin/5  Right anterior tibial: 5/5  Left anterior tibial: 5/5  Right posterior tibial: 5/5  Left posterior tibial: 5/5  Right gastroc: 5/5  Left gastroc: 5/5    REFLEXES     Reflexes   Right brachioradialis: 2+  Left brachioradialis: 2+  Right biceps: 2+  Left biceps: 2+  Right triceps: 2+  Left triceps: 2+  Right patellar: 2+  Left patellar: 2+  Right achilles: 2+  Left achilles: 2+  Right plantar: normal  Left plantar: normal  Right Juárez: absent  Left Juárez: absent  Right ankle clonus: absent  Left ankle clonus: absent  Right pendular knee jerk: absent  Left pendular knee jerk: absent    SENSORY EXAM   Light touch normal.   Right arm light touch: normal  Left arm light touch: normal  Right leg light touch: normal  Left leg light touch: normal  Vibration normal.   Right arm vibration: normal  Left arm vibration: normal  Right leg vibration: normal  Left leg vibration: normal  Proprioception normal.   Right arm proprioception: normal  Left arm proprioception: normal  Right leg proprioception: normal  Left leg proprioception: normal  Pinprick normal.   Right arm pinprick: normal  Left arm pinprick: normal  Right leg pinprick: normal  Left leg pinprick: normal  Graphesthesia: normal  Romberg: negative  Stereognosis: normal    GAIT AND COORDINATION     Gait  Gait: wide-based     Coordination   Finger to nose coordination: normal  Heel to shin coordination: normal  Tandem walking coordination: abnormal    Tremor   Resting tremor: absent  Intention tremor: present  Action tremor: left arm and right arm     Physical Exam  Vitals and nursing note reviewed.   Constitutional:       Appearance: Normal appearance.   HENT:      Head: Normocephalic.   Eyes:      Extraocular Movements: Extraocular movements intact and EOM normal.      Pupils: Pupils are equal, round, and reactive to light.   Cardiovascular:      Rate and Rhythm: Normal rate and regular rhythm.   Pulmonary:      Effort: Pulmonary  effort is normal.      Breath sounds: Normal breath sounds.   Musculoskeletal:         General: No swelling or tenderness. Normal range of motion.      Cervical back: Normal range of motion and neck supple.      Right lower leg: No edema.      Left lower leg: No edema.   Skin:     General: Skin is warm and dry.      Coloration: Skin is not jaundiced.      Findings: No rash.   Neurological:      General: No focal deficit present.      Mental Status: She is alert and oriented to person, place, and time.      GCS: GCS eye subscore is 4. GCS verbal subscore is 5. GCS motor subscore is 6.      Cranial Nerves: No cranial nerve deficit.      Sensory: No sensory deficit.      Motor: Motor function is intact. No weakness.      Coordination: Coordination is intact. Coordination normal. Finger-Nose-Finger Test, Heel to Shin Test and Romberg Test normal.      Gait: Tandem walk abnormal. Gait normal.      Deep Tendon Reflexes: Reflexes normal.      Reflex Scores:       Tricep reflexes are 2+ on the right side and 2+ on the left side.       Bicep reflexes are 2+ on the right side and 2+ on the left side.       Brachioradialis reflexes are 2+ on the right side and 2+ on the left side.       Patellar reflexes are 2+ on the right side and 2+ on the left side.       Achilles reflexes are 2+ on the right side and 2+ on the left side.  Psychiatric:         Mood and Affect: Mood normal.         Speech: Speech normal.         Behavior: Behavior normal.        Assessment:     Problem List Items Addressed This Visit          Neuro    Generalized epilepsy       Other    Encounter for long-term current use of medication - Primary    Relevant Orders    Valproic Acid    Comprehensive Metabolic Panel     Other Visit Diagnoses       Seizure        Relevant Medications    divalproex 500 MG Tb24             Primary Diagnosis and ICD10  Encounter for long-term current use of medication [Z79.899]    Plan:     Patient Instructions   Continue current  Depakote ER 1000mg at night  CMP and depakote level  Good eating and sleep habits encouraged  Notify clinic if any seizures      Seizure Precautions:  1. Take medications as directed  2. Avoid open flames and hot surfaces such as fires and grills  3. Avoid elevations such as ladders or stools  4. Avoid swimming alone  5. Make sure getting plenty of sleep, recommend 7-8 hours per day  6. Avoid alcohol and illicit drugs  7. No driving or operating heavy machinery for 6 months after last known seizure     Medications Discontinued During This Encounter   Medication Reason    divalproex (DEPAKOTE) 250 MG EC tablet     divalproex ER (DEPAKOTE) 500 MG Tb24 Reorder         Requested Prescriptions     Signed Prescriptions Disp Refills    divalproex 500 MG Tb24 180 tablet 1     Sig: TAKE 2 TABLETS BY MOUTH EACH NIGHT AT BEDTIME FOR SEIZURES       Orders Placed This Encounter   Procedures    Valproic Acid    Comprehensive Metabolic Panel

## 2022-10-27 ENCOUNTER — OFFICE VISIT (OUTPATIENT)
Dept: PRIMARY CARE CLINIC | Facility: CLINIC | Age: 77
End: 2022-10-27
Payer: MEDICARE

## 2022-10-27 VITALS
WEIGHT: 189 LBS | OXYGEN SATURATION: 99 % | HEART RATE: 71 BPM | HEIGHT: 66 IN | SYSTOLIC BLOOD PRESSURE: 138 MMHG | DIASTOLIC BLOOD PRESSURE: 70 MMHG | BODY MASS INDEX: 30.37 KG/M2 | TEMPERATURE: 98 F | RESPIRATION RATE: 17 BRPM

## 2022-10-27 DIAGNOSIS — I83.029 VENOUS ULCER OF LEFT LEG: ICD-10-CM

## 2022-10-27 DIAGNOSIS — I87.2 VENOUS INSUFFICIENCY (CHRONIC) (PERIPHERAL): ICD-10-CM

## 2022-10-27 DIAGNOSIS — L97.929 VENOUS ULCER OF LEFT LEG: ICD-10-CM

## 2022-10-27 DIAGNOSIS — E03.9 HYPOTHYROIDISM, UNSPECIFIED TYPE: ICD-10-CM

## 2022-10-27 DIAGNOSIS — I10 PRIMARY HYPERTENSION: ICD-10-CM

## 2022-10-27 DIAGNOSIS — E78.5 HYPERLIPIDEMIA, UNSPECIFIED HYPERLIPIDEMIA TYPE: ICD-10-CM

## 2022-10-27 DIAGNOSIS — I10 ESSENTIAL HYPERTENSION, BENIGN: Primary | ICD-10-CM

## 2022-10-27 PROCEDURE — 3288F PR FALLS RISK ASSESSMENT DOCUMENTED: ICD-10-PCS | Mod: ,,, | Performed by: NURSE PRACTITIONER

## 2022-10-27 PROCEDURE — 1101F PR PT FALLS ASSESS DOC 0-1 FALLS W/OUT INJ PAST YR: ICD-10-PCS | Mod: ,,, | Performed by: NURSE PRACTITIONER

## 2022-10-27 PROCEDURE — 1159F PR MEDICATION LIST DOCUMENTED IN MEDICAL RECORD: ICD-10-PCS | Mod: ,,, | Performed by: NURSE PRACTITIONER

## 2022-10-27 PROCEDURE — 1159F MED LIST DOCD IN RCRD: CPT | Mod: ,,, | Performed by: NURSE PRACTITIONER

## 2022-10-27 PROCEDURE — 1101F PT FALLS ASSESS-DOCD LE1/YR: CPT | Mod: ,,, | Performed by: NURSE PRACTITIONER

## 2022-10-27 PROCEDURE — 3078F PR MOST RECENT DIASTOLIC BLOOD PRESSURE < 80 MM HG: ICD-10-PCS | Mod: ,,, | Performed by: NURSE PRACTITIONER

## 2022-10-27 PROCEDURE — 3078F DIAST BP <80 MM HG: CPT | Mod: ,,, | Performed by: NURSE PRACTITIONER

## 2022-10-27 PROCEDURE — 3288F FALL RISK ASSESSMENT DOCD: CPT | Mod: ,,, | Performed by: NURSE PRACTITIONER

## 2022-10-27 PROCEDURE — 3075F SYST BP GE 130 - 139MM HG: CPT | Mod: ,,, | Performed by: NURSE PRACTITIONER

## 2022-10-27 PROCEDURE — 3075F PR MOST RECENT SYSTOLIC BLOOD PRESS GE 130-139MM HG: ICD-10-PCS | Mod: ,,, | Performed by: NURSE PRACTITIONER

## 2022-10-27 PROCEDURE — 99213 PR OFFICE/OUTPT VISIT, EST, LEVL III, 20-29 MIN: ICD-10-PCS | Mod: ,,, | Performed by: NURSE PRACTITIONER

## 2022-10-27 PROCEDURE — 99213 OFFICE O/P EST LOW 20 MIN: CPT | Mod: ,,, | Performed by: NURSE PRACTITIONER

## 2022-10-27 RX ORDER — LOSARTAN POTASSIUM 50 MG/1
50 TABLET ORAL DAILY
Qty: 90 TABLET | Refills: 3 | Status: SHIPPED | OUTPATIENT
Start: 2022-10-27 | End: 2023-08-08 | Stop reason: SDUPTHER

## 2022-10-27 RX ORDER — CLORAZEPATE DIPOTASSIUM 3.75 MG/1
TABLET ORAL
Qty: 60 TABLET | Refills: 5 | Status: SHIPPED | OUTPATIENT
Start: 2022-10-27 | End: 2023-02-07 | Stop reason: SDUPTHER

## 2022-10-27 RX ORDER — ALBUTEROL SULFATE 2 MG/5ML
2 SYRUP ORAL 3 TIMES DAILY
Qty: 473 ML | Refills: 5 | Status: SHIPPED | OUTPATIENT
Start: 2022-10-27 | End: 2023-11-07

## 2022-10-27 RX ORDER — DIVALPROEX SODIUM 250 MG/1
250 TABLET, DELAYED RELEASE ORAL EVERY MORNING
COMMUNITY
End: 2022-11-04 | Stop reason: SDUPTHER

## 2022-10-27 RX ORDER — MELOXICAM 15 MG/1
15 TABLET ORAL DAILY PRN
COMMUNITY
End: 2022-12-20 | Stop reason: SDUPTHER

## 2022-10-27 RX ORDER — CILOSTAZOL 50 MG/1
50 TABLET ORAL 2 TIMES DAILY
Qty: 180 TABLET | Refills: 3 | Status: SHIPPED | OUTPATIENT
Start: 2022-10-27 | End: 2023-08-08 | Stop reason: SDUPTHER

## 2022-10-27 RX ORDER — FUROSEMIDE 20 MG/1
20 TABLET ORAL DAILY
Qty: 90 TABLET | Refills: 3 | Status: SHIPPED | OUTPATIENT
Start: 2022-10-27 | End: 2023-08-08 | Stop reason: SDUPTHER

## 2022-10-27 RX ORDER — LEVOTHYROXINE SODIUM 50 UG/1
50 TABLET ORAL
Qty: 90 TABLET | Refills: 3 | Status: SHIPPED | OUTPATIENT
Start: 2022-10-27 | End: 2023-08-08 | Stop reason: SDUPTHER

## 2022-10-27 NOTE — PROGRESS NOTES
Subjective:       Patient ID: Umesh Lassiter is a 77 y.o. female.    Chief Complaint: Follow-up    Follow-up  Pertinent negatives include no abdominal pain, chest pain, congestion, coughing, fatigue, fever, headaches, nausea, rash, sore throat or vertigo.    Umesh Lassiter presents today for routine follow up. Denies new complaints or problems at present.    Review of Systems   Constitutional:  Negative for fatigue, fever and unexpected weight change.   HENT:  Negative for nasal congestion, postnasal drip and sore throat.    Eyes:  Negative for pain and redness.   Respiratory:  Negative for cough, shortness of breath and wheezing.    Cardiovascular:  Negative for chest pain and leg swelling.   Gastrointestinal:  Negative for abdominal pain, constipation, diarrhea and nausea.   Genitourinary:  Negative for dysuria and hematuria.   Musculoskeletal:  Negative for gait problem.   Integumentary:  Negative for color change and rash.   Neurological:  Negative for vertigo, syncope and headaches.       Objective:      Physical Exam  Constitutional:       Appearance: Normal appearance.   HENT:      Head: Normocephalic.   Eyes:      Pupils: Pupils are equal, round, and reactive to light.   Cardiovascular:      Rate and Rhythm: Normal rate and regular rhythm.      Pulses: Normal pulses.      Heart sounds: Normal heart sounds.   Pulmonary:      Effort: Pulmonary effort is normal. No respiratory distress.      Breath sounds: Normal breath sounds. No wheezing, rhonchi or rales.   Abdominal:      General: Bowel sounds are normal.      Palpations: Abdomen is soft.      Tenderness: There is no abdominal tenderness.   Musculoskeletal:         General: Normal range of motion.      Cervical back: Normal range of motion and neck supple. No tenderness.   Skin:     General: Skin is warm and dry.   Neurological:      General: No focal deficit present.      Mental Status: She is alert.      Cranial Nerves: No cranial nerve deficit.       Gait: Gait normal.       Assessment:       Problem List Items Addressed This Visit          Cardiac/Vascular    Hypertension    Relevant Medications    losartan (COZAAR) 50 MG tablet    Hyperlipemia       Endocrine    Hypothyroidism       Orthopedic    Venous ulcer of left leg     Other Visit Diagnoses       Essential hypertension, benign    -  Primary    Relevant Medications    losartan (COZAAR) 50 MG tablet    Venous insufficiency (chronic) (peripheral)        Relevant Medications    furosemide (LASIX) 20 MG tablet    cilostazoL (PLETAL) 50 MG Tab              Plan:       Refills

## 2022-11-04 DIAGNOSIS — G40.309 GENERALIZED EPILEPSY: Primary | ICD-10-CM

## 2022-11-04 RX ORDER — DIVALPROEX SODIUM 250 MG/1
250 TABLET, DELAYED RELEASE ORAL EVERY MORNING
Qty: 90 TABLET | Refills: 1 | Status: SHIPPED | OUTPATIENT
Start: 2022-11-04 | End: 2023-04-11

## 2022-11-07 ENCOUNTER — OFFICE VISIT (OUTPATIENT)
Dept: VASCULAR SURGERY | Facility: CLINIC | Age: 77
End: 2022-11-07
Payer: MEDICARE

## 2022-11-07 VITALS
HEIGHT: 65 IN | WEIGHT: 185 LBS | RESPIRATION RATE: 12 BRPM | DIASTOLIC BLOOD PRESSURE: 80 MMHG | HEART RATE: 96 BPM | SYSTOLIC BLOOD PRESSURE: 144 MMHG | BODY MASS INDEX: 30.82 KG/M2

## 2022-11-07 DIAGNOSIS — I87.2 VENOUS INSUFFICIENCY: ICD-10-CM

## 2022-11-07 DIAGNOSIS — L97.929 STASIS EDEMA WITH ULCER, LEFT: Primary | ICD-10-CM

## 2022-11-07 DIAGNOSIS — R60.0 EDEMA, LOWER EXTREMITY: ICD-10-CM

## 2022-11-07 DIAGNOSIS — I87.312 STASIS EDEMA WITH ULCER, LEFT: Primary | ICD-10-CM

## 2022-11-07 DIAGNOSIS — L81.9 HYPERPIGMENTATION OF SKIN: ICD-10-CM

## 2022-11-07 PROCEDURE — 1159F PR MEDICATION LIST DOCUMENTED IN MEDICAL RECORD: ICD-10-PCS | Mod: CPTII,,, | Performed by: FAMILY MEDICINE

## 2022-11-07 PROCEDURE — 99214 OFFICE O/P EST MOD 30 MIN: CPT | Mod: PBBFAC | Performed by: FAMILY MEDICINE

## 2022-11-07 PROCEDURE — 3077F PR MOST RECENT SYSTOLIC BLOOD PRESSURE >= 140 MM HG: ICD-10-PCS | Mod: CPTII,,, | Performed by: FAMILY MEDICINE

## 2022-11-07 PROCEDURE — 3077F SYST BP >= 140 MM HG: CPT | Mod: CPTII,,, | Performed by: FAMILY MEDICINE

## 2022-11-07 PROCEDURE — 1159F MED LIST DOCD IN RCRD: CPT | Mod: CPTII,,, | Performed by: FAMILY MEDICINE

## 2022-11-07 PROCEDURE — 99214 PR OFFICE/OUTPT VISIT, EST, LEVL IV, 30-39 MIN: ICD-10-PCS | Mod: S$PBB,,, | Performed by: FAMILY MEDICINE

## 2022-11-07 PROCEDURE — 1160F RVW MEDS BY RX/DR IN RCRD: CPT | Mod: CPTII,,, | Performed by: FAMILY MEDICINE

## 2022-11-07 PROCEDURE — 3079F DIAST BP 80-89 MM HG: CPT | Mod: CPTII,,, | Performed by: FAMILY MEDICINE

## 2022-11-07 PROCEDURE — 99214 OFFICE O/P EST MOD 30 MIN: CPT | Mod: S$PBB,,, | Performed by: FAMILY MEDICINE

## 2022-11-07 PROCEDURE — 1160F PR REVIEW ALL MEDS BY PRESCRIBER/CLIN PHARMACIST DOCUMENTED: ICD-10-PCS | Mod: CPTII,,, | Performed by: FAMILY MEDICINE

## 2022-11-07 PROCEDURE — 3079F PR MOST RECENT DIASTOLIC BLOOD PRESSURE 80-89 MM HG: ICD-10-PCS | Mod: CPTII,,, | Performed by: FAMILY MEDICINE

## 2022-11-07 NOTE — PROGRESS NOTES
[facility]ENTER CLINIC NOTE    Patient ID: Umesh Lassiter is a 77 y.o. female.    I. HISTORY     Chief Complaint:   Chief Complaint   Patient presents with    Wound Check     Procedure room 1. 1M WOUND CK          HPI: Umesh Lassiter is a 77 y.o. female who presents today for a one-month follow-up and wound check.  The patient's wound looks a little worse today it seems the superficial ulcerations have spread.  There are several small ulcerations and skin deep, verses 1 large ulceration.  The patient states that his been hurting her since her last visit on 10/06/2022.  She states that we had debrided the wound to deeply.  In fact, we did not debride the wound last time.  We have not debrided her wound since February of 2022.  The patient continues to take care of the wound as she wishes at home without following any instructions from our clinic and feels that we are the reason for her worsening wound condition.  I feel that the patient is not satisfied with the progress so far it may be satisfied being referred elsewhere for wound care.  I will refer her to our [facility] care department for further evaluation and treatment.  Patient agrees to this plan.    Clinical summary:  The patient is known to our clinic but has not been seen since 03/02/2021 for a one-month follow-up status post left great saphenous vein ablation.  She has been lost to follow-up since.  She states that this ulceration of her left lower extremity has been present for approximately 6 months.  She states that his been persistent and worsening during that time.  She has not been wearing compression daily.  She has pitting edema, hyperpigmentation along with the ulceration of the left medial Gator region.  No signs of infection, including purulent drainage, increased temperature or foul odor.  She denies complaints of her right lower extremity.  Do not use Unna boot with patient, she does not tolerate well.    Reflux study 12/21/2021 shows no  evidence of DVT or superficial venous reflux.  Shows a well ablated proximal left great saphenous vein and ablated left small saphenous vein.     Left great saphenous vein laser ablation 01/08/2021.  Right great saphenous vein, duplicate, laser ablation 12/17/2020.  Right distal great saphenous varithena ablation 02/28/2020.  Left small saphenous vein laser ablation 04/27/2018.  Right great saphenous vein laser ablation 04/20/2018.    Past Medical History:   Diagnosis Date    Anxiety     Asthma     CVA (cerebral vascular accident) 05/23/2018    DVT (deep venous thrombosis) 2019    Epilepsy     Hyperlipidemia     Hypertension     Hypothyroid     PVD (peripheral vascular disease)     Venous stasis     Wound infection     left lower leg        Past Surgical History:   Procedure Laterality Date    KNEE ARTHROSCOPY Left     x 2    LASER ABLATION Right 04/20/2018    GSV LASER ABLATION PERFORMED BY DR. KARMA BUTCHER.    LASER ABLATION Left 04/27/2018    SSV LASER ABLATION PERFORMED BY DR. KARMA BUTCHER.    LASER ABLATION Right 12/17/2020    GSV ( DUPLICATED )LASER ABLATION PERFORMED BY DR. KARMA BUTCHER.    LASER ABLATION Left 01/08/2021    GSV LASER ABLATION PERFORMED BY DR. KARMA BUTCHER.    Remove cataracts, insert lens Bilateral     SHOULDER ARTHROSCOPY Left     SINUS SURGERY      TOTAL ABDOMINAL HYSTERECTOMY W/ BILATERAL SALPINGOOPHORECTOMY      VENOUS ABLATION Right 02/28/2020    DISTAL GSV VARITHENA ABLATION PERFORMED BY DR. KARMA BUTCHER.       Social History     Tobacco Use   Smoking Status Never   Smokeless Tobacco Never         Current Outpatient Medications:     albuterol (PROVENTIL/VENTOLIN HFA) 90 mcg/actuation inhaler, Inhale 2 puffs into the lungs every 6 (six) hours as needed for Wheezing. Rescue, Disp: 6.7 g, Rfl: 0    albuterol 2 mg/5 mL syrup, Take 5 mLs (2 mg total) by mouth 3 (three) times daily., Disp: 473 mL, Rfl: 5    cilostazoL (PLETAL) 50 MG Tab, Take 1 tablet (50 mg total) by mouth 2 (two) times  daily., Disp: 180 tablet, Rfl: 3    clorazepate (TRANXENE) 3.75 MG Tab, TAKE 1 TABLET BY MOUTH TWICE A DAY AS NEEDED FOR ANXIETY *CAUSES DROWSINESS-AVOID ALCOHOL!!, Disp: 60 tablet, Rfl: 5    divalproex (DEPAKOTE) 250 MG EC tablet, Take 1 tablet (250 mg total) by mouth every morning., Disp: 90 tablet, Rfl: 1    divalproex 500 MG Tb24, TAKE 2 TABLETS BY MOUTH EACH NIGHT AT BEDTIME FOR SEIZURES, Disp: 180 tablet, Rfl: 1    ferrous sulfate (FEOSOL) 325 mg (65 mg iron) Tab tablet, Take 1 tablet (325 mg total) by mouth daily with breakfast., Disp: 90 tablet, Rfl: 0    furosemide (LASIX) 20 MG tablet, Take 1 tablet (20 mg total) by mouth once daily., Disp: 90 tablet, Rfl: 3    HYDROcodone-acetaminophen (NORCO) 5-325 mg per tablet, Take 1 tablet by mouth every 6 (six) hours as needed for Pain., Disp: 20 tablet, Rfl: 0    levothyroxine (SYNTHROID) 50 MCG tablet, Take 1 tablet (50 mcg total) by mouth before breakfast. Take on an empty stomach with a full glass of water. Nothing else to eat or drink for 30 mins, Disp: 90 tablet, Rfl: 3    losartan (COZAAR) 50 MG tablet, Take 1 tablet (50 mg total) by mouth once daily., Disp: 90 tablet, Rfl: 3    meloxicam (MOBIC) 15 MG tablet, Take 15 mg by mouth daily as needed for Pain., Disp: , Rfl:     silver sulfADIAZINE 1% (SILVADENE) 1 % cream, Apply topically once daily., Disp: 90 g, Rfl: 2    Review of Systems   Constitutional:  Negative for activity change, chills, diaphoresis, fatigue and fever.   Respiratory:  Negative for cough and shortness of breath.    Cardiovascular:  Positive for leg swelling. Negative for chest pain and claudication.        Hyperpigmentation LE   Gastrointestinal:  Negative for nausea and vomiting.   Musculoskeletal:  Positive for leg pain. Negative for joint swelling.   Integumentary:  Negative for rash and wound.   Neurological:  Negative for weakness and numbness.     II. PHYSICAL EXAM     Physical Exam  Constitutional:       General: She is awake. She  is not in acute distress.     Appearance: Normal appearance. She is obese. She is not ill-appearing or toxic-appearing.   HENT:      Head: Normocephalic and atraumatic.   Eyes:      Extraocular Movements: Extraocular movements intact.      Conjunctiva/sclera: Conjunctivae normal.      Pupils: Pupils are equal, round, and reactive to light.   Neck:      Vascular: No carotid bruit or JVD.   Cardiovascular:      Rate and Rhythm: Normal rate and regular rhythm.      Pulses:           Dorsalis pedis pulses are detected w/ Doppler on the left side.        Posterior tibial pulses are detected w/ Doppler on the left side.      Heart sounds: No murmur heard.  Pulmonary:      Effort: Pulmonary effort is normal. No respiratory distress.      Breath sounds: No stridor. No wheezing, rhonchi or rales.   Musculoskeletal:         General: No swelling, tenderness or deformity.      Right lower le+ Edema present.      Left lower le+ Edema present.        Legs:       Comments: Ulcer of left medial Gator region shallow in appearance with several small areas of skin breakdown.     Hyperpigmentation of the left lower Gator region along with +2 edema.  No evidence of infection today.   Feet:      Comments: Triphasic dopplerable pulses of the left lower extremity.  Skin:     General: Skin is warm.      Capillary Refill: Capillary refill takes less than 2 seconds.      Coloration: Skin is not ashen.      Findings: No bruising, erythema, lesion, rash or wound.   Neurological:      Mental Status: She is alert and oriented to person, place, and time.      Motor: No weakness.   Psychiatric:         Speech: Speech normal.         Behavior: Behavior normal. Behavior is cooperative.     Reticular/Spider veins noted:  RLE: medial calf  LLE: none    Varicose veins noted:  RLE: medial calf  LLE:  medial calf    CEAP Classification  Clinical Signs: Class 6 - Leg ulceration, skin changes as defined above  Etiologic Classification:  Primary  Anatomic distribution: Superficial  Pathophysiologic dysfunction: Reflux     Venous Clinical Severity Score  Pain:2=Daily, moderate activity limitation, occasional analgesics  Varicose Veins: 1=Few, scattered. Branch varicose veins  Venous Edema: 2=Afternoon edema, above ankle  Pigmentation: 2=Diffuse over most of gaiter distribution (lower 1/3) or recent pigmentation (purple)  Inflammation: 1=Mild cellulitis, limited to marginal area around ulcer  Induration: 1=Focal, circummalleolar (< 5 cm)  Number of Active Ulcers: 2=2  Active Ulceration, Duration: 2=>3 months, <1 year  Active Ulcer Size: 2=2 - 6 cm diameter  Compressive Therapy: 3=Full compliance, stockings + elevation  Total Score: 18     III. ASSESSMENT & PLAN (MEDICAL DECISION MAKING)     1. Stasis edema with ulcer, left    2. Hyperpigmentation of skin    3. Edema, lower extremity    4. Venous insufficiency            Assessment/Diagnosis and Plan:  Her wound is actually a little worse today.  She has no further correctable venous insufficiency at this time.  The patient continues to be noncompliant with wound care instructions at home he will not use home health.  She is dissatisfied with us for lack of results with wound healing.  Therefore, I will refer the patient to [facility] for continued patient care.  She does not have to follow-up with us.    Today, the wound was cleaned with wounds spray and skin protection applied.  Apply dressing consisting of Silvadene, Telfa, Kerlix, ABD pad and Ace wrap.      Minh Cisneros,

## 2022-11-09 DIAGNOSIS — Z71.89 COMPLEX CARE COORDINATION: ICD-10-CM

## 2022-11-22 ENCOUNTER — OFFICE VISIT (OUTPATIENT)
Dept: WOUND CARE | Facility: CLINIC | Age: 77
End: 2022-11-22
Attending: FAMILY MEDICINE
Payer: MEDICARE

## 2022-11-22 VITALS
RESPIRATION RATE: 20 BRPM | TEMPERATURE: 97 F | DIASTOLIC BLOOD PRESSURE: 69 MMHG | SYSTOLIC BLOOD PRESSURE: 129 MMHG | HEART RATE: 78 BPM

## 2022-11-22 DIAGNOSIS — I87.312 STASIS EDEMA WITH ULCER, LEFT: ICD-10-CM

## 2022-11-22 DIAGNOSIS — L97.929 STASIS EDEMA WITH ULCER, LEFT: ICD-10-CM

## 2022-11-22 DIAGNOSIS — L97.922 NON-PRESSURE CHRONIC ULCER OF LEFT LOWER LEG WITH FAT LAYER EXPOSED: Primary | ICD-10-CM

## 2022-11-22 DIAGNOSIS — I73.9 PVD (PERIPHERAL VASCULAR DISEASE): ICD-10-CM

## 2022-11-22 DIAGNOSIS — R60.0 EDEMA, LOWER EXTREMITY: ICD-10-CM

## 2022-11-22 PROCEDURE — 1125F AMNT PAIN NOTED PAIN PRSNT: CPT | Mod: CPTII,,, | Performed by: NURSE PRACTITIONER

## 2022-11-22 PROCEDURE — 99499 UNLISTED E&M SERVICE: CPT | Mod: S$PBB,,, | Performed by: NURSE PRACTITIONER

## 2022-11-22 PROCEDURE — 87076 CULTURE, ANAEROBE: ICD-10-PCS | Mod: ,,, | Performed by: CLINICAL MEDICAL LABORATORY

## 2022-11-22 PROCEDURE — 3074F PR MOST RECENT SYSTOLIC BLOOD PRESSURE < 130 MM HG: ICD-10-PCS | Mod: CPTII,,, | Performed by: NURSE PRACTITIONER

## 2022-11-22 PROCEDURE — 87070 CULTURE OTHR SPECIMN AEROBIC: CPT | Mod: ,,, | Performed by: CLINICAL MEDICAL LABORATORY

## 2022-11-22 PROCEDURE — 87075 CULTURE, ANAEROBE: ICD-10-PCS | Mod: ,,, | Performed by: CLINICAL MEDICAL LABORATORY

## 2022-11-22 PROCEDURE — 1159F MED LIST DOCD IN RCRD: CPT | Mod: CPTII,,, | Performed by: NURSE PRACTITIONER

## 2022-11-22 PROCEDURE — 11045 DEBRIDEMENT: ICD-10-PCS | Mod: S$PBB,,, | Performed by: NURSE PRACTITIONER

## 2022-11-22 PROCEDURE — 87186 CULTURE, WOUND: ICD-10-PCS | Mod: ,,, | Performed by: CLINICAL MEDICAL LABORATORY

## 2022-11-22 PROCEDURE — 3078F DIAST BP <80 MM HG: CPT | Mod: CPTII,,, | Performed by: NURSE PRACTITIONER

## 2022-11-22 PROCEDURE — 1101F PT FALLS ASSESS-DOCD LE1/YR: CPT | Mod: CPTII,,, | Performed by: NURSE PRACTITIONER

## 2022-11-22 PROCEDURE — 11042 DBRDMT SUBQ TIS 1ST 20SQCM/<: CPT | Mod: PBBFAC | Performed by: NURSE PRACTITIONER

## 2022-11-22 PROCEDURE — 1125F PR PAIN SEVERITY QUANTIFIED, PAIN PRESENT: ICD-10-PCS | Mod: CPTII,,, | Performed by: NURSE PRACTITIONER

## 2022-11-22 PROCEDURE — 1160F RVW MEDS BY RX/DR IN RCRD: CPT | Mod: CPTII,,, | Performed by: NURSE PRACTITIONER

## 2022-11-22 PROCEDURE — 1101F PR PT FALLS ASSESS DOC 0-1 FALLS W/OUT INJ PAST YR: ICD-10-PCS | Mod: CPTII,,, | Performed by: NURSE PRACTITIONER

## 2022-11-22 PROCEDURE — 11042 DEBRIDEMENT: ICD-10-PCS | Mod: S$PBB,,, | Performed by: NURSE PRACTITIONER

## 2022-11-22 PROCEDURE — 3288F PR FALLS RISK ASSESSMENT DOCUMENTED: ICD-10-PCS | Mod: CPTII,,, | Performed by: NURSE PRACTITIONER

## 2022-11-22 PROCEDURE — 99499 NO LOS: ICD-10-PCS | Mod: S$PBB,,, | Performed by: NURSE PRACTITIONER

## 2022-11-22 PROCEDURE — 11045 DBRDMT SUBQ TISS EACH ADDL: CPT | Mod: S$PBB,,, | Performed by: NURSE PRACTITIONER

## 2022-11-22 PROCEDURE — 3288F FALL RISK ASSESSMENT DOCD: CPT | Mod: CPTII,,, | Performed by: NURSE PRACTITIONER

## 2022-11-22 PROCEDURE — 87075 CULTR BACTERIA EXCEPT BLOOD: CPT | Mod: ,,, | Performed by: CLINICAL MEDICAL LABORATORY

## 2022-11-22 PROCEDURE — 3074F SYST BP LT 130 MM HG: CPT | Mod: CPTII,,, | Performed by: NURSE PRACTITIONER

## 2022-11-22 PROCEDURE — 87077 CULTURE, WOUND: ICD-10-PCS | Mod: ,,, | Performed by: CLINICAL MEDICAL LABORATORY

## 2022-11-22 PROCEDURE — 87070 CULTURE, WOUND: ICD-10-PCS | Mod: ,,, | Performed by: CLINICAL MEDICAL LABORATORY

## 2022-11-22 PROCEDURE — 1159F PR MEDICATION LIST DOCUMENTED IN MEDICAL RECORD: ICD-10-PCS | Mod: CPTII,,, | Performed by: NURSE PRACTITIONER

## 2022-11-22 PROCEDURE — 99215 OFFICE O/P EST HI 40 MIN: CPT | Mod: PBBFAC | Performed by: NURSE PRACTITIONER

## 2022-11-22 PROCEDURE — 87077 CULTURE AEROBIC IDENTIFY: CPT | Mod: ,,, | Performed by: CLINICAL MEDICAL LABORATORY

## 2022-11-22 PROCEDURE — 87186 SC STD MICRODIL/AGAR DIL: CPT | Mod: ,,, | Performed by: CLINICAL MEDICAL LABORATORY

## 2022-11-22 PROCEDURE — 87076 CULTURE ANAEROBE IDENT EACH: CPT | Mod: ,,, | Performed by: CLINICAL MEDICAL LABORATORY

## 2022-11-22 PROCEDURE — 3078F PR MOST RECENT DIASTOLIC BLOOD PRESSURE < 80 MM HG: ICD-10-PCS | Mod: CPTII,,, | Performed by: NURSE PRACTITIONER

## 2022-11-22 PROCEDURE — 1160F PR REVIEW ALL MEDS BY PRESCRIBER/CLIN PHARMACIST DOCUMENTED: ICD-10-PCS | Mod: CPTII,,, | Performed by: NURSE PRACTITIONER

## 2022-11-22 RX ORDER — GENTAMICIN SULFATE 1 MG/G
CREAM TOPICAL DAILY
Qty: 30 G | Refills: 2 | Status: SHIPPED | OUTPATIENT
Start: 2022-11-22 | End: 2023-02-07

## 2022-11-22 NOTE — PROGRESS NOTES
MELANI Saldaña   [facility]  [address] [facility] [address]  [phone]      PATIENT NAME: Umesh Lassiter  : 1945  DATE: 22  MRN: [id number]      Billing Provider: MELANI Saldaña  Level of Service:   Patient PCP Information       Provider PCP Type    MELANI uKo General            Reason for Visit / Chief Complaint: Venous Ulcer (LLE with edema)       History of Present Illness / Problem Focused Workflow     Umesh Lassiter is a 78 yo female presents to clinic with chronic non-healing wound to left lower leg. She was referred to Wound care by Dr. Cisneros. Wounds have biofilm to wound bed, bedside debridement today. She has been using silvadene on wound. D/c silvadene and use acetic acid and gentamycin with compression. She has had venous reflux study, set up arterial doppler. Cultures obtained today. Significant PMH includes diabetes, CVA, hypertension, hypothyroidism, PVD, and asthma. Reports minimal pain to wound. Denies fever/chills.       Review of Systems     Review of Systems   Constitutional:  Negative for activity change, chills and fever.   Respiratory:  Negative for chest tightness and shortness of breath.    Cardiovascular:  Positive for leg swelling. Negative for chest pain and palpitations.   Musculoskeletal:  Positive for arthralgias and joint swelling.   Skin:  Positive for wound.        wound   Neurological:  Positive for weakness and numbness.   Psychiatric/Behavioral:  Negative for agitation, behavioral problems, confusion and self-injury.      Medical / Social / Family History     Past Medical History:   Diagnosis Date    Anxiety     Asthma     CVA (cerebral vascular accident) 2018    DVT (deep venous thrombosis) 2019    Epilepsy     Hyperlipidemia     Hypertension     Hypothyroid     Non-pressure chronic ulcer of left lower leg with fat layer exposed     PVD (peripheral vascular disease)      Venous stasis     Wound infection     left lower leg       Past Surgical History:   Procedure Laterality Date    KNEE ARTHROSCOPY Left     x 2    LASER ABLATION Right 04/20/2018    GSV LASER ABLATION PERFORMED BY DR. KARMA BUTCHER.    LASER ABLATION Left 04/27/2018    SSV LASER ABLATION PERFORMED BY DR. KARMA BUTCHER.    LASER ABLATION Right 12/17/2020    GSV ( DUPLICATED )LASER ABLATION PERFORMED BY DR. KARMA BUTCHER.    LASER ABLATION Left 01/08/2021    GSV LASER ABLATION PERFORMED BY DR. KARMA BUTCHER.    Remove cataracts, insert lens Bilateral     SHOULDER ARTHROSCOPY Left     SINUS SURGERY      TOTAL ABDOMINAL HYSTERECTOMY W/ BILATERAL SALPINGOOPHORECTOMY      VENOUS ABLATION Right 02/28/2020    DISTAL GSV VARITHENA ABLATION PERFORMED BY DR. KARMA BUTCHER.       Social History  Ms. Umesh Lassiter  reports that she has never smoked. She has never used smokeless tobacco. She reports that she does not drink alcohol and does not use drugs.    Family History  Ms.'s Umesh Lassiter family history includes Alzheimer's disease in her mother; Cancer in her brother; Diabetes in her brother and sister; Endometrial cancer in her mother and sister; Heart disease in her daughter and mother; Hypertension in her sister; Lung cancer in her father; No Known Problems in her sister, sister, sister, sister, sister, son, son, and son; Polycystic kidney disease in her sister; Seizures in her brother; Sickle cell anemia in her daughter; Thyroid disease in her sister.    Medications and Allergies     Medications  Outpatient Medications Marked as Taking for the 11/22/22 encounter (Office Visit) with MELANI Saldaña   Medication Sig Dispense Refill    albuterol (PROVENTIL/VENTOLIN HFA) 90 mcg/actuation inhaler Inhale 2 puffs into the lungs every 6 (six) hours as needed for Wheezing. Rescue 6.7 g 0    albuterol 2 mg/5 mL syrup Take 5 mLs (2 mg total) by mouth 3 (three) times daily. 473 mL 5    cilostazoL (PLETAL) 50 MG Tab Take 1  tablet (50 mg total) by mouth 2 (two) times daily. 180 tablet 3    clorazepate (TRANXENE) 3.75 MG Tab TAKE 1 TABLET BY MOUTH TWICE A DAY AS NEEDED FOR ANXIETY *CAUSES DROWSINESS-AVOID ALCOHOL!! 60 tablet 5    divalproex (DEPAKOTE) 250 MG EC tablet Take 1 tablet (250 mg total) by mouth every morning. 90 tablet 1    divalproex 500 MG Tb24 TAKE 2 TABLETS BY MOUTH EACH NIGHT AT BEDTIME FOR SEIZURES 180 tablet 1    ferrous sulfate (FEOSOL) 325 mg (65 mg iron) Tab tablet Take 1 tablet (325 mg total) by mouth daily with breakfast. 90 tablet 0    furosemide (LASIX) 20 MG tablet Take 1 tablet (20 mg total) by mouth once daily. 90 tablet 3    gentamicin (GARAMYCIN) 0.1 % cream Apply topically once daily. 30 g 2    HYDROcodone-acetaminophen (NORCO) 5-325 mg per tablet Take 1 tablet by mouth every 6 (six) hours as needed for Pain. 20 tablet 0    levothyroxine (SYNTHROID) 50 MCG tablet Take 1 tablet (50 mcg total) by mouth before breakfast. Take on an empty stomach with a full glass of water. Nothing else to eat or drink for 30 mins 90 tablet 3    losartan (COZAAR) 50 MG tablet Take 1 tablet (50 mg total) by mouth once daily. 90 tablet 3    meloxicam (MOBIC) 15 MG tablet Take 15 mg by mouth daily as needed for Pain.         Allergies  Review of patient's allergies indicates:   Allergen Reactions    Influenza virus vaccines      Pt states had to be admitted to hospital after last flu vaccine     Lexapro [escitalopram]        Physical Examination     Vitals:    11/22/22 1018   BP: 129/69   Pulse: 78   Resp: 20   Temp: 97.4 °F (36.3 °C)     Physical Exam  Vitals and nursing note reviewed.   Constitutional:       Appearance: Normal appearance.   HENT:      Head: Normocephalic.   Cardiovascular:      Rate and Rhythm: Normal rate and regular rhythm.      Pulses: Normal pulses.      Heart sounds: Normal heart sounds.   Pulmonary:      Effort: Pulmonary effort is normal. No respiratory distress.   Chest:      Chest wall: No tenderness.    Musculoskeletal:         General: Swelling and tenderness present.      Left lower leg: Edema present.   Skin:     General: Skin is warm and dry.      Comments: See LDA for measurements and picture   Neurological:      General: No focal deficit present.      Mental Status: She is alert and oriented to person, place, and time. Mental status is at baseline.   Psychiatric:         Mood and Affect: Mood normal.         Behavior: Behavior normal.         Thought Content: Thought content normal.         Judgment: Judgment normal.       Assessment and Plan             Wound 01/13/22 1900 Venous Ulcer Left lower #1 (Active)   01/13/22 1900    Pre-existing: Yes   Primary Wound Type: Venous ulcer   Side: Left   Orientation: lower   Location:    Wound Number: #1   Ankle-Brachial Index:    Pulses:    Removal Indication and Assessment:    Wound Outcome:    (Retired) Wound Type:    (Retired) Wound Length (cm):    (Retired) Wound Width (cm):    (Retired) Depth (cm):    Wound Description (Comments):    Removal Indications:    Wound Image      11/22/22 1027   Dressing Appearance Moist drainage 11/22/22 1027   Drainage Amount Moderate 11/22/22 1027   Drainage Characteristics/Odor Serous 11/22/22 1027   Appearance Pink;Tan;Moist;Fibrin;Granulating 11/22/22 1027   Tissue loss description Full thickness 11/22/22 1027   Black (%), Wound Tissue Color 0 % 11/22/22 1027   Red (%), Wound Tissue Color 20 % 11/22/22 1027   Yellow (%), Wound Tissue Color 80 % 11/22/22 1027   Periwound Area Moist;Pale white 11/22/22 1027   Wound Edges Irregular 11/22/22 1027   Wound Length (cm) 12.2 cm 11/22/22 1027   Wound Width (cm) 5 cm 11/22/22 1027   Wound Depth (cm) 0.3 cm 11/22/22 1027   Wound Volume (cm^3) 18.3 cm^3 11/22/22 1027   Wound Surface Area (cm^2) 61 cm^2 11/22/22 1027   Care Cleansed with:;Soap and water 11/22/22 1027   Dressing Applied;Gauze, wet to dry;Gauze;Rolled gauze 11/22/22 1027   Periwound Care Moisturizer applied 11/22/22 1027    Dressing Change Due 11/23/22 11/22/22 1027     Problem List Items Addressed This Visit          Orthopedic    Stasis edema with ulcer, left    Non-pressure chronic ulcer of left lower leg with fat layer exposed - Primary    Overview                      Current Assessment & Plan     Clean wounds with acetic acid (1/2 cup white vinegar into 1 1/2 cups plain water)  Apply gentamicin cream to wound,cover with drawtex. Apply mepitel over shin for protection. Cover with dry gauze, wrap with cast padding and Coban wrap from toes to knee.   If wrap slides down re wrap and if becomes to tight remove  Change twice a week and as needed for soilage.  Diabetes:  Monitor glucose closely. Check fasting glucose and 2 hours after meals. HgA1C goal <7, fasting glucose , and 2 hours after meals <180  Hypertension:  Check blood pressure twice daily, goal <120/80  Diet:   Increase protein intake, avoid fried, fatty foods and foods high in simple carbs.   Vitamins:  Take vitamin C 1000 mg, zinc 50mg, vitamin d 5000 units, and a daily multivitamin. [place] is a good source of protein and nutrients to aid in wound healing.   Monitor closely for s/s of infection including fever, chills, increase in pain, odor from wound, and increased redness from foot. Go to ER if any complications develop.   Keep leg elevated and avoid pressure on wound.  Home Health to change dressings twice a week         Relevant Orders    Culture, Wound    Culture, Anaerobe     Other Visit Diagnoses       Edema, lower extremity        PVD (peripheral vascular disease)        Relevant Orders    US Lower Extrem Arteries Bilat with JOSÉ (xpd)            Future Appointments   Date Time Provider Department Center   12/6/2022  9:00 AM [facility]  US1 [facility]   12/6/2022 10:00 AM [facility] Saldaña [facility]   1/27/2023 10:20 AM MELANI Ku[facility]   4/11/2023  9:30 AM MELANI [facility] NEURO [facility]    4/17/2023 10:00 AM [facility] NURSE, [facility] PRIMARY CARE [facility]            Signature:  MELANI Saldaña  [facility]  [address] [facility] [place] [address]  [phone]    Date of encounter: 11/22/22

## 2022-11-22 NOTE — PLAN OF CARE
[x]     Provide antibiotics as ordered. Provide systemic antibiotics immediately in patients with critical limb ischemia, have evidence of limb infection or cellulitis, and/or infected wounds.   [x]     Use aseptic techniques in the provision of wound care.   [x]     Consult Infectious Disease specialist as needed.    [x]     Provide debridement type based upon vascular status and wound assessment to remove necrotic tissue and decrease bioburden.   [x]     Follow Universal Precautions and Infection Control [place] to prevent the cross-contamination of organisms.      [x]     Assess for malnutrition, specifically, chronic causes of protein-energy malnutrition such as Diabetes, immobility, alcoholism/substance abuse, Morbid Obesity, COPD, infection, and increased age.    [x]     Review pertinent labs to evaluate renal function and nutritional status.    [x]     Obtain patient weight.    [x]     Obtain Nutrition Consult based upon nutrition risk assessment protocols.    [x]     Promote sufficient fluid and calories to promote wound healing.    [x]     Recommend vitamin and mineral supplements as needed.   [x]     Exercise as tolerated or progressive resistance activity.

## 2022-11-22 NOTE — PROGRESS NOTES
Debridement Performed for Assessment: Wound# 1  Performed By: Provider: Yesika Salgado NP  Assistant: SAMI Reyes RN    Debridement: Surgical    Photo taken post procedure: Yes    Time-Out Taken: Yes  Level: Skin/Subcutaneous Tissue  Post Debridement Measurements  Length: (cm) 12.4  Width: (cm) 5.4  Depth: (cm) 0.4      Area: (cm²) 66.9  Percent Debrided: 100%  Total Area Debrided: (sq cm) 66.9    Tissue and other material debrided:  Adipose, Dermis, Epidermis, Subcutaneous  Devitalized Tissue Debrided:Biofilm, Slough, Eschar  Instrument: Curette  Bleeding: Moderate  Hemostasis Achieved: Pressure  Procedural Pain: Insensate  Post Procedural Pain: Insensate  Response to Treatment: Procedure was tolerated well    Devitalized materials/tissue Removed  the following was removed during debridement  subcutaneous      Post Debridement Diagnosis  Post debridement diagnosis  Same as Pre-operative debridement diagnosis, No Complications noted.      Grafts or implants applied  Was a graft or implant applied?  No      Procedure assistant  Procedure assisted by:  Assistant is the same as nurse listed above      Complications related to procedure  Did any complication occure during procedure?  No complications noted during or after procedure.      Specimen  Specimen collect during procedure?  No specimen collected      Anaesthesia:  Anesthesia used  None      Blood Loss:  Blood loss during procedure  less than 5 cc

## 2022-11-22 NOTE — PROCEDURES
Debridement    Date/Time: 11/22/2022 10:00 AM  Performed by: MELANI Saldaña  Authorized by: MELANI Saldaña     Consent Done?:  Yes (Written)    Wound Details:    Location:  Left leg    Type of Debridement:  Excisional       Length (cm):  12.4       Area (sq cm):  66.96       Width (cm):  5.4       Percent Debrided (%):  100       Depth (cm):  0.4       Total Area Debrided (sq cm):  66.96    Depth of debridement:  Subcutaneous tissue    Tissue debrided:  Adipose, Dermis, Epidermis and Subcutaneous    Devitalized tissue debrided:  Biofilm, Exudate and Slough    Instruments:  Curette    Bleeding:  Minimal  Hemostasis Achieved: Yes    Method Used:  Pressure  Patient tolerance:  Patient tolerated the procedure well with no immediate complications     Assistant SAMI Reyes RN

## 2022-11-22 NOTE — PATIENT INSTRUCTIONS
Clean wounds with acetic acid (1/2 cup white vinegar into 1 1/2 cups plain water)  Apply gentamicin cream to wound,cover with drawtex. Apply mepitel over shin for protection. Cover with dry gauze, wrap with cast padding and Coban wrap from toes to knee.   If wrap slides down re wrap and if becomes to tight remove  Change twice a week and as needed for soilage.  Diabetes:  Monitor glucose closely. Check fasting glucose and 2 hours after meals. HgA1C goal <7, fasting glucose , and 2 hours after meals <180  Hypertension:  Check blood pressure twice daily, goal <120/80  Diet:   Increase protein intake, avoid fried, fatty foods and foods high in simple carbs.   Vitamins:  Take vitamin C 1000 mg, zinc 50mg, vitamin d 5000 units, and a daily multivitamin. [place] is a good source of protein and nutrients to aid in wound healing.   Monitor closely for s/s of infection including fever, chills, increase in pain, odor from wound, and increased redness from foot. Go to ER if any complications develop.   Keep leg elevated and avoid pressure on wound.  Home Health to change dressings twice a week

## 2022-11-24 LAB — MICROORGANISM SPEC CULT: ABNORMAL

## 2022-11-26 LAB — BACTERIA SPEC ANAEROBE CULT: ABNORMAL

## 2022-12-06 ENCOUNTER — OFFICE VISIT (OUTPATIENT)
Dept: WOUND CARE | Facility: CLINIC | Age: 77
End: 2022-12-06
Attending: FAMILY MEDICINE
Payer: MEDICARE

## 2022-12-06 ENCOUNTER — HOSPITAL ENCOUNTER (OUTPATIENT)
Dept: RADIOLOGY | Facility: HOSPITAL | Age: 77
Discharge: HOME | End: 2022-12-06
Attending: NURSE PRACTITIONER
Payer: MEDICARE

## 2022-12-06 VITALS
SYSTOLIC BLOOD PRESSURE: 142 MMHG | TEMPERATURE: 98 F | RESPIRATION RATE: 18 BRPM | HEART RATE: 102 BPM | DIASTOLIC BLOOD PRESSURE: 54 MMHG

## 2022-12-06 DIAGNOSIS — I73.9 PVD (PERIPHERAL VASCULAR DISEASE): ICD-10-CM

## 2022-12-06 DIAGNOSIS — L97.929 STASIS EDEMA WITH ULCER, LEFT: ICD-10-CM

## 2022-12-06 DIAGNOSIS — I87.312 STASIS EDEMA WITH ULCER, LEFT: ICD-10-CM

## 2022-12-06 DIAGNOSIS — L97.922 NON-PRESSURE CHRONIC ULCER OF LEFT LOWER LEG WITH FAT LAYER EXPOSED: Primary | ICD-10-CM

## 2022-12-06 DIAGNOSIS — R60.0 EDEMA, LOWER EXTREMITY: ICD-10-CM

## 2022-12-06 PROCEDURE — 1160F RVW MEDS BY RX/DR IN RCRD: CPT | Mod: CPTII,,, | Performed by: NURSE PRACTITIONER

## 2022-12-06 PROCEDURE — 3288F FALL RISK ASSESSMENT DOCD: CPT | Mod: CPTII,,, | Performed by: NURSE PRACTITIONER

## 2022-12-06 PROCEDURE — 93922 UPR/L XTREMITY ART 2 LEVELS: CPT | Mod: 26,,, | Performed by: RADIOLOGY

## 2022-12-06 PROCEDURE — 3078F PR MOST RECENT DIASTOLIC BLOOD PRESSURE < 80 MM HG: ICD-10-PCS | Mod: CPTII,,, | Performed by: NURSE PRACTITIONER

## 2022-12-06 PROCEDURE — 11042 DBRDMT SUBQ TIS 1ST 20SQCM/<: CPT | Mod: S$PBB,,, | Performed by: NURSE PRACTITIONER

## 2022-12-06 PROCEDURE — 1159F PR MEDICATION LIST DOCUMENTED IN MEDICAL RECORD: ICD-10-PCS | Mod: CPTII,,, | Performed by: NURSE PRACTITIONER

## 2022-12-06 PROCEDURE — 11042 DEBRIDEMENT: ICD-10-PCS | Mod: S$PBB,,, | Performed by: NURSE PRACTITIONER

## 2022-12-06 PROCEDURE — 3077F PR MOST RECENT SYSTOLIC BLOOD PRESSURE >= 140 MM HG: ICD-10-PCS | Mod: CPTII,,, | Performed by: NURSE PRACTITIONER

## 2022-12-06 PROCEDURE — 93922 US ARTERIAL LOWER EXTREMITY BILAT WITH ABI (XPD): ICD-10-PCS | Mod: 26,,, | Performed by: RADIOLOGY

## 2022-12-06 PROCEDURE — 99499 UNLISTED E&M SERVICE: CPT | Mod: S$PBB,,, | Performed by: NURSE PRACTITIONER

## 2022-12-06 PROCEDURE — 99499 NO LOS: ICD-10-PCS | Mod: S$PBB,,, | Performed by: NURSE PRACTITIONER

## 2022-12-06 PROCEDURE — 11045 DEBRIDEMENT: ICD-10-PCS | Mod: S$PBB,,, | Performed by: NURSE PRACTITIONER

## 2022-12-06 PROCEDURE — 3288F PR FALLS RISK ASSESSMENT DOCUMENTED: ICD-10-PCS | Mod: CPTII,,, | Performed by: NURSE PRACTITIONER

## 2022-12-06 PROCEDURE — 11045 DBRDMT SUBQ TISS EACH ADDL: CPT | Mod: PBBFAC | Performed by: NURSE PRACTITIONER

## 2022-12-06 PROCEDURE — 3077F SYST BP >= 140 MM HG: CPT | Mod: CPTII,,, | Performed by: NURSE PRACTITIONER

## 2022-12-06 PROCEDURE — 93925 US ARTERIAL LOWER EXTREMITY BILAT WITH ABI (XPD): ICD-10-PCS | Mod: 26,,, | Performed by: RADIOLOGY

## 2022-12-06 PROCEDURE — 1159F MED LIST DOCD IN RCRD: CPT | Mod: CPTII,,, | Performed by: NURSE PRACTITIONER

## 2022-12-06 PROCEDURE — 93925 LOWER EXTREMITY STUDY: CPT | Mod: TC

## 2022-12-06 PROCEDURE — 1101F PT FALLS ASSESS-DOCD LE1/YR: CPT | Mod: CPTII,,, | Performed by: NURSE PRACTITIONER

## 2022-12-06 PROCEDURE — 3078F DIAST BP <80 MM HG: CPT | Mod: CPTII,,, | Performed by: NURSE PRACTITIONER

## 2022-12-06 PROCEDURE — 99214 OFFICE O/P EST MOD 30 MIN: CPT | Mod: PBBFAC,25 | Performed by: NURSE PRACTITIONER

## 2022-12-06 PROCEDURE — 93925 LOWER EXTREMITY STUDY: CPT | Mod: 26,,, | Performed by: RADIOLOGY

## 2022-12-06 PROCEDURE — 1160F PR REVIEW ALL MEDS BY PRESCRIBER/CLIN PHARMACIST DOCUMENTED: ICD-10-PCS | Mod: CPTII,,, | Performed by: NURSE PRACTITIONER

## 2022-12-06 PROCEDURE — 1101F PR PT FALLS ASSESS DOC 0-1 FALLS W/OUT INJ PAST YR: ICD-10-PCS | Mod: CPTII,,, | Performed by: NURSE PRACTITIONER
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
## 2022-12-06 NOTE — PROCEDURES
Debridement    Date/Time: 12/6/2022 10:00 AM  Performed by: MELANI Saldaña  Authorized by: MELANI Saldaña     Consent Done?:  Yes (Written)    Wound Details:    Location:  Left leg    Type of Debridement:  Excisional       Length (cm):  12       Area (sq cm):  48       Width (cm):  4       Percent Debrided (%):  100       Depth (cm):  0.4       Total Area Debrided (sq cm):  48    Depth of debridement:  Subcutaneous tissue    Tissue debrided:  Adipose, Cartilage, Dermis and Subcutaneous    Devitalized tissue debrided:  Biofilm, Clots, Exudate and Slough    Instruments:  Curette    Bleeding:  Minimal  Hemostasis Achieved: Yes    Method Used:  Pressure  Patient tolerance:  Patient tolerated the procedure well with no immediate complications     Assistant TATYANA Cooney RN

## 2022-12-06 NOTE — PATIENT INSTRUCTIONS
Clean wounds with acetic acid (1/2 cup white vinegar into 1 1/2 cups plain water)  Apply aloe vista antifungal moisturizer to periwound  Apply gentamicin cream to wound,cover with drawtex. Apply mepitel over shin for protection. Cover with dry gauze, wrap with cast padding and Coban wrap from toes to knee.   If wrap slides down re wrap and if becomes to tight remove  Change twice a week and as needed for soilage.  Diabetes:  Monitor glucose closely. Check fasting glucose and 2 hours after meals. HgA1C goal <7, fasting glucose , and 2 hours after meals <180  Hypertension:  Check blood pressure twice daily, goal <120/80  Diet:   Increase protein intake, avoid fried, fatty foods and foods high in simple carbs.   Vitamins:  Take vitamin C 1000 mg, zinc 50mg, vitamin d 5000 units, and a daily multivitamin. [place] is a good source of protein and nutrients to aid in wound healing.   Monitor closely for s/s of infection including fever, chills, increase in pain, odor from wound, and increased redness from foot. Go to ER if any complications develop.   Keep leg elevated and avoid pressure on wound.  Home Health to change dressings twice a week

## 2022-12-20 DIAGNOSIS — M15.3 POST-TRAUMATIC OSTEOARTHRITIS OF MULTIPLE JOINTS: Primary | ICD-10-CM

## 2022-12-20 RX ORDER — MELOXICAM 15 MG/1
15 TABLET ORAL DAILY PRN
Qty: 90 TABLET | Refills: 1 | Status: SHIPPED | OUTPATIENT
Start: 2022-12-20 | End: 2023-08-08

## 2022-12-27 ENCOUNTER — OFFICE VISIT (OUTPATIENT)
Dept: WOUND CARE | Facility: CLINIC | Age: 77
End: 2022-12-27
Attending: SURGERY
Payer: MEDICARE

## 2022-12-27 VITALS
DIASTOLIC BLOOD PRESSURE: 72 MMHG | SYSTOLIC BLOOD PRESSURE: 168 MMHG | TEMPERATURE: 98 F | HEART RATE: 86 BPM | RESPIRATION RATE: 18 BRPM

## 2022-12-27 DIAGNOSIS — R60.0 EDEMA, LOWER EXTREMITY: ICD-10-CM

## 2022-12-27 DIAGNOSIS — L97.922 NON-PRESSURE CHRONIC ULCER OF LEFT LOWER LEG WITH FAT LAYER EXPOSED: Primary | ICD-10-CM

## 2022-12-27 PROCEDURE — 1159F MED LIST DOCD IN RCRD: CPT | Mod: CPTII,,, | Performed by: SURGERY

## 2022-12-27 PROCEDURE — 3288F PR FALLS RISK ASSESSMENT DOCUMENTED: ICD-10-PCS | Mod: CPTII,,, | Performed by: SURGERY

## 2022-12-27 PROCEDURE — 3077F PR MOST RECENT SYSTOLIC BLOOD PRESSURE >= 140 MM HG: ICD-10-PCS | Mod: CPTII,,, | Performed by: SURGERY

## 2022-12-27 PROCEDURE — 3288F FALL RISK ASSESSMENT DOCD: CPT | Mod: CPTII,,, | Performed by: SURGERY

## 2022-12-27 PROCEDURE — 1101F PR PT FALLS ASSESS DOC 0-1 FALLS W/OUT INJ PAST YR: ICD-10-PCS | Mod: CPTII,,, | Performed by: SURGERY

## 2022-12-27 PROCEDURE — 99214 OFFICE O/P EST MOD 30 MIN: CPT | Mod: PBBFAC | Performed by: SURGERY

## 2022-12-27 PROCEDURE — 1159F PR MEDICATION LIST DOCUMENTED IN MEDICAL RECORD: ICD-10-PCS | Mod: CPTII,,, | Performed by: SURGERY

## 2022-12-27 PROCEDURE — 99499 UNLISTED E&M SERVICE: CPT | Mod: S$PBB,,, | Performed by: SURGERY

## 2022-12-27 PROCEDURE — 99499 NO LOS: ICD-10-PCS | Mod: S$PBB,,, | Performed by: SURGERY

## 2022-12-27 PROCEDURE — 1101F PT FALLS ASSESS-DOCD LE1/YR: CPT | Mod: CPTII,,, | Performed by: SURGERY

## 2022-12-27 PROCEDURE — 3077F SYST BP >= 140 MM HG: CPT | Mod: CPTII,,, | Performed by: SURGERY

## 2022-12-27 PROCEDURE — 3078F PR MOST RECENT DIASTOLIC BLOOD PRESSURE < 80 MM HG: ICD-10-PCS | Mod: CPTII,,, | Performed by: SURGERY

## 2022-12-27 PROCEDURE — 3078F DIAST BP <80 MM HG: CPT | Mod: CPTII,,, | Performed by: SURGERY
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
## 2022-12-27 NOTE — PROGRESS NOTES
Debridement Performed for Assessment: Wound# left lower leg venous stasis ulcer  Performed By: Provider: Arron Farias MD  Assistant: Alma Cooney RN    Debridement: Surgical    Photo taken post procedure:    Time-Out Taken: Yes  Level: Skin/Subcutaneous Tissue/   Post Debridement Measurements  Length: (cm) 13  Width: (cm) 2  Depth: (cm) 0.5      Area: (cm²) 26  Percent Debrided: 100%  Total Area Debrided: (sq cm) 26    Tissue and other material debrided:  Adipose, Dermis, Epidermis, Subcutaneous,   Devitalized Tissue Debrided:Biofilm, Slough, Eschar  Instrument: Curette  Bleeding: Moderate  Hemostasis Achieved: Pressure  Procedural Pain: Insensate  Post Procedural Pain: Insensate  Response to Treatment: Procedure was tolerated well    Devitalized materials/tissue Removed  the following was removed during debridement  subcutaneous,       Post Debridement Diagnosis  Post debridement diagnosis  Same as Pre-operative debridement diagnosis, No Complications noted.      Grafts or implants applied  Was a graft or implant applied?  No      Procedure assistant  Procedure assisted by:  Assistant is the same as nurse listed above      Complications related to procedure  Did any complication occure during procedure?  No complications noted during or after procedure.      Specimen  Specimen collect during procedure?  No specimen collected      Anaesthesia:  Anesthesia used  None      Blood Loss:  Blood loss during procedure  less than 5 cc

## 2023-01-17 ENCOUNTER — OFFICE VISIT (OUTPATIENT)
Dept: WOUND CARE | Facility: CLINIC | Age: 78
End: 2023-01-17
Attending: SURGERY
Payer: MEDICARE

## 2023-01-17 VITALS
TEMPERATURE: 98 F | SYSTOLIC BLOOD PRESSURE: 143 MMHG | RESPIRATION RATE: 18 BRPM | DIASTOLIC BLOOD PRESSURE: 53 MMHG | HEART RATE: 85 BPM

## 2023-01-17 DIAGNOSIS — L97.922 NON-PRESSURE CHRONIC ULCER OF LEFT LOWER LEG WITH FAT LAYER EXPOSED: Primary | ICD-10-CM

## 2023-01-17 PROCEDURE — 1159F PR MEDICATION LIST DOCUMENTED IN MEDICAL RECORD: ICD-10-PCS | Mod: CPTII,,, | Performed by: NURSE PRACTITIONER

## 2023-01-17 PROCEDURE — 99499 UNLISTED E&M SERVICE: CPT | Mod: S$PBB,,, | Performed by: NURSE PRACTITIONER

## 2023-01-17 PROCEDURE — 3078F DIAST BP <80 MM HG: CPT | Mod: CPTII,,, | Performed by: NURSE PRACTITIONER

## 2023-01-17 PROCEDURE — 1160F RVW MEDS BY RX/DR IN RCRD: CPT | Mod: CPTII,,, | Performed by: NURSE PRACTITIONER

## 2023-01-17 PROCEDURE — 3078F PR MOST RECENT DIASTOLIC BLOOD PRESSURE < 80 MM HG: ICD-10-PCS | Mod: CPTII,,, | Performed by: NURSE PRACTITIONER

## 2023-01-17 PROCEDURE — 11042 DEBRIDEMENT: ICD-10-PCS | Mod: S$PBB,,, | Performed by: NURSE PRACTITIONER

## 2023-01-17 PROCEDURE — 99214 OFFICE O/P EST MOD 30 MIN: CPT | Mod: PBBFAC | Performed by: NURSE PRACTITIONER

## 2023-01-17 PROCEDURE — 1101F PT FALLS ASSESS-DOCD LE1/YR: CPT | Mod: CPTII,,, | Performed by: NURSE PRACTITIONER

## 2023-01-17 PROCEDURE — 1159F MED LIST DOCD IN RCRD: CPT | Mod: CPTII,,, | Performed by: NURSE PRACTITIONER

## 2023-01-17 PROCEDURE — 11045 DBRDMT SUBQ TISS EACH ADDL: CPT | Mod: S$PBB,,, | Performed by: NURSE PRACTITIONER

## 2023-01-17 PROCEDURE — 3288F PR FALLS RISK ASSESSMENT DOCUMENTED: ICD-10-PCS | Mod: CPTII,,, | Performed by: NURSE PRACTITIONER

## 2023-01-17 PROCEDURE — 11045 DEBRIDEMENT: ICD-10-PCS | Mod: S$PBB,,, | Performed by: NURSE PRACTITIONER

## 2023-01-17 PROCEDURE — 3077F SYST BP >= 140 MM HG: CPT | Mod: CPTII,,, | Performed by: NURSE PRACTITIONER

## 2023-01-17 PROCEDURE — 1101F PR PT FALLS ASSESS DOC 0-1 FALLS W/OUT INJ PAST YR: ICD-10-PCS | Mod: CPTII,,, | Performed by: NURSE PRACTITIONER

## 2023-01-17 PROCEDURE — 1160F PR REVIEW ALL MEDS BY PRESCRIBER/CLIN PHARMACIST DOCUMENTED: ICD-10-PCS | Mod: CPTII,,, | Performed by: NURSE PRACTITIONER

## 2023-01-17 PROCEDURE — 3077F PR MOST RECENT SYSTOLIC BLOOD PRESSURE >= 140 MM HG: ICD-10-PCS | Mod: CPTII,,, | Performed by: NURSE PRACTITIONER

## 2023-01-17 PROCEDURE — 3288F FALL RISK ASSESSMENT DOCD: CPT | Mod: CPTII,,, | Performed by: NURSE PRACTITIONER

## 2023-01-17 PROCEDURE — 99499 NO LOS: ICD-10-PCS | Mod: S$PBB,,, | Performed by: NURSE PRACTITIONER

## 2023-01-17 PROCEDURE — 11042 DBRDMT SUBQ TIS 1ST 20SQCM/<: CPT | Mod: PBBFAC | Performed by: NURSE PRACTITIONER

## 2023-01-17 NOTE — ASSESSMENT & PLAN NOTE
Clean wounds with acetic acid (1/2 cup white vinegar into 1 1/2 cups plain water)  Apply aloe vista antifungal moisturizer to periwound  Apply mepitel over shin for protection. Apply acetic acid moistened drawtex. Place additional piece of dry drawtex to aid in wicking away moisture. Cover with dry gauze.  Cover with 4x4s and rudy  Change daily and PRN for soilage.   If wrap slides down re wrap and if becomes to tight remove  Diabetes:  Monitor glucose closely. Check fasting glucose and 2 hours after meals. HgA1C goal <7, fasting glucose , and 2 hours after meals <180  Hypertension:  Check blood pressure twice daily, goal <120/80  Diet:   Increase protein intake, avoid fried, fatty foods and foods high in simple carbs.   Vitamins:  Take vitamin C 1000 mg, zinc 50mg, vitamin d 5000 units, and a daily multivitamin. [place] is a good source of protein and nutrients to aid in wound healing.   Monitor closely for s/s of infection including fever, chills, increase in pain, odor from wound, and increased redness from foot. Go to ER if any complications develop.   Keep leg elevated and avoid pressure on wound.  Home Health to change dressings twice a week

## 2023-01-17 NOTE — PATIENT INSTRUCTIONS
Clean wounds with acetic acid (1/2 cup white vinegar into 1 1/2 cups plain water)  Apply aloe vista antifungal moisturizer to periwound  Apply mepitel over shin for protection. Apply acetic acid moistened drawtex. Place additional piece of dry drawtex to aid in wicking away moisture. Cover with dry gauze.  Cover with 4x4s and rudy  Change daily and PRN for soilage.   If wrap slides down re wrap and if becomes to tight remove  Diabetes:  Monitor glucose closely. Check fasting glucose and 2 hours after meals. HgA1C goal <7, fasting glucose , and 2 hours after meals <180  Hypertension:  Check blood pressure twice daily, goal <120/80  Diet:   Increase protein intake, avoid fried, fatty foods and foods high in simple carbs.   Vitamins:  Take vitamin C 1000 mg, zinc 50mg, vitamin d 5000 units, and a daily multivitamin. [place] is a good source of protein and nutrients to aid in wound healing.   Monitor closely for s/s of infection including fever, chills, increase in pain, odor from wound, and increased redness from foot. Go to ER if any complications develop.   Keep leg elevated and avoid pressure on wound.

## 2023-01-17 NOTE — PROCEDURES
Debridement    Date/Time: 1/17/2023 10:00 AM  Performed by: MELANI Saldaña  Authorized by: MELANI Saldaña     Consent Done?:  Yes (Written)    Type of Debridement:  Excisional       Length (cm):  11.1       Area (sq cm):  24.42       Width (cm):  2.2       Percent Debrided (%):  100       Depth (cm):  0.4       Total Area Debrided (sq cm):  24.42    Depth of debridement:  Subcutaneous tissue    Tissue debrided:  Adipose, Dermis, Epidermis and Subcutaneous    Devitalized tissue debrided:  Biofilm, Callus, Clots and Slough    Instruments:  Curette    Bleeding:  Minimal  Hemostasis Achieved: Yes    Method Used:  Pressure  Patient tolerance:  Patient tolerated the procedure well with no immediate complications     Assistant TATYANA Cooney RN

## 2023-01-17 NOTE — PROGRESS NOTES
Debridement Performed for Assessment: Wound# left anterior leg  Performed By: Provider: Yesika Car NP  Assistant: Alma Cooney RN    Debridement: Surgical    Photo taken post procedure:    Time-Out Taken: Yes  Level: Skin/Subcutaneous Tissue/   Post Debridement Measurements  Length: (cm) 11.1  Width: (cm) 2.2  Depth: (cm) 0.4      Area: (cm²) 24.42  Percent Debrided: 100%  Total Area Debrided: (sq cm) 24.42    Tissue and other material debrided:  Adipose, Dermis, Epidermis, Subcutaneous,   Devitalized Tissue Debrided:Biofilm, Slough, Eschar  Instrument: Curette  Bleeding: Moderate  Hemostasis Achieved: Pressure  Procedural Pain: Insensate  Post Procedural Pain: Insensate  Response to Treatment: Procedure was tolerated well    Devitalized materials/tissue Removed  the following was removed during debridement  subcutaneous,       Post Debridement Diagnosis  Post debridement diagnosis  Same as Pre-operative debridement diagnosis, No Complications noted.      Grafts or implants applied  Was a graft or implant applied?  No      Procedure assistant  Procedure assisted by:  Assistant is the same as nurse listed above      Complications related to procedure  Did any complication occure during procedure?  No complications noted during or after procedure.      Specimen  Specimen collect during procedure?  No specimen collected      Anaesthesia:  Anesthesia used  None      Blood Loss:  Blood loss during procedure  less than 5 cc

## 2023-01-17 NOTE — PROGRESS NOTES
MELANI Saldaña   [facility]  [address] [facility] [address]  [phone]      PATIENT NAME: Umesh Lassiter  : 1945  DATE: 23  MRN: [id number]      Billing Provider: MELANI Saldaña  Level of Service:   Patient PCP Information       Provider PCP Type    Christa Nieves DO General            Reason for Visit / Chief Complaint: Non-pressure chronic ulcer of left lower leg with fat layer       History of Present Illness / Problem Focused Workflow     Umesh Lassiter is a 76 yo female presents to clinic with chronic non-healing wound to left lower leg. Wounds are stagnant. Biofilm to wound bed, bedside debridement today. She is compliant with acetic acid and gentamycin with compression. Wounds are staying to moist, change to daily dressing change with drawtex and acetic acid. Continue compression and elevation.      Arterial doppler up to date, Ankle brachial indices measure 1.06 right and 1.10 left. No areas of focal occlusion.  No definite area of focal hemodynamically significant stenosis.. Significant PMH includes diabetes, CVA, hypertension, hypothyroidism, PVD, and asthma. Reports minimal pain to wound. Denies fever/chills.     Review of Systems     Review of Systems   Constitutional:  Negative for activity change, chills and fever.   Respiratory:  Negative for chest tightness and shortness of breath.    Cardiovascular:  Positive for leg swelling. Negative for chest pain and palpitations.   Musculoskeletal:  Positive for arthralgias and joint swelling.   Skin:  Positive for wound.        wound   Neurological:  Positive for weakness and numbness.   Psychiatric/Behavioral:  Negative for agitation, behavioral problems, confusion and self-injury.      Medical / Social / Family History     Past Medical History:   Diagnosis Date    Anxiety     Asthma     CVA (cerebral vascular accident) 2018    DVT (deep venous thrombosis)      Epilepsy     Hyperlipidemia     Hypertension     Hypothyroid     Non-pressure chronic ulcer of left lower leg with fat layer exposed     PVD (peripheral vascular disease)     Venous stasis     Wound infection     left lower leg       Past Surgical History:   Procedure Laterality Date    KNEE ARTHROSCOPY Left     x 2    LASER ABLATION Right 04/20/2018    [facility] LASER ABLATION PERFORMED BY DR. KARMA BUTHCER.    LASER ABLATION Left 04/27/2018    [facility] LASER ABLATION PERFORMED BY DR. KARMA BUTCHER.    LASER ABLATION Right 12/17/2020    [facility] ( DUPLICATED )LASER ABLATION PERFORMED BY DR. KARMA BUTCHER.    LASER ABLATION Left 01/08/2021    [facility] LASER ABLATION PERFORMED BY DR. KARMA BUTCHER.    Remove cataracts, insert lens Bilateral     SHOULDER ARTHROSCOPY Left     SINUS SURGERY      TOTAL ABDOMINAL HYSTERECTOMY W/ BILATERAL SALPINGOOPHORECTOMY      VENOUS ABLATION Right 02/28/2020    DISTAL [facility] VARITHENA ABLATION PERFORMED BY DR. KARMA BUTCHER.       Social History  Ms. Umesh Lassiter  reports that she has never smoked. She has never used smokeless tobacco. She reports that she does not drink alcohol and does not use drugs.    Family History  Ms.'regine Lassiter family history includes Alzheimer's disease in her mother; Cancer in her brother; Diabetes in her brother and sister; Endometrial cancer in her mother and sister; Heart disease in her daughter and mother; Hypertension in her sister; Lung cancer in her father; No Known Problems in her sister, sister, sister, sister, sister, son, son, and son; Polycystic kidney disease in her sister; Seizures in her brother; Sickle cell anemia in her daughter; Thyroid disease in her sister.    Medications and Allergies     Medications  Outpatient Medications Marked as Taking for the 1/17/23 encounter (Office Visit) with MELANI Saldaña   Medication Sig Dispense Refill    albuterol (PROVENTIL/VENTOLIN HFA) 90 mcg/actuation inhaler Inhale 2 puffs into the lungs every 6 (six) hours as  needed for Wheezing. Rescue 6.7 g 0    albuterol 2 mg/5 mL syrup Take 5 mLs (2 mg total) by mouth 3 (three) times daily. 473 mL 5    cilostazoL (PLETAL) 50 MG Tab Take 1 tablet (50 mg total) by mouth 2 (two) times daily. 180 tablet 3    clorazepate (TRANXENE) 3.75 MG Tab TAKE 1 TABLET BY MOUTH TWICE A DAY AS NEEDED FOR ANXIETY *CAUSES DROWSINESS-AVOID ALCOHOL!! 60 tablet 5    divalproex (DEPAKOTE) 250 MG EC tablet Take 1 tablet (250 mg total) by mouth every morning. 90 tablet 1    divalproex 500 MG Tb24 TAKE 2 TABLETS BY MOUTH EACH NIGHT AT BEDTIME FOR SEIZURES 180 tablet 1    ferrous sulfate (FEOSOL) 325 mg (65 mg iron) Tab tablet Take 1 tablet (325 mg total) by mouth daily with breakfast. 90 tablet 0    furosemide (LASIX) 20 MG tablet Take 1 tablet (20 mg total) by mouth once daily. 90 tablet 3    gentamicin (GARAMYCIN) 0.1 % cream Apply topically once daily. 30 g 2    HYDROcodone-acetaminophen (NORCO) 5-325 mg per tablet Take 1 tablet by mouth every 6 (six) hours as needed for Pain. 20 tablet 0    levothyroxine (SYNTHROID) 50 MCG tablet Take 1 tablet (50 mcg total) by mouth before breakfast. Take on an empty stomach with a full glass of water. Nothing else to eat or drink for 30 mins 90 tablet 3    losartan (COZAAR) 50 MG tablet Take 1 tablet (50 mg total) by mouth once daily. 90 tablet 3    meloxicam (MOBIC) 15 MG tablet Take 1 tablet (15 mg total) by mouth daily as needed for Pain. 90 tablet 1       Allergies  Review of patient's allergies indicates:   Allergen Reactions    Influenza virus vaccines      Pt states had to be admitted to hospital after last flu vaccine     Lexapro [escitalopram]        Physical Examination     Vitals:    01/17/23 1027   BP: (!) 143/53   Pulse: 85   Resp: 18   Temp: 97.5 °F (36.4 °C)     Physical Exam  Vitals and nursing note reviewed.   Constitutional:       Appearance: Normal appearance.   HENT:      Head: Normocephalic.   Cardiovascular:      Rate and Rhythm: Normal rate and  regular rhythm.      Pulses: Normal pulses.      Heart sounds: Normal heart sounds.   Pulmonary:      Effort: Pulmonary effort is normal. No respiratory distress.   Chest:      Chest wall: No tenderness.   Musculoskeletal:         General: Swelling and tenderness present.      Left lower leg: Edema present.   Skin:     General: Skin is warm and dry.      Comments: See LDA for measurements and picture   Neurological:      General: No focal deficit present.      Mental Status: She is alert and oriented to person, place, and time. Mental status is at baseline.   Psychiatric:         Mood and Affect: Mood normal.         Behavior: Behavior normal.         Thought Content: Thought content normal.         Judgment: Judgment normal.     Assessment and Plan             Wound 01/13/22 1900 Venous Ulcer Left lower #1 (Active)   01/13/22 1900    Pre-existing: Yes   Primary Wound Type: Venous ulcer   Side: Left   Orientation: lower   Location:    Wound Number: #1   Ankle-Brachial Index:    Pulses:    Removal Indication and Assessment:    Wound Outcome:    (Retired) Wound Type:    (Retired) Wound Length (cm):    (Retired) Wound Width (cm):    (Retired) Depth (cm):    Wound Description (Comments):    Removal Indications:    Wound Image    01/17/23 1102   Dressing Appearance Dried drainage 01/17/23 1030   Drainage Amount Small 01/17/23 1030   Drainage Characteristics/Odor Serosanguineous;No odor 01/17/23 1030   Appearance Pink;Red;Yellow;Eschar;Slough;Dry;Granulating 01/17/23 1030   Tissue loss description Full thickness 01/17/23 1030   Black (%), Wound Tissue Color 0 % 01/17/23 1030   Red (%), Wound Tissue Color 50 % 01/17/23 1030   Yellow (%), Wound Tissue Color 50 % 01/17/23 1030   Periwound Area Dry;[place] 01/17/23 1030   Wound Edges Callused;Open 01/17/23 1030   Wound Length (cm) 11.1 cm 01/17/23 1102   Wound Width (cm) 2.2 cm 01/17/23 1102   Wound Depth (cm) 0.4 cm 01/17/23 1102   Wound Volume (cm^3) 9.768 cm^3 01/17/23 1102    Wound Surface Area (cm^2) 24.42 cm^2 01/17/23 1102   Care Cleansed with:;Soap and water;Applied:;Moisturizing agent;Debrided 01/17/23 1030   Dressing Applied;Gauze, wet to dry;Gauze;Cast padding;Compression wrap 01/17/23 1030   Periwound Care Moisturizer applied 01/17/23 1030   Compression Two layer compression 01/17/23 1030   Dressing Change Due 01/18/23 01/17/23 1030     Problem List Items Addressed This Visit          Orthopedic    Non-pressure chronic ulcer of left lower leg with fat layer exposed    Overview                              Current Assessment & Plan     Clean wounds with acetic acid (1/2 cup white vinegar into 1 1/2 cups plain water)  Apply aloe vista antifungal moisturizer to periwound  Apply mepitel over shin for protection. Apply acetic acid moistened drawtex. Place additional piece of dry drawtex to aid in wicking away moisture. Cover with dry gauze.  Cover with 4x4s and rudy  Change daily and PRN for soilage.   If wrap slides down re wrap and if becomes to tight remove  Diabetes:  Monitor glucose closely. Check fasting glucose and 2 hours after meals. HgA1C goal <7, fasting glucose , and 2 hours after meals <180  Hypertension:  Check blood pressure twice daily, goal <120/80  Diet:   Increase protein intake, avoid fried, fatty foods and foods high in simple carbs.   Vitamins:  Take vitamin C 1000 mg, zinc 50mg, vitamin d 5000 units, and a daily multivitamin. [place] is a good source of protein and nutrients to aid in wound healing.   Monitor closely for s/s of infection including fever, chills, increase in pain, odor from wound, and increased redness from foot. Go to ER if any complications develop.   Keep leg elevated and avoid pressure on wound.  Home Health to change dressings twice a week            Future Appointments   Date Time Provider Department Center   1/31/2023 10:00 AM MELANI Saldaña [facility]   2/2/2023 10:00 AM Christa Nieves DO [facility]    4/11/2023  9:30 AM MELANI [facility] NEUR[facility]   4/17/2023 10:00 AM AWMANOHAR NURSE, [facility] PRIMARY CARE [facility]            Signature:  MELANI Saldaña  [facility]  [address]  [place] [address]  [phone]    Date of encounter: 1/17/23

## 2023-01-30 NOTE — PATIENT INSTRUCTIONS
Clean wounds with acetic acid (1/2 cup white vinegar into 1 1/2 cups plain water)  Apply aloe vista antifungal moisturizer to periwound  Apply mepitel over shin for protection. Apply acetic acid moistened drawtex. Place additional piece of dry drawtex to aid in wicking away moisture. Cover with dry gauze.  Cover with 4x4s and rudy  Change daily and PRN for soilage.   OK to use compression sock with wrap. If wrap slides down re wrap and if becomes to tight remove  Diabetes:  Monitor glucose closely. Check fasting glucose and 2 hours after meals. HgA1C goal <7, fasting glucose , and 2 hours after meals <180  Hypertension:  Check blood pressure twice daily, goal <120/80  Diet:   Increase protein intake, avoid fried, fatty foods and foods high in simple carbs.   Vitamins:  Take vitamin C 1000 mg, zinc 50mg, vitamin d 5000 units, and a daily multivitamin. [place] is a good source of protein and nutrients to aid in wound healing.   Monitor closely for s/s of infection including fever, chills, increase in pain, odor from wound, and increased redness from foot. Go to ER if any complications develop.   Keep leg elevated and avoid pressure on wound.
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
## 2023-01-31 ENCOUNTER — OFFICE VISIT (OUTPATIENT)
Dept: WOUND CARE | Facility: CLINIC | Age: 78
End: 2023-01-31
Attending: FAMILY MEDICINE
Payer: MEDICARE

## 2023-01-31 VITALS
SYSTOLIC BLOOD PRESSURE: 196 MMHG | DIASTOLIC BLOOD PRESSURE: 86 MMHG | HEART RATE: 87 BPM | TEMPERATURE: 98 F | RESPIRATION RATE: 18 BRPM

## 2023-01-31 DIAGNOSIS — L97.922 NON-PRESSURE CHRONIC ULCER OF LEFT LOWER LEG WITH FAT LAYER EXPOSED: Primary | ICD-10-CM

## 2023-01-31 DIAGNOSIS — R60.0 EDEMA, LOWER EXTREMITY: ICD-10-CM

## 2023-01-31 PROCEDURE — 1159F MED LIST DOCD IN RCRD: CPT | Mod: CPTII,,, | Performed by: NURSE PRACTITIONER

## 2023-01-31 PROCEDURE — 99215 OFFICE O/P EST HI 40 MIN: CPT | Mod: PBBFAC,25 | Performed by: NURSE PRACTITIONER

## 2023-01-31 PROCEDURE — 3079F DIAST BP 80-89 MM HG: CPT | Mod: CPTII,,, | Performed by: NURSE PRACTITIONER

## 2023-01-31 PROCEDURE — 1160F PR REVIEW ALL MEDS BY PRESCRIBER/CLIN PHARMACIST DOCUMENTED: ICD-10-PCS | Mod: CPTII,,, | Performed by: NURSE PRACTITIONER

## 2023-01-31 PROCEDURE — 3077F SYST BP >= 140 MM HG: CPT | Mod: CPTII,,, | Performed by: NURSE PRACTITIONER

## 2023-01-31 PROCEDURE — 3079F PR MOST RECENT DIASTOLIC BLOOD PRESSURE 80-89 MM HG: ICD-10-PCS | Mod: CPTII,,, | Performed by: NURSE PRACTITIONER

## 2023-01-31 PROCEDURE — 99499 NO LOS: ICD-10-PCS | Mod: S$PBB,,, | Performed by: NURSE PRACTITIONER

## 2023-01-31 PROCEDURE — 99499 UNLISTED E&M SERVICE: CPT | Mod: S$PBB,,, | Performed by: NURSE PRACTITIONER

## 2023-01-31 PROCEDURE — 11042 DEBRIDEMENT: ICD-10-PCS | Mod: S$PBB,,, | Performed by: NURSE PRACTITIONER

## 2023-01-31 PROCEDURE — 3077F PR MOST RECENT SYSTOLIC BLOOD PRESSURE >= 140 MM HG: ICD-10-PCS | Mod: CPTII,,, | Performed by: NURSE PRACTITIONER

## 2023-01-31 PROCEDURE — 1159F PR MEDICATION LIST DOCUMENTED IN MEDICAL RECORD: ICD-10-PCS | Mod: CPTII,,, | Performed by: NURSE PRACTITIONER

## 2023-01-31 PROCEDURE — 11042 DBRDMT SUBQ TIS 1ST 20SQCM/<: CPT | Mod: PBBFAC | Performed by: NURSE PRACTITIONER

## 2023-01-31 PROCEDURE — 1160F RVW MEDS BY RX/DR IN RCRD: CPT | Mod: CPTII,,, | Performed by: NURSE PRACTITIONER

## 2023-01-31 PROCEDURE — 1126F PR PAIN SEVERITY QUANTIFIED, NO PAIN PRESENT: ICD-10-PCS | Mod: CPTII,,, | Performed by: NURSE PRACTITIONER

## 2023-01-31 PROCEDURE — 1126F AMNT PAIN NOTED NONE PRSNT: CPT | Mod: CPTII,,, | Performed by: NURSE PRACTITIONER

## 2023-01-31 NOTE — PROGRESS NOTES
Debridement Performed for Assessment: Wound# left lower leg  Performed By: Provider: Yesika Salgado NP  Assistant:  Angie Alvarado LPN    Debridement: Surgical    Photo taken post procedure:    Time-Out Taken: Yes  Level: Skin/Subcutaneous Tissue  Post Debridement Measurements  Length: (cm) 5.1  Width: (cm) 2.4  Depth: (cm) 0.5      Area: (cm²) 12.24  Percent Debrided: 100%  Total Area Debrided: (sq cm)     Tissue and other material debrided:  Adipose, Dermis, Epidermis, Subcutaneous  Devitalized Tissue Debrided:Biofilm  Instrument: Curette  Bleeding: Moderate  Hemostasis Achieved: Pressure  Procedural Pain: Insensate  Post Procedural Pain: Insensate  Response to Treatment: Procedure was tolerated well    Devitalized materials/tissue Removed  the following was removed during debridement  subcutaneous      Post Debridement Diagnosis  left lower leg venous ulcer  Post debridement diagnosis  Same as Pre-operative debridement diagnosis, No Complications noted.      Grafts or implants applied  Was a graft or implant applied?  No      Procedure assistant  Procedure assisted by:  Angie Villegas LPN  Assistant is the same as nurse listed above      Complications related to procedure  Did any complication occure during procedure?  No complications noted during or after procedure.      Specimen  Specimen collect during procedure?  No specimen collected      Anaesthesia:  Anesthesia used  None      Blood Loss:  Blood loss during procedure  less than 5 cc

## 2023-01-31 NOTE — PROCEDURES
Debridement    Date/Time: 1/31/2023 10:00 AM  Performed by: MELANI Saldaña  Authorized by: MELANI Saldaña     Consent Done?:  Yes (Written)    Wound Details:    Location:  Left leg    Type of Debridement:  Excisional       Length (cm):  5.1       Area (sq cm):  12.24       Width (cm):  2.4       Percent Debrided (%):  100       Depth (cm):  0.5       Total Area Debrided (sq cm):  12.24    Depth of debridement:  Subcutaneous tissue    Tissue debrided:  Adipose, Dermis, Epidermis and Subcutaneous    Devitalized tissue debrided:  Biofilm, Clots, Exudate and Slough    Instruments:  Curette    Bleeding:  Minimal  Hemostasis Achieved: Yes    Method Used:  Pressure  Patient tolerance:  Patient tolerated the procedure well with no immediate complications     Assistant HENRRY Villegas LPN

## 2023-02-07 ENCOUNTER — OFFICE VISIT (OUTPATIENT)
Dept: PRIMARY CARE CLINIC | Facility: CLINIC | Age: 78
End: 2023-02-07
Payer: COMMERCIAL

## 2023-02-07 VITALS
RESPIRATION RATE: 20 BRPM | BODY MASS INDEX: 31.65 KG/M2 | TEMPERATURE: 99 F | DIASTOLIC BLOOD PRESSURE: 72 MMHG | WEIGHT: 190 LBS | SYSTOLIC BLOOD PRESSURE: 134 MMHG | OXYGEN SATURATION: 100 % | HEART RATE: 70 BPM | HEIGHT: 65 IN

## 2023-02-07 DIAGNOSIS — J45.909 ASTHMA, UNSPECIFIED ASTHMA SEVERITY, UNSPECIFIED WHETHER COMPLICATED, UNSPECIFIED WHETHER PERSISTENT: ICD-10-CM

## 2023-02-07 DIAGNOSIS — R73.09 ABNORMAL GLUCOSE: ICD-10-CM

## 2023-02-07 DIAGNOSIS — E03.9 HYPOTHYROIDISM, UNSPECIFIED TYPE: ICD-10-CM

## 2023-02-07 DIAGNOSIS — I10 ESSENTIAL HYPERTENSION, BENIGN: Primary | ICD-10-CM

## 2023-02-07 DIAGNOSIS — Z78.0 POST-MENOPAUSAL: ICD-10-CM

## 2023-02-07 DIAGNOSIS — E78.5 HYPERLIPIDEMIA, UNSPECIFIED HYPERLIPIDEMIA TYPE: ICD-10-CM

## 2023-02-07 DIAGNOSIS — Z11.59 NEED FOR HEPATITIS C SCREENING TEST: ICD-10-CM

## 2023-02-07 DIAGNOSIS — F41.9 ANXIETY: ICD-10-CM

## 2023-02-07 PROCEDURE — 1126F AMNT PAIN NOTED NONE PRSNT: CPT | Mod: ,,, | Performed by: NURSE PRACTITIONER

## 2023-02-07 PROCEDURE — 1101F PR PT FALLS ASSESS DOC 0-1 FALLS W/OUT INJ PAST YR: ICD-10-PCS | Mod: ,,, | Performed by: NURSE PRACTITIONER

## 2023-02-07 PROCEDURE — 1160F PR REVIEW ALL MEDS BY PRESCRIBER/CLIN PHARMACIST DOCUMENTED: ICD-10-PCS | Mod: ,,, | Performed by: NURSE PRACTITIONER

## 2023-02-07 PROCEDURE — 3078F PR MOST RECENT DIASTOLIC BLOOD PRESSURE < 80 MM HG: ICD-10-PCS | Mod: ,,, | Performed by: NURSE PRACTITIONER

## 2023-02-07 PROCEDURE — 3288F FALL RISK ASSESSMENT DOCD: CPT | Mod: ,,, | Performed by: NURSE PRACTITIONER

## 2023-02-07 PROCEDURE — 3078F DIAST BP <80 MM HG: CPT | Mod: ,,, | Performed by: NURSE PRACTITIONER

## 2023-02-07 PROCEDURE — 3075F PR MOST RECENT SYSTOLIC BLOOD PRESS GE 130-139MM HG: ICD-10-PCS | Mod: ,,, | Performed by: NURSE PRACTITIONER

## 2023-02-07 PROCEDURE — 99214 OFFICE O/P EST MOD 30 MIN: CPT | Mod: ,,, | Performed by: NURSE PRACTITIONER

## 2023-02-07 PROCEDURE — 1101F PT FALLS ASSESS-DOCD LE1/YR: CPT | Mod: ,,, | Performed by: NURSE PRACTITIONER

## 2023-02-07 PROCEDURE — 1126F PR PAIN SEVERITY QUANTIFIED, NO PAIN PRESENT: ICD-10-PCS | Mod: ,,, | Performed by: NURSE PRACTITIONER

## 2023-02-07 PROCEDURE — 3288F PR FALLS RISK ASSESSMENT DOCUMENTED: ICD-10-PCS | Mod: ,,, | Performed by: NURSE PRACTITIONER

## 2023-02-07 PROCEDURE — 1159F PR MEDICATION LIST DOCUMENTED IN MEDICAL RECORD: ICD-10-PCS | Mod: ,,, | Performed by: NURSE PRACTITIONER

## 2023-02-07 PROCEDURE — 1160F RVW MEDS BY RX/DR IN RCRD: CPT | Mod: ,,, | Performed by: NURSE PRACTITIONER

## 2023-02-07 PROCEDURE — 1159F MED LIST DOCD IN RCRD: CPT | Mod: ,,, | Performed by: NURSE PRACTITIONER

## 2023-02-07 PROCEDURE — 3075F SYST BP GE 130 - 139MM HG: CPT | Mod: ,,, | Performed by: NURSE PRACTITIONER

## 2023-02-07 PROCEDURE — 99214 PR OFFICE/OUTPT VISIT, EST, LEVL IV, 30-39 MIN: ICD-10-PCS | Mod: ,,, | Performed by: NURSE PRACTITIONER

## 2023-02-07 RX ORDER — ALBUTEROL SULFATE 90 UG/1
2 AEROSOL, METERED RESPIRATORY (INHALATION) EVERY 6 HOURS PRN
Qty: 6.7 G | Refills: 0 | Status: SHIPPED | OUTPATIENT
Start: 2023-02-07 | End: 2023-08-08

## 2023-02-07 RX ORDER — CLORAZEPATE DIPOTASSIUM 3.75 MG/1
TABLET ORAL
Qty: 60 TABLET | Refills: 5 | Status: SHIPPED | OUTPATIENT
Start: 2023-02-07 | End: 2023-08-08 | Stop reason: SDUPTHER

## 2023-02-07 NOTE — PROGRESS NOTES
Subjective:       Patient ID: Umesh Lassiter is a 77 y.o. female.    Chief Complaint: Establish Care and Medication Refill    Pt presents to establish care.  reviewed and pt states she has been taking tranxene for years due to a nervous breakdown.     Review of Systems   Constitutional:  Negative for activity change, appetite change, chills, fatigue and fever.   HENT:  Negative for nasal congestion, ear discharge, nosebleeds, postnasal drip, rhinorrhea, sinus pressure/congestion, sneezing, sore throat and tinnitus.    Eyes:  Negative for pain, discharge, redness and itching.   Respiratory:  Negative for cough, choking, chest tightness, shortness of breath and wheezing.    Cardiovascular:  Negative for chest pain.   Gastrointestinal:  Negative for abdominal distention, abdominal pain, blood in stool, change in bowel habit, constipation, diarrhea, nausea, vomiting and change in bowel habit.   Genitourinary:  Negative for decreased urine volume, dysuria, flank pain and frequency.   Musculoskeletal:  Negative for back pain and gait problem.   Integumentary:  Negative for wound, breast mass and breast discharge.   Allergic/Immunologic: Negative for immunocompromised state.   Neurological:  Negative for dizziness, light-headedness and headaches.   Psychiatric/Behavioral:  Negative for agitation, behavioral problems and hallucinations.    Breast: Negative for mass      Objective:      Physical Exam  Vitals and nursing note reviewed.   Constitutional:       Appearance: Normal appearance.   Cardiovascular:      Rate and Rhythm: Normal rate and regular rhythm.      Heart sounds: Normal heart sounds.   Pulmonary:      Effort: Pulmonary effort is normal.      Breath sounds: Normal breath sounds.   Musculoskeletal:         General: Normal range of motion.   Neurological:      Mental Status: She is alert and oriented to person, place, and time.   Psychiatric:         Mood and Affect: Mood normal.         Behavior: Behavior  normal.       Assessment:       Problem List Items Addressed This Visit          Cardiac/Vascular    Hyperlipemia       Endocrine    Hypothyroidism     Other Visit Diagnoses       Essential hypertension, benign    -  Primary    Relevant Orders    CBC Auto Differential    Comprehensive Metabolic Panel    Lipid Panel    TSH    Asthma, unspecified asthma severity, unspecified whether complicated, unspecified whether persistent        Relevant Medications    albuterol (PROVENTIL/VENTOLIN HFA) 90 mcg/actuation inhaler    Abnormal glucose        Relevant Orders    Hemoglobin A1C    Need for hepatitis C screening test        Relevant Orders    Hepatitis C Antibody    Post-menopausal        Relevant Orders    DXA Bone Density Spine And Hip    Anxiety        Relevant Medications    clorazepate (TRANXENE) 3.75 MG Tab              Plan:       Will call pt with lab results and Dxa appointment.

## 2023-03-13 ENCOUNTER — OFFICE VISIT (OUTPATIENT)
Dept: WOUND CARE | Facility: CLINIC | Age: 78
End: 2023-03-13
Attending: FAMILY MEDICINE
Payer: COMMERCIAL

## 2023-03-13 VITALS
HEART RATE: 100 BPM | RESPIRATION RATE: 20 BRPM | SYSTOLIC BLOOD PRESSURE: 134 MMHG | DIASTOLIC BLOOD PRESSURE: 58 MMHG | TEMPERATURE: 98 F

## 2023-03-13 DIAGNOSIS — R60.0 EDEMA, LOWER EXTREMITY: ICD-10-CM

## 2023-03-13 DIAGNOSIS — I87.312 STASIS EDEMA WITH ULCER, LEFT: ICD-10-CM

## 2023-03-13 DIAGNOSIS — I73.9 PVD (PERIPHERAL VASCULAR DISEASE): ICD-10-CM

## 2023-03-13 DIAGNOSIS — L97.929 STASIS EDEMA WITH ULCER, LEFT: ICD-10-CM

## 2023-03-13 DIAGNOSIS — L97.922 NON-PRESSURE CHRONIC ULCER OF LEFT LOWER LEG WITH FAT LAYER EXPOSED: Primary | ICD-10-CM

## 2023-03-13 PROCEDURE — 1126F AMNT PAIN NOTED NONE PRSNT: CPT | Mod: CPTII,,, | Performed by: FAMILY MEDICINE

## 2023-03-13 PROCEDURE — 1159F MED LIST DOCD IN RCRD: CPT | Mod: CPTII,,, | Performed by: FAMILY MEDICINE

## 2023-03-13 PROCEDURE — 99214 PR OFFICE/OUTPT VISIT, EST, LEVL IV, 30-39 MIN: ICD-10-PCS | Mod: S$PBB,,, | Performed by: FAMILY MEDICINE

## 2023-03-13 PROCEDURE — 3288F FALL RISK ASSESSMENT DOCD: CPT | Mod: CPTII,,, | Performed by: FAMILY MEDICINE

## 2023-03-13 PROCEDURE — 3078F DIAST BP <80 MM HG: CPT | Mod: CPTII,,, | Performed by: FAMILY MEDICINE

## 2023-03-13 PROCEDURE — 1126F PR PAIN SEVERITY QUANTIFIED, NO PAIN PRESENT: ICD-10-PCS | Mod: CPTII,,, | Performed by: FAMILY MEDICINE

## 2023-03-13 PROCEDURE — 1101F PT FALLS ASSESS-DOCD LE1/YR: CPT | Mod: CPTII,,, | Performed by: FAMILY MEDICINE

## 2023-03-13 PROCEDURE — 99214 OFFICE O/P EST MOD 30 MIN: CPT | Mod: S$PBB,,, | Performed by: FAMILY MEDICINE

## 2023-03-13 PROCEDURE — 3288F PR FALLS RISK ASSESSMENT DOCUMENTED: ICD-10-PCS | Mod: CPTII,,, | Performed by: FAMILY MEDICINE

## 2023-03-13 PROCEDURE — 3075F PR MOST RECENT SYSTOLIC BLOOD PRESS GE 130-139MM HG: ICD-10-PCS | Mod: CPTII,,, | Performed by: FAMILY MEDICINE

## 2023-03-13 PROCEDURE — 1101F PR PT FALLS ASSESS DOC 0-1 FALLS W/OUT INJ PAST YR: ICD-10-PCS | Mod: CPTII,,, | Performed by: FAMILY MEDICINE

## 2023-03-13 PROCEDURE — 3078F PR MOST RECENT DIASTOLIC BLOOD PRESSURE < 80 MM HG: ICD-10-PCS | Mod: CPTII,,, | Performed by: FAMILY MEDICINE

## 2023-03-13 PROCEDURE — 1159F PR MEDICATION LIST DOCUMENTED IN MEDICAL RECORD: ICD-10-PCS | Mod: CPTII,,, | Performed by: FAMILY MEDICINE

## 2023-03-13 PROCEDURE — 99214 OFFICE O/P EST MOD 30 MIN: CPT | Mod: PBBFAC | Performed by: FAMILY MEDICINE

## 2023-03-13 PROCEDURE — 3075F SYST BP GE 130 - 139MM HG: CPT | Mod: CPTII,,, | Performed by: FAMILY MEDICINE
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
## 2023-03-13 NOTE — PATIENT INSTRUCTIONS
Clean wounds with acetic acid (1/2 cup white vinegar into 1 1/2 cups plain water)  Apply aloe vista antifungal moisturizer to periwound  Apply mepitel over shin for protection. Apply acetic acid moistened drawtex. Place additional piece of dry drawtex to aid in wicking away moisture. Cover with dry gauze.  Cover with 4x4s and rudy. Change daily and as needed for soilage  Change daily and PRN for soilage.   OK to use compression sock with wrap. If wrap slides down re wrap and if becomes to tight remove  Diabetes:  Monitor glucose closely. Check fasting glucose and 2 hours after meals. HgA1C goal <7, fasting glucose , and 2 hours after meals <180  Hypertension:  Check blood pressure twice daily, goal <120/80  Diet:   Increase protein intake, avoid fried, fatty foods and foods high in simple carbs.   Vitamins:  Take vitamin C 1000 mg, zinc 50mg, vitamin d 5000 units, and a daily multivitamin. [place] is a good source of protein and nutrients to aid in wound healing.   Monitor closely for s/s of infection including fever, chills, increase in pain, odor from wound, and increased redness from foot. Go to ER if any complications develop.   Keep leg elevated and avoid pressure on wound.

## 2023-03-28 PROCEDURE — G0180 MD CERTIFICATION HHA PATIENT: HCPCS | Mod: ,,, | Performed by: NURSE PRACTITIONER

## 2023-03-28 PROCEDURE — G0180 PR HOME HEALTH MD CERTIFICATION: ICD-10-PCS | Mod: ,,, | Performed by: NURSE PRACTITIONER

## 2023-04-11 ENCOUNTER — OFFICE VISIT (OUTPATIENT)
Dept: NEUROLOGY | Facility: CLINIC | Age: 78
End: 2023-04-11
Payer: COMMERCIAL

## 2023-04-11 VITALS
WEIGHT: 189 LBS | BODY MASS INDEX: 31.49 KG/M2 | DIASTOLIC BLOOD PRESSURE: 80 MMHG | SYSTOLIC BLOOD PRESSURE: 136 MMHG | HEART RATE: 83 BPM | HEIGHT: 65 IN | OXYGEN SATURATION: 98 %

## 2023-04-11 DIAGNOSIS — R56.9 SEIZURE: ICD-10-CM

## 2023-04-11 DIAGNOSIS — G40.309 GENERALIZED EPILEPSY: Primary | ICD-10-CM

## 2023-04-11 PROCEDURE — 1126F PR PAIN SEVERITY QUANTIFIED, NO PAIN PRESENT: ICD-10-PCS | Mod: CPTII,,, | Performed by: NURSE PRACTITIONER

## 2023-04-11 PROCEDURE — 3079F PR MOST RECENT DIASTOLIC BLOOD PRESSURE 80-89 MM HG: ICD-10-PCS | Mod: CPTII,,, | Performed by: NURSE PRACTITIONER

## 2023-04-11 PROCEDURE — 1126F AMNT PAIN NOTED NONE PRSNT: CPT | Mod: CPTII,,, | Performed by: NURSE PRACTITIONER

## 2023-04-11 PROCEDURE — 1101F PR PT FALLS ASSESS DOC 0-1 FALLS W/OUT INJ PAST YR: ICD-10-PCS | Mod: CPTII,,, | Performed by: NURSE PRACTITIONER

## 2023-04-11 PROCEDURE — 1160F RVW MEDS BY RX/DR IN RCRD: CPT | Mod: CPTII,,, | Performed by: NURSE PRACTITIONER

## 2023-04-11 PROCEDURE — 3075F PR MOST RECENT SYSTOLIC BLOOD PRESS GE 130-139MM HG: ICD-10-PCS | Mod: CPTII,,, | Performed by: NURSE PRACTITIONER

## 2023-04-11 PROCEDURE — 3079F DIAST BP 80-89 MM HG: CPT | Mod: CPTII,,, | Performed by: NURSE PRACTITIONER

## 2023-04-11 PROCEDURE — 1159F PR MEDICATION LIST DOCUMENTED IN MEDICAL RECORD: ICD-10-PCS | Mod: CPTII,,, | Performed by: NURSE PRACTITIONER

## 2023-04-11 PROCEDURE — 1160F PR REVIEW ALL MEDS BY PRESCRIBER/CLIN PHARMACIST DOCUMENTED: ICD-10-PCS | Mod: CPTII,,, | Performed by: NURSE PRACTITIONER

## 2023-04-11 PROCEDURE — 99213 PR OFFICE/OUTPT VISIT, EST, LEVL III, 20-29 MIN: ICD-10-PCS | Mod: S$PBB,,, | Performed by: NURSE PRACTITIONER

## 2023-04-11 PROCEDURE — 3288F FALL RISK ASSESSMENT DOCD: CPT | Mod: CPTII,,, | Performed by: NURSE PRACTITIONER

## 2023-04-11 PROCEDURE — 3075F SYST BP GE 130 - 139MM HG: CPT | Mod: CPTII,,, | Performed by: NURSE PRACTITIONER

## 2023-04-11 PROCEDURE — 1159F MED LIST DOCD IN RCRD: CPT | Mod: CPTII,,, | Performed by: NURSE PRACTITIONER

## 2023-04-11 PROCEDURE — 3288F PR FALLS RISK ASSESSMENT DOCUMENTED: ICD-10-PCS | Mod: CPTII,,, | Performed by: NURSE PRACTITIONER

## 2023-04-11 PROCEDURE — 99213 OFFICE O/P EST LOW 20 MIN: CPT | Mod: S$PBB,,, | Performed by: NURSE PRACTITIONER

## 2023-04-11 PROCEDURE — 99214 OFFICE O/P EST MOD 30 MIN: CPT | Mod: PBBFAC | Performed by: NURSE PRACTITIONER

## 2023-04-11 PROCEDURE — 1101F PT FALLS ASSESS-DOCD LE1/YR: CPT | Mod: CPTII,,, | Performed by: NURSE PRACTITIONER

## 2023-04-11 RX ORDER — DIVALPROEX SODIUM 500 MG/1
TABLET, FILM COATED, EXTENDED RELEASE ORAL
Qty: 180 TABLET | Refills: 3 | Status: SHIPPED | OUTPATIENT
Start: 2023-04-11 | End: 2023-10-05 | Stop reason: SDUPTHER

## 2023-04-11 NOTE — PATIENT INSTRUCTIONS
Continue current Depakote ER 1000mg at night  CMP and depakote level next visit  Good eating and sleep habits encouraged  Notify clinic if any seizures      Seizure Precautions:  1. Take medications as directed  2. Avoid open flames and hot surfaces such as fires and grills  3. Avoid elevations such as ladders or stools  4. Avoid swimming alone  5. Make sure getting plenty of sleep, recommend 7-8 hours per day  6. Avoid alcohol and illicit drugs  7. No driving or operating heavy machinery for 6 months after last known seizure

## 2023-04-11 NOTE — PROGRESS NOTES
Subjective:       Patient ID: Umesh Lassiter is a 77 y.o. female     Chief Complaint:    Chief Complaint   Patient presents with    Follow-up          Allergies:  Influenza virus vaccines and Lexapro [escitalopram]    Current Medications:    Outpatient Encounter Medications as of 4/11/2023   Medication Sig Dispense Refill    albuterol (PROVENTIL/VENTOLIN HFA) 90 mcg/actuation inhaler Inhale 2 puffs into the lungs every 6 (six) hours as needed for Wheezing. Rescue 6.7 g 0    albuterol 2 mg/5 mL syrup Take 5 mLs (2 mg total) by mouth 3 (three) times daily. 473 mL 5    cilostazoL (PLETAL) 50 MG Tab Take 1 tablet (50 mg total) by mouth 2 (two) times daily. 180 tablet 3    clorazepate (TRANXENE) 3.75 MG Tab TAKE 1 TABLET BY MOUTH TWICE A DAY AS NEEDED FOR ANXIETY *CAUSES DROWSINESS-AVOID ALCOHOL!! 60 tablet 5    furosemide (LASIX) 20 MG tablet Take 1 tablet (20 mg total) by mouth once daily. 90 tablet 3    levothyroxine (SYNTHROID) 50 MCG tablet Take 1 tablet (50 mcg total) by mouth before breakfast. Take on an empty stomach with a full glass of water. Nothing else to eat or drink for 30 mins 90 tablet 3    losartan (COZAAR) 50 MG tablet Take 1 tablet (50 mg total) by mouth once daily. 90 tablet 3    [DISCONTINUED] divalproex 500 MG Tb24 TAKE 2 TABLETS BY MOUTH EACH NIGHT AT BEDTIME FOR SEIZURES 180 tablet 1    divalproex 500 MG Tb24 TAKE 2 TABLETS BY MOUTH EACH NIGHT AT BEDTIME FOR SEIZURES 180 tablet 3    meloxicam (MOBIC) 15 MG tablet Take 1 tablet (15 mg total) by mouth daily as needed for Pain. (Patient not taking: Reported on 4/11/2023) 90 tablet 1    [DISCONTINUED] divalproex (DEPAKOTE) 250 MG EC tablet Take 1 tablet (250 mg total) by mouth every morning. (Patient not taking: Reported on 4/11/2023) 90 tablet 1     No facility-administered encounter medications on file as of 4/11/2023.       History of Present Illness  76 y/o female following for history of seizures    Last visit reported last seizure was  February 2015.  Continued on depakote ER 1000mg QPM.    She has slight bilateral hand tremor on exam, she denies this causes her any complications.  Likely due to the depakote.  It has been monitored for several visits, with no noted worsening.    Last depakote level in October of 2022, it was 90.         Review of Systems  Review of Systems   Constitutional:  Negative for diaphoresis and fever.   HENT:  Negative for congestion, hearing loss and tinnitus.    Eyes:  Negative for blurred vision, double vision, photophobia, discharge and redness.   Respiratory:  Negative for cough and shortness of breath.    Cardiovascular:  Negative for chest pain.   Gastrointestinal:  Negative for abdominal pain, nausea and vomiting.   Musculoskeletal:  Negative for back pain, joint pain, myalgias and neck pain.   Skin:  Negative for itching and rash.   Neurological:  Positive for tremors and seizures. Negative for dizziness, sensory change, speech change, focal weakness, loss of consciousness, weakness and headaches.   Psychiatric/Behavioral:  Negative for depression, hallucinations and memory loss. The patient does not have insomnia.    All other systems reviewed and are negative.   Objective:     NEUROLOGICAL EXAMINATION:     MENTAL STATUS   Oriented to person, place, and time.   Registration: recalls 3 of 3 objects. Recall at 5 minutes: recalls 3 of 3 objects.   Attention: normal. Concentration: normal.   Speech: speech is normal   Level of consciousness: alert  Knowledge: good and consistent with education.   Normal comprehension.     CRANIAL NERVES     CN II   Visual fields full to confrontation.   Visual acuity: normal  Right visual field deficit: none  Left visual field deficit: none     CN III, IV, VI   Pupils are equal, round, and reactive to light.  Extraocular motions are normal.   Right pupil: Size: 3 mm. Shape: regular. Reactivity: brisk. Consensual response: intact. Accommodation: intact.   Left pupil: Size: 3 mm.  Shape: regular. Reactivity: brisk. Consensual response: intact. Accommodation: intact.   CN III: no CN III palsy  CN VI: no CN VI palsy  Nystagmus: none   Diplopia: none  Upgaze: normal  Downgaze: normal  Conjugate gaze: present  Vestibulo-ocular reflex: present    CN V   Facial sensation intact.   Right facial sensation deficit: none  Left facial sensation deficit: none  Right corneal reflex: normal  Left corneal reflex: normal    CN VII   Facial expression full, symmetric.   Right facial weakness: none  Left facial weakness: none  Right taste: normal  Left taste: normal    CN VIII   CN VIII normal.   Hearing: intact    CN IX, X   CN IX normal.   CN X normal.   Palate: symmetric    CN XI   CN XI normal.   Right sternocleidomastoid strength: normal  Left sternocleidomastoid strength: normal  Right trapezius strength: normal  Left trapezius strength: normal    CN XII   CN XII normal.   Tongue: not atrophic  Fasciculations: absent  Tongue deviation: none    MOTOR EXAM   Muscle bulk: normal  Overall muscle tone: normal  Right arm tone: normal  Left arm tone: normal  Right arm pronator drift: absent  Left arm pronator drift: absent  Right leg tone: normal  Left leg tone: normal    Strength   Right neck flexion: 5/5  Left neck flexion: 5/5  Right neck extension: 5/5  Left neck extension: 5/5  Right deltoid: 5/5  Left deltoid: 5/5  Right biceps: 5/5  Left biceps: 5/5  Right triceps: 5/5  Left triceps: 5/5  Right wrist flexion: 5/5  Left wrist flexion: 5/5  Right wrist extension: 5/5  Left wrist extension: 5/5  Right interossei: 5/5  Left interossei: 5/5  Right iliopsoas: 5/5  Left iliopsoas: 5/5  Right quadriceps: 5/5  Left quadriceps: 5/5  Right hamstrin/5  Left hamstrin/5  Right anterior tibial: 5/5  Left anterior tibial: 5/5  Right posterior tibial: 5/5  Left posterior tibial: 5/5  Right gastroc: 5/5  Left gastroc: 5/5    REFLEXES     Reflexes   Right brachioradialis: 2+  Left brachioradialis: 2+  Right biceps:  2+  Left biceps: 2+  Right triceps: 2+  Left triceps: 2+  Right patellar: 2+  Left patellar: 2+  Right achilles: 2+  Left achilles: 2+  Right plantar: normal  Left plantar: normal  Right Juárez: absent  Left Jáurez: absent  Right ankle clonus: absent  Left ankle clonus: absent  Right pendular knee jerk: absent  Left pendular knee jerk: absent    SENSORY EXAM   Light touch normal.   Right arm light touch: normal  Left arm light touch: normal  Right leg light touch: normal  Left leg light touch: normal  Vibration normal.   Right arm vibration: normal  Left arm vibration: normal  Right leg vibration: normal  Left leg vibration: normal  Proprioception normal.   Right arm proprioception: normal  Left arm proprioception: normal  Right leg proprioception: normal  Left leg proprioception: normal  Pinprick normal.   Right arm pinprick: normal  Left arm pinprick: normal  Right leg pinprick: normal  Left leg pinprick: normal  Graphesthesia: normal  Romberg: negative  Stereognosis: normal    GAIT AND COORDINATION     Gait  Gait: wide-based     Coordination   Finger to nose coordination: normal  Heel to shin coordination: normal  Tandem walking coordination: abnormal    Tremor   Resting tremor: absent  Intention tremor: present  Action tremor: left arm and right arm     Physical Exam  Vitals and nursing note reviewed.   Constitutional:       Appearance: Normal appearance.   HENT:      Head: Normocephalic.   Eyes:      Extraocular Movements: Extraocular movements intact and EOM normal.      Pupils: Pupils are equal, round, and reactive to light.   Cardiovascular:      Rate and Rhythm: Normal rate and regular rhythm.   Pulmonary:      Effort: Pulmonary effort is normal.      Breath sounds: Normal breath sounds.   Musculoskeletal:         General: No swelling or tenderness. Normal range of motion.      Cervical back: Normal range of motion and neck supple.      Right lower leg: No edema.      Left lower leg: No edema.   Skin:      General: Skin is warm and dry.      Coloration: Skin is not jaundiced.      Findings: No rash.   Neurological:      General: No focal deficit present.      Mental Status: She is alert and oriented to person, place, and time.      GCS: GCS eye subscore is 4. GCS verbal subscore is 5. GCS motor subscore is 6.      Cranial Nerves: No cranial nerve deficit.      Sensory: No sensory deficit.      Motor: Motor function is intact. No weakness.      Coordination: Coordination is intact. Coordination normal. Finger-Nose-Finger Test, Heel to Shin Test and Romberg Test normal.      Gait: Tandem walk abnormal. Gait normal.      Deep Tendon Reflexes: Reflexes normal.      Reflex Scores:       Tricep reflexes are 2+ on the right side and 2+ on the left side.       Bicep reflexes are 2+ on the right side and 2+ on the left side.       Brachioradialis reflexes are 2+ on the right side and 2+ on the left side.       Patellar reflexes are 2+ on the right side and 2+ on the left side.       Achilles reflexes are 2+ on the right side and 2+ on the left side.  Psychiatric:         Mood and Affect: Mood normal.         Speech: Speech normal.         Behavior: Behavior normal.        Assessment:     Problem List Items Addressed This Visit          Neuro    Generalized epilepsy - Primary     Other Visit Diagnoses       Seizure        Relevant Medications    divalproex 500 MG Tb24               Primary Diagnosis and ICD10  Generalized epilepsy [G40.309]    Plan:     Patient Instructions   Continue current Depakote ER 1000mg at night  CMP and depakote level next visit  Good eating and sleep habits encouraged  Notify clinic if any seizures      Seizure Precautions:  1. Take medications as directed  2. Avoid open flames and hot surfaces such as fires and grills  3. Avoid elevations such as ladders or stools  4. Avoid swimming alone  5. Make sure getting plenty of sleep, recommend 7-8 hours per day  6. Avoid alcohol and illicit drugs  7. No  driving or operating heavy machinery for 6 months after last known seizure     Medications Discontinued During This Encounter   Medication Reason    divalproex (DEPAKOTE) 250 MG EC tablet     divalproex 500 MG Tb24            Requested Prescriptions     Signed Prescriptions Disp Refills    divalproex 500 MG Tb24 180 tablet 3     Sig: TAKE 2 TABLETS BY MOUTH EACH NIGHT AT BEDTIME FOR SEIZURES       No orders of the defined types were placed in this encounter.               2-point gait

## 2023-04-17 ENCOUNTER — DOCUMENT SCAN (OUTPATIENT)
Dept: HOME HEALTH SERVICES | Facility: HOSPITAL | Age: 78
End: 2023-04-17
Payer: COMMERCIAL

## 2023-04-24 ENCOUNTER — DOCUMENT SCAN (OUTPATIENT)
Dept: HOME HEALTH SERVICES | Facility: HOSPITAL | Age: 78
End: 2023-04-24
Payer: COMMERCIAL

## 2023-04-30 ENCOUNTER — EXTERNAL HOME HEALTH (OUTPATIENT)
Dept: HOME HEALTH SERVICES | Facility: HOSPITAL | Age: 78
End: 2023-04-30
Payer: COMMERCIAL

## 2023-05-27 PROCEDURE — G0179 MD RECERTIFICATION HHA PT: HCPCS | Mod: ,,, | Performed by: NURSE PRACTITIONER

## 2023-05-27 PROCEDURE — G0179 PR HOME HEALTH MD RECERTIFICATION: ICD-10-PCS | Mod: ,,, | Performed by: NURSE PRACTITIONER

## 2023-06-05 ENCOUNTER — EXTERNAL HOME HEALTH (OUTPATIENT)
Dept: HOME HEALTH SERVICES | Facility: HOSPITAL | Age: 78
End: 2023-06-05
Payer: COMMERCIAL

## 2023-06-09 DIAGNOSIS — Z71.89 COMPLEX CARE COORDINATION: ICD-10-CM

## 2023-08-08 ENCOUNTER — OFFICE VISIT (OUTPATIENT)
Dept: PRIMARY CARE CLINIC | Facility: CLINIC | Age: 78
End: 2023-08-08
Payer: COMMERCIAL

## 2023-08-08 VITALS
BODY MASS INDEX: 30.49 KG/M2 | WEIGHT: 183 LBS | SYSTOLIC BLOOD PRESSURE: 132 MMHG | HEART RATE: 75 BPM | OXYGEN SATURATION: 100 % | DIASTOLIC BLOOD PRESSURE: 82 MMHG | HEIGHT: 65 IN | TEMPERATURE: 98 F

## 2023-08-08 DIAGNOSIS — I87.2 VENOUS INSUFFICIENCY (CHRONIC) (PERIPHERAL): ICD-10-CM

## 2023-08-08 DIAGNOSIS — E78.5 HYPERLIPIDEMIA, UNSPECIFIED HYPERLIPIDEMIA TYPE: ICD-10-CM

## 2023-08-08 DIAGNOSIS — F41.9 ANXIETY: ICD-10-CM

## 2023-08-08 DIAGNOSIS — I10 ESSENTIAL HYPERTENSION, BENIGN: Primary | ICD-10-CM

## 2023-08-08 DIAGNOSIS — J06.9 ACUTE UPPER RESPIRATORY INFECTION: ICD-10-CM

## 2023-08-08 DIAGNOSIS — E03.9 HYPOTHYROIDISM, UNSPECIFIED TYPE: ICD-10-CM

## 2023-08-08 DIAGNOSIS — J45.909 ASTHMA, UNSPECIFIED ASTHMA SEVERITY, UNSPECIFIED WHETHER COMPLICATED, UNSPECIFIED WHETHER PERSISTENT: ICD-10-CM

## 2023-08-08 PROBLEM — T14.8XXA WOUND INFECTION: Status: RESOLVED | Noted: 2022-06-29 | Resolved: 2023-08-08

## 2023-08-08 PROBLEM — L97.922 NON-PRESSURE CHRONIC ULCER OF LEFT LOWER LEG WITH FAT LAYER EXPOSED: Status: RESOLVED | Noted: 2022-11-22 | Resolved: 2023-08-08

## 2023-08-08 PROBLEM — L08.9 WOUND INFECTION: Status: RESOLVED | Noted: 2022-06-29 | Resolved: 2023-08-08

## 2023-08-08 PROBLEM — S81.809A OPEN WOUND OF LOWER LIMB: Status: RESOLVED | Noted: 2022-07-16 | Resolved: 2023-08-08

## 2023-08-08 PROCEDURE — 3075F PR MOST RECENT SYSTOLIC BLOOD PRESS GE 130-139MM HG: ICD-10-PCS | Mod: ,,, | Performed by: NURSE PRACTITIONER

## 2023-08-08 PROCEDURE — 1126F PR PAIN SEVERITY QUANTIFIED, NO PAIN PRESENT: ICD-10-PCS | Mod: ,,, | Performed by: NURSE PRACTITIONER

## 2023-08-08 PROCEDURE — 3288F FALL RISK ASSESSMENT DOCD: CPT | Mod: ,,, | Performed by: NURSE PRACTITIONER

## 2023-08-08 PROCEDURE — 1160F PR REVIEW ALL MEDS BY PRESCRIBER/CLIN PHARMACIST DOCUMENTED: ICD-10-PCS | Mod: ,,, | Performed by: NURSE PRACTITIONER

## 2023-08-08 PROCEDURE — 1159F PR MEDICATION LIST DOCUMENTED IN MEDICAL RECORD: ICD-10-PCS | Mod: ,,, | Performed by: NURSE PRACTITIONER

## 2023-08-08 PROCEDURE — 1101F PT FALLS ASSESS-DOCD LE1/YR: CPT | Mod: ,,, | Performed by: NURSE PRACTITIONER

## 2023-08-08 PROCEDURE — 99214 PR OFFICE/OUTPT VISIT, EST, LEVL IV, 30-39 MIN: ICD-10-PCS | Mod: ,,, | Performed by: NURSE PRACTITIONER

## 2023-08-08 PROCEDURE — 99214 OFFICE O/P EST MOD 30 MIN: CPT | Mod: ,,, | Performed by: NURSE PRACTITIONER

## 2023-08-08 PROCEDURE — 1126F AMNT PAIN NOTED NONE PRSNT: CPT | Mod: ,,, | Performed by: NURSE PRACTITIONER

## 2023-08-08 PROCEDURE — 3079F DIAST BP 80-89 MM HG: CPT | Mod: ,,, | Performed by: NURSE PRACTITIONER

## 2023-08-08 PROCEDURE — 1160F RVW MEDS BY RX/DR IN RCRD: CPT | Mod: ,,, | Performed by: NURSE PRACTITIONER

## 2023-08-08 PROCEDURE — 1101F PR PT FALLS ASSESS DOC 0-1 FALLS W/OUT INJ PAST YR: ICD-10-PCS | Mod: ,,, | Performed by: NURSE PRACTITIONER

## 2023-08-08 PROCEDURE — 3288F PR FALLS RISK ASSESSMENT DOCUMENTED: ICD-10-PCS | Mod: ,,, | Performed by: NURSE PRACTITIONER

## 2023-08-08 PROCEDURE — 3079F PR MOST RECENT DIASTOLIC BLOOD PRESSURE 80-89 MM HG: ICD-10-PCS | Mod: ,,, | Performed by: NURSE PRACTITIONER

## 2023-08-08 PROCEDURE — 3075F SYST BP GE 130 - 139MM HG: CPT | Mod: ,,, | Performed by: NURSE PRACTITIONER

## 2023-08-08 PROCEDURE — 1159F MED LIST DOCD IN RCRD: CPT | Mod: ,,, | Performed by: NURSE PRACTITIONER

## 2023-08-08 RX ORDER — CLORAZEPATE DIPOTASSIUM 3.75 MG/1
TABLET ORAL
Qty: 60 TABLET | Refills: 5 | Status: SHIPPED | OUTPATIENT
Start: 2023-08-08 | End: 2024-02-08 | Stop reason: SDUPTHER

## 2023-08-08 RX ORDER — LEVOTHYROXINE SODIUM 50 UG/1
50 TABLET ORAL
Qty: 90 TABLET | Refills: 3 | Status: SHIPPED | OUTPATIENT
Start: 2023-08-08 | End: 2023-10-04

## 2023-08-08 RX ORDER — ALBUTEROL SULFATE 90 UG/1
2 AEROSOL, METERED RESPIRATORY (INHALATION) EVERY 6 HOURS PRN
Qty: 18 G | Refills: 5 | Status: SHIPPED | OUTPATIENT
Start: 2023-08-08 | End: 2023-10-04

## 2023-08-08 RX ORDER — LOSARTAN POTASSIUM 50 MG/1
50 TABLET ORAL DAILY
Qty: 90 TABLET | Refills: 3 | Status: SHIPPED | OUTPATIENT
Start: 2023-08-08 | End: 2023-10-04

## 2023-08-08 RX ORDER — CILOSTAZOL 50 MG/1
50 TABLET ORAL 2 TIMES DAILY
Qty: 180 TABLET | Refills: 3 | Status: SHIPPED | OUTPATIENT
Start: 2023-08-08 | End: 2023-10-04

## 2023-08-08 RX ORDER — AZITHROMYCIN 250 MG/1
TABLET, FILM COATED ORAL
Qty: 6 TABLET | Refills: 0 | Status: SHIPPED | OUTPATIENT
Start: 2023-08-08 | End: 2024-02-08

## 2023-08-08 RX ORDER — FUROSEMIDE 20 MG/1
20 TABLET ORAL DAILY
Qty: 90 TABLET | Refills: 3 | Status: SHIPPED | OUTPATIENT
Start: 2023-08-08 | End: 2023-10-04

## 2023-08-08 RX ORDER — METHYLPREDNISOLONE 4 MG/1
TABLET ORAL
Qty: 21 EACH | Refills: 0 | Status: SHIPPED | OUTPATIENT
Start: 2023-08-08 | End: 2023-08-29

## 2023-08-08 NOTE — PROGRESS NOTES
Subjective     Patient ID: Umesh Lassiter is a 78 y.o. female.    Chief Complaint: 6th Month follow up for Essential hypertension,benign and Sinusitis    Pt presents for a 6 month follow-up for hypertension. Pt states her asthma is flaring up and sinus symptoms with wheezing.      Review of Systems   Constitutional:  Negative for activity change, appetite change, chills, fatigue and fever.   HENT:  Positive for nasal congestion and sinus pressure/congestion. Negative for ear discharge, nosebleeds, postnasal drip, rhinorrhea, sneezing, sore throat and tinnitus.    Eyes:  Negative for pain, discharge, redness and itching.   Respiratory:  Positive for wheezing. Negative for cough, choking, chest tightness and shortness of breath.    Cardiovascular:  Negative for chest pain.   Gastrointestinal:  Negative for abdominal distention, abdominal pain, blood in stool, change in bowel habit, constipation, diarrhea, nausea, vomiting and change in bowel habit.   Genitourinary:  Negative for decreased urine volume, dysuria, flank pain and frequency.   Musculoskeletal:  Negative for back pain and gait problem.   Integumentary:  Negative for wound, breast mass and breast discharge.   Allergic/Immunologic: Negative for immunocompromised state.   Neurological:  Negative for dizziness, light-headedness and headaches.   Psychiatric/Behavioral:  Negative for agitation, behavioral problems and hallucinations.    Breast: Negative for mass         Objective     Physical Exam  Vitals and nursing note reviewed.   Constitutional:       Appearance: Normal appearance.   Cardiovascular:      Rate and Rhythm: Normal rate and regular rhythm.      Heart sounds: Normal heart sounds.   Pulmonary:      Effort: Pulmonary effort is normal.      Breath sounds: Wheezing present.   Musculoskeletal:         General: Normal range of motion.   Neurological:      Mental Status: She is alert and oriented to person, place, and time.   Psychiatric:         Mood  and Affect: Mood normal.         Behavior: Behavior normal.            Assessment and Plan     1. Essential hypertension, benign  -     losartan (COZAAR) 50 MG tablet; Take 1 tablet (50 mg total) by mouth once daily.  Dispense: 90 tablet; Refill: 3  -     CBC Auto Differential; Future; Expected date: 08/08/2023  -     Comprehensive Metabolic Panel; Future; Expected date: 08/08/2023  -     Lipid Panel; Future; Expected date: 08/08/2023  -     TSH; Future; Expected date: 08/08/2023    2. Venous insufficiency (chronic) (peripheral)  -     cilostazoL (PLETAL) 50 MG Tab; Take 1 tablet (50 mg total) by mouth 2 (two) times daily.  Dispense: 180 tablet; Refill: 3  -     furosemide (LASIX) 20 MG tablet; Take 1 tablet (20 mg total) by mouth once daily.  Dispense: 90 tablet; Refill: 3    3. Anxiety  -     clorazepate (TRANXENE) 3.75 MG Tab; TAKE 1 TABLET BY MOUTH TWICE A DAY AS NEEDED FOR ANXIETY *CAUSES DROWSINESS-AVOID ALCOHOL!!  Dispense: 60 tablet; Refill: 5    4. Hyperlipidemia, unspecified hyperlipidemia type    5. Hypothyroidism, unspecified type  -     levothyroxine (SYNTHROID) 50 MCG tablet; Take 1 tablet (50 mcg total) by mouth before breakfast. Take on an empty stomach with a full glass of water. Nothing else to eat or drink for 30 mins  Dispense: 90 tablet; Refill: 3    6. Asthma, unspecified asthma severity, unspecified whether complicated, unspecified whether persistent  -     albuterol (PROVENTIL/VENTOLIN HFA) 90 mcg/actuation inhaler; Inhale 2 puffs into the lungs every 6 (six) hours as needed for Wheezing. Rescue  Dispense: 18 g; Refill: 5  -     methylPREDNISolone (MEDROL DOSEPACK) 4 mg tablet; use as directed  Dispense: 21 each; Refill: 0    7. Acute upper respiratory infection  -     azithromycin (Z-SEVERIANO) 250 MG tablet; 2 tablets on day 1 and 1 tablet on days 2-5  Dispense: 6 tablet; Refill: 0        Pneumonia vaccine at next visit due to ill today. Will call pt with lab results.          Follow up in about  6 months (around 2/8/2024).

## 2023-10-04 DIAGNOSIS — R56.9 SEIZURE: ICD-10-CM

## 2023-10-05 DIAGNOSIS — R56.9 SEIZURE: ICD-10-CM

## 2023-10-05 RX ORDER — DIVALPROEX SODIUM 500 MG/1
TABLET, FILM COATED, EXTENDED RELEASE ORAL
Qty: 180 TABLET | Refills: 3 | Status: SHIPPED | OUTPATIENT
Start: 2023-10-05

## 2023-10-05 RX ORDER — DIVALPROEX SODIUM 500 MG/1
TABLET, FILM COATED, EXTENDED RELEASE ORAL
Qty: 180 TABLET | Refills: 3 | OUTPATIENT
Start: 2023-10-05

## 2023-10-05 RX ORDER — DIVALPROEX SODIUM 500 MG/1
500 TABLET, FILM COATED, EXTENDED RELEASE ORAL DAILY
Qty: 30 TABLET | Refills: 11 | OUTPATIENT
Start: 2023-10-05 | End: 2024-10-04

## 2023-10-11 ENCOUNTER — OFFICE VISIT (OUTPATIENT)
Dept: NEUROLOGY | Facility: CLINIC | Age: 78
End: 2023-10-11
Payer: MEDICARE

## 2023-10-11 VITALS
SYSTOLIC BLOOD PRESSURE: 140 MMHG | RESPIRATION RATE: 18 BRPM | HEART RATE: 114 BPM | OXYGEN SATURATION: 99 % | WEIGHT: 197 LBS | HEIGHT: 65 IN | DIASTOLIC BLOOD PRESSURE: 78 MMHG | BODY MASS INDEX: 32.82 KG/M2

## 2023-10-11 DIAGNOSIS — G40.309 GENERALIZED EPILEPSY: Primary | ICD-10-CM

## 2023-10-11 DIAGNOSIS — Z79.899 ENCOUNTER FOR LONG-TERM CURRENT USE OF MEDICATION: ICD-10-CM

## 2023-10-11 PROCEDURE — 99213 OFFICE O/P EST LOW 20 MIN: CPT | Mod: S$PBB,,, | Performed by: NURSE PRACTITIONER

## 2023-10-11 PROCEDURE — 1101F PR PT FALLS ASSESS DOC 0-1 FALLS W/OUT INJ PAST YR: ICD-10-PCS | Mod: CPTII,,, | Performed by: NURSE PRACTITIONER

## 2023-10-11 PROCEDURE — 3077F PR MOST RECENT SYSTOLIC BLOOD PRESSURE >= 140 MM HG: ICD-10-PCS | Mod: CPTII,,, | Performed by: NURSE PRACTITIONER

## 2023-10-11 PROCEDURE — 1101F PT FALLS ASSESS-DOCD LE1/YR: CPT | Mod: CPTII,,, | Performed by: NURSE PRACTITIONER

## 2023-10-11 PROCEDURE — 99213 PR OFFICE/OUTPT VISIT, EST, LEVL III, 20-29 MIN: ICD-10-PCS | Mod: S$PBB,,, | Performed by: NURSE PRACTITIONER

## 2023-10-11 PROCEDURE — 1159F PR MEDICATION LIST DOCUMENTED IN MEDICAL RECORD: ICD-10-PCS | Mod: CPTII,,, | Performed by: NURSE PRACTITIONER

## 2023-10-11 PROCEDURE — 3078F PR MOST RECENT DIASTOLIC BLOOD PRESSURE < 80 MM HG: ICD-10-PCS | Mod: CPTII,,, | Performed by: NURSE PRACTITIONER

## 2023-10-11 PROCEDURE — 1125F PR PAIN SEVERITY QUANTIFIED, PAIN PRESENT: ICD-10-PCS | Mod: CPTII,,, | Performed by: NURSE PRACTITIONER

## 2023-10-11 PROCEDURE — 1160F RVW MEDS BY RX/DR IN RCRD: CPT | Mod: CPTII,,, | Performed by: NURSE PRACTITIONER

## 2023-10-11 PROCEDURE — 3288F FALL RISK ASSESSMENT DOCD: CPT | Mod: CPTII,,, | Performed by: NURSE PRACTITIONER

## 2023-10-11 PROCEDURE — 1159F MED LIST DOCD IN RCRD: CPT | Mod: CPTII,,, | Performed by: NURSE PRACTITIONER

## 2023-10-11 PROCEDURE — 1160F PR REVIEW ALL MEDS BY PRESCRIBER/CLIN PHARMACIST DOCUMENTED: ICD-10-PCS | Mod: CPTII,,, | Performed by: NURSE PRACTITIONER

## 2023-10-11 PROCEDURE — 1125F AMNT PAIN NOTED PAIN PRSNT: CPT | Mod: CPTII,,, | Performed by: NURSE PRACTITIONER

## 2023-10-11 PROCEDURE — 3288F PR FALLS RISK ASSESSMENT DOCUMENTED: ICD-10-PCS | Mod: CPTII,,, | Performed by: NURSE PRACTITIONER

## 2023-10-11 PROCEDURE — 99214 OFFICE O/P EST MOD 30 MIN: CPT | Mod: PBBFAC | Performed by: NURSE PRACTITIONER

## 2023-10-11 PROCEDURE — 3077F SYST BP >= 140 MM HG: CPT | Mod: CPTII,,, | Performed by: NURSE PRACTITIONER

## 2023-10-11 PROCEDURE — 3078F DIAST BP <80 MM HG: CPT | Mod: CPTII,,, | Performed by: NURSE PRACTITIONER

## 2023-10-11 NOTE — PATIENT INSTRUCTIONS
Continue current Depakote ER 1000mg at night  Depakote level  Good eating and sleep habits encouraged  Notify clinic if any seizures      Seizure Precautions:  1. Take medications as directed  2. Avoid open flames and hot surfaces such as fires and grills  3. Avoid elevations such as ladders or stools  4. Avoid swimming alone  5. Make sure getting plenty of sleep, recommend 7-8 hours per day  6. Avoid alcohol and illicit drugs  7. No driving or operating heavy machinery for 6 months after last known seizure

## 2023-10-11 NOTE — PROGRESS NOTES
Subjective:       Patient ID: Umesh Lassiter is a 78 y.o. female     Chief Complaint:    Chief Complaint   Patient presents with    Follow-up          Allergies:  Influenza virus vaccines and Lexapro [escitalopram]    Current Medications:    Outpatient Encounter Medications as of 10/11/2023   Medication Sig Dispense Refill    albuterol (PROVENTIL/VENTOLIN HFA) 90 mcg/actuation inhaler Inhale 2 puffs into the lungs every 6 (six) hours as needed for Wheezing. 18 g 3    albuterol 2 mg/5 mL syrup Take 5 mLs (2 mg total) by mouth 3 (three) times daily. 473 mL 5    azithromycin (Z-SEVERIANO) 250 MG tablet 2 tablets on day 1 and 1 tablet on days 2-5 6 tablet 0    cilostazoL (PLETAL) 50 MG Tab Take 1 tablet (50 mg total) by mouth 2 (two) times daily. 180 tablet 1    clorazepate (TRANXENE) 3.75 MG Tab TAKE 1 TABLET BY MOUTH TWICE A DAY AS NEEDED FOR ANXIETY *CAUSES DROWSINESS-AVOID ALCOHOL!! 60 tablet 5    divalproex 500 MG Tb24 TAKE 2 TABLETS BY MOUTH EACH NIGHT AT BEDTIME FOR SEIZURES 180 tablet 3    furosemide (LASIX) 20 MG tablet Take 1 tablet (20 mg total) by mouth once daily. 90 tablet 1    levothyroxine (SYNTHROID) 50 MCG tablet Take 1 tablet (50 mcg total) by mouth before breakfast. 90 tablet 1    losartan (COZAAR) 50 MG tablet Take 1 tablet (50 mg total) by mouth once daily. 90 tablet 1    [DISCONTINUED] albuterol (PROVENTIL/VENTOLIN HFA) 90 mcg/actuation inhaler Inhale 2 puffs into the lungs every 6 (six) hours as needed for Wheezing. Rescue 18 g 5    [DISCONTINUED] cilostazoL (PLETAL) 50 MG Tab Take 1 tablet (50 mg total) by mouth 2 (two) times daily. 180 tablet 3    [DISCONTINUED] divalproex 500 MG Tb24 TAKE 2 TABLETS BY MOUTH EACH NIGHT AT BEDTIME FOR SEIZURES 180 tablet 3    [DISCONTINUED] furosemide (LASIX) 20 MG tablet Take 1 tablet (20 mg total) by mouth once daily. 90 tablet 3    [DISCONTINUED] levothyroxine (SYNTHROID) 50 MCG tablet Take 1 tablet (50 mcg total) by mouth before breakfast. Take on an empty  stomach with a full glass of water. Nothing else to eat or drink for 30 mins 90 tablet 3    [DISCONTINUED] losartan (COZAAR) 50 MG tablet Take 1 tablet (50 mg total) by mouth once daily. 90 tablet 3     No facility-administered encounter medications on file as of 10/11/2023.       History of Present Illness  77 y/o female following for history of seizures    Last visit reported last seizure was February 2015.  Continued on depakote ER 1000mg QPM.    She has slight bilateral hand tremor on exam, she denies this causes her any complications.  Likely due to the depakote.  It has been monitored for several visits, with no noted worsening.    Last depakote level in October of 2022, it was 90.           Review of Systems  Review of Systems   Constitutional:  Negative for diaphoresis and fever.   HENT:  Negative for congestion, hearing loss and tinnitus.    Eyes:  Negative for blurred vision, double vision, photophobia, discharge and redness.   Respiratory:  Negative for cough and shortness of breath.    Cardiovascular:  Negative for chest pain.   Gastrointestinal:  Negative for abdominal pain, nausea and vomiting.   Musculoskeletal:  Negative for back pain, joint pain, myalgias and neck pain.   Skin:  Negative for itching and rash.   Neurological:  Positive for tremors and seizures. Negative for dizziness, sensory change, speech change, focal weakness, loss of consciousness, weakness and headaches.   Psychiatric/Behavioral:  Negative for depression, hallucinations and memory loss. The patient does not have insomnia.    All other systems reviewed and are negative.     Objective:     NEUROLOGICAL EXAMINATION:     MENTAL STATUS   Oriented to person, place, and time.   Registration: recalls 3 of 3 objects. Recall at 5 minutes: recalls 3 of 3 objects.   Attention: normal. Concentration: normal.   Speech: speech is normal   Level of consciousness: alert  Knowledge: good and consistent with education.   Normal comprehension.      CRANIAL NERVES     CN II   Visual fields full to confrontation.   Visual acuity: normal  Right visual field deficit: none  Left visual field deficit: none     CN III, IV, VI   Pupils are equal, round, and reactive to light.  Extraocular motions are normal.   Right pupil: Size: 3 mm. Shape: regular. Reactivity: brisk. Consensual response: intact. Accommodation: intact.   Left pupil: Size: 3 mm. Shape: regular. Reactivity: brisk. Consensual response: intact. Accommodation: intact.   CN III: no CN III palsy  CN VI: no CN VI palsy  Nystagmus: none   Diplopia: none  Upgaze: normal  Downgaze: normal  Conjugate gaze: present  Vestibulo-ocular reflex: present    CN V   Facial sensation intact.   Right facial sensation deficit: none  Left facial sensation deficit: none  Right corneal reflex: normal  Left corneal reflex: normal    CN VII   Facial expression full, symmetric.   Right facial weakness: none  Left facial weakness: none  Right taste: normal  Left taste: normal    CN VIII   CN VIII normal.   Hearing: intact    CN IX, X   CN IX normal.   CN X normal.   Palate: symmetric    CN XI   CN XI normal.   Right sternocleidomastoid strength: normal  Left sternocleidomastoid strength: normal  Right trapezius strength: normal  Left trapezius strength: normal    CN XII   CN XII normal.   Tongue: not atrophic  Fasciculations: absent  Tongue deviation: none    MOTOR EXAM   Muscle bulk: normal  Overall muscle tone: normal  Right arm tone: normal  Left arm tone: normal  Right arm pronator drift: absent  Left arm pronator drift: absent  Right leg tone: normal  Left leg tone: normal    Strength   Right neck flexion: 5/5  Left neck flexion: 5/5  Right neck extension: 5/5  Left neck extension: 5/5  Right deltoid: 5/5  Left deltoid: 5/5  Right biceps: 5/5  Left biceps: 5/5  Right triceps: 5/5  Left triceps: 5/5  Right wrist flexion: 5/5  Left wrist flexion: 5/5  Right wrist extension: 5/5  Left wrist extension: 5/5  Right interossei:  5/5  Left interossei: 5/5  Right iliopsoas: 5/5  Left iliopsoas: 5/5  Right quadriceps: 5/5  Left quadriceps: 5/5  Right hamstrin/5  Left hamstrin/5  Right anterior tibial: 5/5  Left anterior tibial: 5/5  Right posterior tibial: 5/5  Left posterior tibial: 5/5  Right gastroc: 5/5  Left gastroc: 5/5    REFLEXES     Reflexes   Right brachioradialis: 2+  Left brachioradialis: 2+  Right biceps: 2+  Left biceps: 2+  Right triceps: 2+  Left triceps: 2+  Right patellar: 2+  Left patellar: 2+  Right achilles: 2+  Left achilles: 2+  Right plantar: normal  Left plantar: normal  Right Juárez: absent  Left Juárez: absent  Right ankle clonus: absent  Left ankle clonus: absent  Right pendular knee jerk: absent  Left pendular knee jerk: absent    SENSORY EXAM   Light touch normal.   Right arm light touch: normal  Left arm light touch: normal  Right leg light touch: normal  Left leg light touch: normal  Vibration normal.   Right arm vibration: normal  Left arm vibration: normal  Right leg vibration: normal  Left leg vibration: normal  Proprioception normal.   Right arm proprioception: normal  Left arm proprioception: normal  Right leg proprioception: normal  Left leg proprioception: normal  Pinprick normal.   Right arm pinprick: normal  Left arm pinprick: normal  Right leg pinprick: normal  Left leg pinprick: normal  Graphesthesia: normal  Romberg: negative  Stereognosis: normal    GAIT AND COORDINATION     Gait  Gait: wide-based     Coordination   Finger to nose coordination: normal  Heel to shin coordination: normal  Tandem walking coordination: abnormal    Tremor   Resting tremor: absent  Intention tremor: present  Action tremor: left arm and right arm       Physical Exam  Vitals and nursing note reviewed.   Constitutional:       Appearance: Normal appearance.   HENT:      Head: Normocephalic.   Eyes:      Extraocular Movements: Extraocular movements intact and EOM normal.      Pupils: Pupils are equal, round, and  reactive to light.   Cardiovascular:      Rate and Rhythm: Normal rate and regular rhythm.   Pulmonary:      Effort: Pulmonary effort is normal.      Breath sounds: Normal breath sounds.   Musculoskeletal:         General: No swelling or tenderness. Normal range of motion.      Cervical back: Normal range of motion and neck supple.      Right lower leg: No edema.      Left lower leg: No edema.   Skin:     General: Skin is warm and dry.      Coloration: Skin is not jaundiced.      Findings: No rash.   Neurological:      General: No focal deficit present.      Mental Status: She is alert and oriented to person, place, and time.      GCS: GCS eye subscore is 4. GCS verbal subscore is 5. GCS motor subscore is 6.      Cranial Nerves: No cranial nerve deficit.      Sensory: No sensory deficit.      Motor: Motor function is intact. No weakness.      Coordination: Coordination is intact. Coordination normal. Finger-Nose-Finger Test, Heel to Shin Test and Romberg Test normal.      Gait: Tandem walk abnormal. Gait normal.      Deep Tendon Reflexes: Reflexes normal.      Reflex Scores:       Tricep reflexes are 2+ on the right side and 2+ on the left side.       Bicep reflexes are 2+ on the right side and 2+ on the left side.       Brachioradialis reflexes are 2+ on the right side and 2+ on the left side.       Patellar reflexes are 2+ on the right side and 2+ on the left side.       Achilles reflexes are 2+ on the right side and 2+ on the left side.  Psychiatric:         Mood and Affect: Mood normal.         Speech: Speech normal.         Behavior: Behavior normal.          Assessment:     Problem List Items Addressed This Visit          Neuro    Generalized epilepsy - Primary       Other    Encounter for long-term current use of medication    Relevant Orders    Valproic Acid            Primary Diagnosis and ICD10  Generalized epilepsy [G40.309]    Plan:     Patient Instructions   Continue current Depakote ER 1000mg at  night  Depakote level  Good eating and sleep habits encouraged  Notify clinic if any seizures      Seizure Precautions:  1. Take medications as directed  2. Avoid open flames and hot surfaces such as fires and grills  3. Avoid elevations such as ladders or stools  4. Avoid swimming alone  5. Make sure getting plenty of sleep, recommend 7-8 hours per day  6. Avoid alcohol and illicit drugs  7. No driving or operating heavy machinery for 6 months after last known seizure     There are no discontinued medications.          Requested Prescriptions      No prescriptions requested or ordered in this encounter       Orders Placed This Encounter   Procedures    Valproic Acid

## 2023-11-07 DIAGNOSIS — J45.909 ASTHMA, UNSPECIFIED ASTHMA SEVERITY, UNSPECIFIED WHETHER COMPLICATED, UNSPECIFIED WHETHER PERSISTENT: ICD-10-CM

## 2023-11-07 RX ORDER — ALBUTEROL SULFATE 90 UG/1
2 AEROSOL, METERED RESPIRATORY (INHALATION) EVERY 6 HOURS PRN
Qty: 18 G | Refills: 3 | Status: SHIPPED | OUTPATIENT
Start: 2023-11-07

## 2023-11-07 RX ORDER — ALBUTEROL SULFATE 90 UG/1
2 AEROSOL, METERED RESPIRATORY (INHALATION) EVERY 6 HOURS PRN
Qty: 18 G | Refills: 3 | Status: CANCELLED | OUTPATIENT
Start: 2023-11-07

## 2023-11-07 RX ORDER — ALBUTEROL SULFATE 2 MG/5ML
2 SYRUP ORAL 3 TIMES DAILY
Qty: 473 ML | Refills: 5 | Status: SHIPPED | OUTPATIENT
Start: 2023-11-07 | End: 2023-11-07 | Stop reason: CLARIF

## 2023-11-08 ENCOUNTER — TELEPHONE (OUTPATIENT)
Dept: PRIMARY CARE CLINIC | Facility: CLINIC | Age: 78
End: 2023-11-08
Payer: MEDICARE

## 2023-11-08 NOTE — TELEPHONE ENCOUNTER
I spoke with pt. She stated she is not having any symptoms. Stated Albuterol syrup is her allergy medication. I explained to her numerous times that Albuterol is not for allergies, and it is not meant to be taken everyday long-term. I attempted to explain to pt what the purpose of Albuterol is but she repeatedly yelled that she needed her Albuterol for her allergies. I informed her that Mrs. Smith stated that she was not sending in Albuterol syrup. Albuterol inhaler was sent to the pharmacy. Pt stated she was not getting the inhaler because she has several inhalers at home. Stated she knows how to take her medicine. Stated she has bad allergies and it is going to be Mrs. Smith's fault if she ends up in the hospital. Stated Mrs. Smith is going to pay her hospital bill if she goes to the hospital. Pt refused all other treatment. Stated she wanted the Albuterol syrup to take 3 times a day everyday for her allergies. Pt then hung the phone up while I was talking.

## 2023-11-08 NOTE — TELEPHONE ENCOUNTER
----- Message from MELANI Wiley sent at 11/7/2023  1:14 PM CST -----  Please ask her what symptoms she is having, we don't usually take this by mouth.  ----- Message -----  From: Lisa Rubin  Sent: 11/7/2023   9:47 AM CST  To: MELANI Wiley    albuterol 2 mg/5 mL syrup 473 mL 5 10/27/2022 -  Sig: Take 5 mLs (2 mg total) by mouth 3 (three) times daily.  Route: Oral    [address]

## 2023-12-05 RX ORDER — SILVER SULFADIAZINE 10 G/1000G
CREAM TOPICAL
Status: CANCELLED | OUTPATIENT
Start: 2023-12-05 | End: 2023-12-05

## 2024-01-09 DIAGNOSIS — Z71.89 COMPLEX CARE COORDINATION: ICD-10-CM

## 2024-02-08 ENCOUNTER — OFFICE VISIT (OUTPATIENT)
Dept: PRIMARY CARE CLINIC | Facility: CLINIC | Age: 79
End: 2024-02-08
Payer: MEDICARE

## 2024-02-08 VITALS
OXYGEN SATURATION: 100 % | SYSTOLIC BLOOD PRESSURE: 134 MMHG | DIASTOLIC BLOOD PRESSURE: 78 MMHG | WEIGHT: 190 LBS | BODY MASS INDEX: 31.65 KG/M2 | TEMPERATURE: 99 F | HEIGHT: 65 IN | HEART RATE: 79 BPM

## 2024-02-08 DIAGNOSIS — L97.922 NON-PRESSURE CHRONIC ULCER OF LEFT LOWER LEG WITH FAT LAYER EXPOSED: ICD-10-CM

## 2024-02-08 DIAGNOSIS — Z23 NEED FOR VACCINATION: ICD-10-CM

## 2024-02-08 DIAGNOSIS — G40.309 GENERALIZED EPILEPSY: ICD-10-CM

## 2024-02-08 DIAGNOSIS — E78.5 HYPERLIPIDEMIA, UNSPECIFIED HYPERLIPIDEMIA TYPE: ICD-10-CM

## 2024-02-08 DIAGNOSIS — I87.2 VENOUS INSUFFICIENCY (CHRONIC) (PERIPHERAL): ICD-10-CM

## 2024-02-08 DIAGNOSIS — L97.929 STASIS EDEMA WITH ULCER, LEFT: ICD-10-CM

## 2024-02-08 DIAGNOSIS — I87.312 STASIS EDEMA WITH ULCER, LEFT: ICD-10-CM

## 2024-02-08 DIAGNOSIS — N18.31 CHRONIC KIDNEY DISEASE, STAGE 3A: ICD-10-CM

## 2024-02-08 DIAGNOSIS — E03.9 HYPOTHYROIDISM, UNSPECIFIED TYPE: ICD-10-CM

## 2024-02-08 DIAGNOSIS — I10 ESSENTIAL HYPERTENSION, BENIGN: Primary | ICD-10-CM

## 2024-02-08 DIAGNOSIS — F32.0 MAJOR DEPRESSIVE DISORDER, SINGLE EPISODE, MILD: ICD-10-CM

## 2024-02-08 DIAGNOSIS — F41.9 ANXIETY: ICD-10-CM

## 2024-02-08 DIAGNOSIS — Z78.0 POST-MENOPAUSAL: ICD-10-CM

## 2024-02-08 PROCEDURE — 3078F DIAST BP <80 MM HG: CPT | Mod: ,,, | Performed by: NURSE PRACTITIONER

## 2024-02-08 PROCEDURE — 1101F PT FALLS ASSESS-DOCD LE1/YR: CPT | Mod: ,,, | Performed by: NURSE PRACTITIONER

## 2024-02-08 PROCEDURE — 3288F FALL RISK ASSESSMENT DOCD: CPT | Mod: ,,, | Performed by: NURSE PRACTITIONER

## 2024-02-08 PROCEDURE — 90677 PCV20 VACCINE IM: CPT | Mod: ,,, | Performed by: NURSE PRACTITIONER

## 2024-02-08 PROCEDURE — 1126F AMNT PAIN NOTED NONE PRSNT: CPT | Mod: ,,, | Performed by: NURSE PRACTITIONER

## 2024-02-08 PROCEDURE — 99214 OFFICE O/P EST MOD 30 MIN: CPT | Mod: ,,, | Performed by: NURSE PRACTITIONER

## 2024-02-08 PROCEDURE — 1160F RVW MEDS BY RX/DR IN RCRD: CPT | Mod: ,,, | Performed by: NURSE PRACTITIONER

## 2024-02-08 PROCEDURE — 3075F SYST BP GE 130 - 139MM HG: CPT | Mod: ,,, | Performed by: NURSE PRACTITIONER

## 2024-02-08 PROCEDURE — 1159F MED LIST DOCD IN RCRD: CPT | Mod: ,,, | Performed by: NURSE PRACTITIONER

## 2024-02-08 PROCEDURE — G0009 ADMIN PNEUMOCOCCAL VACCINE: HCPCS | Mod: ,,, | Performed by: NURSE PRACTITIONER

## 2024-02-08 RX ORDER — CILOSTAZOL 50 MG/1
50 TABLET ORAL 2 TIMES DAILY
Qty: 180 TABLET | Refills: 3 | Status: SHIPPED | OUTPATIENT
Start: 2024-02-08

## 2024-02-08 RX ORDER — LOSARTAN POTASSIUM 50 MG/1
50 TABLET ORAL DAILY
Qty: 90 TABLET | Refills: 3 | Status: SHIPPED | OUTPATIENT
Start: 2024-02-08

## 2024-02-08 RX ORDER — FUROSEMIDE 20 MG/1
20 TABLET ORAL DAILY
Qty: 90 TABLET | Refills: 3 | Status: SHIPPED | OUTPATIENT
Start: 2024-02-08

## 2024-02-08 RX ORDER — LEVOTHYROXINE SODIUM 50 UG/1
50 TABLET ORAL
Qty: 90 TABLET | Refills: 3 | Status: SHIPPED | OUTPATIENT
Start: 2024-02-08

## 2024-02-08 RX ORDER — CLORAZEPATE DIPOTASSIUM 3.75 MG/1
TABLET ORAL
Qty: 60 TABLET | Refills: 5 | Status: SHIPPED | OUTPATIENT
Start: 2024-02-08

## 2024-02-08 NOTE — PROGRESS NOTES
Subjective     Patient ID: Umesh Lassiter is a 78 y.o. female.    Chief Complaint: 6th Month fu for Essential hypertension    Pt presents for a 6 month follow-up for hypertension. Pt states she is going to Dr. Rubin's pain treatment clinic now for leg wound.       Review of Systems   Constitutional:  Negative for activity change, appetite change, chills, fatigue and fever.   HENT:  Negative for nasal congestion, ear discharge, nosebleeds, postnasal drip, rhinorrhea, sinus pressure/congestion, sneezing, sore throat and tinnitus.    Eyes:  Negative for pain, discharge, redness and itching.   Respiratory:  Negative for cough, choking, chest tightness, shortness of breath and wheezing.    Cardiovascular:  Negative for chest pain.   Gastrointestinal:  Negative for abdominal distention, abdominal pain, blood in stool, change in bowel habit, constipation, diarrhea, nausea and vomiting.   Genitourinary:  Negative for decreased urine volume, dysuria, flank pain and frequency.   Musculoskeletal:  Negative for back pain and gait problem.   Integumentary:  Negative for wound, breast mass and breast discharge.   Allergic/Immunologic: Negative for immunocompromised state.   Neurological:  Negative for dizziness, light-headedness and headaches.   Psychiatric/Behavioral:  Negative for agitation, behavioral problems and hallucinations.    Breast: Negative for mass         Objective     Physical Exam  Vitals and nursing note reviewed.   Constitutional:       Appearance: Normal appearance.   Cardiovascular:      Rate and Rhythm: Normal rate and regular rhythm.      Heart sounds: Normal heart sounds.   Pulmonary:      Effort: Pulmonary effort is normal.      Breath sounds: Normal breath sounds.   Musculoskeletal:         General: Normal range of motion.   Neurological:      Mental Status: She is alert and oriented to person, place, and time.   Psychiatric:         Mood and Affect: Mood normal.         Behavior: Behavior normal.             Assessment and Plan     1. Essential hypertension, benign  -     CBC Auto Differential; Future; Expected date: 02/08/2024  -     Comprehensive Metabolic Panel; Future; Expected date: 02/08/2024  -     Lipid Panel; Future; Expected date: 02/08/2024  -     TSH; Future; Expected date: 02/08/2024  -     losartan (COZAAR) 50 MG tablet; Take 1 tablet (50 mg total) by mouth once daily.  Dispense: 90 tablet; Refill: 3    2. Chronic kidney disease, stage 3a    3. Major depressive disorder, single episode, mild    4. Generalized epilepsy    5. Non-pressure chronic ulcer of left lower leg with fat layer exposed    6. Stasis edema with ulcer, left    7. Hyperlipidemia, unspecified hyperlipidemia type    8. Hypothyroidism, unspecified type  -     levothyroxine (SYNTHROID) 50 MCG tablet; Take 1 tablet (50 mcg total) by mouth before breakfast.  Dispense: 90 tablet; Refill: 3    9. Venous insufficiency (chronic) (peripheral)  -     cilostazoL (PLETAL) 50 MG Tab; Take 1 tablet (50 mg total) by mouth 2 (two) times daily.  Dispense: 180 tablet; Refill: 3  -     furosemide (LASIX) 20 MG tablet; Take 1 tablet (20 mg total) by mouth once daily.  Dispense: 90 tablet; Refill: 3    10. Anxiety  -     clorazepate (TRANXENE) 3.75 MG Tab; TAKE 1 TABLET BY MOUTH TWICE A DAY AS NEEDED FOR ANXIETY *CAUSES DROWSINESS-AVOID ALCOHOL!!  Dispense: 60 tablet; Refill: 5    11. Post-menopausal  -     DXA Bone Density Axial Skeleton 1 or more sites; Future; Expected date: 02/08/2024    12. Need for vaccination  -     (In Office Administered) Pneumococcal Conjugate Vaccine (20 Valent) (IM) (Preferred)        Will call pt with lab results and dxa appointment.          Follow up in about 6 months (around 8/8/2024).

## 2024-04-11 ENCOUNTER — OFFICE VISIT (OUTPATIENT)
Dept: NEUROLOGY | Facility: CLINIC | Age: 79
End: 2024-04-11
Payer: MEDICARE

## 2024-04-11 VITALS
SYSTOLIC BLOOD PRESSURE: 132 MMHG | OXYGEN SATURATION: 100 % | HEART RATE: 77 BPM | BODY MASS INDEX: 30.99 KG/M2 | WEIGHT: 186 LBS | HEIGHT: 65 IN | DIASTOLIC BLOOD PRESSURE: 57 MMHG

## 2024-04-11 DIAGNOSIS — G40.309 GENERALIZED EPILEPSY: Primary | ICD-10-CM

## 2024-04-11 PROCEDURE — 3075F SYST BP GE 130 - 139MM HG: CPT | Mod: CPTII,,, | Performed by: NURSE PRACTITIONER

## 2024-04-11 PROCEDURE — 1101F PT FALLS ASSESS-DOCD LE1/YR: CPT | Mod: CPTII,,, | Performed by: NURSE PRACTITIONER

## 2024-04-11 PROCEDURE — 99212 OFFICE O/P EST SF 10 MIN: CPT | Mod: S$PBB,,, | Performed by: NURSE PRACTITIONER

## 2024-04-11 PROCEDURE — 1126F AMNT PAIN NOTED NONE PRSNT: CPT | Mod: CPTII,,, | Performed by: NURSE PRACTITIONER

## 2024-04-11 PROCEDURE — 3288F FALL RISK ASSESSMENT DOCD: CPT | Mod: CPTII,,, | Performed by: NURSE PRACTITIONER

## 2024-04-11 PROCEDURE — 1160F RVW MEDS BY RX/DR IN RCRD: CPT | Mod: CPTII,,, | Performed by: NURSE PRACTITIONER

## 2024-04-11 PROCEDURE — 99215 OFFICE O/P EST HI 40 MIN: CPT | Mod: PBBFAC | Performed by: NURSE PRACTITIONER

## 2024-04-11 PROCEDURE — 1159F MED LIST DOCD IN RCRD: CPT | Mod: CPTII,,, | Performed by: NURSE PRACTITIONER

## 2024-04-11 PROCEDURE — 3078F DIAST BP <80 MM HG: CPT | Mod: CPTII,,, | Performed by: NURSE PRACTITIONER

## 2024-04-11 RX ORDER — ACETAMINOPHEN AND CODEINE PHOSPHATE 300; 30 MG/1; MG/1
TABLET ORAL
COMMUNITY
Start: 2024-03-26

## 2024-04-11 NOTE — PATIENT INSTRUCTIONS
Continue current Depakote ER 1000mg at night  Depakote level next visit  Good eating and sleep habits encouraged  Notify clinic if any seizures      Seizure Precautions:  1. Take medications as directed  2. Avoid open flames and hot surfaces such as fires and grills  3. Avoid elevations such as ladders or stools  4. Avoid swimming alone  5. Make sure getting plenty of sleep, recommend 7-8 hours per day  6. Avoid alcohol and illicit drugs  7. No driving or operating heavy machinery for 6 months after last known seizure

## 2024-04-11 NOTE — PROGRESS NOTES
Subjective:       Patient ID: Umesh Lassiter is a 78 y.o. female     Chief Complaint:    Chief Complaint   Patient presents with    Follow-up          Allergies:  Influenza virus vaccines and Lexapro [escitalopram]    Current Medications:    Outpatient Encounter Medications as of 4/11/2024   Medication Sig Dispense Refill    acetaminophen-codeine 300-30mg (TYLENOL #3) 300-30 mg Tab TAKE 1 TABLET BY MOUTH EVERY 12 HOURS AS NEEDED FOR PAIN      albuterol (PROVENTIL/VENTOLIN HFA) 90 mcg/actuation inhaler Inhale 2 puffs into the lungs every 6 (six) hours as needed for Wheezing. 18 g 3    cilostazoL (PLETAL) 50 MG Tab Take 1 tablet (50 mg total) by mouth 2 (two) times daily. 180 tablet 3    clorazepate (TRANXENE) 3.75 MG Tab TAKE 1 TABLET BY MOUTH TWICE A DAY AS NEEDED FOR ANXIETY *CAUSES DROWSINESS-AVOID ALCOHOL!! 60 tablet 5    divalproex 500 MG Tb24 TAKE 2 TABLETS BY MOUTH EACH NIGHT AT BEDTIME FOR SEIZURES 180 tablet 3    furosemide (LASIX) 20 MG tablet Take 1 tablet (20 mg total) by mouth once daily. 90 tablet 3    levothyroxine (SYNTHROID) 50 MCG tablet Take 1 tablet (50 mcg total) by mouth before breakfast. 90 tablet 3    losartan (COZAAR) 50 MG tablet Take 1 tablet (50 mg total) by mouth once daily. 90 tablet 3     No facility-administered encounter medications on file as of 4/11/2024.       History of Present Illness  79 y/o female following for history of seizures    Last visit reported last seizure was February 2015.  Continued on depakote ER 1000mg QPM.    She has slight bilateral hand tremor on exam, she denies this causes her any complications.  Likely due to the depakote.  It has been monitored for several visits, with no noted worsening.    Last depakote level in October of 2023, it was 89.           Review of Systems  Review of Systems   Constitutional:  Negative for diaphoresis and fever.   HENT:  Negative for congestion, hearing loss and tinnitus.    Eyes:  Negative for blurred vision, double  vision, photophobia, discharge and redness.   Respiratory:  Negative for cough and shortness of breath.    Cardiovascular:  Negative for chest pain.   Gastrointestinal:  Negative for abdominal pain, nausea and vomiting.   Musculoskeletal:  Negative for back pain, joint pain, myalgias and neck pain.   Skin:  Negative for itching and rash.   Neurological:  Positive for tremors and seizures. Negative for dizziness, sensory change, speech change, focal weakness, loss of consciousness, weakness and headaches.   Psychiatric/Behavioral:  Negative for depression, hallucinations and memory loss. The patient does not have insomnia.    All other systems reviewed and are negative.     Objective:     NEUROLOGICAL EXAMINATION:     MENTAL STATUS   Oriented to person, place, and time.   Registration: recalls 3 of 3 objects. Recall at 5 minutes: recalls 3 of 3 objects.   Attention: normal. Concentration: normal.   Speech: speech is normal   Level of consciousness: alert  Knowledge: good and consistent with education.   Normal comprehension.     CRANIAL NERVES     CN II   Visual fields full to confrontation.   Visual acuity: normal  Right visual field deficit: none  Left visual field deficit: none     CN III, IV, VI   Pupils are equal, round, and reactive to light.  Extraocular motions are normal.   Right pupil: Size: 3 mm. Shape: regular. Reactivity: brisk. Consensual response: intact. Accommodation: intact.   Left pupil: Size: 3 mm. Shape: regular. Reactivity: brisk. Consensual response: intact. Accommodation: intact.   CN III: no CN III palsy  CN VI: no CN VI palsy  Nystagmus: none   Diplopia: none  Upgaze: normal  Downgaze: normal  Conjugate gaze: present  Vestibulo-ocular reflex: present    CN V   Facial sensation intact.   Right facial sensation deficit: none  Left facial sensation deficit: none  Right corneal reflex: normal  Left corneal reflex: normal    CN VII   Facial expression full, symmetric.   Right facial weakness:  none  Left facial weakness: none  Right taste: normal  Left taste: normal    CN VIII   CN VIII normal.   Hearing: intact    CN IX, X   CN IX normal.   CN X normal.   Palate: symmetric    CN XI   CN XI normal.   Right sternocleidomastoid strength: normal  Left sternocleidomastoid strength: normal  Right trapezius strength: normal  Left trapezius strength: normal    CN XII   CN XII normal.   Tongue: not atrophic  Fasciculations: absent  Tongue deviation: none    MOTOR EXAM   Muscle bulk: normal  Overall muscle tone: normal  Right arm tone: normal  Left arm tone: normal  Right arm pronator drift: absent  Left arm pronator drift: absent  Right leg tone: normal  Left leg tone: normal    Strength   Right neck flexion: 5/5  Left neck flexion: 5/5  Right neck extension: 5/5  Left neck extension: 5/5  Right deltoid: 5/5  Left deltoid: 5/5  Right biceps: 5/5  Left biceps: 5/5  Right triceps: 5/5  Left triceps: 5/5  Right wrist flexion: 5/5  Left wrist flexion: 5/5  Right wrist extension: 5/5  Left wrist extension: 5/5  Right interossei: 5/5  Left interossei: 5/5  Right iliopsoas: 5/5  Left iliopsoas: 5/5  Right quadriceps: 5/5  Left quadriceps: 5/5  Right hamstrin/5  Left hamstrin/5  Right anterior tibial: 5/5  Left anterior tibial: 5/5  Right posterior tibial: 5/5  Left posterior tibial: 5/5  Right gastroc: 5/5  Left gastroc: 5/5    REFLEXES     Reflexes   Right brachioradialis: 2+  Left brachioradialis: 2+  Right biceps: 2+  Left biceps: 2+  Right triceps: 2+  Left triceps: 2+  Right patellar: 2+  Left patellar: 2+  Right achilles: 2+  Left achilles: 2+  Right plantar: normal  Left plantar: normal  Right Juárez: absent  Left Juárez: absent  Right ankle clonus: absent  Left ankle clonus: absent  Right pendular knee jerk: absent  Left pendular knee jerk: absent    SENSORY EXAM   Light touch normal.   Right arm light touch: normal  Left arm light touch: normal  Right leg light touch: normal  Left leg light touch:  normal  Vibration normal.   Right arm vibration: normal  Left arm vibration: normal  Right leg vibration: normal  Left leg vibration: normal  Proprioception normal.   Right arm proprioception: normal  Left arm proprioception: normal  Right leg proprioception: normal  Left leg proprioception: normal  Pinprick normal.   Right arm pinprick: normal  Left arm pinprick: normal  Right leg pinprick: normal  Left leg pinprick: normal  Graphesthesia: normal  Romberg: negative  Stereognosis: normal    GAIT AND COORDINATION     Gait  Gait: wide-based     Coordination   Finger to nose coordination: normal  Heel to shin coordination: normal  Tandem walking coordination: abnormal    Tremor   Resting tremor: absent  Intention tremor: present  Action tremor: left arm and right arm       Physical Exam  Vitals and nursing note reviewed.   Constitutional:       Appearance: Normal appearance.   HENT:      Head: Normocephalic.   Eyes:      Extraocular Movements: Extraocular movements intact and EOM normal.      Pupils: Pupils are equal, round, and reactive to light.   Cardiovascular:      Rate and Rhythm: Normal rate and regular rhythm.   Pulmonary:      Effort: Pulmonary effort is normal.      Breath sounds: Normal breath sounds.   Musculoskeletal:         General: No swelling or tenderness. Normal range of motion.      Cervical back: Normal range of motion and neck supple.      Right lower leg: No edema.      Left lower leg: No edema.   Skin:     General: Skin is warm and dry.      Coloration: Skin is not jaundiced.      Findings: No rash.   Neurological:      General: No focal deficit present.      Mental Status: She is alert and oriented to person, place, and time.      GCS: GCS eye subscore is 4. GCS verbal subscore is 5. GCS motor subscore is 6.      Cranial Nerves: No cranial nerve deficit.      Sensory: No sensory deficit.      Motor: Motor function is intact. No weakness.      Coordination: Coordination is intact. Coordination  normal. Finger-Nose-Finger Test, Heel to Shin Test and Romberg Test normal.      Gait: Tandem walk abnormal. Gait normal.      Deep Tendon Reflexes: Reflexes normal.      Reflex Scores:       Tricep reflexes are 2+ on the right side and 2+ on the left side.       Bicep reflexes are 2+ on the right side and 2+ on the left side.       Brachioradialis reflexes are 2+ on the right side and 2+ on the left side.       Patellar reflexes are 2+ on the right side and 2+ on the left side.       Achilles reflexes are 2+ on the right side and 2+ on the left side.  Psychiatric:         Mood and Affect: Mood normal.         Speech: Speech normal.         Behavior: Behavior normal.          Assessment:     Problem List Items Addressed This Visit          Neuro    Generalized epilepsy - Primary              Primary Diagnosis and ICD10  Generalized epilepsy [G40.309]    Plan:     There are no Patient Instructions on file for this visit.    There are no discontinued medications.          Requested Prescriptions      No prescriptions requested or ordered in this encounter       No orders of the defined types were placed in this encounter.

## 2024-08-05 ENCOUNTER — HOSPITAL ENCOUNTER (EMERGENCY)
Facility: HOSPITAL | Age: 79
Discharge: HOME | End: 2024-08-05
Attending: EMERGENCY MEDICINE
Payer: MEDICARE

## 2024-08-05 VITALS
HEIGHT: 65 IN | HEART RATE: 86 BPM | RESPIRATION RATE: 11 BRPM | WEIGHT: 180 LBS | TEMPERATURE: 99 F | SYSTOLIC BLOOD PRESSURE: 158 MMHG | BODY MASS INDEX: 29.99 KG/M2 | DIASTOLIC BLOOD PRESSURE: 68 MMHG | OXYGEN SATURATION: 100 %

## 2024-08-05 DIAGNOSIS — F41.9 ANXIETY: Primary | ICD-10-CM

## 2024-08-05 DIAGNOSIS — G47.00 INSOMNIA, UNSPECIFIED TYPE: ICD-10-CM

## 2024-08-05 DIAGNOSIS — R55 SYNCOPE: ICD-10-CM

## 2024-08-05 LAB
ALBUMIN SERPL BCP-MCNC: 3.4 G/DL (ref 3.5–5)
ALBUMIN/GLOB SERPL: 0.9 {RATIO}
ALP SERPL-CCNC: 49 U/L (ref 55–142)
ALT SERPL W P-5'-P-CCNC: 13 U/L (ref 13–56)
ANION GAP SERPL CALCULATED.3IONS-SCNC: 7 MMOL/L (ref 7–16)
AST SERPL W P-5'-P-CCNC: 12 U/L (ref 15–37)
BASOPHILS # BLD AUTO: 0.02 K/UL (ref 0–0.2)
BASOPHILS NFR BLD AUTO: 0.4 % (ref 0–1)
BILIRUB SERPL-MCNC: 0.2 MG/DL (ref ?–1.2)
BILIRUB UR QL STRIP: NEGATIVE
BUN SERPL-MCNC: 13 MG/DL (ref 7–18)
BUN/CREAT SERPL: 12 (ref 6–20)
CALCIUM SERPL-MCNC: 10 MG/DL (ref 8.5–10.1)
CHLORIDE SERPL-SCNC: 103 MMOL/L (ref 98–107)
CLARITY UR: CLEAR
CO2 SERPL-SCNC: 32 MMOL/L (ref 21–32)
COLOR UR: COLORLESS
CREAT SERPL-MCNC: 1.09 MG/DL (ref 0.55–1.02)
DIFFERENTIAL METHOD BLD: ABNORMAL
EGFR (NO RACE VARIABLE) (RUSH/TITUS): 51 ML/MIN/1.73M2
EOSINOPHIL # BLD AUTO: 0.07 K/UL (ref 0–0.5)
EOSINOPHIL NFR BLD AUTO: 1.4 % (ref 1–4)
ERYTHROCYTE [DISTWIDTH] IN BLOOD BY AUTOMATED COUNT: 14.9 % (ref 11.5–14.5)
GLOBULIN SER-MCNC: 3.9 G/DL (ref 2–4)
GLUCOSE SERPL-MCNC: 103 MG/DL (ref 74–106)
GLUCOSE UR STRIP-MCNC: NORMAL MG/DL
HCT VFR BLD AUTO: 36.1 % (ref 38–47)
HGB BLD-MCNC: 11.3 G/DL (ref 12–16)
IMM GRANULOCYTES # BLD AUTO: 0.03 K/UL (ref 0–0.04)
IMM GRANULOCYTES NFR BLD: 0.6 % (ref 0–0.4)
KETONES UR STRIP-SCNC: NEGATIVE MG/DL
LEUKOCYTE ESTERASE UR QL STRIP: NEGATIVE
LYMPHOCYTES # BLD AUTO: 1.87 K/UL (ref 1–4.8)
LYMPHOCYTES NFR BLD AUTO: 37.7 % (ref 27–41)
MCH RBC QN AUTO: 25.1 PG (ref 27–31)
MCHC RBC AUTO-ENTMCNC: 31.3 G/DL (ref 32–36)
MCV RBC AUTO: 80 FL (ref 80–96)
MONOCYTES # BLD AUTO: 0.52 K/UL (ref 0–0.8)
MONOCYTES NFR BLD AUTO: 10.5 % (ref 2–6)
MPC BLD CALC-MCNC: 11.3 FL (ref 9.4–12.4)
NEUTROPHILS # BLD AUTO: 2.45 K/UL (ref 1.8–7.7)
NEUTROPHILS NFR BLD AUTO: 49.4 % (ref 53–65)
NITRITE UR QL STRIP: NEGATIVE
NRBC # BLD AUTO: 0 X10E3/UL
NRBC, AUTO (.00): 0 %
NT-PROBNP SERPL-MCNC: 168 PG/ML (ref 1–450)
PH UR STRIP: 7.5 PH UNITS
PLATELET # BLD AUTO: 195 K/UL (ref 150–400)
POTASSIUM SERPL-SCNC: 3.6 MMOL/L (ref 3.5–5.1)
PROT SERPL-MCNC: 7.3 G/DL (ref 6.4–8.2)
PROT UR QL STRIP: NEGATIVE
RBC # BLD AUTO: 4.51 M/UL (ref 4.2–5.4)
RBC # UR STRIP: NEGATIVE /UL
SODIUM SERPL-SCNC: 138 MMOL/L (ref 136–145)
SP GR UR STRIP: 1.01
TROPONIN I SERPL DL<=0.01 NG/ML-MCNC: 7.1 PG/ML
UROBILINOGEN UR STRIP-ACNC: NORMAL MG/DL
WBC # BLD AUTO: 4.96 K/UL (ref 4.5–11)

## 2024-08-05 PROCEDURE — 99285 EMERGENCY DEPT VISIT HI MDM: CPT | Mod: 25

## 2024-08-05 PROCEDURE — 85025 COMPLETE CBC W/AUTO DIFF WBC: CPT | Performed by: EMERGENCY MEDICINE

## 2024-08-05 PROCEDURE — 84484 ASSAY OF TROPONIN QUANT: CPT | Performed by: EMERGENCY MEDICINE

## 2024-08-05 PROCEDURE — 36415 COLL VENOUS BLD VENIPUNCTURE: CPT | Performed by: EMERGENCY MEDICINE

## 2024-08-05 PROCEDURE — 81003 URINALYSIS AUTO W/O SCOPE: CPT | Performed by: EMERGENCY MEDICINE

## 2024-08-05 PROCEDURE — 83880 ASSAY OF NATRIURETIC PEPTIDE: CPT | Performed by: EMERGENCY MEDICINE

## 2024-08-05 PROCEDURE — 80053 COMPREHEN METABOLIC PANEL: CPT | Performed by: EMERGENCY MEDICINE

## 2024-08-05 PROCEDURE — 94761 N-INVAS EAR/PLS OXIMETRY MLT: CPT

## 2024-08-05 PROCEDURE — 93005 ELECTROCARDIOGRAM TRACING: CPT

## 2024-08-07 ENCOUNTER — OFFICE VISIT (OUTPATIENT)
Dept: PRIMARY CARE CLINIC | Facility: CLINIC | Age: 79
End: 2024-08-07
Payer: MEDICARE

## 2024-08-07 VITALS
TEMPERATURE: 98 F | OXYGEN SATURATION: 100 % | BODY MASS INDEX: 28.66 KG/M2 | HEART RATE: 83 BPM | HEIGHT: 65 IN | WEIGHT: 172 LBS | DIASTOLIC BLOOD PRESSURE: 74 MMHG | SYSTOLIC BLOOD PRESSURE: 136 MMHG

## 2024-08-07 DIAGNOSIS — I10 ESSENTIAL HYPERTENSION, BENIGN: Primary | ICD-10-CM

## 2024-08-07 DIAGNOSIS — E78.5 HYPERLIPIDEMIA, UNSPECIFIED HYPERLIPIDEMIA TYPE: ICD-10-CM

## 2024-08-07 DIAGNOSIS — E03.9 HYPOTHYROIDISM, UNSPECIFIED TYPE: ICD-10-CM

## 2024-08-07 DIAGNOSIS — N18.32 STAGE 3B CHRONIC KIDNEY DISEASE: Primary | ICD-10-CM

## 2024-08-07 DIAGNOSIS — J45.909 ASTHMA, UNSPECIFIED ASTHMA SEVERITY, UNSPECIFIED WHETHER COMPLICATED, UNSPECIFIED WHETHER PERSISTENT: ICD-10-CM

## 2024-08-07 DIAGNOSIS — N18.31 CHRONIC KIDNEY DISEASE, STAGE 3A: ICD-10-CM

## 2024-08-07 DIAGNOSIS — I73.9 PVD (PERIPHERAL VASCULAR DISEASE): ICD-10-CM

## 2024-08-07 PROBLEM — L03.116 CELLULITIS OF LEFT LOWER EXTREMITY: Status: RESOLVED | Noted: 2022-01-13 | Resolved: 2024-08-07

## 2024-08-07 PROBLEM — L08.9 CHRONIC WOUND INFECTION OF ABDOMEN: Status: RESOLVED | Noted: 2022-06-06 | Resolved: 2024-08-07

## 2024-08-07 PROBLEM — S31.109A CHRONIC WOUND INFECTION OF ABDOMEN: Status: RESOLVED | Noted: 2022-06-06 | Resolved: 2024-08-07

## 2024-08-07 PROBLEM — M79.605 LEFT LEG PAIN: Status: RESOLVED | Noted: 2021-11-29 | Resolved: 2024-08-07

## 2024-08-07 PROCEDURE — 99214 OFFICE O/P EST MOD 30 MIN: CPT | Mod: ,,, | Performed by: NURSE PRACTITIONER

## 2024-08-07 RX ORDER — ALBUTEROL SULFATE 90 UG/1
2 INHALANT RESPIRATORY (INHALATION) EVERY 6 HOURS PRN
Qty: 18 G | Refills: 3 | Status: SHIPPED | OUTPATIENT
Start: 2024-08-07

## 2024-08-08 DIAGNOSIS — N18.32 STAGE 3B CHRONIC KIDNEY DISEASE: Primary | ICD-10-CM

## 2024-08-09 DIAGNOSIS — Z71.89 COMPLEX CARE COORDINATION: ICD-10-CM

## 2024-08-09 LAB
OHS QRS DURATION: 84 MS
OHS QTC CALCULATION: 409 MS

## 2024-08-10 ENCOUNTER — HOSPITAL ENCOUNTER (EMERGENCY)
Facility: HOSPITAL | Age: 79
Discharge: HOME | End: 2024-08-10
Payer: MEDICARE

## 2024-08-10 VITALS
HEART RATE: 71 BPM | RESPIRATION RATE: 14 BRPM | TEMPERATURE: 98 F | BODY MASS INDEX: 28.93 KG/M2 | OXYGEN SATURATION: 100 % | WEIGHT: 180 LBS | DIASTOLIC BLOOD PRESSURE: 64 MMHG | SYSTOLIC BLOOD PRESSURE: 148 MMHG | HEIGHT: 66 IN

## 2024-08-10 DIAGNOSIS — K57.92 DIVERTICULITIS: Primary | ICD-10-CM

## 2024-08-10 LAB
ALBUMIN SERPL BCP-MCNC: 3.2 G/DL (ref 3.5–5)
ALBUMIN/GLOB SERPL: 0.9 {RATIO}
ALP SERPL-CCNC: 61 U/L (ref 55–142)
ALT SERPL W P-5'-P-CCNC: 36 U/L (ref 13–56)
ANION GAP SERPL CALCULATED.3IONS-SCNC: 10 MMOL/L (ref 7–16)
AST SERPL W P-5'-P-CCNC: 82 U/L (ref 15–37)
BASOPHILS # BLD AUTO: 0.02 K/UL (ref 0–0.2)
BASOPHILS NFR BLD AUTO: 0.4 % (ref 0–1)
BILIRUB SERPL-MCNC: 0.4 MG/DL (ref ?–1.2)
BILIRUB UR QL STRIP: NEGATIVE
BUN SERPL-MCNC: 7 MG/DL (ref 7–18)
BUN/CREAT SERPL: 7 (ref 6–20)
CALCIUM SERPL-MCNC: 9.9 MG/DL (ref 8.5–10.1)
CHLORIDE SERPL-SCNC: 102 MMOL/L (ref 98–107)
CLARITY UR: CLEAR
CO2 SERPL-SCNC: 27 MMOL/L (ref 21–32)
COLOR UR: COLORLESS
CREAT SERPL-MCNC: 0.94 MG/DL (ref 0.55–1.02)
DIFFERENTIAL METHOD BLD: ABNORMAL
EGFR (NO RACE VARIABLE) (RUSH/TITUS): 61 ML/MIN/1.73M2
EOSINOPHIL # BLD AUTO: 0.05 K/UL (ref 0–0.5)
EOSINOPHIL NFR BLD AUTO: 0.9 % (ref 1–4)
ERYTHROCYTE [DISTWIDTH] IN BLOOD BY AUTOMATED COUNT: 14.6 % (ref 11.5–14.5)
GLOBULIN SER-MCNC: 3.7 G/DL (ref 2–4)
GLUCOSE SERPL-MCNC: 91 MG/DL (ref 74–106)
GLUCOSE UR STRIP-MCNC: NORMAL MG/DL
HCT VFR BLD AUTO: 36.1 % (ref 38–47)
HGB BLD-MCNC: 11.5 G/DL (ref 12–16)
IMM GRANULOCYTES # BLD AUTO: 0.05 K/UL (ref 0–0.04)
IMM GRANULOCYTES NFR BLD: 0.9 % (ref 0–0.4)
KETONES UR STRIP-SCNC: NEGATIVE MG/DL
LEUKOCYTE ESTERASE UR QL STRIP: NEGATIVE
LYMPHOCYTES # BLD AUTO: 1.6 K/UL (ref 1–4.8)
LYMPHOCYTES NFR BLD AUTO: 28.7 % (ref 27–41)
MCH RBC QN AUTO: 24.7 PG (ref 27–31)
MCHC RBC AUTO-ENTMCNC: 31.9 G/DL (ref 32–36)
MCV RBC AUTO: 77.6 FL (ref 80–96)
MONOCYTES # BLD AUTO: 0.72 K/UL (ref 0–0.8)
MONOCYTES NFR BLD AUTO: 12.9 % (ref 2–6)
MPC BLD CALC-MCNC: 11.1 FL (ref 9.4–12.4)
NEUTROPHILS # BLD AUTO: 3.14 K/UL (ref 1.8–7.7)
NEUTROPHILS NFR BLD AUTO: 56.2 % (ref 53–65)
NITRITE UR QL STRIP: NEGATIVE
NRBC # BLD AUTO: 0 X10E3/UL
NRBC, AUTO (.00): 0 %
PH UR STRIP: 7 PH UNITS
PLATELET # BLD AUTO: 196 K/UL (ref 150–400)
POTASSIUM SERPL-SCNC: 3.7 MMOL/L (ref 3.5–5.1)
PROT SERPL-MCNC: 6.9 G/DL (ref 6.4–8.2)
PROT UR QL STRIP: NEGATIVE
RBC # BLD AUTO: 4.65 M/UL (ref 4.2–5.4)
RBC # UR STRIP: NEGATIVE /UL
SODIUM SERPL-SCNC: 135 MMOL/L (ref 136–145)
SP GR UR STRIP: 1
UROBILINOGEN UR STRIP-ACNC: NORMAL MG/DL
WBC # BLD AUTO: 5.58 K/UL (ref 4.5–11)

## 2024-08-10 PROCEDURE — 25500020 PHARM REV CODE 255: Performed by: NURSE PRACTITIONER

## 2024-08-10 PROCEDURE — 36415 COLL VENOUS BLD VENIPUNCTURE: CPT | Performed by: NURSE PRACTITIONER

## 2024-08-10 PROCEDURE — 99285 EMERGENCY DEPT VISIT HI MDM: CPT | Mod: 25

## 2024-08-10 PROCEDURE — 81003 URINALYSIS AUTO W/O SCOPE: CPT | Performed by: NURSE PRACTITIONER

## 2024-08-10 PROCEDURE — 80053 COMPREHEN METABOLIC PANEL: CPT | Performed by: NURSE PRACTITIONER

## 2024-08-10 PROCEDURE — 85025 COMPLETE CBC W/AUTO DIFF WBC: CPT | Performed by: NURSE PRACTITIONER

## 2024-08-10 RX ORDER — IOPAMIDOL 755 MG/ML
100 INJECTION, SOLUTION INTRAVASCULAR
Status: COMPLETED | OUTPATIENT
Start: 2024-08-10 | End: 2024-08-10

## 2024-08-10 RX ORDER — AMOXICILLIN AND CLAVULANATE POTASSIUM 875; 125 MG/1; MG/1
1 TABLET, FILM COATED ORAL 2 TIMES DAILY
Qty: 20 TABLET | Refills: 0 | Status: SHIPPED | OUTPATIENT
Start: 2024-08-10

## 2024-08-10 RX ADMIN — IOPAMIDOL 100 ML: 755 INJECTION, SOLUTION INTRAVENOUS at 11:08

## 2024-08-10 NOTE — ED PROVIDER NOTES
Encounter Date: 8/10/2024       History     Chief Complaint   Patient presents with    Abdominal Pain    Flank Pain    Nausea     79-year-old female presents to the emergency department to be evaluated for abdominal pain.  Her daughter is at bedside and said that she is a poor historian due to dementia.    The history is provided by the patient and a relative.   Abdominal Pain  The other symptoms of the illness include dysuria. The other symptoms of the illness do not include fever, fatigue, jaundice, melena, nausea, vomiting, diarrhea, hematemesis, hematochezia, vaginal discharge or vaginal bleeding.   Symptoms associated with the illness do not include chills.     Review of patient's allergies indicates:   Allergen Reactions    Influenza virus vaccines      Pt states had to be admitted to hospital after last flu vaccine     Lexapro [escitalopram]      Past Medical History:   Diagnosis Date    Anxiety     Asthma     Cellulitis of left lower extremity 01/13/2022    Chronic wound infection of abdomen 06/06/2022    CVA (cerebral vascular accident) 05/23/2018    DVT (deep venous thrombosis) 2019    Epilepsy     Hyperlipidemia     Hypertension     Hypothyroid     Left leg pain 11/29/2021    Non-pressure chronic ulcer of left lower leg with fat layer exposed     Open wound of lower limb 07/16/2022    PVD (peripheral vascular disease)     Venous stasis     Wound infection     left lower leg     Past Surgical History:   Procedure Laterality Date    KNEE ARTHROSCOPY Left     x 2    LASER ABLATION Right 04/20/2018    [facility] LASER ABLATION PERFORMED BY DR. KARMA BUTCHER.    LASER ABLATION Left 04/27/2018    [facility] LASER ABLATION PERFORMED BY DR. KARMA BUTCHER.    LASER ABLATION Right 12/17/2020    [facility] ( DUPLICATED )LASER ABLATION PERFORMED BY DR. KARMA BUTCHER.    LASER ABLATION Left 01/08/2021    [facility] LASER ABLATION PERFORMED BY DR. KARMA BUTCHER.    Remove cataracts, insert lens Bilateral     SHOULDER ARTHROSCOPY Left     SINUS  SURGERY      TOTAL ABDOMINAL HYSTERECTOMY W/ BILATERAL SALPINGOOPHORECTOMY      VENOUS ABLATION Right 02/28/2020    DISTAL [facility] VARITHENA ABLATION PERFORMED BY DR. KARMA BUTCHER.     Family History   Problem Relation Name Age of Onset    Alzheimer's disease Mother      Endometrial cancer Mother      Heart disease Mother      Lung cancer Father      Diabetes Sister      Endometrial cancer Sister      Hypertension Sister      Polycystic kidney disease Sister      Diabetes Brother      Sickle cell anemia Daughter      Thyroid disease Sister      Seizures Brother      Heart disease Daughter          7 MONTHS    No Known Problems Son      No Known Problems Son      No Known Problems Son      Cancer Brother      No Known Problems Sister      No Known Problems Sister      No Known Problems Sister      No Known Problems Sister      No Known Problems Sister       Social History     Tobacco Use    Smoking status: Never    Smokeless tobacco: Never   Substance Use Topics    Alcohol use: Never    Drug use: Never     Review of Systems   Constitutional:  Negative for chills, fatigue and fever.   Gastrointestinal:  Negative for diarrhea, hematemesis, hematochezia, jaundice, melena, nausea and vomiting.   Genitourinary:  Positive for dysuria. Negative for vaginal bleeding and vaginal discharge.   All other systems reviewed and are negative.      Physical Exam     Initial Vitals [08/10/24 0823]   BP Pulse Resp Temp SpO2   139/83 80 18 98.1 °F (36.7 °C) 98 %      MAP       --         Physical Exam    Vitals reviewed.  Constitutional: She appears well-developed and well-nourished.   Neck: Neck supple.   Cardiovascular:  Normal rate and regular rhythm.           Pulmonary/Chest: Breath sounds normal.   Abdominal: Abdomen is soft. Bowel sounds are normal. She exhibits no distension and no mass. There is abdominal tenderness (Moderate diffuse tenderness).   Musculoskeletal:         General: Normal range of motion.      Cervical back: Neck  supple.     Neurological: She is alert and oriented to person, place, and time. She has normal strength. GCS score is 15. GCS eye subscore is 4. GCS verbal subscore is 5. GCS motor subscore is 6.   Skin: Skin is warm and dry. Capillary refill takes less than 2 seconds.   Psychiatric: She has a normal mood and affect.         Medical Screening Exam   See Full Note    ED Course   Procedures  Labs Reviewed   COMPREHENSIVE METABOLIC PANEL - Abnormal       Result Value    Sodium 135 (*)     Potassium 3.7      Chloride 102      CO2 27      Anion Gap 10      Glucose 91      BUN 7      Creatinine 0.94      BUN/Creatinine Ratio 7      Calcium 9.9      Total Protein 6.9      Albumin 3.2 (*)     Globulin 3.7      A/G Ratio 0.9      Bilirubin, Total 0.4      Alk Phos 61      ALT 36      AST 82 (*)     eGFR 61     CBC WITH DIFFERENTIAL - Abnormal    WBC 5.58      RBC 4.65      Hemoglobin 11.5 (*)     Hematocrit 36.1 (*)     MCV 77.6 (*)     MCH 24.7 (*)     MCHC 31.9 (*)     RDW 14.6 (*)     Platelet Count 196      MPV 11.1      Neutrophils % 56.2      Lymphocytes % 28.7      Monocytes % 12.9 (*)     Eosinophils % 0.9 (*)     Basophils % 0.4      Immature Granulocytes % 0.9 (*)     nRBC, Auto 0.0      Neutrophils, Abs 3.14      Lymphocytes, Absolute 1.60      Monocytes, Absolute 0.72      Eosinophils, Absolute 0.05      Basophils, Absolute 0.02      Immature Granulocytes, Absolute 0.05 (*)     nRBC, Absolute 0.00      Diff Type Auto     CBC W/ AUTO DIFFERENTIAL    Narrative:     The following orders were created for panel order CBC auto differential.  Procedure                               Abnormality         Status                     ---------                               -----------         ------                     CBC with Differential[[id number]]       Abnormal            Final result                 Please view results for these tests on the individual orders.   URINALYSIS    Color, UA Colorless      Clarity, UA Clear       pH, UA 7.0      Leukocytes, UA Negative      Nitrites, UA Negative      Protein, UA Negative      Glucose, UA Normal      Ketones, UA Negative      Urobilinogen, UA Normal      Bilirubin, UA Negative      Blood, UA Negative      Specific Gravity, UA 1.005            Imaging Results              CT Abdomen Pelvis With IV Contrast NO Oral Contrast (Final result)  Result time 08/10/24 11:15:27      Final result by Ruben Blancas II, MD (08/10/24 11:15:27)                   Impression:      Diverticulitis as described above.      Electronically signed by: Ruben Blancas  Date:    08/10/2024  Time:    11:15               Narrative:    EXAMINATION:  CT ABDOMEN PELVIS WITH IV CONTRAST    CLINICAL HISTORY:  Abdominal pain, acute, nonlocalized;    TECHNIQUE:  Axial CT imaging of the abdomen and pelvis is performed with intravenous contrast. Contrast dose is 100 cc Isovue 370.    CT dose reduction technique used - Dose Rite and tube current modulation.    COMPARISON:  None available    FINDINGS:  Cardiac and lung bases are within normal limits    CT abdomen: The gallbladder has been removed.  The liver has multiple nonenhancing cyst.  The spleen, pancreas and adrenal glands are normal in size and enhancement.  No evidence of focal lesion is demonstrated in these solid organs.    Kidneys are normal in size and enhancement.  No evidence of hydronephrosis or nephrolithiasis is seen.    The bowel caliber is normal and no wall thickening or adjacent inflammatory change is seen.  No evidence of free fluid or free air is present.  Appendix appears normal.    CT pelvis: Multiple diverticula present the colon in there is a segment of wall thickening and adjacent stranding in the sigmoid colon.  No free air or adjacent abscess at this time.  Remaining pelvic bowel appears within normal limits.  Bladder shows no evidence of abnormality.  The pelvic organs show no evidence of abnormality.                                       XR  ABDOMEN, ACUTE 2 OR MORE VIEWS WITH CHEST (Final result)  Result time 08/10/24 10:07:44      Final result by Ruben Blancas II, MD (08/10/24 10:07:44)                   Impression:      No evidence of acute process demonstrated      Electronically signed by: Ruben Blancas  Date:    08/10/2024  Time:    10:07               Narrative:    EXAMINATION:  XR ABDOMEN ACUTE 2 OR MORE VIEWS WITH CHEST    CLINICAL HISTORY:  Abdominal pain abdominal pain;    COMPARISON:  None available    TECHNIQUE:  XR ABDOMEN 2 VIEWS WITH CHEST    FINDINGS:  No free fluid or free air seen.  Clips in the right upper quadrant from previous surgery.  The bowel gas pattern appears within normal limits.  No abnormal calcifications are present. Chest shows no evidence of abnormality. No other abnormality is identified.                                       Medications   iopamidoL (ISOVUE-370) injection 100 mL (100 mLs Intravenous Given 8/10/24 1109)     Medical Decision Making  79-year-old female presents to the emergency department to be evaluated for abdominal pain.  Her daughter is at bedside and said that she is a poor historian due to dementia.  Labs ordered and reviewed  X-rays ordered, films reviewed as well as radiologist's interpretation significant for no acute process  Urinalysis ordered and reviewed  CT abdomen pelvis ordered, reviewed as well as the radiologist's interpretation significant for diverticulitis  Diagnosis: Diverticulitis  Prescribed Augmentin    Amount and/or Complexity of Data Reviewed  Labs: ordered.  Radiology: ordered.    Risk  Prescription drug management.                                      Clinical Impression:   Final diagnoses:  [K57.92] Diverticulitis (Primary)        ED Disposition Condition    Discharge Stable          ED Prescriptions       Medication Sig Dispense Start Date End Date Auth. Provider    amoxicillin-clavulanate 875-125mg (AUGMENTIN) 875-125 mg per tablet Take 1 tablet by mouth 2 (two)  times daily. 20 tablet 8/10/2024 -- Eduarda Oquendo FNP          Follow-up Information    None          Eduarda Oquendo FNP  08/10/24 113

## 2024-08-10 NOTE — ED TRIAGE NOTES
Presents to ED for complaints of left flank pain and abdominal pain.  Patient reports pain on urination.  Patient saw Terri POLO on Thursday and is being referred to Nephrology and Pain Management.

## 2024-08-16 ENCOUNTER — HOSPITAL ENCOUNTER (OUTPATIENT)
Dept: RADIOLOGY | Facility: HOSPITAL | Age: 79
Discharge: HOME | End: 2024-08-16
Attending: NURSE PRACTITIONER
Payer: MEDICARE

## 2024-08-16 DIAGNOSIS — N18.32 STAGE 3B CHRONIC KIDNEY DISEASE: ICD-10-CM

## 2024-08-16 PROCEDURE — 76775 US EXAM ABDO BACK WALL LIM: CPT | Mod: 26,,, | Performed by: RADIOLOGY

## 2024-08-16 PROCEDURE — 76775 US EXAM ABDO BACK WALL LIM: CPT | Mod: TC

## 2024-08-27 ENCOUNTER — DOCUMENT SCAN (OUTPATIENT)
Dept: HOME HEALTH SERVICES | Facility: HOSPITAL | Age: 79
End: 2024-08-27
Payer: MEDICAID

## 2024-08-29 ENCOUNTER — EXTERNAL HOME HEALTH (OUTPATIENT)
Dept: HOME HEALTH SERVICES | Facility: HOSPITAL | Age: 79
End: 2024-08-29
Payer: MEDICAID

## 2024-09-09 ENCOUNTER — DOCUMENT SCAN (OUTPATIENT)
Dept: HOME HEALTH SERVICES | Facility: HOSPITAL | Age: 79
End: 2024-09-09
Payer: MEDICAID

## 2024-09-10 ENCOUNTER — DOCUMENT SCAN (OUTPATIENT)
Dept: HOME HEALTH SERVICES | Facility: HOSPITAL | Age: 79
End: 2024-09-10
Payer: MEDICAID

## 2024-10-01 ENCOUNTER — OFFICE VISIT (OUTPATIENT)
Dept: FAMILY MEDICINE | Facility: CLINIC | Age: 79
End: 2024-10-01
Payer: MEDICARE

## 2024-10-01 VITALS
DIASTOLIC BLOOD PRESSURE: 80 MMHG | SYSTOLIC BLOOD PRESSURE: 132 MMHG | TEMPERATURE: 98 F | BODY MASS INDEX: 27.97 KG/M2 | HEIGHT: 66 IN | OXYGEN SATURATION: 100 % | WEIGHT: 174 LBS | HEART RATE: 65 BPM

## 2024-10-01 DIAGNOSIS — Z02.2 ENCOUNTER FOR EXAMINATION FOR ADMISSION TO NURSING HOME: Primary | ICD-10-CM

## 2024-10-01 DIAGNOSIS — I10 PRIMARY HYPERTENSION: ICD-10-CM

## 2024-10-01 DIAGNOSIS — F32.0 MAJOR DEPRESSIVE DISORDER, SINGLE EPISODE, MILD: ICD-10-CM

## 2024-10-01 DIAGNOSIS — N18.31 CHRONIC KIDNEY DISEASE, STAGE 3A: ICD-10-CM

## 2024-10-01 DIAGNOSIS — E03.9 HYPOTHYROIDISM, UNSPECIFIED TYPE: ICD-10-CM

## 2024-10-01 DIAGNOSIS — E78.5 HYPERLIPIDEMIA, UNSPECIFIED HYPERLIPIDEMIA TYPE: ICD-10-CM

## 2024-10-01 DIAGNOSIS — G40.309 GENERALIZED EPILEPSY: ICD-10-CM

## 2024-10-01 PROCEDURE — 1160F RVW MEDS BY RX/DR IN RCRD: CPT | Mod: CPTII,,, | Performed by: NURSE PRACTITIONER

## 2024-10-01 PROCEDURE — 1101F PT FALLS ASSESS-DOCD LE1/YR: CPT | Mod: CPTII,,, | Performed by: NURSE PRACTITIONER

## 2024-10-01 PROCEDURE — 3075F SYST BP GE 130 - 139MM HG: CPT | Mod: CPTII,,, | Performed by: NURSE PRACTITIONER

## 2024-10-01 PROCEDURE — 3288F FALL RISK ASSESSMENT DOCD: CPT | Mod: CPTII,,, | Performed by: NURSE PRACTITIONER

## 2024-10-01 PROCEDURE — 3079F DIAST BP 80-89 MM HG: CPT | Mod: CPTII,,, | Performed by: NURSE PRACTITIONER

## 2024-10-01 PROCEDURE — 1159F MED LIST DOCD IN RCRD: CPT | Mod: CPTII,,, | Performed by: NURSE PRACTITIONER

## 2024-10-01 PROCEDURE — 99212 OFFICE O/P EST SF 10 MIN: CPT | Mod: ,,, | Performed by: NURSE PRACTITIONER

## 2024-10-01 PROCEDURE — 1126F AMNT PAIN NOTED NONE PRSNT: CPT | Mod: CPTII,,, | Performed by: NURSE PRACTITIONER

## 2024-10-01 NOTE — PROGRESS NOTES
Subjective     Patient ID: Umesh Lassiter is a 79 y.o. female.    Chief Complaint: Nursing Home H&P    Pt presents for a physical for nursing home placement.       Review of Systems   Constitutional:  Negative for activity change, appetite change, chills, fatigue and fever.   HENT:  Negative for nasal congestion, ear discharge, nosebleeds, postnasal drip, rhinorrhea, sinus pressure/congestion, sneezing, sore throat and tinnitus.    Eyes:  Negative for pain, discharge, redness and itching.   Respiratory:  Negative for cough, choking, chest tightness, shortness of breath and wheezing.    Cardiovascular:  Negative for chest pain.   Gastrointestinal:  Negative for abdominal distention, abdominal pain, blood in stool, change in bowel habit, constipation, diarrhea, nausea and vomiting.   Genitourinary:  Negative for decreased urine volume, dysuria, flank pain and frequency.   Musculoskeletal:  Negative for back pain and gait problem.   Integumentary:  Negative for wound, breast mass and breast discharge.   Allergic/Immunologic: Negative for immunocompromised state.   Neurological:  Negative for dizziness, light-headedness and headaches.   Psychiatric/Behavioral:  Negative for agitation, behavioral problems and hallucinations.    Breast: Negative for mass         Objective     Physical Exam  Vitals and nursing note reviewed.   Constitutional:       Appearance: Normal appearance.   Cardiovascular:      Rate and Rhythm: Normal rate and regular rhythm.      Heart sounds: Normal heart sounds.   Pulmonary:      Effort: Pulmonary effort is normal.      Breath sounds: Normal breath sounds.   Musculoskeletal:         General: Normal range of motion.   Neurological:      Mental Status: She is alert and oriented to person, place, and time.   Psychiatric:         Mood and Affect: Mood normal.         Behavior: Behavior normal.            Assessment and Plan     1. Encounter for examination for admission to nursing home  -     X-Ray  Chest PA And Lateral; Future; Expected date: 10/01/2024    2. Primary hypertension    3. Hyperlipidemia, unspecified hyperlipidemia type    4. Hypothyroidism, unspecified type    5. Chronic kidney disease, stage 3a    6. Major depressive disorder, single episode, mild    7. Generalized epilepsy        Will call pt with chest xray results. Pt is going to contact [facility] to see if they can provide the TBST.          No follow-ups on file.

## 2024-10-14 ENCOUNTER — OFFICE VISIT (OUTPATIENT)
Dept: NEUROLOGY | Facility: CLINIC | Age: 79
End: 2024-10-14
Payer: MEDICARE

## 2024-10-14 VITALS
HEART RATE: 103 BPM | DIASTOLIC BLOOD PRESSURE: 65 MMHG | BODY MASS INDEX: 26.49 KG/M2 | HEIGHT: 66 IN | SYSTOLIC BLOOD PRESSURE: 143 MMHG | OXYGEN SATURATION: 98 % | WEIGHT: 164.81 LBS | RESPIRATION RATE: 18 BRPM

## 2024-10-14 DIAGNOSIS — G40.309 GENERALIZED EPILEPSY: Primary | ICD-10-CM

## 2024-10-14 DIAGNOSIS — Z79.899 ENCOUNTER FOR LONG-TERM CURRENT USE OF MEDICATION: ICD-10-CM

## 2024-10-14 DIAGNOSIS — R56.9 SEIZURE: ICD-10-CM

## 2024-10-14 PROCEDURE — 1160F RVW MEDS BY RX/DR IN RCRD: CPT | Mod: CPTII,,, | Performed by: NURSE PRACTITIONER

## 2024-10-14 PROCEDURE — 3078F DIAST BP <80 MM HG: CPT | Mod: CPTII,,, | Performed by: NURSE PRACTITIONER

## 2024-10-14 PROCEDURE — 99214 OFFICE O/P EST MOD 30 MIN: CPT | Mod: PBBFAC | Performed by: NURSE PRACTITIONER

## 2024-10-14 PROCEDURE — 3288F FALL RISK ASSESSMENT DOCD: CPT | Mod: CPTII,,, | Performed by: NURSE PRACTITIONER

## 2024-10-14 PROCEDURE — 3077F SYST BP >= 140 MM HG: CPT | Mod: CPTII,,, | Performed by: NURSE PRACTITIONER

## 2024-10-14 PROCEDURE — 1101F PT FALLS ASSESS-DOCD LE1/YR: CPT | Mod: CPTII,,, | Performed by: NURSE PRACTITIONER

## 2024-10-14 PROCEDURE — 1159F MED LIST DOCD IN RCRD: CPT | Mod: CPTII,,, | Performed by: NURSE PRACTITIONER

## 2024-10-14 PROCEDURE — 99999 PR PBB SHADOW E&M-EST. PATIENT-LVL IV: CPT | Mod: PBBFAC,,, | Performed by: NURSE PRACTITIONER

## 2024-10-14 PROCEDURE — 1126F AMNT PAIN NOTED NONE PRSNT: CPT | Mod: CPTII,,, | Performed by: NURSE PRACTITIONER

## 2024-10-14 PROCEDURE — 99213 OFFICE O/P EST LOW 20 MIN: CPT | Mod: S$PBB,,, | Performed by: NURSE PRACTITIONER

## 2024-10-14 RX ORDER — DIVALPROEX SODIUM 500 MG/1
TABLET, FILM COATED, EXTENDED RELEASE ORAL
Qty: 180 TABLET | Refills: 3 | Status: SHIPPED | OUTPATIENT
Start: 2024-10-14

## 2024-10-14 NOTE — PROGRESS NOTES
Subjective:       Patient ID: Umesh Lassiter is a 79 y.o. female     Chief Complaint:    Chief Complaint   Patient presents with    Follow-up          Allergies:  Influenza virus vaccines and Lexapro [escitalopram]    Current Medications:    Outpatient Encounter Medications as of 10/14/2024   Medication Sig Dispense Refill    acetaminophen-codeine 300-30mg (TYLENOL #3) 300-30 mg Tab TAKE 1 TABLET BY MOUTH EVERY 12 HOURS AS NEEDED FOR PAIN      albuterol (PROVENTIL/VENTOLIN HFA) 90 mcg/actuation inhaler Inhale 2 puffs into the lungs every 6 (six) hours as needed for Wheezing. 18 g 3    cilostazoL (PLETAL) 50 MG Tab Take 1 tablet (50 mg total) by mouth 2 (two) times daily. 180 tablet 3    clorazepate (TRANXENE) 3.75 MG Tab TAKE 1 TABLET BY MOUTH TWICE A DAY AS NEEDED FOR ANXIETY *CAUSES DROWSINESS-AVOID ALCOHOL!! 60 tablet 5    furosemide (LASIX) 20 MG tablet Take 1 tablet (20 mg total) by mouth once daily. 90 tablet 3    levothyroxine (SYNTHROID) 50 MCG tablet Take 1 tablet (50 mcg total) by mouth before breakfast. 90 tablet 3    losartan (COZAAR) 50 MG tablet Take 1 tablet (50 mg total) by mouth once daily. 90 tablet 3    [DISCONTINUED] divalproex 500 MG Tb24 TAKE 2 TABLETS BY MOUTH EACH NIGHT AT BEDTIME FOR SEIZURES 180 tablet 3    divalproex ER (DEPAKOTE ER) 500 MG Tb24 24 hr tablet TAKE 2 TABLETS BY MOUTH EACH NIGHT AT BEDTIME FOR SEIZURES 180 tablet 3    [DISCONTINUED] amoxicillin-clavulanate 875-125mg (AUGMENTIN) 875-125 mg per tablet Take 1 tablet by mouth 2 (two) times daily. (Patient not taking: Reported on 10/1/2024) 20 tablet 0     No facility-administered encounter medications on file as of 10/14/2024.       History of Present Illness  80 y/o female following for history of seizures    Last visit reported last seizure was February 2015.  Continued on depakote ER 1000mg QPM.    She has slight bilateral hand tremor on exam, she denies this causes her any complications.  Likely due to the depakote.  It  has been monitored for several visits, with no noted worsening.    Last depakote level in October of 2023, it was 89.  Will recheck today           Review of Systems  Review of Systems   Constitutional:  Negative for diaphoresis and fever.   HENT:  Negative for congestion, hearing loss and tinnitus.    Eyes:  Negative for blurred vision, double vision, photophobia, discharge and redness.   Respiratory:  Negative for cough and shortness of breath.    Cardiovascular:  Negative for chest pain.   Gastrointestinal:  Negative for abdominal pain, nausea and vomiting.   Musculoskeletal:  Negative for back pain, joint pain, myalgias and neck pain.   Skin:  Negative for itching and rash.   Neurological:  Positive for tremors and seizures. Negative for dizziness, sensory change, speech change, focal weakness, loss of consciousness, weakness and headaches.   Psychiatric/Behavioral:  Negative for depression, hallucinations and memory loss. The patient does not have insomnia.    All other systems reviewed and are negative.     Objective:     NEUROLOGICAL EXAMINATION:     MENTAL STATUS   Oriented to person, place, and time.   Registration: recalls 3 of 3 objects. Recall at 5 minutes: recalls 3 of 3 objects.   Attention: normal. Concentration: normal.   Speech: speech is normal   Level of consciousness: alert  Knowledge: good and consistent with education.   Normal comprehension.     CRANIAL NERVES     CN II   Visual fields full to confrontation.   Visual acuity: normal  Right visual field deficit: none  Left visual field deficit: none     CN III, IV, VI   Pupils are equal, round, and reactive to light.  Extraocular motions are normal.   Right pupil: Size: 3 mm. Shape: regular. Reactivity: brisk. Consensual response: intact. Accommodation: intact.   Left pupil: Size: 3 mm. Shape: regular. Reactivity: brisk. Consensual response: intact. Accommodation: intact.   CN III: no CN III palsy  CN VI: no CN VI palsy  Nystagmus: none    Diplopia: none  Upgaze: normal  Downgaze: normal  Conjugate gaze: present  Vestibulo-ocular reflex: present    CN V   Facial sensation intact.   Right facial sensation deficit: none  Left facial sensation deficit: none  Right corneal reflex: normal  Left corneal reflex: normal    CN VII   Facial expression full, symmetric.   Right facial weakness: none  Left facial weakness: none  Right taste: normal  Left taste: normal    CN VIII   CN VIII normal.   Hearing: intact    CN IX, X   CN IX normal.   CN X normal.   Palate: symmetric    CN XI   CN XI normal.   Right sternocleidomastoid strength: normal  Left sternocleidomastoid strength: normal  Right trapezius strength: normal  Left trapezius strength: normal    CN XII   CN XII normal.   Tongue: not atrophic  Fasciculations: absent  Tongue deviation: none    MOTOR EXAM   Muscle bulk: normal  Overall muscle tone: normal  Right arm tone: normal  Left arm tone: normal  Right arm pronator drift: absent  Left arm pronator drift: absent  Right leg tone: normal  Left leg tone: normal    Strength   Right neck flexion: 5/5  Left neck flexion: 5/5  Right neck extension: 5/5  Left neck extension: 5/5  Right deltoid: 5/5  Left deltoid: 5/5  Right biceps: 5/5  Left biceps: 5/5  Right triceps: 5/5  Left triceps: 5/5  Right wrist flexion: 5/5  Left wrist flexion: 5/5  Right wrist extension: 5/5  Left wrist extension: 5/5  Right interossei: 5/5  Left interossei: 5/5  Right iliopsoas: 5/5  Left iliopsoas: 5/5  Right quadriceps: 5/5  Left quadriceps: 5/5  Right hamstrin/5  Left hamstrin/5  Right anterior tibial: 5/5  Left anterior tibial: 5/5  Right posterior tibial: 5/5  Left posterior tibial: 5/5  Right gastroc: 5/5  Left gastroc: 5/5    REFLEXES     Reflexes   Right brachioradialis: 2+  Left brachioradialis: 2+  Right biceps: 2+  Left biceps: 2+  Right triceps: 2+  Left triceps: 2+  Right patellar: 2+  Left patellar: 2+  Right achilles: 2+  Left achilles: 2+  Right plantar:  normal  Left plantar: normal  Right Juárez: absent  Left Juárez: absent  Right ankle clonus: absent  Left ankle clonus: absent  Right pendular knee jerk: absent  Left pendular knee jerk: absent    SENSORY EXAM   Light touch normal.   Right arm light touch: normal  Left arm light touch: normal  Right leg light touch: normal  Left leg light touch: normal  Vibration normal.   Right arm vibration: normal  Left arm vibration: normal  Right leg vibration: normal  Left leg vibration: normal  Proprioception normal.   Right arm proprioception: normal  Left arm proprioception: normal  Right leg proprioception: normal  Left leg proprioception: normal  Pinprick normal.   Right arm pinprick: normal  Left arm pinprick: normal  Right leg pinprick: normal  Left leg pinprick: normal  Graphesthesia: normal  Romberg: negative  Stereognosis: normal    GAIT AND COORDINATION     Gait  Gait: wide-based     Coordination   Finger to nose coordination: normal  Heel to shin coordination: normal  Tandem walking coordination: abnormal    Tremor   Resting tremor: absent  Intention tremor: present  Action tremor: left arm and right arm       Physical Exam  Vitals and nursing note reviewed.   Constitutional:       Appearance: Normal appearance.   HENT:      Head: Normocephalic.   Eyes:      Extraocular Movements: Extraocular movements intact and EOM normal.      Pupils: Pupils are equal, round, and reactive to light.   Cardiovascular:      Rate and Rhythm: Normal rate and regular rhythm.   Pulmonary:      Effort: Pulmonary effort is normal.      Breath sounds: Normal breath sounds.   Musculoskeletal:         General: No swelling or tenderness. Normal range of motion.      Cervical back: Normal range of motion and neck supple.      Right lower leg: No edema.      Left lower leg: No edema.   Skin:     General: Skin is warm and dry.      Coloration: Skin is not jaundiced.      Findings: No rash.   Neurological:      General: No focal deficit  present.      Mental Status: She is alert and oriented to person, place, and time.      GCS: GCS eye subscore is 4. GCS verbal subscore is 5. GCS motor subscore is 6.      Cranial Nerves: No cranial nerve deficit.      Sensory: No sensory deficit.      Motor: Motor function is intact. No weakness.      Coordination: Coordination is intact. Coordination normal. Finger-Nose-Finger Test, Heel to Shin Test and Romberg Test normal.      Gait: Tandem walk abnormal. Gait normal.      Deep Tendon Reflexes: Reflexes normal.      Reflex Scores:       Tricep reflexes are 2+ on the right side and 2+ on the left side.       Bicep reflexes are 2+ on the right side and 2+ on the left side.       Brachioradialis reflexes are 2+ on the right side and 2+ on the left side.       Patellar reflexes are 2+ on the right side and 2+ on the left side.       Achilles reflexes are 2+ on the right side and 2+ on the left side.  Psychiatric:         Mood and Affect: Mood normal.         Speech: Speech normal.         Behavior: Behavior normal.          Assessment:     Problem List Items Addressed This Visit          Neuro    Generalized epilepsy - Primary       Psychiatric    Encounter for long-term current use of medication    Relevant Orders    Valproic Acid     Other Visit Diagnoses       Seizure        Relevant Medications    divalproex ER (DEPAKOTE ER) 500 MG Tb24 24 hr tablet                     Primary Diagnosis and ICD10  Generalized epilepsy [G40.309]    Plan:     Patient Instructions   Continue current Depakote ER 1000mg at night  Depakote level  Good eating and sleep habits encouraged  Notify clinic if any seizures      Seizure Precautions:  1. Take medications as directed  2. Avoid open flames and hot surfaces such as fires and grills  3. Avoid elevations such as ladders or stools  4. Avoid swimming alone  5. Make sure getting plenty of sleep, recommend 7-8 hours per day  6. Avoid alcohol and illicit drugs  7. No driving or  operating heavy machinery for 6 months after last known seizure     Medications Discontinued During This Encounter   Medication Reason    divalproex 500 MG Tb24 Reorder             Requested Prescriptions     Signed Prescriptions Disp Refills    divalproex ER (DEPAKOTE ER) 500 MG Tb24 24 hr tablet 180 tablet 3     Sig: TAKE 2 TABLETS BY MOUTH EACH NIGHT AT BEDTIME FOR SEIZURES       Orders Placed This Encounter   Procedures    Valproic Acid

## 2024-12-09 DIAGNOSIS — R56.9 SEIZURE: ICD-10-CM

## 2024-12-09 RX ORDER — DIVALPROEX SODIUM 500 MG/1
TABLET, FILM COATED, EXTENDED RELEASE ORAL
Qty: 180 TABLET | Refills: 3 | Status: SHIPPED | OUTPATIENT
Start: 2024-12-09

## 2025-06-21 ENCOUNTER — HOSPITAL ENCOUNTER (INPATIENT)
Facility: HOSPITAL | Age: 80
LOS: 8 days | Discharge: NURSING, REHAB OR LONG-TERM CARE | DRG: 330 | End: 2025-06-30
Attending: FAMILY MEDICINE | Admitting: SURGERY
Payer: MEDICARE

## 2025-06-21 DIAGNOSIS — K56.609 LARGE BOWEL OBSTRUCTION: ICD-10-CM

## 2025-06-21 DIAGNOSIS — N18.31 CKD STAGE 3A, GFR 45-59 ML/MIN: ICD-10-CM

## 2025-06-21 DIAGNOSIS — K56.609 COLONIC OBSTRUCTION: Primary | ICD-10-CM

## 2025-06-21 DIAGNOSIS — R53.81 DEBILITY: ICD-10-CM

## 2025-06-21 DIAGNOSIS — C18.9 COLON ADENOCARCINOMA: ICD-10-CM

## 2025-06-21 DIAGNOSIS — R10.9 ABDOMINAL PAIN: ICD-10-CM

## 2025-06-21 DIAGNOSIS — G89.18 ACUTE POST-OPERATIVE PAIN: ICD-10-CM

## 2025-06-21 LAB
ANION GAP SERPL CALCULATED.3IONS-SCNC: 13 MMOL/L (ref 7–16)
BASOPHILS # BLD AUTO: 0.03 K/UL (ref 0–0.2)
BASOPHILS NFR BLD AUTO: 0.3 % (ref 0–1)
BUN SERPL-MCNC: 26 MG/DL (ref 10–20)
BUN/CREAT SERPL: 21 (ref 6–20)
CALCIUM SERPL-MCNC: 9.6 MG/DL (ref 8.4–10.2)
CHLORIDE SERPL-SCNC: 101 MMOL/L (ref 98–107)
CO2 SERPL-SCNC: 25 MMOL/L (ref 23–31)
CREAT SERPL-MCNC: 1.24 MG/DL (ref 0.55–1.02)
DIFFERENTIAL METHOD BLD: ABNORMAL
EGFR (NO RACE VARIABLE) (RUSH/TITUS): 44 ML/MIN/1.73M2
EOSINOPHIL # BLD AUTO: 0.12 K/UL (ref 0–0.5)
EOSINOPHIL NFR BLD AUTO: 1.4 % (ref 1–4)
ERYTHROCYTE [DISTWIDTH] IN BLOOD BY AUTOMATED COUNT: 17.5 % (ref 11.5–14.5)
GLUCOSE SERPL-MCNC: 128 MG/DL (ref 82–115)
HCT VFR BLD AUTO: 33.2 % (ref 38–47)
HGB BLD-MCNC: 10.6 G/DL (ref 12–16)
IMM GRANULOCYTES # BLD AUTO: 0.06 K/UL (ref 0–0.04)
IMM GRANULOCYTES NFR BLD: 0.7 % (ref 0–0.4)
LIPASE SERPL-CCNC: 84 U/L
LYMPHOCYTES # BLD AUTO: 1.56 K/UL (ref 1–4.8)
LYMPHOCYTES NFR BLD AUTO: 17.6 % (ref 27–41)
MCH RBC QN AUTO: 25.4 PG (ref 27–31)
MCHC RBC AUTO-ENTMCNC: 31.9 G/DL (ref 32–36)
MCV RBC AUTO: 79.6 FL (ref 80–96)
MONOCYTES # BLD AUTO: 0.81 K/UL (ref 0–0.8)
MONOCYTES NFR BLD AUTO: 9.1 % (ref 2–6)
MPC BLD CALC-MCNC: 9.9 FL (ref 9.4–12.4)
NEUTROPHILS # BLD AUTO: 6.28 K/UL (ref 1.8–7.7)
NEUTROPHILS NFR BLD AUTO: 70.9 % (ref 53–65)
NRBC # BLD AUTO: 0 X10E3/UL
NRBC, AUTO (.00): 0 %
PLATELET # BLD AUTO: 319 K/UL (ref 150–400)
POTASSIUM SERPL-SCNC: 3.5 MMOL/L (ref 3.5–5.1)
RBC # BLD AUTO: 4.17 M/UL (ref 4.2–5.4)
SODIUM SERPL-SCNC: 135 MMOL/L (ref 136–145)
WBC # BLD AUTO: 8.86 K/UL (ref 4.5–11)

## 2025-06-21 PROCEDURE — 85025 COMPLETE CBC W/AUTO DIFF WBC: CPT

## 2025-06-21 PROCEDURE — 83690 ASSAY OF LIPASE: CPT

## 2025-06-21 PROCEDURE — 25000003 PHARM REV CODE 250

## 2025-06-21 PROCEDURE — 36415 COLL VENOUS BLD VENIPUNCTURE: CPT

## 2025-06-21 PROCEDURE — 80048 BASIC METABOLIC PNL TOTAL CA: CPT

## 2025-06-21 RX ORDER — ONDANSETRON HYDROCHLORIDE 2 MG/ML
4 INJECTION, SOLUTION INTRAVENOUS
Status: DISCONTINUED | OUTPATIENT
Start: 2025-06-21 | End: 2025-06-21

## 2025-06-21 RX ADMIN — SODIUM CHLORIDE 1000 ML: 9 INJECTION, SOLUTION INTRAVENOUS at 11:06

## 2025-06-21 NOTE — Clinical Note
Diagnosis: Abdominal pain [320759]   Future Attending Provider: IVORY AL [[id number]]   Requested Bed Type: Standard [1]

## 2025-06-22 ENCOUNTER — ANESTHESIA (OUTPATIENT)
Dept: SURGERY | Facility: HOSPITAL | Age: 80
End: 2025-06-22
Payer: MEDICARE

## 2025-06-22 ENCOUNTER — ANESTHESIA EVENT (OUTPATIENT)
Dept: SURGERY | Facility: HOSPITAL | Age: 80
End: 2025-06-22
Payer: MEDICARE

## 2025-06-22 PROBLEM — R53.81 DEBILITY: Status: ACTIVE | Noted: 2022-07-16

## 2025-06-22 PROBLEM — K56.609 LARGE BOWEL OBSTRUCTION: Status: ACTIVE | Noted: 2025-06-22

## 2025-06-22 LAB — VALPROATE SERPL-MCNC: 35 ΜG/ML (ref 50–100)

## 2025-06-22 PROCEDURE — 27000689 HC BLADE LARYNGOSCOPE ANY SIZE: Performed by: ANESTHESIOLOGY

## 2025-06-22 PROCEDURE — 99222 1ST HOSP IP/OBS MODERATE 55: CPT | Mod: ,,, | Performed by: INTERNAL MEDICINE

## 2025-06-22 PROCEDURE — 44143 PARTIAL REMOVAL OF COLON: CPT | Mod: 22,,, | Performed by: SURGERY

## 2025-06-22 PROCEDURE — 88341 IMHCHEM/IMCYTCHM EA ADD ANTB: CPT | Mod: 26,,, | Performed by: PATHOLOGY

## 2025-06-22 PROCEDURE — 44120 REMOVAL OF SMALL INTESTINE: CPT | Mod: 51,,, | Performed by: SURGERY

## 2025-06-22 PROCEDURE — 25000003 PHARM REV CODE 250: Performed by: INTERNAL MEDICINE

## 2025-06-22 PROCEDURE — 37000009 HC ANESTHESIA EA ADD 15 MINS: Performed by: SURGERY

## 2025-06-22 PROCEDURE — 27000716 HC OXISENSOR PROBE, ANY SIZE: Performed by: ANESTHESIOLOGY

## 2025-06-22 PROCEDURE — P9045 ALBUMIN (HUMAN), 5%, 250 ML: HCPCS | Mod: JZ,TB

## 2025-06-22 PROCEDURE — 99223 1ST HOSP IP/OBS HIGH 75: CPT | Mod: AI,57,ICN, | Performed by: SURGERY

## 2025-06-22 PROCEDURE — 0DBA0ZZ EXCISION OF JEJUNUM, OPEN APPROACH: ICD-10-PCS | Performed by: SURGERY

## 2025-06-22 PROCEDURE — 94761 N-INVAS EAR/PLS OXIMETRY MLT: CPT

## 2025-06-22 PROCEDURE — 0DTN0ZZ RESECTION OF SIGMOID COLON, OPEN APPROACH: ICD-10-PCS | Performed by: SURGERY

## 2025-06-22 PROCEDURE — 96360 HYDRATION IV INFUSION INIT: CPT

## 2025-06-22 PROCEDURE — 63600175 PHARM REV CODE 636 W HCPCS: Performed by: SURGERY

## 2025-06-22 PROCEDURE — 25000003 PHARM REV CODE 250

## 2025-06-22 PROCEDURE — 99900035 HC TECH TIME PER 15 MIN (STAT)

## 2025-06-22 PROCEDURE — 27000221 HC OXYGEN, UP TO 24 HOURS

## 2025-06-22 PROCEDURE — 99285 EMERGENCY DEPT VISIT HI MDM: CPT | Mod: 25

## 2025-06-22 PROCEDURE — D9220A PRA ANESTHESIA: Mod: ANES,,, | Performed by: ANESTHESIOLOGY

## 2025-06-22 PROCEDURE — 36000708 HC OR TIME LEV III 1ST 15 MIN: Performed by: SURGERY

## 2025-06-22 PROCEDURE — D9220A PRA ANESTHESIA: Mod: CRNA,,,

## 2025-06-22 PROCEDURE — 27000510 HC BLANKET BAIR HUGGER ANY SIZE: Performed by: ANESTHESIOLOGY

## 2025-06-22 PROCEDURE — 25000003 PHARM REV CODE 250: Performed by: SURGERY

## 2025-06-22 PROCEDURE — 0D1M0Z4 BYPASS DESCENDING COLON TO CUTANEOUS, OPEN APPROACH: ICD-10-PCS | Performed by: SURGERY

## 2025-06-22 PROCEDURE — 36415 COLL VENOUS BLD VENIPUNCTURE: CPT | Performed by: INTERNAL MEDICINE

## 2025-06-22 PROCEDURE — 27201423 OPTIME MED/SURG SUP & DEVICES STERILE SUPPLY: Performed by: SURGERY

## 2025-06-22 PROCEDURE — 37000008 HC ANESTHESIA 1ST 15 MINUTES: Performed by: SURGERY

## 2025-06-22 PROCEDURE — 27000165 HC TUBE, ETT CUFFED: Performed by: ANESTHESIOLOGY

## 2025-06-22 PROCEDURE — 94799 UNLISTED PULMONARY SVC/PX: CPT

## 2025-06-22 PROCEDURE — 71000033 HC RECOVERY, INTIAL HOUR: Performed by: SURGERY

## 2025-06-22 PROCEDURE — 27000655: Performed by: ANESTHESIOLOGY

## 2025-06-22 PROCEDURE — 63600175 PHARM REV CODE 636 W HCPCS

## 2025-06-22 PROCEDURE — 99900031 HC PATIENT EDUCATION (STAT)

## 2025-06-22 PROCEDURE — 88309 TISSUE EXAM BY PATHOLOGIST: CPT | Mod: 26,,, | Performed by: PATHOLOGY

## 2025-06-22 PROCEDURE — 80164 ASSAY DIPROPYLACETIC ACD TOT: CPT | Performed by: INTERNAL MEDICINE

## 2025-06-22 PROCEDURE — 88341 IMHCHEM/IMCYTCHM EA ADD ANTB: CPT | Mod: TC,SUR | Performed by: SURGERY

## 2025-06-22 PROCEDURE — 36000709 HC OR TIME LEV III EA ADD 15 MIN: Performed by: SURGERY

## 2025-06-22 PROCEDURE — 11000001 HC ACUTE MED/SURG PRIVATE ROOM

## 2025-06-22 PROCEDURE — 88342 IMHCHEM/IMCYTCHM 1ST ANTB: CPT | Mod: 26,,, | Performed by: PATHOLOGY

## 2025-06-22 PROCEDURE — 80165 DIPROPYLACETIC ACID FREE: CPT | Mod: 90 | Performed by: INTERNAL MEDICINE

## 2025-06-22 RX ORDER — METRONIDAZOLE 500 MG/100ML
INJECTION, SOLUTION INTRAVENOUS
Status: DISCONTINUED | OUTPATIENT
Start: 2025-06-22 | End: 2025-06-22

## 2025-06-22 RX ORDER — PROPOFOL 10 MG/ML
VIAL (ML) INTRAVENOUS
Status: DISCONTINUED | OUTPATIENT
Start: 2025-06-22 | End: 2025-06-22

## 2025-06-22 RX ORDER — LOSARTAN POTASSIUM 50 MG/1
50 TABLET ORAL DAILY
Status: DISCONTINUED | OUTPATIENT
Start: 2025-06-23 | End: 2025-06-28

## 2025-06-22 RX ORDER — ACETAMINOPHEN 10 MG/ML
INJECTION, SOLUTION INTRAVENOUS
Status: DISCONTINUED | OUTPATIENT
Start: 2025-06-22 | End: 2025-06-22

## 2025-06-22 RX ORDER — ALBUMIN HUMAN 50 G/1000ML
SOLUTION INTRAVENOUS
Status: DISCONTINUED | OUTPATIENT
Start: 2025-06-22 | End: 2025-06-22

## 2025-06-22 RX ORDER — ONDANSETRON HYDROCHLORIDE 2 MG/ML
4 INJECTION, SOLUTION INTRAVENOUS EVERY 6 HOURS PRN
Status: DISCONTINUED | OUTPATIENT
Start: 2025-06-22 | End: 2025-06-30 | Stop reason: HOSPADM

## 2025-06-22 RX ORDER — LEVOTHYROXINE SODIUM 50 UG/1
50 TABLET ORAL
Status: DISCONTINUED | OUTPATIENT
Start: 2025-06-23 | End: 2025-06-30 | Stop reason: HOSPADM

## 2025-06-22 RX ORDER — MUPIROCIN 20 MG/G
1 OINTMENT TOPICAL 2 TIMES DAILY
Status: DISCONTINUED | OUTPATIENT
Start: 2025-06-22 | End: 2025-06-22

## 2025-06-22 RX ORDER — ONDANSETRON HYDROCHLORIDE 2 MG/ML
4 INJECTION, SOLUTION INTRAVENOUS DAILY PRN
Status: DISCONTINUED | OUTPATIENT
Start: 2025-06-22 | End: 2025-06-22 | Stop reason: HOSPADM

## 2025-06-22 RX ORDER — ACETAMINOPHEN, DIPHENHYDRAMINE HCL, PHENYLEPHRINE HCL 325; 25; 5 MG/1; MG/1; MG/1
10 TABLET ORAL NIGHTLY
COMMUNITY

## 2025-06-22 RX ORDER — MORPHINE SULFATE 1 MG/ML
INJECTION INTRAVENOUS CONTINUOUS
Status: DISCONTINUED | OUTPATIENT
Start: 2025-06-22 | End: 2025-06-27

## 2025-06-22 RX ORDER — LIDOCAINE HYDROCHLORIDE 20 MG/ML
INJECTION, SOLUTION EPIDURAL; INFILTRATION; INTRACAUDAL; PERINEURAL
Status: DISCONTINUED | OUTPATIENT
Start: 2025-06-22 | End: 2025-06-22

## 2025-06-22 RX ORDER — HYDROMORPHONE HYDROCHLORIDE 2 MG/ML
INJECTION, SOLUTION INTRAMUSCULAR; INTRAVENOUS; SUBCUTANEOUS
Status: DISCONTINUED | OUTPATIENT
Start: 2025-06-22 | End: 2025-06-22

## 2025-06-22 RX ORDER — POLYETHYLENE GLYCOL 3350 17 G/17G
17 POWDER, FOR SOLUTION ORAL DAILY
COMMUNITY

## 2025-06-22 RX ORDER — ACETAMINOPHEN AND CODEINE PHOSPHATE 300; 30 MG/1; MG/1
1 TABLET ORAL EVERY 12 HOURS PRN
COMMUNITY

## 2025-06-22 RX ORDER — MEPERIDINE HYDROCHLORIDE 25 MG/ML
25 INJECTION INTRAMUSCULAR; INTRAVENOUS; SUBCUTANEOUS EVERY 10 MIN PRN
Status: DISCONTINUED | OUTPATIENT
Start: 2025-06-22 | End: 2025-06-22 | Stop reason: HOSPADM

## 2025-06-22 RX ORDER — PHENYLEPHRINE HYDROCHLORIDE 10 MG/ML
INJECTION INTRAVENOUS
Status: DISCONTINUED | OUTPATIENT
Start: 2025-06-22 | End: 2025-06-22

## 2025-06-22 RX ORDER — ONDANSETRON HYDROCHLORIDE 2 MG/ML
INJECTION, SOLUTION INTRAVENOUS
Status: DISCONTINUED | OUTPATIENT
Start: 2025-06-22 | End: 2025-06-22

## 2025-06-22 RX ORDER — CEFAZOLIN SODIUM 1 G/3ML
INJECTION, POWDER, FOR SOLUTION INTRAMUSCULAR; INTRAVENOUS
Status: DISCONTINUED | OUTPATIENT
Start: 2025-06-22 | End: 2025-06-22

## 2025-06-22 RX ORDER — DIPHENHYDRAMINE HYDROCHLORIDE 50 MG/ML
25 INJECTION, SOLUTION INTRAMUSCULAR; INTRAVENOUS EVERY 6 HOURS PRN
Status: DISCONTINUED | OUTPATIENT
Start: 2025-06-22 | End: 2025-06-22 | Stop reason: HOSPADM

## 2025-06-22 RX ORDER — ENOXAPARIN SODIUM 100 MG/ML
40 INJECTION SUBCUTANEOUS EVERY 24 HOURS
Status: DISCONTINUED | OUTPATIENT
Start: 2025-06-22 | End: 2025-06-30 | Stop reason: HOSPADM

## 2025-06-22 RX ORDER — CLORAZEPATE DIPOTASSIUM 3.75 MG/1
7.5 TABLET ORAL NIGHTLY PRN
Status: DISCONTINUED | OUTPATIENT
Start: 2025-06-22 | End: 2025-06-30 | Stop reason: HOSPADM

## 2025-06-22 RX ORDER — FUROSEMIDE 20 MG/1
20 TABLET ORAL DAILY
Status: DISCONTINUED | OUTPATIENT
Start: 2025-06-23 | End: 2025-06-28

## 2025-06-22 RX ORDER — IPRATROPIUM BROMIDE AND ALBUTEROL SULFATE 2.5; .5 MG/3ML; MG/3ML
3 SOLUTION RESPIRATORY (INHALATION)
Status: DISCONTINUED | OUTPATIENT
Start: 2025-06-22 | End: 2025-06-22 | Stop reason: HOSPADM

## 2025-06-22 RX ORDER — ASCORBIC ACID 500 MG
500 TABLET ORAL 2 TIMES DAILY
COMMUNITY

## 2025-06-22 RX ORDER — OXYCODONE HYDROCHLORIDE 5 MG/1
5 TABLET ORAL
Status: DISCONTINUED | OUTPATIENT
Start: 2025-06-22 | End: 2025-06-22 | Stop reason: HOSPADM

## 2025-06-22 RX ORDER — ROCURONIUM BROMIDE 10 MG/ML
INJECTION, SOLUTION INTRAVENOUS
Status: DISCONTINUED | OUTPATIENT
Start: 2025-06-22 | End: 2025-06-22

## 2025-06-22 RX ORDER — SODIUM CHLORIDE, SODIUM LACTATE, POTASSIUM CHLORIDE, CALCIUM CHLORIDE 600; 310; 30; 20 MG/100ML; MG/100ML; MG/100ML; MG/100ML
125 INJECTION, SOLUTION INTRAVENOUS CONTINUOUS
Status: DISCONTINUED | OUTPATIENT
Start: 2025-06-22 | End: 2025-06-22

## 2025-06-22 RX ORDER — MUPIROCIN 20 MG/G
OINTMENT TOPICAL 2 TIMES DAILY
Status: COMPLETED | OUTPATIENT
Start: 2025-06-22 | End: 2025-06-27

## 2025-06-22 RX ORDER — HYDROMORPHONE HYDROCHLORIDE 2 MG/ML
0.5 INJECTION, SOLUTION INTRAMUSCULAR; INTRAVENOUS; SUBCUTANEOUS EVERY 5 MIN PRN
Status: DISCONTINUED | OUTPATIENT
Start: 2025-06-22 | End: 2025-06-22 | Stop reason: HOSPADM

## 2025-06-22 RX ORDER — DEXTROSE MONOHYDRATE, SODIUM CHLORIDE, AND POTASSIUM CHLORIDE 50; 1.49; 4.5 G/1000ML; G/1000ML; G/1000ML
INJECTION, SOLUTION INTRAVENOUS CONTINUOUS
Status: DISCONTINUED | OUTPATIENT
Start: 2025-06-22 | End: 2025-06-27

## 2025-06-22 RX ORDER — SUCCINYLCHOLINE CHLORIDE 20 MG/ML
INJECTION INTRAMUSCULAR; INTRAVENOUS
Status: DISCONTINUED | OUTPATIENT
Start: 2025-06-22 | End: 2025-06-22

## 2025-06-22 RX ORDER — FENTANYL CITRATE 50 UG/ML
INJECTION, SOLUTION INTRAMUSCULAR; INTRAVENOUS
Status: DISCONTINUED | OUTPATIENT
Start: 2025-06-22 | End: 2025-06-22

## 2025-06-22 RX ORDER — LABETALOL HYDROCHLORIDE 5 MG/ML
10 INJECTION, SOLUTION INTRAVENOUS EVERY 4 HOURS PRN
Status: DISCONTINUED | OUTPATIENT
Start: 2025-06-22 | End: 2025-06-30 | Stop reason: HOSPADM

## 2025-06-22 RX ORDER — ALLOPURINOL 100 MG/1
100 TABLET ORAL DAILY
Status: DISCONTINUED | OUTPATIENT
Start: 2025-06-23 | End: 2025-06-28

## 2025-06-22 RX ORDER — ALBUTEROL SULFATE 0.83 MG/ML
2.5 SOLUTION RESPIRATORY (INHALATION) EVERY 6 HOURS PRN
Status: DISCONTINUED | OUTPATIENT
Start: 2025-06-22 | End: 2025-06-30 | Stop reason: HOSPADM

## 2025-06-22 RX ORDER — NALOXONE HCL 0.4 MG/ML
0.4 VIAL (ML) INJECTION 4 TIMES DAILY PRN
Status: DISCONTINUED | OUTPATIENT
Start: 2025-06-22 | End: 2025-06-30 | Stop reason: HOSPADM

## 2025-06-22 RX ORDER — MORPHINE SULFATE 10 MG/ML
4 INJECTION INTRAMUSCULAR; INTRAVENOUS; SUBCUTANEOUS EVERY 5 MIN PRN
Status: DISCONTINUED | OUTPATIENT
Start: 2025-06-22 | End: 2025-06-22 | Stop reason: HOSPADM

## 2025-06-22 RX ORDER — MULTIVITAMIN
1 TABLET ORAL DAILY
COMMUNITY

## 2025-06-22 RX ADMIN — SODIUM CHLORIDE, POTASSIUM CHLORIDE, SODIUM LACTATE AND CALCIUM CHLORIDE: 600; 310; 30; 20 INJECTION, SOLUTION INTRAVENOUS at 10:06

## 2025-06-22 RX ADMIN — ROCURONIUM BROMIDE 5 MG: 10 INJECTION, SOLUTION INTRAVENOUS at 08:06

## 2025-06-22 RX ADMIN — CEFAZOLIN 2 G: 1 INJECTION, POWDER, FOR SOLUTION INTRAMUSCULAR; INTRAVENOUS; PARENTERAL at 08:06

## 2025-06-22 RX ADMIN — ALBUMIN (HUMAN) 250 ML: 12.5 INJECTION, SOLUTION INTRAVENOUS at 09:06

## 2025-06-22 RX ADMIN — ONDANSETRON 4 MG: 2 INJECTION INTRAMUSCULAR; INTRAVENOUS at 08:06

## 2025-06-22 RX ADMIN — SODIUM CHLORIDE, POTASSIUM CHLORIDE, SODIUM LACTATE AND CALCIUM CHLORIDE: 600; 310; 30; 20 INJECTION, SOLUTION INTRAVENOUS at 08:06

## 2025-06-22 RX ADMIN — Medication: at 05:06

## 2025-06-22 RX ADMIN — PHENYLEPHRINE HYDROCHLORIDE 100 MCG: 10 INJECTION INTRAVENOUS at 09:06

## 2025-06-22 RX ADMIN — ACETAMINOPHEN 1000 MG: 10 INJECTION, SOLUTION INTRAVENOUS at 09:06

## 2025-06-22 RX ADMIN — METRONIDAZOLE 500 MG: 500 INJECTION, SOLUTION INTRAVENOUS at 08:06

## 2025-06-22 RX ADMIN — HYDROMORPHONE HYDROCHLORIDE 1 MG: 2 INJECTION, SOLUTION INTRAMUSCULAR; INTRAVENOUS; SUBCUTANEOUS at 10:06

## 2025-06-22 RX ADMIN — PROPOFOL 120 MG: 10 INJECTION, EMULSION INTRAVENOUS at 08:06

## 2025-06-22 RX ADMIN — ROCURONIUM BROMIDE 10 MG: 10 INJECTION, SOLUTION INTRAVENOUS at 09:06

## 2025-06-22 RX ADMIN — HYDROMORPHONE HYDROCHLORIDE 0.5 MG: 2 INJECTION, SOLUTION INTRAMUSCULAR; INTRAVENOUS; SUBCUTANEOUS at 09:06

## 2025-06-22 RX ADMIN — SUCCINYLCHOLINE CHLORIDE 100 MG: 20 INJECTION, SOLUTION INTRAMUSCULAR; INTRAVENOUS; PARENTERAL at 08:06

## 2025-06-22 RX ADMIN — FENTANYL CITRATE 100 MCG: 50 INJECTION, SOLUTION INTRAMUSCULAR; INTRAVENOUS at 08:06

## 2025-06-22 RX ADMIN — ENOXAPARIN SODIUM 40 MG: 40 INJECTION SUBCUTANEOUS at 05:06

## 2025-06-22 RX ADMIN — LIDOCAINE HYDROCHLORIDE 60 MG: 20 INJECTION, SOLUTION EPIDURAL; INFILTRATION; INTRACAUDAL; PERINEURAL at 08:06

## 2025-06-22 RX ADMIN — SUGAMMADEX 400 MG: 100 INJECTION, SOLUTION INTRAVENOUS at 10:06

## 2025-06-22 RX ADMIN — ROCURONIUM BROMIDE 45 MG: 10 INJECTION, SOLUTION INTRAVENOUS at 08:06

## 2025-06-22 RX ADMIN — PROPOFOL 10 MG: 10 INJECTION, EMULSION INTRAVENOUS at 10:06

## 2025-06-22 RX ADMIN — SODIUM CHLORIDE 500 MG: 9 INJECTION, SOLUTION INTRAVENOUS at 09:06

## 2025-06-22 RX ADMIN — POTASSIUM CHLORIDE, DEXTROSE MONOHYDRATE AND SODIUM CHLORIDE: 150; 5; 450 INJECTION, SOLUTION INTRAVENOUS at 04:06

## 2025-06-22 RX ADMIN — MUPIROCIN: 20 OINTMENT TOPICAL at 09:06

## 2025-06-22 NOTE — PLAN OF CARE
1103  pt to pacu pt resp reg and non labored pt dsg d/I t  abd  dsg d/I to abd no bleeding noted pt with colostomy germain cath intact and draining well will monitor     1120 pt resting well pt sao2 96% on 2l nbp pt voices no complaints dsg d/I to abd  wound vac intact  colostomy bag intact will monitor    1135  pt vs stable  pt resting well pt sao2 96% on 2l nbp orders to release to room per md    1145  pt released to [facility] rn b/p  157/68 pulse 84 resp 16 sao2 96% on 2l nbp temp 97.8 oral pt awake voices no complaints  pca equipment taken to room nurse said they would start pca pump on floor

## 2025-06-22 NOTE — ASSESSMENT & PLAN NOTE
Patient with Acute debility due to neoplastic/malignant related fatigue. The patient's latest AMPAC (Activity Measure for Post Acute Care) Score is listed below.    AM-PAC Score - How much help does the patient need for each activity listed       Plan  - Progressive mobility protocol initated  - PT/OT consulted  - Fall precautions in place

## 2025-06-22 NOTE — ED PROVIDER NOTES
Encounter Date: 6/21/2025       History     Chief Complaint   Patient presents with    Abdominal Pain    Nausea     BA is a 79 y/o AAF with a PMHx of Anxiety, Asthma, CVA, DVT, HLD. Epilepsy, HLD, HTN, Thyroid disease, Diverticulitis, and PVD presents to the ED via EMS with c/o generalized abdominal pain with nausea and vomiting. Patient stated that her current symptoms have been ongoing for several days but worsened yesterday. Patient endorses 4 episodes of nausea with vomiting in the past 24 hours. Patient denies any fevers and/or diarrhea. Patient stated that she has not had a bowel in the past 2 days.     The history is provided by the patient.   Abdominal Pain  The current episode started two days ago. The onset of the illness was gradual. The problem has been gradually worsening. The abdominal pain is generalized. The abdominal pain does not radiate. The severity of the abdominal pain is 10/10. The abdominal pain is relieved by nothing. The abdominal pain is exacerbated by vomiting and movement. The other symptoms of the illness include nausea and vomiting. The other symptoms of the illness do not include fever, fatigue, jaundice, melena, diarrhea, dysuria, hematemesis, hematochezia, vaginal discharge or vaginal bleeding.   Nausea  This is a new problem. The current episode started 6 to 12 hours ago. The problem occurs hourly. The problem has not changed since onset.Associated symptoms include abdominal pain. She has tried nothing for the symptoms.     Review of patient's allergies indicates:   Allergen Reactions    Influenza virus vaccines      Pt states had to be admitted to hospital after last flu vaccine     Lexapro [escitalopram]      Past Medical History:   Diagnosis Date    Anxiety     Asthma     Cellulitis of left lower extremity 01/13/2022    Chronic wound infection of abdomen 06/06/2022    CVA (cerebral vascular accident) 05/23/2018    DVT (deep venous thrombosis) 2019    Epilepsy     Hyperlipidemia      Hypertension     Hypothyroid     Left leg pain 11/29/2021    Non-pressure chronic ulcer of left lower leg with fat layer exposed     Open wound of lower limb 07/16/2022    PVD (peripheral vascular disease)     Venous stasis     Wound infection     left lower leg     Past Surgical History:   Procedure Laterality Date    KNEE ARTHROSCOPY Left     x 2    LASER ABLATION Right 04/20/2018    [facility] LASER ABLATION PERFORMED BY DR. KARMA BUTCHER.    LASER ABLATION Left 04/27/2018    [facility] LASER ABLATION PERFORMED BY DR. KARMA BUTCHER.    LASER ABLATION Right 12/17/2020    [facility] ( DUPLICATED )LASER ABLATION PERFORMED BY DR. KARMA BUTCHER.    LASER ABLATION Left 01/08/2021    [facility] LASER ABLATION PERFORMED BY DR. KARMA BUTCHER.    Remove cataracts, insert lens Bilateral     SHOULDER ARTHROSCOPY Left     SINUS SURGERY      TOTAL ABDOMINAL HYSTERECTOMY W/ BILATERAL SALPINGOOPHORECTOMY      VENOUS ABLATION Right 02/28/2020    DISTAL [facility] VARITHENA ABLATION PERFORMED BY DR. KARMA BUTCHER.     Family History   Problem Relation Name Age of Onset    Alzheimer's disease Mother      Endometrial cancer Mother      Heart disease Mother      Lung cancer Father      Diabetes Sister      Endometrial cancer Sister      Hypertension Sister      Polycystic kidney disease Sister      Diabetes Brother      Sickle cell anemia Daughter      Thyroid disease Sister      Seizures Brother      Heart disease Daughter          7 MONTHS    No Known Problems Son      No Known Problems Son      No Known Problems Son      Cancer Brother      No Known Problems Sister      No Known Problems Sister      No Known Problems Sister      No Known Problems Sister      No Known Problems Sister       Social History[1]  Review of Systems   Constitutional: Negative.  Negative for fatigue and fever.   HENT: Negative.     Eyes: Negative.    Respiratory: Negative.     Cardiovascular: Negative.    Gastrointestinal:  Positive for abdominal pain, nausea and vomiting. Negative for  diarrhea, hematemesis, hematochezia, jaundice and melena.   Endocrine: Negative.    Genitourinary: Negative.  Negative for dysuria, vaginal bleeding and vaginal discharge.   Musculoskeletal: Negative.    Skin: Negative.    Allergic/Immunologic: Negative.    Neurological: Negative.    Hematological: Negative.    Psychiatric/Behavioral: Negative.         Physical Exam     Initial Vitals [06/21/25 2237]   BP Pulse Resp Temp SpO2   133/67 91 20 97.4 °F (36.3 °C) 96 %      MAP       --         Physical Exam    Nursing note and vitals reviewed.  Constitutional: Vital signs are normal. She appears well-developed and well-nourished. She is cooperative. She appears ill.   Cardiovascular:  Normal rate, regular rhythm, S1 normal, S2 normal, normal heart sounds, intact distal pulses and normal pulses.           Pulmonary/Chest: Effort normal and breath sounds normal.   Abdominal: Abdomen is soft and flat. Bowel sounds are increased. There is generalized abdominal tenderness. A hernia is present.         Lymphadenopathy:     She has no cervical adenopathy.     She has no axillary adenopathy.   Neurological: She is alert and oriented to person, place, and time. She has normal strength and normal reflexes. She displays normal reflexes. No cranial nerve deficit or sensory deficit. She displays a negative Romberg sign. GCS eye subscore is 4. GCS verbal subscore is 5. GCS motor subscore is 6.   Skin: Skin is warm, dry and intact. Capillary refill takes less than 2 seconds. No rash noted.   Psychiatric: She has a normal mood and affect. Her speech is normal and behavior is normal. Judgment and thought content normal. Cognition and memory are normal.         Medical Screening Exam   See Full Note    ED Course   Procedures  Labs Reviewed   BASIC METABOLIC PANEL - Abnormal       Result Value    Sodium 135 (*)     Potassium 3.5      Chloride 101      CO2 25      Anion Gap 13      Glucose 128 (*)     BUN 26 (*)     Creatinine 1.24 (*)      BUN/Creatinine Ratio 21 (*)     Calcium 9.6      eGFR 44 (*)    CBC WITH DIFFERENTIAL - Abnormal    WBC 8.86      RBC 4.17 (*)     Hemoglobin 10.6 (*)     Hematocrit 33.2 (*)     MCV 79.6 (*)     MCH 25.4 (*)     MCHC 31.9 (*)     RDW 17.5 (*)     Platelet Count 319      MPV 9.9      Neutrophils % 70.9 (*)     Lymphocytes % 17.6 (*)     Monocytes % 9.1 (*)     Eosinophils % 1.4      Basophils % 0.3      Immature Granulocytes % 0.7 (*)     nRBC, Auto 0.0      Neutrophils, Abs 6.28      Lymphocytes, Absolute 1.56      Monocytes, Absolute 0.81 (*)     Eosinophils, Absolute 0.12      Basophils, Absolute 0.03      Immature Granulocytes, Absolute 0.06 (*)     nRBC, Absolute 0.00      Diff Type Auto     LIPASE - Abnormal    Lipase 84 (*)    CBC W/ AUTO DIFFERENTIAL    Narrative:     The following orders were created for panel order CBC auto differential.  Procedure                               Abnormality         Status                     ---------                               -----------         ------                     CBC with Differential[[id number]]       Abnormal            Final result                 Please view results for these tests on the individual orders.          Imaging Results               CT Abdomen Pelvis  Without Contrast (Final result)  Result time 06/22/25 06:15:49      Final result by Pepe Nunez MD (06/22/25 06:15:49)                   Impression:      Colonic distension with retained fecal material secondary to obstructing lesion worrisome for carcinoma at the level of the sigmoid colon LEFT lower quadrant.  Colonoscopy/surgical evaluation recommended.    The preliminary [facility] and final reports are concordant.    This report was flagged in Epic as abnormal.      Electronically signed by: Pepe Nunez MD  Date:    06/22/2025  Time:    06:15               Narrative:    EXAMINATION:  CT ABDOMEN PELVIS WITHOUT CONTRAST    CLINICAL HISTORY:  Abdominal pain, acute,  nonlocalized;    TECHNIQUE:  Low dose axial images, sagittal and coronal reformations were obtained from the lung bases to the pubic symphysis, Oral contrast was not administered.    COMPARISON:  08/10/2024    FINDINGS:  Heart: Normal in size. No pericardial effusion.    Lung Bases: Well aerated, without consolidation or pleural fluid.    Liver: Normal in size and attenuation, with lobulated cystic lesion unchanged from 08/10/2024 LEFT hepatic lobe    Gallbladder: Cholecystectomy    Bile Ducts: No evidence of dilated ducts.    Pancreas: No mass or peripancreatic fat stranding.    Spleen: Unremarkable.    Adrenals: Unremarkable.    Kidneys/ Ureters: Unremarkable.    Bladder: No evidence of wall thickening.    Reproductive organs: Hysterectomy.    GI Tract/Mesentery: There is dilatation of the colon with retention of stool to the level of the sigmoid colon.  There is rectosigmoid diverticulosis.  At the level of the sigmoid colon, there is abrupt narrowing on axial series 2, image 103 coronal series 601 image 27, with soft tissue density at that level, worrisome for that of obstructing neoplasm at the level of the junction of descending colon and rectosigmoid LEFT lower quadrant.    Peritoneal Space: No ascites. No free air.    Retroperitoneum: No significant adenopathy.    Abdominal wall: Unremarkable.    Vasculature: No significant atherosclerosis or aneurysm.    Bones: No acute fracture.  Scoliosis                                        X-Ray Abdomen AP 1 View (KUB) (Final result)  Result time 06/22/25 06:15:08      Final result by Pepe Nunez MD (06/22/25 06:15:08)                   Impression:      Colonic distension to the level of the sigmoid colon.  CT same date demonstrated findings suspicious for obstructing neoplasm at the level of the sigmoid colon/LEFT lower quadrant.    This report was flagged in Epic as abnormal.      Electronically signed by: Pepe Nunez MD  Date:    06/22/2025  Time:    06:15                Narrative:    EXAMINATION:  XR ABDOMEN AP 1 VIEW    TECHNIQUE:  XR ABDOMEN AP 1 VIEW    FINDINGS:  Colonic distension to the level of the LEFT lower quadrant, consistent with CT examination same date, reflecting underlying suspicious narrowing, worrisome for carcinoma at the level of sigmoid colon.    No airspace consolidation at the lung bases.No acute bony abnormality.No abnormal soft tissue masses.No abnormal calcific densities.                                       Medications   sodium chloride 0.9% bolus 1,000 mL 1,000 mL (0 mLs Intravenous Stopped 6/22/25 0046)     Medical Decision Making  Amount and/or Complexity of Data Reviewed  Labs: ordered. Decision-making details documented in ED Course.  Radiology: ordered.    Risk  Prescription drug management.               ED Course as of 06/22/25 0716   Sat Jun 21, 2025 2327 Sodium(!): 135 [AC]   2327 BUN(!): 26 [AC]   2327 Creatinine(!): 1.24 [AC]   2327 BUN/CREAT RATIO(!): 21 [AC]   2327 eGFR(!): 44 [AC]   2327 Glucose(!): 128 [AC]      ED Course User Index  [AC] Brandon Cordova FNP                           Clinical Impression:   Final diagnoses:  [R10.9] Abdominal pain        ED Disposition Condition    Observation                     [1]   Social History  Tobacco Use    Smoking status: Never    Smokeless tobacco: Never   Substance Use Topics    Alcohol use: Never    Drug use: Never        Brandon Cordova FNP  06/22/25 0716

## 2025-06-22 NOTE — ASSESSMENT & PLAN NOTE
Creatine stable for now. BMP reviewed- noted Estimated Creatinine Clearance: 38.2 mL/min (A) (based on SCr of 1.24 mg/dL (H)). according to latest data. Based on current GFR, CKD stage is stage 3 - GFR 30-59.  Monitor UOP and serial BMP and adjust therapy as needed. Renally dose meds. Avoid nephrotoxic medications and procedures.

## 2025-06-22 NOTE — ASSESSMENT & PLAN NOTE
Resume home valproate but using IV as absorption might be questionable given bowel surgery.   Check valproate levels.

## 2025-06-22 NOTE — INTERVAL H&P NOTE
The patient has been examined and the H&P has been reviewed:    I concur with the findings and no changes have occurred since H&P was written.    Surgery risks, benefits and alternative options discussed and understood by patient/family.          Active Hospital Problems    Diagnosis  POA    *Large bowel obstruction [K56.609]  Yes    PVD (peripheral vascular disease) [I73.9]  Yes    Weakness [R53.1]  Yes    Hypertension [I10]  Yes    Hypothyroidism [E03.9]  Yes      Resolved Hospital Problems   No resolved problems to display.

## 2025-06-22 NOTE — PHARMACY MED REC
"Admission Medication History     The home medication history was taken by Caitlin Connor.    You may go to "Admission" then "Reconcile Home Medications" tabs to review and/or act upon these items.     The home medication list has been updated by the Pharmacy department.   Please read ALL comments highlighted in yellow.   Please address this information as you see fit.    Feel free to contact us if you have any questions or require assistance.    The following medications were added:  Allopurinol 100 mg was updated to 200 mg QHS+100 mg daily PRN  Acetaminophen-codeine 300-30 mg  Ascorbic acid 500 mg  Clorazepate 7.5 mg was updated to 3.75 mg BID  Melatonin 10 mg  Meloxicam 7.5 mg was updated to 7.5 mg BID  Multivitamin   Glycolax 17 g      The medications listed below were removed from the home medication list. Please reorder if appropriate:  Colchicine 0.6 mg        Medications listed below were obtained from: Analytic software- [employer] Medical records, and Nursing home  PTA Medications   Medication Sig    acetaminophen-codeine 300-30mg (TYLENOL #3) 300-30 mg Tab Take 1 tablet by mouth every 12 (twelve) hours as needed (pain).    albuterol (PROVENTIL/VENTOLIN HFA) 90 mcg/actuation inhaler Inhale 2 puffs into the lungs every 6 (six) hours as needed for Wheezing. (Patient taking differently: Inhale 2 puffs into the lungs every 6 (six) hours as needed for Wheezing or Shortness of Breath.)    allopurinoL (ZYLOPRIM) 100 MG tablet Take 200 mg by mouth every evening. May also give 100 mg by mouth daily PRN for elevated uric acid    ascorbic acid, vitamin C, (VITAMIN C) 500 MG tablet Take 500 mg by mouth 2 (two) times daily.    cilostazoL (PLETAL) 50 MG Tab Take 1 tablet (50 mg total) by mouth 2 (two) times daily.    clorazepate (TRANXENE) 7.5 MG Tab Take 3.75 mg by mouth 2 (two) times daily.    divalproex ER (DEPAKOTE ER) 500 MG Tb24 24 hr tablet TAKE 2 TABLETS BY MOUTH EVERY NIGHT AT BEDTIME FOR SEIZURES (Patient taking " differently: Take 1,000 mg by mouth every evening. TAKE 2 TABLETS BY MOUTH EVERY NIGHT AT BEDTIME FOR SEIZURES)    furosemide (LASIX) 20 MG tablet Take 1 tablet (20 mg total) by mouth once daily.    levothyroxine (SYNTHROID) 50 MCG tablet Take 1 tablet (50 mcg total) by mouth before breakfast.    losartan (COZAAR) 50 MG tablet Take 1 tablet (50 mg total) by mouth once daily.    melatonin 10 mg Tab Take 10 mg by mouth every evening.    meloxicam (MOBIC) 7.5 MG tablet Take 7.5 mg by mouth 2 (two) times a day.    multivitamin (THERAGRAN) per tablet Take 1 tablet by mouth once daily.    polyethylene glycol (GLYCOLAX) 17 gram PwPk Take 17 g by mouth once daily.         Current Outpatient Medications on File Prior to Encounter   Medication Sig Dispense Refill Last Dose/Taking    acetaminophen-codeine 300-30mg (TYLENOL #3) 300-30 mg Tab Take 1 tablet by mouth every 12 (twelve) hours as needed (pain).   Taking As Needed    albuterol (PROVENTIL/VENTOLIN HFA) 90 mcg/actuation inhaler Inhale 2 puffs into the lungs every 6 (six) hours as needed for Wheezing. (Patient taking differently: Inhale 2 puffs into the lungs every 6 (six) hours as needed for Wheezing or Shortness of Breath.) 18 g 3 6/21/2025    allopurinoL (ZYLOPRIM) 100 MG tablet Take 200 mg by mouth every evening. May also give 100 mg by mouth daily PRN for elevated uric acid   6/21/2025    ascorbic acid, vitamin C, (VITAMIN C) 500 MG tablet Take 500 mg by mouth 2 (two) times daily.   6/21/2025    cilostazoL (PLETAL) 50 MG Tab Take 1 tablet (50 mg total) by mouth 2 (two) times daily. 180 tablet 3 6/21/2025    clorazepate (TRANXENE) 7.5 MG Tab Take 3.75 mg by mouth 2 (two) times daily.   6/21/2025    divalproex ER (DEPAKOTE ER) 500 MG Tb24 24 hr tablet TAKE 2 TABLETS BY MOUTH EVERY NIGHT AT BEDTIME FOR SEIZURES (Patient taking differently: Take 1,000 mg by mouth every evening. TAKE 2 TABLETS BY MOUTH EVERY NIGHT AT BEDTIME FOR SEIZURES) 180 tablet 3 6/21/2025     furosemide (LASIX) 20 MG tablet Take 1 tablet (20 mg total) by mouth once daily. 90 tablet 3 6/21/2025    levothyroxine (SYNTHROID) 50 MCG tablet Take 1 tablet (50 mcg total) by mouth before breakfast. 90 tablet 3 6/21/2025    losartan (COZAAR) 50 MG tablet Take 1 tablet (50 mg total) by mouth once daily. 90 tablet 3 6/21/2025    melatonin 10 mg Tab Take 10 mg by mouth every evening.   6/21/2025    meloxicam (MOBIC) 7.5 MG tablet Take 7.5 mg by mouth 2 (two) times a day.   6/21/2025    multivitamin (THERAGRAN) per tablet Take 1 tablet by mouth once daily.   6/21/2025    polyethylene glycol (GLYCOLAX) 17 gram PwPk Take 17 g by mouth once daily.   6/21/2025    [DISCONTINUED] acetaminophen-codeine 300-30mg (TYLENOL #3) 300-30 mg Tab TAKE 1 TABLET BY MOUTH EVERY 12 HOURS AS NEEDED FOR PAIN       [DISCONTINUED] colchicine (COLCRYS) 0.6 mg tablet           Potential issues to be addressed PRIOR TO DISCHARGE  N/A    Caitlin Connor  EXT [id number]                 .

## 2025-06-22 NOTE — TRANSFER OF CARE
"Anesthesia Transfer of Care Note    Patient: Umesh Lassiter    Procedure(s) Performed: Procedure(s) (LRB):  LAPAROTOMY, EXPLORATORY (N/A)  CREATION, COLOSTOMY (N/A)  EXCISION, SMALL INTESTINE (N/A)    Patient location: PACU    Anesthesia Type: general    Transport from OR: Transported from OR on 6-10 L/min O2 by face mask with adequate spontaneous ventilation    Post pain: adequate analgesia    Post assessment: tolerated procedure well and no apparent anesthetic complications    Post vital signs: stable    Level of consciousness: alert, awake and oriented    Nausea/Vomiting: no nausea/vomiting    Complications: none    Transfer of care protocol was followed      Last vitals: Visit Vitals  BP (!) 171/63   Pulse 94   Temp 36.5 °C (97.7 °F)   Resp 18   Ht 5' 6" (1.676 m)   Wt 83.9 kg (185 lb)   SpO2 96%   BMI 29.86 kg/m²     "

## 2025-06-22 NOTE — ED NOTES
Patient changed into hospital gown at this time.  Personal belongings placed in a bag at this time and at patient bedside.

## 2025-06-22 NOTE — ASSESSMENT & PLAN NOTE
Patient's blood pressure range in the last 24 hours was: BP  Min: 133/67  Max: 174/64.The patient's inpatient anti-hypertensive regimen is listed below:  Current Antihypertensives  furosemide tablet 20 mg, Daily, Oral  losartan tablet 50 mg, Daily, Oral    Plan  - BP is controlled, no changes needed to their regimen

## 2025-06-22 NOTE — ASSESSMENT & PLAN NOTE
Presented to ED with abdominal pain and nausea/vomiting.   CT showed colonic distension with retained fecal material secondary to obstructing lesion worrisome for carcinoma at the level of the sigmoid colon LEFT lower quadrant.   Admitted to Gen Surg (Dr. Brannon) and s/p ex-lap on 6/22/25:     Procedures Performed: Exploratory laparotomy with sigmoid colectomy and end colostomy.  Small bowel resection     Significant Findings of the Procedure:  Multiple loops of dilated small bowel and very large distended large intestine.  Invasive malignant appearing mass in the mid sigmoid colon invading into the retroperitoneum and the adjacent loop of small bowel.  Initially was stuck and a small-bowel resection was performed upon extraction and dissection it did come apart but there was some likely desmoplastic reaction to the small bowel and this was sent with the colon.    Pathology pending.  Management per primary.

## 2025-06-22 NOTE — ANESTHESIA POSTPROCEDURE EVALUATION
Anesthesia Post Evaluation    Patient: Umesh Lassiter    Procedure(s) Performed: Procedure(s) (LRB):  LAPAROTOMY, EXPLORATORY (N/A)  CREATION, COLOSTOMY (N/A)  EXCISION, SMALL INTESTINE (N/A)    Final Anesthesia Type: general      Patient location during evaluation: PACU  Patient participation: Yes- Able to Participate  Level of consciousness: awake and sedated  Post-procedure vital signs: reviewed and stable  Pain management: adequate  Airway patency: patent    PONV status at discharge: No PONV  Anesthetic complications: no      Cardiovascular status: blood pressure returned to baseline  Respiratory status: unassisted  Hydration status: euvolemic  Follow-up not needed.              Vitals Value Taken Time   /69 06/22/25 11:30   Temp 36.5 °C (97.7 °F) 06/22/25 11:05   Pulse 92 06/22/25 11:30   Resp 11 06/22/25 11:30   SpO2 98 % 06/22/25 11:30   Vitals shown include unfiled device data.      Event Time   Out of Recovery 06/22/2025 11:34:00         Pain/Amalia Score: Amalia Score: 8 (6/22/2025 11:30 AM)

## 2025-06-22 NOTE — ANESTHESIA PROCEDURE NOTES
Intubation    Date/Time: 6/22/2025 8:21 AM    Performed by: Rajinder Tompkins CRNA  Authorized by: Duncan Goncalves MD    Intubation:     Induction:  Rapid sequence induction    Intubated:  Postinduction    Mask Ventilation:  Easy mask    Attempts:  1    Attempted By:  CRNA    Method of Intubation:  Direct    Blade:  Peterson 2    Laryngeal View Grade: Grade I - full view of cords      Difficult Airway Encountered?: No      Complications:  None    Airway Device:  Oral endotracheal tube    Airway Device Size:  7.5    Style/Cuff Inflation:  Cuffed (inflated to minimal occlusive pressure)    Tube secured:  21    Secured at:  The lips    Placement Verified By:  Capnometry    Complicating Factors:  None    Findings Post-Intubation:  BS equal bilateral and atraumatic/condition of teeth unchanged

## 2025-06-22 NOTE — CONSULTS
Consult received and appreciated. Chart reviewed, including imaging, labs, vitals and home meds.     Patient is admitted under General surgery service after undergoing ex-lap with sigmoid colectomy, end colostomy and small bowel resection per Dr. Brannon. He was found to have an invading malignant appearing mass which was resected during surgery.     Home medications have been resume as appropriate. Full consult note to follow.

## 2025-06-22 NOTE — H&P
[facility] - Emergency Department  General Surgery  History & Physical    Patient Name: Umesh Lassiter  MRN: [id number]  Admission Date: 6/21/2025  Attending Physician: Mannie Brannon MD   Primary Care Provider: Terri Smith FNP    Patient information was obtained from patient and ER records.     Subjective:     Chief Complaint/Reason for Admission: abdominal pain    History of Present Illness:  Patient is a 80 y.o. female presents with 1 month of worsening constipation and abdominal pain.  Got worse last night with distention nausea and emesis.  Ctscan showed applecore lesions andcomplete obstruction.  Never had a colonoscopy.  Previous hystectomy and gallbadder surgery.     No current facility-administered medications on file prior to encounter.     Current Outpatient Medications on File Prior to Encounter   Medication Sig    acetaminophen-codeine 300-30mg (TYLENOL #3) 300-30 mg Tab TAKE 1 TABLET BY MOUTH EVERY 12 HOURS AS NEEDED FOR PAIN    albuterol (PROVENTIL/VENTOLIN HFA) 90 mcg/actuation inhaler Inhale 2 puffs into the lungs every 6 (six) hours as needed for Wheezing.    allopurinoL (ZYLOPRIM) 100 MG tablet     cilostazoL (PLETAL) 50 MG Tab Take 1 tablet (50 mg total) by mouth 2 (two) times daily.    clorazepate (TRANXENE) 7.5 MG Tab Take by mouth.    colchicine (COLCRYS) 0.6 mg tablet     divalproex ER (DEPAKOTE ER) 500 MG Tb24 24 hr tablet TAKE 2 TABLETS BY MOUTH EVERY NIGHT AT BEDTIME FOR SEIZURES    furosemide (LASIX) 20 MG tablet Take 1 tablet (20 mg total) by mouth once daily.    levothyroxine (SYNTHROID) 50 MCG tablet Take 1 tablet (50 mcg total) by mouth before breakfast.    losartan (COZAAR) 50 MG tablet Take 1 tablet (50 mg total) by mouth once daily.    meloxicam (MOBIC) 7.5 MG tablet        Review of patient's allergies indicates:   Allergen Reactions    Influenza virus vaccines      Pt states had to be admitted to hospital after last flu vaccine     Lexapro [escitalopram]         Past Medical History:   Diagnosis Date    Anxiety     Asthma     Cellulitis of left lower extremity 01/13/2022    Chronic wound infection of abdomen 06/06/2022    CVA (cerebral vascular accident) 05/23/2018    DVT (deep venous thrombosis) 2019    Epilepsy     Hyperlipidemia     Hypertension     Hypothyroid     Left leg pain 11/29/2021    Non-pressure chronic ulcer of left lower leg with fat layer exposed     Open wound of lower limb 07/16/2022    PVD (peripheral vascular disease)     Venous stasis     Wound infection     left lower leg     Past Surgical History:   Procedure Laterality Date    KNEE ARTHROSCOPY Left     x 2    LASER ABLATION Right 04/20/2018    [facility] LASER ABLATION PERFORMED BY DR. KARMA BUTCHER.    LASER ABLATION Left 04/27/2018    [facility] LASER ABLATION PERFORMED BY DR. KARMA BUTCHER.    LASER ABLATION Right 12/17/2020    [facility] ( DUPLICATED )LASER ABLATION PERFORMED BY DR. KARMA BUTCHER.    LASER ABLATION Left 01/08/2021    [facility] LASER ABLATION PERFORMED BY DR. KARMA BUTCHER.    Remove cataracts, insert lens Bilateral     SHOULDER ARTHROSCOPY Left     SINUS SURGERY      TOTAL ABDOMINAL HYSTERECTOMY W/ BILATERAL SALPINGOOPHORECTOMY      VENOUS ABLATION Right 02/28/2020    DISTAL [facility] VARITHENA ABLATION PERFORMED BY DR. KARMA BUTCHER.     Family History       Problem Relation (Age of Onset)    Alzheimer's disease Mother    Cancer Brother    Diabetes Sister, Brother    Endometrial cancer Mother, Sister    Heart disease Mother, Daughter    Hypertension Sister    Lung cancer Father    No Known Problems Son, Son, Son, Sister, Sister, Sister, Sister, Sister    Polycystic kidney disease Sister    Seizures Brother    Sickle cell anemia Daughter    Thyroid disease Sister          Tobacco Use    Smoking status: Never    Smokeless tobacco: Never   Substance and Sexual Activity    Alcohol use: Never    Drug use: Never    Sexual activity: Not Currently     Review of Systems   Constitutional:  Negative for activity change,  appetite change, fatigue and fever.   HENT:  Negative for trouble swallowing.    Respiratory:  Negative for cough and shortness of breath.    Cardiovascular:  Negative for chest pain and palpitations.   Gastrointestinal:  Positive for abdominal pain, nausea and vomiting. Negative for abdominal distention, blood in stool, constipation and diarrhea.   Genitourinary:  Negative for flank pain.   Musculoskeletal:  Negative for neck pain and neck stiffness.   Neurological:  Negative for weakness.     Objective:     Vital Signs (Most Recent):  Temp: 97.9 °F (36.6 °C) (06/22/25 0707)  Pulse: 95 (06/22/25 0707)  Resp: 20 (06/22/25 0707)  BP: (!) 142/98 (06/22/25 0707)  SpO2: 95 % (06/22/25 0707) Vital Signs (24h Range):  Temp:  [97.4 °F (36.3 °C)-97.9 °F (36.6 °C)] 97.9 °F (36.6 °C)  Pulse:  [89-95] 95  Resp:  [16-20] 20  SpO2:  [95 %-96 %] 95 %  BP: (133-161)/(67-98) 142/98     Weight: 83.9 kg (185 lb)  Body mass index is 29.86 kg/m².    Physical Exam  Constitutional:       General: She is not in acute distress.  HENT:      Head: Normocephalic.   Cardiovascular:      Rate and Rhythm: Normal rate and regular rhythm.      Pulses: Normal pulses.   Pulmonary:      Effort: Pulmonary effort is normal. No respiratory distress.      Breath sounds: Normal breath sounds.   Abdominal:      General: Abdomen is flat. There is distension.      Palpations: Abdomen is soft.      Tenderness: There is abdominal tenderness.   Musculoskeletal:         General: Normal range of motion.   Skin:     General: Skin is warm.   Neurological:      General: No focal deficit present.      Mental Status: She is oriented to person, place, and time.         Significant Labs:  I have reviewed all pertinent lab results within the past 24 hours.  CBC:   Recent Labs   Lab 06/21/25  2246   WBC 8.86   RBC 4.17*   HGB 10.6*   HCT 33.2*      MCV 79.6*   MCH 25.4*   MCHC 31.9*     BMP:   Recent Labs   Lab 06/21/25  2246   *   *   K 3.5     "  CO2 25   BUN 26*   CREATININE 1.24*   CALCIUM 9.6     CMP:   Recent Labs   Lab 06/21/25  2246   *   CALCIUM 9.6   *   K 3.5   CO2 25      BUN 26*   CREATININE 1.24*     LFTs: No results for input(s): "ALT", "AST", "ALKPHOS", "BILITOT", "PROT", "ALBUMIN" in the last 168 hours.    Significant Diagnostics:  I have reviewed all pertinent imaging results/findings within the past 24 hours.  CT: I have reviewed all pertinent results/findings within the past 24 hours and my personal findings are:  applecore lesion proximal sigmoid colon Concerning for malignancy.  Dilated large bowel full of stool    Assessment/Plan:     Active Diagnoses:    Diagnosis Date Noted POA    PRINCIPAL PROBLEM:  Large bowel obstruction [K56.609] 06/22/2025 Yes    PVD (peripheral vascular disease) [I73.9] 03/13/2023 Yes    Weakness [R53.1] 07/16/2022 Yes    Hypertension [I10] 01/07/2022 Yes    Hypothyroidism [E03.9] 01/07/2022 Yes      Problems Resolved During this Admission:     VTE Risk Mitigation (From admission, onward)      None          OR today for urgent laparotomy with colostomy.  Risks and benefits explained to patient including risk of bleeding, infection, injury to surrouning organs, need for additional operations.  Spoke with son Esteban.  All questions answered  Mannie Brannon MD  General Surgery  [facility] - Emergency Department  "

## 2025-06-22 NOTE — ED NOTES
RN attempted to call patient's daughter and sons at this time.  RN called every number on chart.  RN unable to get any of the children on the phone at this time.  Dr. Clovis martin.

## 2025-06-22 NOTE — ANESTHESIA PREPROCEDURE EVALUATION
06/22/2025  Umesh Lassiter is a 80 y.o., female.      Pre-op Assessment    I have reviewed the Patient Summary Reports.     I have reviewed the Nursing Notes. I have reviewed the NPO Status.   I have reviewed the Medications.     Review of Systems  Anesthesia Hx:  No problems with previous Anesthesia                Social:  Non-Smoker, No Alcohol Use       Hematology/Oncology:  Hematology Normal   Oncology Normal                                   EENT/Dental:  EENT/Dental Normal           Cardiovascular:     Hypertension           hyperlipidemia                               Pulmonary:    Asthma                    Renal/:  Chronic Renal Disease                Hepatic/GI:  Hepatic/GI Normal                    Musculoskeletal:  Musculoskeletal Normal                Neurological:   CVA    Seizures                                Endocrine:   Hypothyroidism          Dermatological:  Skin Normal    Psych:    depression                Physical Exam  General: Well nourished    Airway:  Mallampati: II / II  Mouth Opening: Normal  TM Distance: > 6 cm  Tongue: Normal  Neck ROM: Normal ROM    Chest/Lungs:  Clear to auscultation, Normal Respiratory Rate    Heart:  Rate: Normal  Rhythm: Regular Rhythm        Anesthesia Plan  Type of Anesthesia, risks & benefits discussed:    Anesthesia Type: Gen ETT  Intra-op Monitoring Plan: Standard ASA Monitors  Post Op Pain Control Plan: multimodal analgesia  Induction:  IV  Informed Consent: Informed consent signed with the Patient and all parties understand the risks and agree with anesthesia plan.  All questions answered. Patient consented to blood products? Yes  ASA Score: 4  Day of Surgery Review of History & Physical: H&P Update referred to the surgeon/provider.I have interviewed and examined the patient. I have reviewed the patient's H&P dated:     Ready For Surgery From  Anesthesia Perspective.     .
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
## 2025-06-22 NOTE — OP NOTE
[facility] - Periop Services  Surgery Department  Operative Note    SUMMARY     Date of Procedure: 6/22/2025     Procedure: Procedure(s) (LRB):  LAPAROTOMY, EXPLORATORY (N/A)  CREATION, COLOSTOMY (N/A)  EXCISION, SMALL INTESTINE (N/A)     Surgeons and Role:     * Mannie Brannon MD - Primary    Assisting Surgeon: None    Pre-Operative Diagnosis: Abdominal pain [R10.9]  Colonic obstruction [K56.609]    Post-Operative Diagnosis: Post-Op Diagnosis Codes:     * Abdominal pain [R10.9]     * Colonic obstruction [K56.609]    Anesthesia: General    Procedures Performed: Exploratory laparotomy with sigmoid colectomy and end colostomy.  Small bowel resection    Significant Findings of the Procedure:  Multiple loops of dilated small bowel and very large distended large intestine.  Invasive malignant appearing mass in the mid sigmoid colon invading into the retroperitoneum and the adjacent loop of small bowel.  Initially was stuck and a small-bowel resection was performed upon extraction and dissection it did come apart but there was some likely desmoplastic reaction to the small bowel and this was sent with the colon.    Procedure in Detail:  After informed consent patient brought to the OR prepped and draped in the usual sterile fashion.  Midline incision was made from the umbilicus down to the suprapubic area.  Abdominal cavity was entered.  Multiple loops of dilated small bowel were appreciated as well as a very large distended tight colon.  Transition point obvious in the mid sigmoid colon.  Diverticula appreciated in the area.  Quite a difficult operation due to all the dilated loops of bowel making it difficult to visualize much.  We then inspected the rest of the bowel.  Small bowel was ran no other abnormality seen.  Large colon all was very dilated like a tight balloon but again no other pathology appreciated.  Liver was palpated and no pathology in the liver either.  We made sure patient's NG tube was not  in a good spot.  We started off by doing our best by mobilizing the lateral attachments.  The descending colon as well as the sidewall of the sigmoid and the rectum were all mobilized medially.  There was obvious adhesions and attachments and desmoplastic reaction to the retroperitoneal structures.  The round ligament was involved in this area and we did divide this.  Deeper with the ureters we did our best to stay superficial to it and we were able with far as we could tell to not injure the ureter.  This was mobilized superiorly off the retroperitoneum.  Eventually we did go proximal at the descending colon and divided the bowel there with a 75 mm blue load stapler.  We are able to the LigaSure down to this mass and then we tacked it from below as well.  We divided the colon at the rectosigmoid junction after getting around it and went proximally from there.  We saved that attachment to the retroperitoneum last.  There was a loop of small bowel pretty densely adherent to it and initial dissection would not free it therefore we felt that has maybe some local invasion.  We also did a small-bowel resection.  This ended up being in the mid jejunal area.  A reload stapler was used twice to divide it proximally distally of the small bowel.  We took down the mesentery of the small bowel with the LigaSure as well.  Eventually we are able to carefully dissect off this mass and divide all the mesentery of the sigmoid colon with the LigaSure and this allowed the specimen to come out.  It did separate the small bowel from the large bowel and when we put it in the specimen container.  At that point we ensured hemostasis in the surgical areas.  We then elected to our anastomosis.  This was in the mid jejunal where the attachment was and we lined it up in a side-to-side anti peristaltic manner.  We then fired a reload stapler 75 blue load to do our common channel.  We then closed the defect with a 2 of the 2-0 PDS x2.   Mesenteric defect was closed with a Vicryl sutures.  We then irrigated out the abdominal cavity.  We mobilized and took down the mesentery of the descending colon and proximal sigmoid colon to allow for an ostomy.  We then in the left lower quadrant created a circular defect in the abdominal wall and the cruciate incision on the fascia and and pulled up a dilated loop of the colon through it.  We changed our gloves. We then closed the midline with a 1. Looped PDS and closed the midline with a stapler.  We then opened our ostomy in had a fair amount of stool come out of it.  This was suctioned out and minimal contamination appreciated.  We then matured it to the skin with a 3-0 Vicryl suture.  Due to the dilation it was quite hard to pull it up and good mobility and we had to leave it at the skin level.  Areas were dressed and patient tolerated the procedure well    Twenty-two modifier to this case due to the difficulty with the bowel obstruction and fighting the intestines both small and large with dilation.  Difficult than a normal colon resection given her acute presentation and the amount of stool within the bowels.    Complications: No    Estimated Blood Loss (EBL): 100           Implants: * No implants in log *    Specimens:   Specimen (24h ago, onward)       Start     Ordered    06/22/25 1011  Surgical Pathology  RELEASE UPON ORDERING         06/22/25 1011                            Condition: Good    Disposition: PACU - hemodynamically stable.    Attestation: I was present and scrubbed for the entire procedure.

## 2025-06-22 NOTE — HPI
Mr. Lassiter is an 80 Y O male with PMH of epilepsy, HTN, hypothyroidism, PVD who presented to ED with abdominal pain nausea and vomiting for last 2 days. He reported pain is generalized and cramping in nature. He has not had any bowel movement for last 2-3 days and is barely passing gas. He did have abdominal discomfort and constipation issues over the last month. He denies fever, chills. He admits to weight loss. No blood in stool reported.

## 2025-06-22 NOTE — HOSPITAL COURSE
6/22- s/p ex-lap with sigmoid colectomy and colostomy plus small bowel resection. Home medications and PMH reviewed. Will continue to follow along.   6/23- On PCA for pain control. Pathology pending from resection. PT/OT/mobilize.   6/24- Doing Ok. Continue to follow along.   6/25- Labs and vitals stable. Remains NPO per primary team. May require TPN.   6/26- Doing Ok. Remains NPO per Gen Surg.     06/27 Records reviewed. Pt tolerating liquids. Positive BS. Increase activity  PMH of epilepsy, HTN, hypothyroidism, PVD   06/28 Diet advanced. Increase activity  06/29 tolerating diet, nonsteroidal anti-inflammatory help feet pain, feet pain likely arthritic not gout  06/30 arthritis better, tolerating diet, discuss with Dr. Brannon, he is going to set up outpatient follow up with Oncology.  Surgical plans to discharge back to nursing home which I agreed.  Patient is in good spirits

## 2025-06-23 PROBLEM — G89.18 ACUTE POST-OPERATIVE PAIN: Status: ACTIVE | Noted: 2025-06-23

## 2025-06-23 LAB
ANION GAP SERPL CALCULATED.3IONS-SCNC: 9 MMOL/L (ref 7–16)
BASOPHILS # BLD AUTO: 0.04 K/UL (ref 0–0.2)
BASOPHILS NFR BLD AUTO: 0.3 % (ref 0–1)
BUN SERPL-MCNC: 12 MG/DL (ref 10–20)
BUN/CREAT SERPL: 14 (ref 6–20)
CALCIUM SERPL-MCNC: 8.9 MG/DL (ref 8.4–10.2)
CHLORIDE SERPL-SCNC: 105 MMOL/L (ref 98–107)
CO2 SERPL-SCNC: 25 MMOL/L (ref 23–31)
CREAT SERPL-MCNC: 0.86 MG/DL (ref 0.55–1.02)
DIFFERENTIAL METHOD BLD: ABNORMAL
EGFR (NO RACE VARIABLE) (RUSH/TITUS): 68 ML/MIN/1.73M2
EOSINOPHIL # BLD AUTO: 0 K/UL (ref 0–0.5)
EOSINOPHIL NFR BLD AUTO: 0 % (ref 1–4)
ERYTHROCYTE [DISTWIDTH] IN BLOOD BY AUTOMATED COUNT: 17.2 % (ref 11.5–14.5)
GLUCOSE SERPL-MCNC: 124 MG/DL (ref 82–115)
HCT VFR BLD AUTO: 31.6 % (ref 38–47)
HGB BLD-MCNC: 9.9 G/DL (ref 12–16)
IMM GRANULOCYTES # BLD AUTO: 0.05 K/UL (ref 0–0.04)
IMM GRANULOCYTES NFR BLD: 0.4 % (ref 0–0.4)
LYMPHOCYTES # BLD AUTO: 1.64 K/UL (ref 1–4.8)
LYMPHOCYTES NFR BLD AUTO: 12.2 % (ref 27–41)
MCH RBC QN AUTO: 25.2 PG (ref 27–31)
MCHC RBC AUTO-ENTMCNC: 31.3 G/DL (ref 32–36)
MCV RBC AUTO: 80.4 FL (ref 80–96)
MONOCYTES # BLD AUTO: 1.72 K/UL (ref 0–0.8)
MONOCYTES NFR BLD AUTO: 12.8 % (ref 2–6)
MPC BLD CALC-MCNC: 10.4 FL (ref 9.4–12.4)
NEUTROPHILS # BLD AUTO: 9.96 K/UL (ref 1.8–7.7)
NEUTROPHILS NFR BLD AUTO: 74.3 % (ref 53–65)
NRBC # BLD AUTO: 0 X10E3/UL
NRBC, AUTO (.00): 0 %
PLATELET # BLD AUTO: 302 K/UL (ref 150–400)
POTASSIUM SERPL-SCNC: 5.1 MMOL/L (ref 3.5–5.1)
RBC # BLD AUTO: 3.93 M/UL (ref 4.2–5.4)
SODIUM SERPL-SCNC: 134 MMOL/L (ref 136–145)
TSH SERPL DL<=0.005 MIU/L-ACNC: 1.45 UIU/ML (ref 0.35–4.94)
WBC # BLD AUTO: 13.41 K/UL (ref 4.5–11)

## 2025-06-23 PROCEDURE — 25000003 PHARM REV CODE 250: Performed by: INTERNAL MEDICINE

## 2025-06-23 PROCEDURE — 25000003 PHARM REV CODE 250: Performed by: SURGERY

## 2025-06-23 PROCEDURE — 80048 BASIC METABOLIC PNL TOTAL CA: CPT | Performed by: SURGERY

## 2025-06-23 PROCEDURE — 36415 COLL VENOUS BLD VENIPUNCTURE: CPT | Performed by: SURGERY

## 2025-06-23 PROCEDURE — 97166 OT EVAL MOD COMPLEX 45 MIN: CPT

## 2025-06-23 PROCEDURE — 11000001 HC ACUTE MED/SURG PRIVATE ROOM

## 2025-06-23 PROCEDURE — 84443 ASSAY THYROID STIM HORMONE: CPT | Performed by: INTERNAL MEDICINE

## 2025-06-23 PROCEDURE — 97162 PT EVAL MOD COMPLEX 30 MIN: CPT

## 2025-06-23 PROCEDURE — 85025 COMPLETE CBC W/AUTO DIFF WBC: CPT | Performed by: SURGERY

## 2025-06-23 PROCEDURE — 99232 SBSQ HOSP IP/OBS MODERATE 35: CPT | Mod: ,,, | Performed by: INTERNAL MEDICINE

## 2025-06-23 PROCEDURE — 63600175 PHARM REV CODE 636 W HCPCS: Performed by: INTERNAL MEDICINE

## 2025-06-23 PROCEDURE — 63600175 PHARM REV CODE 636 W HCPCS: Performed by: SURGERY

## 2025-06-23 RX ADMIN — POTASSIUM CHLORIDE, DEXTROSE MONOHYDRATE AND SODIUM CHLORIDE: 150; 5; 450 INJECTION, SOLUTION INTRAVENOUS at 12:06

## 2025-06-23 RX ADMIN — MUPIROCIN: 20 OINTMENT TOPICAL at 09:06

## 2025-06-23 RX ADMIN — LOSARTAN POTASSIUM 50 MG: 50 TABLET, FILM COATED ORAL at 09:06

## 2025-06-23 RX ADMIN — ENOXAPARIN SODIUM 40 MG: 40 INJECTION SUBCUTANEOUS at 05:06

## 2025-06-23 RX ADMIN — MUPIROCIN: 20 OINTMENT TOPICAL at 08:06

## 2025-06-23 RX ADMIN — SODIUM CHLORIDE 500 MG: 9 INJECTION, SOLUTION INTRAVENOUS at 08:06

## 2025-06-23 RX ADMIN — ALLOPURINOL 100 MG: 100 TABLET ORAL at 09:06

## 2025-06-23 RX ADMIN — ONDANSETRON 4 MG: 2 INJECTION INTRAMUSCULAR; INTRAVENOUS at 11:06

## 2025-06-23 RX ADMIN — FUROSEMIDE 20 MG: 20 TABLET ORAL at 09:06

## 2025-06-23 RX ADMIN — POTASSIUM CHLORIDE, DEXTROSE MONOHYDRATE AND SODIUM CHLORIDE: 150; 5; 450 INJECTION, SOLUTION INTRAVENOUS at 08:06

## 2025-06-23 NOTE — ASSESSMENT & PLAN NOTE
Patient with Acute debility due to neoplastic/malignant related fatigue. The patient's latest AMPAC (Activity Measure for Post Acute Care) Score is listed below.    AM-PAC Score - How much help does the patient need for each activity listed  Basic Mobility Total Score: 12  Turning over in bed (including adjusting bedclothes, sheets and blankets)?: A lot  Sitting down on and standing up from a chair with arms (e.g., wheelchair, bedside commode, etc.): A lot  Moving from lying on back to sitting on the side of the bed?: A little  Moving to and from a bed to a chair (including a wheelchair)?: A lot  Need to walk in hospital room?: A lot  Climbing 3-5 steps with a railing?: Unable    Plan  - Progressive mobility protocol initated  - PT/OT consulted  - Fall precautions in place

## 2025-06-23 NOTE — ASSESSMENT & PLAN NOTE
Patient's blood pressure range in the last 24 hours was: BP  Min: 124/60  Max: 175/77.The patient's inpatient anti-hypertensive regimen is listed below:  Current Antihypertensives  furosemide tablet 20 mg, Daily, Oral  losartan tablet 50 mg, Daily, Oral  labetaloL injection 10 mg, Every 4 hours PRN, Intravenous    Plan  - BP is controlled, no changes needed to their regimen

## 2025-06-23 NOTE — PLAN OF CARE
Problem: Occupational Therapy  Goal: Occupational Therapy Goal  Description: STG:  Pt will perform grooming (I)  Pt will bathe with min a  Pt will perform UE dressing with (I)  Pt will perform LE dressing with I/Mod I  Pt will transfer bed/chair/bsc with Mod I  Pt will perform standing task x 2 min with Mod I  Pt will tolerate 20 minutes of tx without fatigue      LT.Restore to max I with self care and mobility.     Outcome: Progressing

## 2025-06-23 NOTE — ASSESSMENT & PLAN NOTE
Creatine stable for now. BMP reviewed- noted Estimated Creatinine Clearance: 52.5 mL/min (based on SCr of 0.86 mg/dL). according to latest data. Based on current GFR, CKD stage is stage 3 - GFR 30-59.  Monitor UOP and serial BMP and adjust therapy as needed. Renally dose meds. Avoid nephrotoxic medications and procedures.

## 2025-06-23 NOTE — ASSESSMENT & PLAN NOTE
Resume home valproate but using IV as absorption might be questionable given bowel surgery.   Valproate levels checked.

## 2025-06-23 NOTE — SUBJECTIVE & OBJECTIVE
Interval History: Patient seen and examined at the bedside, reports doing Ok, pain reported and expected post op.     Review of Systems   All other systems reviewed and are negative.    Objective:     Vital Signs (Most Recent):  Temp: 98.8 °F (37.1 °C) (06/23/25 0749)  Pulse: 97 (06/23/25 0749)  Resp: 17 (06/23/25 0417)  BP: 124/60 (06/23/25 0749)  SpO2: 99 % (06/23/25 0749) Vital Signs (24h Range):  Temp:  [97.5 °F (36.4 °C)-99.4 °F (37.4 °C)] 98.8 °F (37.1 °C)  Pulse:  [] 97  Resp:  [11-18] 17  SpO2:  [90 %-100 %] 99 %  BP: (124-175)/(60-80) 124/60     Weight: 75.8 kg (167 lb 1.7 oz)  Body mass index is 26.97 kg/m².    Intake/Output Summary (Last 24 hours) at 6/23/2025 1050  Last data filed at 6/23/2025 0417  Gross per 24 hour   Intake --   Output 1650 ml   Net -1650 ml         Physical Exam  Vitals and nursing note reviewed.   Constitutional:       Appearance: She is ill-appearing.   HENT:      Head: Normocephalic and atraumatic.      Mouth/Throat:      Mouth: Mucous membranes are moist.   Eyes:      Extraocular Movements: Extraocular movements intact.      Pupils: Pupils are equal, round, and reactive to light.   Cardiovascular:      Rate and Rhythm: Normal rate and regular rhythm.   Pulmonary:      Effort: Pulmonary effort is normal.      Breath sounds: Normal breath sounds.   Abdominal:      General: There is distension.      Tenderness: There is abdominal tenderness.      Comments: Abdominal dressing in place. Colostomy present. NG present.      Musculoskeletal:         General: Normal range of motion.      Cervical back: Normal range of motion and neck supple.   Neurological:      General: No focal deficit present.      Mental Status: She is alert and oriented to person, place, and time. Mental status is at baseline.   Psychiatric:         Mood and Affect: Mood normal.         Behavior: Behavior normal.               Significant Labs: All pertinent labs within the past 24 hours have been  reviewed.    Significant Imaging: I have reviewed all pertinent imaging results/findings within the past 24 hours.
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
## 2025-06-23 NOTE — PLAN OF CARE
[facility]  Initial [facility]charge Assessment       Primary Care Provider: Terri Smith FNP    Admission Diagnosis: Colonic obstruction [K56.609]  Large bowel obstruction [K56.609]  Abdominal pain [R10.9]    Admission Date: 6/21/2025  Expected Discharge Date:     Transition of Care Barriers: None    Payor: MEDICARE / Plan: MEDICARE PART A & B / Product Type: Government /     Extended Emergency Contact Information  Primary Emergency Contact: nazia munoz  [address]           [place] [address] [place]  Mobile Phone: [phone]  Relation: Daughter  Preferred language: English   needed? No  Secondary Emergency Contact: Almas Lassiter  Address: [address] [place] [address]  Home Phone: [phone]  Mobile Phone: [phone]  Relation: Son    Discharge Plan A: Return to nursing home, Skilled Nursing Facility  Discharge Plan B: Return to Nursing Home, Skilled Nursing Facility      [id number] - [place], [place] - [address] [facility] [address]  [address]  [place] [address]  Phone: [phone] Fax: [phone]    [facility] - [facility] [address]  [address] [facility] [address]  Phone: [phone] Fax: [phone]    [facility] - [place], [place] - [address]  [address]  [facility] [address]  Phone: [phone] Fax: [phone]      Initial Assessment (most recent)       Adult Discharge Assessment - 06/23/25 1336          Discharge Assessment    Assessment Type Discharge Planning Assessment     Confirmed/corrected address, phone number and insurance Yes     Confirmed Demographics Correct on Facesheet     Source of Information patient;facility verbal report     People in Home facility resident     Facility Arrived From: [facility]     Do you expect to return to  your current living situation? Yes     Do you have help at home or someone to help you manage your care at home? Yes     Who are your caregiver(s) and their phone number(s)? facility staff     Prior to hospitilization cognitive status: Unable to Assess     Current cognitive status: Alert/Oriented     Walking or Climbing Stairs Difficulty yes     Walking or Climbing Stairs ambulation difficulty, requires equipment     Mobility Management rw     Dressing/Bathing Difficulty no     Home Accessibility wheelchair accessible     Home Layout Able to live on 1st floor     Equipment Currently Used at Home walker, rolling     Readmission within 30 days? No     Patient currently being followed by outpatient case management? No     Do you currently have service(s) that help you manage your care at home? No     Do you take prescription medications? Yes     Do you have prescription coverage? Yes     Do you have any problems affording any of your prescribed medications? No     Is the patient taking medications as prescribed? yes     Who is going to help you get home at discharge? agency     How do you get to doctors appointments? agency     Are you on dialysis? No     Do you take coumadin? No     Discharge Plan A Return to nursing home;Skilled Nursing Facility     Discharge Plan B Return to Nursing Home;Skilled Nursing Facility     DME Needed Upon Discharge  none     Discharge Plan discussed with: Patient     Transition of Care Barriers None                   Ss spoke with pt at bedside. Pt is a long term resident at [facility] and plans to return when medically ready. Pt uses a rw at facility. Ss spoke with DOE Damon at [facility], pt is able to return when medically ready. Initial info faxed. Packet started. IM obtained. SDOH deferred due to pt being a facility resident. Ss following    Met with pt to review discharge recommendation of SNF/NH and is agreeable to plan    Patient/family provided list of  facilities in-network with patient's payor plan. Providers that are owned, operated, or affiliated with [facility] are included on the list.     Notified that referral sent to below listed facilities from in-network list based on proximity to home/family support:   [facility]  2.  3.  4.  5. (can send more than 5)    Patient/family instructed to identify preference.    Preferred Facility: (if more than 1, listed in order of descending preference)  1.  OR  Patient has declined to select a preferred provider and elects placement with the first accepting in network provider that is available to provide services as ordered by the physician       If an additional preferred facility not listed above is identified, additional referral to be sent. If above facilities unable to accept, will send additional referrals to in-network providers.

## 2025-06-23 NOTE — PROGRESS NOTES
[facility] - [address] [place] [address] Medicine  Progress Note    Patient Name: Umesh Lassiter  MRN: [id number]  Patient Class: IP- Inpatient   Admission Date: 6/21/2025  Length of Stay: 1 days  Attending Physician: Mannie Brannon MD  Primary Care Provider: Terri Smith FNP        Subjective     Principal Problem:Large bowel obstruction        HPI:  Mr. Lasister is an 80 Y O male with PMH of epilepsy, HTN, hypothyroidism, PVD who presented to ED with abdominal pain nausea and vomiting for last 2 days. He reported pain is generalized and cramping in nature. He has not had any bowel movement for last 2-3 days and is barely passing gas. He did have abdominal discomfort and constipation issues over the last month. He denies fever, chills. He admits to weight loss. No blood in stool reported.     Overview/Hospital Course:  6/22- s/p ex-lap with sigmoid colectomy and colostomy plus small bowel resection. Home medications and PMH reviewed. Will continue to follow along.     6/23- On PCA for pain control. Pathology pending from resection. PT/OT/mobilize.     Interval History: Patient seen and examined at the bedside, reports doing Ok, pain reported and expected post op.     Review of Systems   All other systems reviewed and are negative.    Objective:     Vital Signs (Most Recent):  Temp: 98.8 °F (37.1 °C) (06/23/25 0749)  Pulse: 97 (06/23/25 0749)  Resp: 17 (06/23/25 0417)  BP: 124/60 (06/23/25 0749)  SpO2: 99 % (06/23/25 0749) Vital Signs (24h Range):  Temp:  [97.5 °F (36.4 °C)-99.4 °F (37.4 °C)] 98.8 °F (37.1 °C)  Pulse:  [] 97  Resp:  [11-18] 17  SpO2:  [90 %-100 %] 99 %  BP: (124-175)/(60-80) 124/60     Weight: 75.8 kg (167 lb 1.7 oz)  Body mass index is 26.97 kg/m².    Intake/Output Summary (Last 24 hours) at 6/23/2025 1050  Last data filed at 6/23/2025 0417  Gross per 24 hour   Intake --   Output 1650 ml   Net -1650 ml         Physical Exam  Vitals and nursing note reviewed.    Constitutional:       Appearance: She is ill-appearing.   HENT:      Head: Normocephalic and atraumatic.      Mouth/Throat:      Mouth: Mucous membranes are moist.   Eyes:      Extraocular Movements: Extraocular movements intact.      Pupils: Pupils are equal, round, and reactive to light.   Cardiovascular:      Rate and Rhythm: Normal rate and regular rhythm.   Pulmonary:      Effort: Pulmonary effort is normal.      Breath sounds: Normal breath sounds.   Abdominal:      General: There is distension.      Tenderness: There is abdominal tenderness.      Comments: Abdominal dressing in place. Colostomy present. NG present.      Musculoskeletal:         General: Normal range of motion.      Cervical back: Normal range of motion and neck supple.   Neurological:      General: No focal deficit present.      Mental Status: She is alert and oriented to person, place, and time. Mental status is at baseline.   Psychiatric:         Mood and Affect: Mood normal.         Behavior: Behavior normal.               Significant Labs: All pertinent labs within the past 24 hours have been reviewed.    Significant Imaging: I have reviewed all pertinent imaging results/findings within the past 24 hours.      Assessment & Plan  Large bowel obstruction  Acute post-operative pain  Presented to ED with abdominal pain and nausea/vomiting.   CT showed colonic distension with retained fecal material secondary to obstructing lesion worrisome for carcinoma at the level of the sigmoid colon LEFT lower quadrant.   Admitted to Gen Surg (Dr. Brannon) and s/p ex-lap on 6/22/25:     Procedures Performed: Exploratory laparotomy with sigmoid colectomy and end colostomy.  Small bowel resection     Significant Findings of the Procedure:  Multiple loops of dilated small bowel and very large distended large intestine.  Invasive malignant appearing mass in the mid sigmoid colon invading into the retroperitoneum and the adjacent loop of small bowel.  Initially  was stuck and a small-bowel resection was performed upon extraction and dissection it did come apart but there was some likely desmoplastic reaction to the small bowel and this was sent with the colon.    Pathology pending.  Management per primary.     Hypertension  Patient's blood pressure range in the last 24 hours was: BP  Min: 124/60  Max: 175/77.The patient's inpatient anti-hypertensive regimen is listed below:  Current Antihypertensives  furosemide tablet 20 mg, Daily, Oral  losartan tablet 50 mg, Daily, Oral  labetaloL injection 10 mg, Every 4 hours PRN, Intravenous    Plan  - BP is controlled, no changes needed to their regimen    CKD stage 3a, GFR 45-59 ml/min  Creatine stable for now. BMP reviewed- noted Estimated Creatinine Clearance: 52.5 mL/min (based on SCr of 0.86 mg/dL). according to latest data. Based on current GFR, CKD stage is stage 3 - GFR 30-59.  Monitor UOP and serial BMP and adjust therapy as needed. Renally dose meds. Avoid nephrotoxic medications and procedures.    Hypothyroidism  TSH WNL.   Resume home Synthroid.     Generalized epilepsy  Resume home valproate but using IV as absorption might be questionable given bowel surgery.   Valproate levels checked.     PVD (peripheral vascular disease)  Resume home Pletal when Ok with surgery team.     Debility  Patient with Acute debility due to neoplastic/malignant related fatigue. The patient's latest AMPAC (Activity Measure for Post Acute Care) Score is listed below.    AM-PAC Score - How much help does the patient need for each activity listed  Basic Mobility Total Score: 12  Turning over in bed (including adjusting bedclothes, sheets and blankets)?: A lot  Sitting down on and standing up from a chair with arms (e.g., wheelchair, bedside commode, etc.): A lot  Moving from lying on back to sitting on the side of the bed?: A little  Moving to and from a bed to a chair (including a wheelchair)?: A lot  Need to walk in hospital room?: A  lot  Climbing 3-5 steps with a railing?: Unable    Plan  - Progressive mobility protocol initated  - PT/OT consulted  - Fall precautions in place    VTE Risk Mitigation (From admission, onward)           Ordered     enoxaparin injection 40 mg  Daily         06/22/25 1306     IP VTE HIGH RISK PATIENT  Once         06/22/25 1306     Place sequential compression device  Until discontinued         06/22/25 1306                    Discharge Planning   CARMEN:      Code Status: Prior   Medical Readiness for Discharge Date:              SDOH Screening:  The patient was screened for utility difficulties, food insecurity, transport difficulties, housing insecurity, and interpersonal safety and there were no concerns identified this admission.                Please place Justification for DME      SUN MCGRAW MD  Department of Hospital Medicine   [facility]

## 2025-06-23 NOTE — PROGRESS NOTES
[facility] - [address] [place] [address]  General Surgery  Progress Note    Subjective:     Interval History:  Patient overnight doing well.  Had some some back pain but overall she feels better from the inside pain she is having.  No nausea or vomiting.  Minimal out the NG tube.  She is having some ostomy output.    Post-Op Info:  Procedure(s) (LRB):  LAPAROTOMY, EXPLORATORY (N/A)  CREATION, COLOSTOMY (N/A)  EXCISION, SMALL INTESTINE (N/A)   1 Day Post-Op      Medications:  Continuous Infusions:   dextrose 5 % and 0.45 % NaCl with KCl 20 mEq   Intravenous Continuous 125 mL/hr at 06/23/25 0011 New Bag at 06/23/25 0011    morphine   Intravenous Continuous   New Syringe/Bag at 06/22/25 1754     Scheduled Meds:   allopurinoL  100 mg Oral Daily    enoxparin  40 mg Subcutaneous Daily    furosemide  20 mg Oral Daily    levothyroxine  50 mcg Oral Before breakfast    losartan  50 mg Oral Daily    mupirocin   Nasal BID    valproate sodium (DEPACON) IVPB  500 mg Intravenous QHS     PRN Meds:  Current Facility-Administered Medications:     albuterol, 2.5 mg, Nebulization, Q6H PRN    clorazepate, 7.5 mg, Oral, Nightly PRN    labetalol, 10 mg, Intravenous, Q4H PRN    naloxone, 0.4 mg, Intravenous, QID PRN    ondansetron, 4 mg, Intravenous, Q6H PRN     Objective:     Vital Signs (Most Recent):  Temp: 98.8 °F (37.1 °C) (06/23/25 0749)  Pulse: 97 (06/23/25 0749)  Resp: 17 (06/23/25 0417)  BP: 124/60 (06/23/25 0749)  SpO2: 99 % (06/23/25 0749) Vital Signs (24h Range):  Temp:  [97.5 °F (36.4 °C)-99.4 °F (37.4 °C)] 98.8 °F (37.1 °C)  Pulse:  [] 97  Resp:  [11-18] 17  SpO2:  [90 %-100 %] 99 %  BP: (124-175)/(60-80) 124/60       Intake/Output Summary (Last 24 hours) at 6/23/2025 1048  Last data filed at 6/23/2025 0417  Gross per 24 hour   Intake --   Output 1650 ml   Net -1650 ml       Physical Exam  Constitutional:       General: She is not in acute distress.  HENT:      Head: Normocephalic.   Cardiovascular:      Rate  "and Rhythm: Normal rate and regular rhythm.      Pulses: Normal pulses.   Pulmonary:      Effort: Pulmonary effort is normal. No respiratory distress.      Breath sounds: Normal breath sounds.   Abdominal:      General: Abdomen is flat. There is no distension.      Palpations: Abdomen is soft.      Tenderness: There is abdominal tenderness.   Musculoskeletal:         General: Normal range of motion.   Skin:     General: Skin is warm.   Neurological:      General: No focal deficit present.      Mental Status: She is oriented to person, place, and time.         Significant Labs:  BMP:   Recent Labs   Lab 06/23/25  0449   *   *   K 5.1      CO2 25   BUN 12   CREATININE 0.86   CALCIUM 8.9     Cardiac markers: No results for input(s): "CKMB", "CPKMB", "TROPONINT", "TROPONINI", "MYOGLOBIN" in the last 48 hours.  CBC:   Recent Labs   Lab 06/23/25  0449   WBC 13.41*   RBC 3.93*   HGB 9.9*   HCT 31.6*      MCV 80.4   MCH 25.2*   MCHC 31.3*     CMP:   Recent Labs   Lab 06/23/25  0449   *   CALCIUM 8.9   *   K 5.1   CO2 25      BUN 12   CREATININE 0.86     Coagulation: No results for input(s): "PT", "INR", "APTT" in the last 48 hours.    Significant Diagnostics:  I have reviewed all pertinent imaging results/findings within the past 24 hours.    Assessment/Plan:     Active Diagnoses:    Diagnosis Date Noted POA    PRINCIPAL PROBLEM:  Large bowel obstruction [K56.609] 06/22/2025 Yes    CKD stage 3a, GFR 45-59 ml/min [N18.31] 02/08/2024 Yes    PVD (peripheral vascular disease) [I73.9] 03/13/2023 Yes    Debility [R53.81] 07/16/2022 Yes    Generalized epilepsy [G40.309] 01/13/2022 Yes    Hypertension [I10] 01/07/2022 Yes    Hypothyroidism [E03.9] 01/07/2022 Yes      Problems Resolved During this Admission:     Expected routine ileus due to the resection x2.  NG tube for now.  Will have physical therapy and OT see the patient today start working with her.  Ostomy teaching also started.  " Will be slow with the patient.    aMnnie Brannon MD  General Surgery  [facility] - [address] [place] [address]

## 2025-06-24 LAB — VALPROATE FREE SERPL-MCNC: 5 MCG/ML (ref 5–25)

## 2025-06-24 PROCEDURE — 11000001 HC ACUTE MED/SURG PRIVATE ROOM

## 2025-06-24 PROCEDURE — 97116 GAIT TRAINING THERAPY: CPT

## 2025-06-24 PROCEDURE — 25000003 PHARM REV CODE 250: Performed by: INTERNAL MEDICINE

## 2025-06-24 PROCEDURE — 97110 THERAPEUTIC EXERCISES: CPT | Mod: CO

## 2025-06-24 PROCEDURE — 63600175 PHARM REV CODE 636 W HCPCS: Performed by: SURGERY

## 2025-06-24 PROCEDURE — 25000003 PHARM REV CODE 250: Performed by: SURGERY

## 2025-06-24 PROCEDURE — 99232 SBSQ HOSP IP/OBS MODERATE 35: CPT | Mod: ,,, | Performed by: INTERNAL MEDICINE

## 2025-06-24 PROCEDURE — 97530 THERAPEUTIC ACTIVITIES: CPT

## 2025-06-24 RX ORDER — SODIUM CHLORIDE 9 MG/ML
1000 INJECTION, SOLUTION INTRAVENOUS ONCE
Status: COMPLETED | OUTPATIENT
Start: 2025-06-24 | End: 2025-06-24

## 2025-06-24 RX ADMIN — MUPIROCIN: 20 OINTMENT TOPICAL at 09:06

## 2025-06-24 RX ADMIN — MUPIROCIN: 20 OINTMENT TOPICAL at 08:06

## 2025-06-24 RX ADMIN — ENOXAPARIN SODIUM 40 MG: 40 INJECTION SUBCUTANEOUS at 05:06

## 2025-06-24 RX ADMIN — LOSARTAN POTASSIUM 50 MG: 50 TABLET, FILM COATED ORAL at 09:06

## 2025-06-24 RX ADMIN — POTASSIUM CHLORIDE, DEXTROSE MONOHYDRATE AND SODIUM CHLORIDE: 150; 5; 450 INJECTION, SOLUTION INTRAVENOUS at 08:06

## 2025-06-24 RX ADMIN — FUROSEMIDE 20 MG: 20 TABLET ORAL at 09:06

## 2025-06-24 RX ADMIN — ALLOPURINOL 100 MG: 100 TABLET ORAL at 09:06

## 2025-06-24 RX ADMIN — SODIUM CHLORIDE 500 MG: 9 INJECTION, SOLUTION INTRAVENOUS at 08:06

## 2025-06-24 RX ADMIN — POTASSIUM CHLORIDE, DEXTROSE MONOHYDRATE AND SODIUM CHLORIDE: 150; 5; 450 INJECTION, SOLUTION INTRAVENOUS at 02:06

## 2025-06-24 RX ADMIN — LEVOTHYROXINE SODIUM 50 MCG: 0.05 TABLET ORAL at 05:06

## 2025-06-24 RX ADMIN — POTASSIUM CHLORIDE, DEXTROSE MONOHYDRATE AND SODIUM CHLORIDE: 150; 5; 450 INJECTION, SOLUTION INTRAVENOUS at 04:06

## 2025-06-24 RX ADMIN — SODIUM CHLORIDE 1000 ML: 9 INJECTION, SOLUTION INTRAVENOUS at 01:06

## 2025-06-24 NOTE — ASSESSMENT & PLAN NOTE
Patient's blood pressure range in the last 24 hours was: BP  Min: 121/55  Max: 137/61.The patient's inpatient anti-hypertensive regimen is listed below:  Current Antihypertensives  furosemide tablet 20 mg, Daily, Oral  losartan tablet 50 mg, Daily, Oral  labetaloL injection 10 mg, Every 4 hours PRN, Intravenous    Plan  - BP is controlled, no changes needed to their regimen

## 2025-06-24 NOTE — PT/OT/SLP PROGRESS
Occupational Therapy   Treatment    Name: Umesh Lassiter  MRN: [id number]  Admitting Diagnosis:  Large bowel obstruction  2 Days Post-Op    Recommendations:     Discharge Recommendations: Low Intensity Therapy  Discharge Equipment Recommendations:  to be determined by next level of care  Barriers to discharge:       Assessment:     Umesh Lassiter is a 80 y.o. female with a medical diagnosis of Large bowel obstruction.   Performance deficits affecting function are weakness, impaired endurance, impaired self care skills.     Rehab Prognosis:  Good; patient would benefit from acute skilled OT services to address these deficits and reach maximum level of function.       Plan:     Patient to be seen 5 x/week to address the above listed problems via self-care/home management, therapeutic activities, therapeutic exercises  Plan of Care Expires: 07/28/25  Plan of Care Reviewed with: patient    Subjective     Chief Complaint:   Patient/Family Comments/goals:   Pain/Comfort:  Pain Rating 1: 0/10    Objective:     Communicated with: Yenifer Milton RN prior to session.  Patient found up in chair with NG tube, colostomy, PCEA, peripheral IV, telemetry upon OT entry to room.    General Precautions: Standard, fall    Orthopedic Precautions:N/A  Braces: N/A  Respiratory Status: Nasal cannula, flow 2 L/min     Occupational Performance:     Bed Mobility:         Functional Mobility/Transfers:    Functional Mobility:     Activities of Daily Living:  Set up to wash her face      AMPAC 6 Click ADL:      Treatment & Education:  Pt performed hand helper with 3 rubber band resistances 20 reps x 2, green t ball ex's 20 reps x 2,rom ex's with 1 lb wt 15 reps x 2 B elbow flex/ext,abd/add,10 reps shld flex,      Patient left up in chair with all lines intact, call button in reach, and 2 family members present    GOALS:   Multidisciplinary Problems       Occupational Therapy Goals          Problem: Occupational Therapy    Goal Priority Disciplines  Outcome Interventions   Occupational Therapy Goal     OT, PT/OT Progressing    Description: STG:  Pt will perform grooming (I)  Pt will bathe with min a  Pt will perform UE dressing with (I)  Pt will perform LE dressing with I/Mod I  Pt will transfer bed/chair/bsc with Mod I  Pt will perform standing task x 2 min with Mod I  Pt will tolerate 20 minutes of tx without fatigue      LT.Restore to max I with self care and mobility.                          DME Justifications:      Time Tracking:     OT Date of Treatment: 25  OT Start Time: 1325  OT Stop Time: 1347  OT Total Time (min): 22 min    Billable Minutes:Therapeutic Exercise 20    OT/ANA LUISA: ANA LUISA          2025

## 2025-06-24 NOTE — PT/OT/SLP PROGRESS
Physical Therapy Treatment    Patient Name:  Umesh Lassiter   MRN:  [id number]    Recommendations:     Discharge Recommendations: Moderate Intensity Therapy  Discharge Equipment Recommendations: to be determined by next level of care  Barriers to discharge: ongoing medical care    Assessment:     Umesh Lassiter is a 80 y.o. female admitted with a medical diagnosis of Large bowel obstruction.  She presents with the following impairments/functional limitations: weakness, impaired endurance, impaired functional mobility, gait instability, decreased lower extremity function, pain. Pt demo much improved safety and mobility this session. During session, pt sats dropped to approx 72% while on room air. RN placed pt back on 2L NC and pt improved to 97%.     Rehab Prognosis: Good and Fair; patient would benefit from acute skilled PT services to address these deficits and reach maximum level of function.    Recent Surgery: Procedure(s) (LRB):  LAPAROTOMY, EXPLORATORY (N/A)  CREATION, COLOSTOMY (N/A)  EXCISION, SMALL INTESTINE (N/A) 2 Days Post-Op    Plan:     During this hospitalization, patient to be seen 5 x/week to address the identified rehab impairments via gait training, therapeutic activities, therapeutic exercises and progress toward the following goals:    Plan of Care Expires:  07/23/25    Subjective     Chief Complaint: Large bowel obstruction   Patient/Family Comments/goals: agreeable  Pain/Comfort: 10/10 back          Objective:     Communicated with RN prior to session.  Patient found HOB elevated with NG tube, colostomy, peripheral IV, PCA upon PT entry to room.     General Precautions: Standard, fall  Orthopedic Precautions: N/A  Braces: N/A  Respiratory Status: Room air      Functional Mobility:  Bed Mobility:     Rolling Right: moderate assistance  Supine to Sit: moderate assistance  Transfers:     Sit to Stand:  contact guard assistance and minimum assistance with rolling walker  Toilet Transfer: contact  guard assistance with  rolling walker  using  Stand Pivot  Gait: 20ft with RW, Min A with cues to maintain close proximity to RW; pt demo flexed  posture, short step length, widened BARBIE  Balance: Fair- to fair in stance       AM-PAC 6 CLICK MOBILITY  Turning over in bed (including adjusting bedclothes, sheets and blankets)?: 2  Sitting down on and standing up from a chair with arms (e.g., wheelchair, bedside commode, etc.): 3  Moving from lying on back to sitting on the side of the bed?: 2  Moving to and from a bed to a chair (including a wheelchair)?: 3  Need to walk in hospital room?: 2  Climbing 3-5 steps with a railing?: 1  Basic Mobility Total Score: 13       Treatment & Education:  Bilateral lower extremity exercise x 15 reps: ankle pumps, Quad sets, and glut sets with active assist ROM, verbal cues for sequencing and safety, and tactile cues     Patient left up in chair with all lines intact, call button in reach, and RN notified..    GOALS:   Multidisciplinary Problems       Physical Therapy Goals          Problem: Physical Therapy    Goal Priority Disciplines Outcome Interventions   Physical Therapy Goal     PT, PT/OT Progressing    Description: Short Term Goals to be met by: 25    Patient will increase functional independence with mobility by performin. Supine to sit with contact guard assist  2. Sit to stand transfer with contact guard assist using Rolling walker  3. Bed to chair transfer with contact guard assist using Rolling walker  4. Gait  x 100 feet with contact guard assist using Rolling walker  5. Lower extremity exercise program x30 reps per handout, with assistance as needed    Long Term Goals to be met by: 25    Pt will regain full independent functional mobility with Rolling walker to return to home situation and prior activities of daily living.                        DME Justifications:  To be determined.     Time Tracking:     PT Received On: 25  PT Start Time: 1010      PT Stop Time: 1044  PT Total Time (min): 34 min     Billable Minutes: Gait Training 12 and Therapeutic Activity 15    Treatment Type: Evaluation  PT/PTA: PT           06/24/2025

## 2025-06-24 NOTE — PT/OT/SLP EVAL
Physical Therapy Evaluation     Patient Name: Umesh Lassiter   MRN: [id number]  Recent Surgery: Procedure(s) (LRB):  LAPAROTOMY, EXPLORATORY (N/A)  CREATION, COLOSTOMY (N/A)  EXCISION, SMALL INTESTINE (N/A) 2 Days Post-Op    Recommendations:     Discharge Recommendations: Moderate Intensity Therapy   Discharge Equipment Recommendations: to be determined by next level of care   Barriers to discharge: Increased level of assist and Ongoing medical treatment    Assessment:     Umesh Lassiter is a 80 y.o. female admitted with a medical diagnosis of Large bowel obstruction. She presents with the following impairments/functional limitations: weakness, impaired endurance, impaired functional mobility, gait instability, decreased lower extremity function, pain. Pt has undergone ex lap with colostomy creation. Pt is slightly resistant to mobility d/t pain. Pt encouraged to take medication to allow her to fully participate in therapy session.     Rehab Prognosis: Good; patient would benefit from acute PT services to address these deficits and reach maximum level of function.    Plan:     During this hospitalization, patient to be seen 5 x/week to address the above listed problems via gait training, therapeutic activities, therapeutic exercises    Plan of Care Expires: 07/23/25    Subjective     Chief Complaint: Large bowel obstruction   Patient Comments/Goals: agreeable  Pain/Comfort: 8/10 abdomen       Social History:  Living Environment: Patient lives in a skilled nursing facility or less than 1 year  Prior Level of Function: Prior to admission, patient ambulatory with RW  Equipment Used at Home: walker, rolling  DME owned (not currently used): unknown  Assistance Upon Discharge: facility staff    Objective:     Communicated with RN prior to session. Patient found up in chair with NG tube, colostomy, peripheral IV, PCA upon PT entry to room.    General Precautions: Standard, fall   Orthopedic Precautions: N/A   Braces:       Respiratory Status: Room air    Exams:  Cognition: Patient is oriented to Person, Place, Time, Situation  RLE ROM: WFL  RLE Strength: WFL  LLE ROM: WFL  LLE Strength: WFL  Sensation: -       Intact    Functional Mobility:  Gait belt applied - Yes  Bed Mobility  Scooting: maximal assistance and of 2 persons  Sit to Supine: minimum assistance for LE management and trunk management  Transfers  Sit to Stand: minimum assistance, moderate assistance, and of 2 persons with hand-held assist and with cues for hand placement and foot placement  Chair to bed: moderate assistance and of 2 persons with hand-held assist and with cues for hand placement and foot placement using Step Transfer  Gait  Patient ambulated 2-3 steps to bed with hand-held assist and moderate assistance and of 2 persons. Patient demonstrates unsteady gait, decreased step length, wide base of support, flexed posture, and decreased selvin. All lines remained intact throughout ambulation trail and nurse present for vital sign monitoring.  Balance  Sitting: stand by assistance and contact guard assistance  Standing: minimum assistance and of 2 persons      Therapeutic Activities and Exercises:   Patient educated on role of acute care PT and PT POC, safety while in hospital including calling nurse for mobility, and call light usage  Patient educated about importance of OOB mobility and remaining up in chair most of the day.  Patient educated about pursed lip breathing technique and cued for use with mobility.      AM-PAC 6 CLICK MOBILITY  Total Score:     Patient left supine with all lines intact, call button in reach, and RN present.    GOALS:   Multidisciplinary Problems       Physical Therapy Goals          Problem: Physical Therapy    Goal Priority Disciplines Outcome Interventions   Physical Therapy Goal     PT, PT/OT Progressing    Description: Short Term Goals to be met by: 7/8/25    Patient will increase functional independence with mobility by  performin. Supine to sit with contact guard assist  2. Sit to stand transfer with contact guard assist using Rolling walker  3. Bed to chair transfer with contact guard assist using Rolling walker  4. Gait  x 100 feet with contact guard assist using Rolling walker  5. Lower extremity exercise program x30 reps per handout, with assistance as needed    Long Term Goals to be met by: 25    Pt will regain full independent functional mobility with Rolling walker to return to home situation and prior activities of daily living.                        DME Justifications:  To be determined.     History:     Past Medical History:   Diagnosis Date    Anxiety     Asthma     Cellulitis of left lower extremity 2022    Chronic wound infection of abdomen 2022    CVA (cerebral vascular accident) 2018    DVT (deep venous thrombosis) 2019    Epilepsy     Hyperlipidemia     Hypertension     Hypothyroid     Left leg pain 2021    Non-pressure chronic ulcer of left lower leg with fat layer exposed     Open wound of lower limb 2022    PVD (peripheral vascular disease)     Venous stasis     Wound infection     left lower leg       Past Surgical History:   Procedure Laterality Date    ABDOMINAL SURGERY      COLOSTOMY Left     COLOSTOMY N/A 2025    Procedure: CREATION, COLOSTOMY;  Surgeon: Mannie Brannon MD;  Location: [facility] OR;  Service: General;  Laterality: N/A;    EXCISION, SMALL INTESTINE N/A 2025    Procedure: EXCISION, SMALL INTESTINE;  Surgeon: Mannie Brannon MD;  Location: [facility] OR;  Service: General;  Laterality: N/A;    KNEE ARTHROSCOPY Left     x 2    LAPAROTOMY, EXPLORATORY N/A 2025    Procedure: LAPAROTOMY, EXPLORATORY;  Surgeon: Mannie Brannon MD;  Location: [facility] OR;  Service: General;  Laterality: N/A;    LASER ABLATION Right 2018    [facility] LASER ABLATION PERFORMED BY DR. KARMA BUTCHER.    LASER ABLATION Left 2018    [facility] LASER ABLATION  PERFORMED BY DR. KARMA BUTCHER.    LASER ABLATION Right 12/17/2020    [facility] ( DUPLICATED )LASER ABLATION PERFORMED BY DR. KARMA BUTCHER.    LASER ABLATION Left 01/08/2021    [facility] LASER ABLATION PERFORMED BY DR. KARMA BUTCHER.    Remove cataracts, insert lens Bilateral     SHOULDER ARTHROSCOPY Left     SINUS SURGERY      TOTAL ABDOMINAL HYSTERECTOMY W/ BILATERAL SALPINGOOPHORECTOMY      VENOUS ABLATION Right 02/28/2020    DISTAL [facility] VARITHENA ABLATION PERFORMED BY DR. KARMA BUTCHER.       Time Tracking:     PT Received On: 06/23/25  PT Start Time: 1445  PT Stop Time: 1506  PT Total Time (min): 21 min     Billable Minutes: Evaluation moderate complexity    6/24/2025

## 2025-06-24 NOTE — ASSESSMENT & PLAN NOTE
Patient with Acute debility due to neoplastic/malignant related fatigue. The patient's latest AMPAC (Activity Measure for Post Acute Care) Score is listed below.    AM-PAC Score - How much help does the patient need for each activity listed  Basic Mobility Total Score: 13  Turning over in bed (including adjusting bedclothes, sheets and blankets)?: A lot  Sitting down on and standing up from a chair with arms (e.g., wheelchair, bedside commode, etc.): A little  Moving from lying on back to sitting on the side of the bed?: A lot  Moving to and from a bed to a chair (including a wheelchair)?: A little  Need to walk in hospital room?: A lot  Climbing 3-5 steps with a railing?: Unable    Plan  - Progressive mobility protocol initated  - PT/OT consulted  - Fall precautions in place

## 2025-06-24 NOTE — PLAN OF CARE
Problem: Physical Therapy  Goal: Physical Therapy Goal  Description: Short Term Goals to be met by: 25    Patient will increase functional independence with mobility by performin. Supine to sit with contact guard assist  2. Sit to stand transfer with contact guard assist using Rolling walker  3. Bed to chair transfer with contact guard assist using Rolling walker  4. Gait  x 100 feet with contact guard assist using Rolling walker  5. Lower extremity exercise program x30 reps per handout, with assistance as needed    Long Term Goals to be met by: 25    Pt will regain full independent functional mobility with Rolling walker to return to home situation and prior activities of daily living.   Outcome: Progressing     PT eval completed. Please see eval note for details.

## 2025-06-24 NOTE — SUBJECTIVE & OBJECTIVE
Interval History: Patient seen and examined at the bedside, reports doing Ok, pain reported and expected post op.     Review of Systems   All other systems reviewed and are negative.    Objective:     Vital Signs (Most Recent):  Temp: 99.5 °F (37.5 °C) (06/24/25 1150)  Pulse: 95 (06/24/25 1150)  Resp: 18 (06/24/25 1150)  BP: (!) 121/55 (06/24/25 1150)  SpO2: 98 % (06/24/25 1150) Vital Signs (24h Range):  Temp:  [97.9 °F (36.6 °C)-99.9 °F (37.7 °C)] 99.5 °F (37.5 °C)  Pulse:  [] 95  Resp:  [16-18] 18  SpO2:  [92 %-98 %] 98 %  BP: (121-137)/(54-65) 121/55     Weight: 91.2 kg (201 lb)  Body mass index is 32.44 kg/m².    Intake/Output Summary (Last 24 hours) at 6/24/2025 1428  Last data filed at 6/24/2025 0753  Gross per 24 hour   Intake --   Output 2200 ml   Net -2200 ml         Physical Exam  Vitals and nursing note reviewed.   Constitutional:       Appearance: She is ill-appearing.   HENT:      Head: Normocephalic and atraumatic.      Mouth/Throat:      Mouth: Mucous membranes are moist.   Eyes:      Extraocular Movements: Extraocular movements intact.      Pupils: Pupils are equal, round, and reactive to light.   Cardiovascular:      Rate and Rhythm: Normal rate and regular rhythm.   Pulmonary:      Effort: Pulmonary effort is normal.      Breath sounds: Normal breath sounds.   Abdominal:      General: There is distension.      Tenderness: There is abdominal tenderness.      Comments: Abdominal dressing in place. Colostomy present. NG present.      Musculoskeletal:         General: Normal range of motion.      Cervical back: Normal range of motion and neck supple.   Neurological:      General: No focal deficit present.      Mental Status: She is alert and oriented to person, place, and time. Mental status is at baseline.   Psychiatric:         Mood and Affect: Mood normal.         Behavior: Behavior normal.               Significant Labs: All pertinent labs within the past 24 hours have been  reviewed.    Significant Imaging: I have reviewed all pertinent imaging results/findings within the past 24 hours.

## 2025-06-24 NOTE — PROGRESS NOTES
[facility] - [address] [place] [address]  General Surgery  Progress Note    Subjective:     Interval History: Patient is awake and alert this morning on exam. NG tube is clamped.  Abdominal binder in place.  Abdomen is mildly distended.  Patient complains of nausea but denies vomiting.  Patient has a noted gas and dark liquid stool in ostomy bag.  Recommend NG tube be replaced to low intermittent suction.  Wound VAC is in place but that has having difficulty keeping seal.  If unable to maintain seal we will replace with wet to dry dressing over midline abdominal post op  wound.    Post-Op Info:  Procedure(s) (LRB):  LAPAROTOMY, EXPLORATORY (N/A)  CREATION, COLOSTOMY (N/A)  EXCISION, SMALL INTESTINE (N/A)   2 Days Post-Op      Medications:  Continuous Infusions:   dextrose 5 % and 0.45 % NaCl with KCl 20 mEq   Intravenous Continuous 125 mL/hr at 06/24/25 0458 New Bag at 06/24/25 0458    morphine   Intravenous Continuous   New Syringe/Bag at 06/22/25 1754     Scheduled Meds:   allopurinoL  100 mg Oral Daily    enoxparin  40 mg Subcutaneous Daily    furosemide  20 mg Oral Daily    levothyroxine  50 mcg Oral Before breakfast    losartan  50 mg Oral Daily    mupirocin   Nasal BID    valproate sodium (DEPACON) IVPB  500 mg Intravenous QHS     PRN Meds:  Current Facility-Administered Medications:     albuterol, 2.5 mg, Nebulization, Q6H PRN    clorazepate, 7.5 mg, Oral, Nightly PRN    labetalol, 10 mg, Intravenous, Q4H PRN    naloxone, 0.4 mg, Intravenous, QID PRN    ondansetron, 4 mg, Intravenous, Q6H PRN     Objective:     Vital Signs (Most Recent):  Temp: 99.5 °F (37.5 °C) (06/24/25 1150)  Pulse: 95 (06/24/25 1150)  Resp: 18 (06/24/25 1150)  BP: (!) 121/55 (06/24/25 1150)  SpO2: 98 % (06/24/25 1150) Vital Signs (24h Range):  Temp:  [97.9 °F (36.6 °C)-99.9 °F (37.7 °C)] 99.5 °F (37.5 °C)  Pulse:  [] 95  Resp:  [16-18] 18  SpO2:  [92 %-98 %] 98 %  BP: (121-137)/(54-65) 121/55       Intake/Output Summary  (Last 24 hours) at 6/24/2025 1217  Last data filed at 6/24/2025 0753  Gross per 24 hour   Intake 7 ml   Output 2800 ml   Net -2793 ml       Physical Exam  Vitals and nursing note reviewed.   HENT:      Head: Normocephalic.      Nose:      Comments: MG to Low intermittent suction     Mouth/Throat:      Mouth: Mucous membranes are moist.   Eyes:      Extraocular Movements: Extraocular movements intact.   Cardiovascular:      Rate and Rhythm: Tachycardia present.   Pulmonary:      Effort: Pulmonary effort is normal.   Abdominal:      General: There is distension.      Palpations: Abdomen is soft.      Tenderness: There is generalized abdominal tenderness.       Musculoskeletal:         General: Normal range of motion.      Cervical back: Normal range of motion.   Skin:     General: Skin is warm.      Capillary Refill: Capillary refill takes 2 to 3 seconds.   Neurological:      Mental Status: She is alert and oriented to person, place, and time.   Psychiatric:         Mood and Affect: Mood normal.         Significant Labs:  Recent Lab Results       None          All pertinent labs from the last 24 hours have been reviewed.    Significant Diagnostics:  I have reviewed all pertinent imaging results/findings within the past 24 hours.    Assessment/Plan:     Active Diagnoses:    Diagnosis Date Noted POA    PRINCIPAL PROBLEM:  Large bowel obstruction [K56.609] 06/22/2025 Yes    Acute post-operative pain [G89.18] 06/23/2025 No    CKD stage 3a, GFR 45-59 ml/min [N18.31] 02/08/2024 Yes    PVD (peripheral vascular disease) [I73.9] 03/13/2023 Yes    Debility [R53.81] 07/16/2022 Yes    Generalized epilepsy [G40.309] 01/13/2022 Yes    Hypertension [I10] 01/07/2022 Yes    Hypothyroidism [E03.9] 01/07/2022 Yes      Problems Resolved During this Admission:         Allyson Tompkins, MELANI  General Surgery  [facility] - [address] [place] [address]

## 2025-06-24 NOTE — ASSESSMENT & PLAN NOTE
Creatine stable for now. BMP reviewed- noted Estimated Creatinine Clearance: 57.4 mL/min (based on SCr of 0.86 mg/dL). according to latest data. Based on current GFR, CKD stage is stage 3 - GFR 30-59.  Monitor UOP and serial BMP and adjust therapy as needed. Renally dose meds. Avoid nephrotoxic medications and procedures.

## 2025-06-24 NOTE — PROGRESS NOTES
,[facility] [address]  Wound Care    Patient Name:  Umesh Lassiter   MRN:  [id number]  Date: 6/24/2025  Diagnosis: Large bowel obstruction    History:     Past Medical History:   Diagnosis Date    Anxiety     Asthma     Cellulitis of left lower extremity 01/13/2022    Chronic wound infection of abdomen 06/06/2022    CVA (cerebral vascular accident) 05/23/2018    DVT (deep venous thrombosis) 2019    Epilepsy     Hyperlipidemia     Hypertension     Hypothyroid     Left leg pain 11/29/2021    Non-pressure chronic ulcer of left lower leg with fat layer exposed     Open wound of lower limb 07/16/2022    PVD (peripheral vascular disease)     Venous stasis     Wound infection     left lower leg       Social History[1]    Precautions:     Allergies as of 06/21/2025 - Reviewed 06/21/2025   Allergen Reaction Noted    Influenza virus vaccines  04/12/2022    Lexapro [escitalopram]  04/12/2021       [facility] Assessment Details/Treatment     Narrative: Seen patient for initiation of preventative skin care measures    Patient up in chair, Alert. Family at bedside. Has NG tube noted to nares. Has LLQ colostomy with brown stool noted. Has abdominal binder on. Nursing staff to place overlay to mattress and place Mepliex foam borders to heels and sacral.   Migue score 19    Consult wound care for any skin issues      06/24/2025         [1]   Social History  Socioeconomic History    Marital status:    Tobacco Use    Smoking status: Never    Smokeless tobacco: Never   Vaping Use    Vaping status: Never Used   Substance and Sexual Activity    Alcohol use: Never    Drug use: Never    Sexual activity: Not Currently     Birth control/protection: Abstinence     Social Drivers of Health     Financial Resource Strain: Low Risk  (6/22/2025)    Overall Financial Resource Strain (CARDIA)     Difficulty of Paying Living Expenses: Not hard at all   Food Insecurity: No Food Insecurity (6/22/2025)    Hunger Vital Sign      Worried About Running Out of Food in the Last Year: Never true     Ran Out of Food in the Last Year: Never true   Transportation Needs: No Transportation Needs (6/22/2025)    PRAPARE - Transportation     Lack of Transportation (Medical): No     Lack of Transportation (Non-Medical): No   Physical Activity: Insufficiently Active (2/7/2023)    Exercise Vital Sign     Days of Exercise per Week: 3 days     Minutes of Exercise per Session: 30 min   Stress: No Stress Concern Present (6/22/2025)    [employer] [place] [employer] - Occupational Stress Questionnaire     Feeling of Stress : Not at all   Housing Stability: Low Risk  (6/22/2025)    Housing Stability Vital Sign     Unable to Pay for Housing in the Last Year: No     Homeless in the Last Year: No

## 2025-06-24 NOTE — PROGRESS NOTES
[facility] - [address] [place] [address] Medicine  Progress Note    Patient Name: Umesh Lassiter  MRN: [id number]  Patient Class: IP- Inpatient   Admission Date: 6/21/2025  Length of Stay: 2 days  Attending Physician: Mannie Brannon MD  Primary Care Provider: Terri Smith FNP        Subjective     Principal Problem:Large bowel obstruction        HPI:  Mr. Lassiter is an 80 Y O male with PMH of epilepsy, HTN, hypothyroidism, PVD who presented to ED with abdominal pain nausea and vomiting for last 2 days. He reported pain is generalized and cramping in nature. He has not had any bowel movement for last 2-3 days and is barely passing gas. He did have abdominal discomfort and constipation issues over the last month. He denies fever, chills. He admits to weight loss. No blood in stool reported.     Overview/Hospital Course:  6/22- s/p ex-lap with sigmoid colectomy and colostomy plus small bowel resection. Home medications and PMH reviewed. Will continue to follow along.     6/23- On PCA for pain control. Pathology pending from resection. PT/OT/mobilize.     6/24- Doing Ok. Continue to follow along.      Interval History: Patient seen and examined at the bedside, reports doing Ok, pain reported and expected post op.     Review of Systems   All other systems reviewed and are negative.    Objective:     Vital Signs (Most Recent):  Temp: 99.5 °F (37.5 °C) (06/24/25 1150)  Pulse: 95 (06/24/25 1150)  Resp: 18 (06/24/25 1150)  BP: (!) 121/55 (06/24/25 1150)  SpO2: 98 % (06/24/25 1150) Vital Signs (24h Range):  Temp:  [97.9 °F (36.6 °C)-99.9 °F (37.7 °C)] 99.5 °F (37.5 °C)  Pulse:  [] 95  Resp:  [16-18] 18  SpO2:  [92 %-98 %] 98 %  BP: (121-137)/(54-65) 121/55     Weight: 91.2 kg (201 lb)  Body mass index is 32.44 kg/m².    Intake/Output Summary (Last 24 hours) at 6/24/2025 1428  Last data filed at 6/24/2025 1695  Gross per 24 hour   Intake --   Output 2200 ml   Net -2200 ml         Physical  Exam  Vitals and nursing note reviewed.   Constitutional:       Appearance: She is ill-appearing.   HENT:      Head: Normocephalic and atraumatic.      Mouth/Throat:      Mouth: Mucous membranes are moist.   Eyes:      Extraocular Movements: Extraocular movements intact.      Pupils: Pupils are equal, round, and reactive to light.   Cardiovascular:      Rate and Rhythm: Normal rate and regular rhythm.   Pulmonary:      Effort: Pulmonary effort is normal.      Breath sounds: Normal breath sounds.   Abdominal:      General: There is distension.      Tenderness: There is abdominal tenderness.      Comments: Abdominal dressing in place. Colostomy present. NG present.      Musculoskeletal:         General: Normal range of motion.      Cervical back: Normal range of motion and neck supple.   Neurological:      General: No focal deficit present.      Mental Status: She is alert and oriented to person, place, and time. Mental status is at baseline.   Psychiatric:         Mood and Affect: Mood normal.         Behavior: Behavior normal.               Significant Labs: All pertinent labs within the past 24 hours have been reviewed.    Significant Imaging: I have reviewed all pertinent imaging results/findings within the past 24 hours.      Assessment & Plan  Large bowel obstruction  Acute post-operative pain  Presented to ED with abdominal pain and nausea/vomiting.   CT showed colonic distension with retained fecal material secondary to obstructing lesion worrisome for carcinoma at the level of the sigmoid colon LEFT lower quadrant.   Admitted to Gen Surg (Dr. Brannon) and s/p ex-lap on 6/22/25:     Procedures Performed: Exploratory laparotomy with sigmoid colectomy and end colostomy.  Small bowel resection     Significant Findings of the Procedure:  Multiple loops of dilated small bowel and very large distended large intestine.  Invasive malignant appearing mass in the mid sigmoid colon invading into the retroperitoneum and the  adjacent loop of small bowel.  Initially was stuck and a small-bowel resection was performed upon extraction and dissection it did come apart but there was some likely desmoplastic reaction to the small bowel and this was sent with the colon.    Pathology pending.  Management per primary.     Hypertension  Patient's blood pressure range in the last 24 hours was: BP  Min: 121/55  Max: 137/61.The patient's inpatient anti-hypertensive regimen is listed below:  Current Antihypertensives  furosemide tablet 20 mg, Daily, Oral  losartan tablet 50 mg, Daily, Oral  labetaloL injection 10 mg, Every 4 hours PRN, Intravenous    Plan  - BP is controlled, no changes needed to their regimen    CKD stage 3a, GFR 45-59 ml/min  Creatine stable for now. BMP reviewed- noted Estimated Creatinine Clearance: 57.4 mL/min (based on SCr of 0.86 mg/dL). according to latest data. Based on current GFR, CKD stage is stage 3 - GFR 30-59.  Monitor UOP and serial BMP and adjust therapy as needed. Renally dose meds. Avoid nephrotoxic medications and procedures.    Hypothyroidism  TSH WNL.   Resume home Synthroid.     Generalized epilepsy  Resume home valproate but using IV as absorption might be questionable given bowel surgery.   Valproate levels checked.     PVD (peripheral vascular disease)  Resume home Pletal when Ok with surgery team.     Debility  Patient with Acute debility due to neoplastic/malignant related fatigue. The patient's latest AMPAC (Activity Measure for Post Acute Care) Score is listed below.    AM-PAC Score - How much help does the patient need for each activity listed  Basic Mobility Total Score: 13  Turning over in bed (including adjusting bedclothes, sheets and blankets)?: A lot  Sitting down on and standing up from a chair with arms (e.g., wheelchair, bedside commode, etc.): A little  Moving from lying on back to sitting on the side of the bed?: A lot  Moving to and from a bed to a chair (including a wheelchair)?: A  little  Need to walk in hospital room?: A lot  Climbing 3-5 steps with a railing?: Unable    Plan  - Progressive mobility protocol initated  - PT/OT consulted  - Fall precautions in place    VTE Risk Mitigation (From admission, onward)           Ordered     enoxaparin injection 40 mg  Daily         06/22/25 1306     IP VTE HIGH RISK PATIENT  Once         06/22/25 1306     Place sequential compression device  Until discontinued         06/22/25 1306                    Discharge Planning   CARMEN: 6/30/2025     Code Status: Full Code   Medical Readiness for Discharge Date:   Discharge Plan A: Return to nursing home, Skilled Nursing Facility                  Please place Justification for DME      SUN MCGRAW MD  Department of Hospital Medicine   [facility] - [facility] Tele[facility]

## 2025-06-25 LAB
ALBUMIN SERPL BCP-MCNC: 2.2 G/DL (ref 3.4–4.8)
ALBUMIN/GLOB SERPL: 0.6 {RATIO}
ALP SERPL-CCNC: 67 U/L (ref 40–150)
ALT SERPL W P-5'-P-CCNC: 9 U/L
ANION GAP SERPL CALCULATED.3IONS-SCNC: 10 MMOL/L (ref 7–16)
AST SERPL W P-5'-P-CCNC: 23 U/L (ref 11–45)
BASOPHILS # BLD AUTO: 0.04 K/UL (ref 0–0.2)
BASOPHILS NFR BLD AUTO: 0.3 % (ref 0–1)
BILIRUB SERPL-MCNC: 0.3 MG/DL
BUN SERPL-MCNC: 7 MG/DL (ref 10–20)
BUN/CREAT SERPL: 10 (ref 6–20)
CALCIUM SERPL-MCNC: 9.7 MG/DL (ref 8.4–10.2)
CHLORIDE SERPL-SCNC: 107 MMOL/L (ref 98–107)
CO2 SERPL-SCNC: 25 MMOL/L (ref 23–31)
CREAT SERPL-MCNC: 0.68 MG/DL (ref 0.55–1.02)
DIFFERENTIAL METHOD BLD: ABNORMAL
EGFR (NO RACE VARIABLE) (RUSH/TITUS): 87 ML/MIN/1.73M2
EOSINOPHIL # BLD AUTO: 0.61 K/UL (ref 0–0.5)
EOSINOPHIL NFR BLD AUTO: 4.5 % (ref 1–4)
ERYTHROCYTE [DISTWIDTH] IN BLOOD BY AUTOMATED COUNT: 17.2 % (ref 11.5–14.5)
GLOBULIN SER-MCNC: 4 G/DL (ref 2–4)
GLUCOSE SERPL-MCNC: 102 MG/DL (ref 82–115)
HCT VFR BLD AUTO: 29.4 % (ref 38–47)
HGB BLD-MCNC: 9.4 G/DL (ref 12–16)
IMM GRANULOCYTES # BLD AUTO: 0.11 K/UL (ref 0–0.04)
IMM GRANULOCYTES NFR BLD: 0.8 % (ref 0–0.4)
LYMPHOCYTES # BLD AUTO: 1.72 K/UL (ref 1–4.8)
LYMPHOCYTES NFR BLD AUTO: 12.6 % (ref 27–41)
MCH RBC QN AUTO: 25.8 PG (ref 27–31)
MCHC RBC AUTO-ENTMCNC: 32 G/DL (ref 32–36)
MCV RBC AUTO: 80.5 FL (ref 80–96)
MONOCYTES # BLD AUTO: 1.58 K/UL (ref 0–0.8)
MONOCYTES NFR BLD AUTO: 11.6 % (ref 2–6)
MPC BLD CALC-MCNC: 10.5 FL (ref 9.4–12.4)
NEUTROPHILS # BLD AUTO: 9.57 K/UL (ref 1.8–7.7)
NEUTROPHILS NFR BLD AUTO: 70.2 % (ref 53–65)
NRBC # BLD AUTO: 0 X10E3/UL
NRBC, AUTO (.00): 0 %
PLATELET # BLD AUTO: 260 K/UL (ref 150–400)
POTASSIUM SERPL-SCNC: 5.1 MMOL/L (ref 3.5–5.1)
PROT SERPL-MCNC: 6.2 G/DL (ref 5.8–7.6)
RBC # BLD AUTO: 3.65 M/UL (ref 4.2–5.4)
SODIUM SERPL-SCNC: 137 MMOL/L (ref 136–145)
WBC # BLD AUTO: 13.63 K/UL (ref 4.5–11)

## 2025-06-25 PROCEDURE — 97530 THERAPEUTIC ACTIVITIES: CPT

## 2025-06-25 PROCEDURE — 27000221 HC OXYGEN, UP TO 24 HOURS

## 2025-06-25 PROCEDURE — 80053 COMPREHEN METABOLIC PANEL: CPT | Performed by: INTERNAL MEDICINE

## 2025-06-25 PROCEDURE — 36415 COLL VENOUS BLD VENIPUNCTURE: CPT | Performed by: INTERNAL MEDICINE

## 2025-06-25 PROCEDURE — 25000003 PHARM REV CODE 250: Performed by: INTERNAL MEDICINE

## 2025-06-25 PROCEDURE — 25000003 PHARM REV CODE 250: Performed by: SURGERY

## 2025-06-25 PROCEDURE — 85025 COMPLETE CBC W/AUTO DIFF WBC: CPT | Performed by: INTERNAL MEDICINE

## 2025-06-25 PROCEDURE — 63600175 PHARM REV CODE 636 W HCPCS: Performed by: SURGERY

## 2025-06-25 PROCEDURE — 97110 THERAPEUTIC EXERCISES: CPT

## 2025-06-25 PROCEDURE — 11000001 HC ACUTE MED/SURG PRIVATE ROOM

## 2025-06-25 PROCEDURE — 99232 SBSQ HOSP IP/OBS MODERATE 35: CPT | Mod: ,,, | Performed by: INTERNAL MEDICINE

## 2025-06-25 PROCEDURE — 97530 THERAPEUTIC ACTIVITIES: CPT | Mod: CQ

## 2025-06-25 RX ADMIN — ALLOPURINOL 100 MG: 100 TABLET ORAL at 10:06

## 2025-06-25 RX ADMIN — SODIUM CHLORIDE 500 MG: 9 INJECTION, SOLUTION INTRAVENOUS at 09:06

## 2025-06-25 RX ADMIN — LOSARTAN POTASSIUM 50 MG: 50 TABLET, FILM COATED ORAL at 10:06

## 2025-06-25 RX ADMIN — POTASSIUM CHLORIDE, DEXTROSE MONOHYDRATE AND SODIUM CHLORIDE: 150; 5; 450 INJECTION, SOLUTION INTRAVENOUS at 05:06

## 2025-06-25 RX ADMIN — POTASSIUM CHLORIDE, DEXTROSE MONOHYDRATE AND SODIUM CHLORIDE: 150; 5; 450 INJECTION, SOLUTION INTRAVENOUS at 11:06

## 2025-06-25 RX ADMIN — POTASSIUM CHLORIDE, DEXTROSE MONOHYDRATE AND SODIUM CHLORIDE: 150; 5; 450 INJECTION, SOLUTION INTRAVENOUS at 10:06

## 2025-06-25 RX ADMIN — FUROSEMIDE 20 MG: 20 TABLET ORAL at 10:06

## 2025-06-25 RX ADMIN — MUPIROCIN: 20 OINTMENT TOPICAL at 09:06

## 2025-06-25 RX ADMIN — POTASSIUM CHLORIDE, DEXTROSE MONOHYDRATE AND SODIUM CHLORIDE: 150; 5; 450 INJECTION, SOLUTION INTRAVENOUS at 02:06

## 2025-06-25 RX ADMIN — LEVOTHYROXINE SODIUM 50 MCG: 0.05 TABLET ORAL at 05:06

## 2025-06-25 RX ADMIN — MUPIROCIN: 20 OINTMENT TOPICAL at 10:06

## 2025-06-25 RX ADMIN — Medication: at 03:06

## 2025-06-25 RX ADMIN — ENOXAPARIN SODIUM 40 MG: 40 INJECTION SUBCUTANEOUS at 06:06

## 2025-06-25 NOTE — PT/OT/SLP PROGRESS
Occupational Therapy   Treatment    Name: Umesh Lassiter  MRN: [id number]  Admitting Diagnosis:  Large bowel obstruction  3 Days Post-Op    Recommendations:     Discharge Recommendations: Moderate Intensity Therapy  Discharge Equipment Recommendations:  to be determined by next level of care  Barriers to discharge:  None    Assessment:     Umesh Lassiter is a 80 y.o. female with a medical diagnosis of Large bowel obstruction.  She presents with alert and sitting up in her chair. Performance deficits affecting function are weakness, impaired endurance, impaired self care skills, impaired skin, pain.     Rehab Prognosis:  Good; patient would benefit from acute skilled OT services to address these deficits and reach maximum level of function.       Plan:     Patient to be seen 5 x/week to address the above listed problems via self-care/home management, therapeutic activities, therapeutic exercises  Plan of Care Expires: 07/28/25  Plan of Care Reviewed with: patient    Subjective     Chief Complaint: Large bowel obstruction    Patient/Family Comments/goals:   Pain/Comfort:  Pain Rating 1: 8/10  Location 1: abdomen  Pain Addressed 1: Other (see comments) (Pt had a pin pump, but won't push the button without heavy encouragement. Therapist educated pt on the pump being set to safety guidlines for her and she won't overdose by using it. Therapist streed the improtance of addressing pain early.)  Pain Rating Post-Intervention 1: 8/10    Objective:     Communicated with: DOE [place] prior to session.  Patient found up in chair with NG tube, colostomy, peripheral IV, telemetry upon OT entry to room.    General Precautions: Standard, fall    Orthopedic Precautions:N/A  Braces: N/A  Respiratory Status: Nasal cannula, flow 2 L/min     Occupational Performance:     Bed Mobility:    Patient completed Scooting/Bridging with moderate assistance  Patient completed Sit to Supine with moderate assistance     Functional  Mobility/Transfers:  Patient completed Sit <> Stand Transfer with minimum assistance  with  rolling walker and heavy cues to bring her feet back up under her and to place her hands on the chair   Patient completed Bed <> Chair Transfer using Step Transfer technique with contact guard assistance with rolling walker and verbal cues for foot and hand placement  Patient completed Toilet Transfer Step Transfer technique with contact guard assistance with  rolling walker and bedside commode and verbal cues  Functional Mobility: Pt was able to step safely with RW with trunk flexed forward some and taking small steps with RW    Activities of Daily Living:  Toileting: total assistance for hygiene while standing with RW      [facility] 6 Click ADL: 14    Treatment & Education:  Pt performed (B) UE ex from chair for 2 x 15 reps of each:   Elbow flexion with 1# db   Shoulder flexion with 1# db  Shoulder ER/IR with 1# db  Punches with 1# db  Hand helper with (L) hand only due to IV placement in (R) hand for 2 x 25 reps    Patient left HOB elevated with all lines intact, call button in reach, and RN notified    GOALS:   Multidisciplinary Problems       Occupational Therapy Goals          Problem: Occupational Therapy    Goal Priority Disciplines Outcome Interventions   Occupational Therapy Goal     OT, PT/OT Progressing    Description: STG:  Pt will perform grooming (I)  Pt will bathe with min a  Pt will perform UE dressing with (I)  Pt will perform LE dressing with I/Mod I  Pt will transfer bed/chair/bsc with Mod I  Pt will perform standing task x 2 min with Mod I  Pt will tolerate 20 minutes of tx without fatigue      LT.Restore to max I with self care and mobility.                            Time Tracking:     OT Date of Treatment: 25  OT Start Time: 1351  OT Stop Time: 1424  OT Total Time (min): 33 min    Billable Minutes:Therapeutic Activity 15 min  Therapeutic Exercise 15 min    OT/ANA LUISA: OT          2025

## 2025-06-25 NOTE — PT/OT/SLP PROGRESS
Physical Therapy Treatment    Patient Name:  Umesh Lassiter   MRN:  [id number]    Recommendations:     Discharge Recommendations: Moderate Intensity Therapy  Discharge Equipment Recommendations: to be determined by next level of care  Barriers to discharge: ongoing medical treatment     Assessment:     Umesh Lassiter is a 80 y.o. female admitted with a medical diagnosis of Large bowel obstruction.  She presents with the following impairments/functional limitations: weakness, impaired endurance, impaired functional mobility, gait instability, decreased lower extremity function, pain     Pt supine in bed upon PTA arrival. Pt states she is tired and sore. Pt agreeable to sit up in chair today. Pt reports needing to use bedside commode. Pt performed 2 static standing trials x 60 sec each with fatigue noted.     Rehab Prognosis: Good; patient would benefit from acute skilled PT services to address these deficits and reach maximum level of function.    Recent Surgery: Procedure(s) (LRB):  LAPAROTOMY, EXPLORATORY (N/A)  CREATION, COLOSTOMY (N/A)  EXCISION, SMALL INTESTINE (N/A) 3 Days Post-Op    Plan:     During this hospitalization, patient to be seen 5 x/week to address the identified rehab impairments via gait training, therapeutic activities, therapeutic exercises and progress toward the following goals:    Plan of Care Expires:  07/23/25    Subjective     Chief Complaint: fatigue and soreness   Patient/Family Comments/goals: Pt agreeable   Pain/Comfort:         Objective:     Communicated with RN and PT  prior to session.  Patient found supine with   upon PT entry to room.     General Precautions: Standard, fall  Orthopedic Precautions: N/A  Braces: N/A  Respiratory Status: Room air     Functional Mobility:  Bed Mobility:     Supine to Sit: minimum assistance  Transfers:     Sit to Stand:  moderate assistance with rolling walker  Bed to Chair: moderate assistance with  rolling walker  using  Stand Pivot  Toilet  Transfer: moderate assistance with  rolling walker  using  Stand Pivot      AM-PAC 6 CLICK MOBILITY  Turning over in bed (including adjusting bedclothes, sheets and blankets)?: 2  Sitting down on and standing up from a chair with arms (e.g., wheelchair, bedside commode, etc.): 3  Moving from lying on back to sitting on the side of the bed?: 2  Moving to and from a bed to a chair (including a wheelchair)?: 3  Need to walk in hospital room?: 2  Climbing 3-5 steps with a railing?: 1  Basic Mobility Total Score: 13       Treatment & Education:  Mobility and transfers as listed above.     STS x 5 trials     Patient left up in chair with all lines intact and call button in reach..    GOALS:   Multidisciplinary Problems       Physical Therapy Goals          Problem: Physical Therapy    Goal Priority Disciplines Outcome Interventions   Physical Therapy Goal     PT, PT/OT Progressing    Description: Short Term Goals to be met by: 25    Patient will increase functional independence with mobility by performin. Supine to sit with contact guard assist  2. Sit to stand transfer with contact guard assist using Rolling walker  3. Bed to chair transfer with contact guard assist using Rolling walker  4. Gait  x 100 feet with contact guard assist using Rolling walker  5. Lower extremity exercise program x30 reps per handout, with assistance as needed    Long Term Goals to be met by: 25    Pt will regain full independent functional mobility with Rolling walker to return to home situation and prior activities of daily living.                        DME Justifications:  No DME recommended requiring DME justifications    Time Tracking:     PT Received On: 25  PT Start Time: 931     PT Stop Time: 952  PT Total Time (min): 21 min     Billable Minutes: Therapeutic Activity 21    Treatment Type: Treatment  PT/PTA: PTA     Number of PTA visits since last PT visit: 2025

## 2025-06-25 NOTE — ASSESSMENT & PLAN NOTE
Patient's blood pressure range in the last 24 hours was: BP  Min: 110/76  Max: 148/67.The patient's inpatient anti-hypertensive regimen is listed below:  Current Antihypertensives  furosemide tablet 20 mg, Daily, Oral  losartan tablet 50 mg, Daily, Oral  labetaloL injection 10 mg, Every 4 hours PRN, Intravenous    Plan  - BP is controlled, no changes needed to their regimen

## 2025-06-25 NOTE — ASSESSMENT & PLAN NOTE
Creatine stable for now. BMP reviewed- noted Estimated Creatinine Clearance: 72.4 mL/min (based on SCr of 0.68 mg/dL). according to latest data. Based on current GFR, CKD stage is stage 3 - GFR 30-59.  Monitor UOP and serial BMP and adjust therapy as needed. Renally dose meds. Avoid nephrotoxic medications and procedures.

## 2025-06-25 NOTE — SUBJECTIVE & OBJECTIVE
Interval History: Patient seen and examined at the bedside, reports doing Ok, pain reported and expected post op.     Review of Systems   All other systems reviewed and are negative.    Objective:     Vital Signs (Most Recent):  Temp: 99.3 °F (37.4 °C) (06/25/25 1216)  Pulse: 90 (06/25/25 1216)  Resp: 17 (06/25/25 1216)  BP: 110/76 (06/25/25 1216)  SpO2: 97 % (06/25/25 0813) Vital Signs (24h Range):  Temp:  [98 °F (36.7 °C)-99.3 °F (37.4 °C)] 99.3 °F (37.4 °C)  Pulse:  [] 90  Resp:  [17-18] 17  SpO2:  [97 %-99 %] 97 %  BP: (110-148)/(61-76) 110/76     Weight: 90.7 kg (200 lb)  Body mass index is 32.28 kg/m².    Intake/Output Summary (Last 24 hours) at 6/25/2025 1405  Last data filed at 6/24/2025 1742  Gross per 24 hour   Intake --   Output 50 ml   Net -50 ml         Physical Exam  Vitals and nursing note reviewed.   Constitutional:       Appearance: She is ill-appearing.   HENT:      Head: Normocephalic and atraumatic.      Mouth/Throat:      Mouth: Mucous membranes are moist.   Eyes:      Extraocular Movements: Extraocular movements intact.      Pupils: Pupils are equal, round, and reactive to light.   Cardiovascular:      Rate and Rhythm: Normal rate and regular rhythm.   Pulmonary:      Effort: Pulmonary effort is normal.      Breath sounds: Normal breath sounds.   Abdominal:      General: There is distension.      Tenderness: There is abdominal tenderness.      Comments: Abdominal dressing in place. Colostomy present. NG present.      Musculoskeletal:         General: Normal range of motion.      Cervical back: Normal range of motion and neck supple.   Neurological:      General: No focal deficit present.      Mental Status: She is alert and oriented to person, place, and time. Mental status is at baseline.   Psychiatric:         Mood and Affect: Mood normal.         Behavior: Behavior normal.               Significant Labs: All pertinent labs within the past 24 hours have been reviewed.    Significant  Imaging: I have reviewed all pertinent imaging results/findings within the past 24 hours.

## 2025-06-25 NOTE — PROGRESS NOTES
[facility] - [address] [place] [address]  General Surgery  Progress Note    Subjective:     Interval History: Patient is awake and alert this morning on exam.  NG tube to Low intermittent suction.  200ml dark bilious drainage noted in suction cannister.  Denies nausea or vomiting.  No stool noted in ostomy bag this morning, but  was reported by nursing overnight. Abdomen is soft and mildly distended.  Tender to palpation over midline incision.  Consult placed with ostomy nurse. Wound vac to be changed to regular dressing today.  Patient denies pain currently.     Post-Op Info:  Procedure(s) (LRB):  LAPAROTOMY, EXPLORATORY (N/A)  CREATION, COLOSTOMY (N/A)  EXCISION, SMALL INTESTINE (N/A)   3 Days Post-Op      Medications:  Continuous Infusions:   dextrose 5 % and 0.45 % NaCl with KCl 20 mEq   Intravenous Continuous 125 mL/hr at 06/25/25 0541 New Bag at 06/25/25 0541    morphine   Intravenous Continuous   New Syringe/Bag at 06/25/25 0340     Scheduled Meds:   allopurinoL  100 mg Oral Daily    enoxparin  40 mg Subcutaneous Daily    furosemide  20 mg Oral Daily    levothyroxine  50 mcg Oral Before breakfast    losartan  50 mg Oral Daily    mupirocin   Nasal BID    valproate sodium (DEPACON) IVPB  500 mg Intravenous QHS     PRN Meds:  Current Facility-Administered Medications:     albuterol, 2.5 mg, Nebulization, Q6H PRN    clorazepate, 7.5 mg, Oral, Nightly PRN    labetalol, 10 mg, Intravenous, Q4H PRN    naloxone, 0.4 mg, Intravenous, QID PRN    ondansetron, 4 mg, Intravenous, Q6H PRN     Objective:     Vital Signs (Most Recent):  Temp: 98.5 °F (36.9 °C) (06/25/25 0811)  Pulse: 101 (06/25/25 0813)  Resp: 18 (06/25/25 0813)  BP: (!) 143/61 (06/25/25 0813)  SpO2: 97 % (06/25/25 0813) Vital Signs (24h Range):  Temp:  [98 °F (36.7 °C)-99.5 °F (37.5 °C)] 98.5 °F (36.9 °C)  Pulse:  [] 101  Resp:  [17-18] 18  SpO2:  [97 %-99 %] 97 %  BP: (121-148)/(55-67) 143/61       Intake/Output Summary (Last 24 hours) at  6/25/2025 1141  Last data filed at 6/24/2025 1742  Gross per 24 hour   Intake --   Output 50 ml   Net -50 ml       Physical Exam  Vitals reviewed.   HENT:      Head: Normocephalic.      Nose: Nose normal.      Mouth/Throat:      Mouth: Mucous membranes are moist.   Eyes:      Extraocular Movements: Extraocular movements intact.   Cardiovascular:      Rate and Rhythm: Tachycardia present.   Pulmonary:      Effort: Pulmonary effort is normal.   Abdominal:      General: There is distension.      Palpations: Abdomen is soft.      Tenderness: There is abdominal tenderness.   Musculoskeletal:         General: Normal range of motion.      Cervical back: Normal range of motion.   Skin:     General: Skin is warm and dry.   Neurological:      Mental Status: She is alert and oriented to person, place, and time.   Psychiatric:         Mood and Affect: Mood normal.         Significant Labs:  Recent Lab Results         06/25/25  0433        Albumin/Globulin Ratio 0.6       Albumin 2.2       ALP 67       ALT 9       Anion Gap 10       AST 23       Baso # 0.04       Basophil % 0.3       BILIRUBIN TOTAL 0.3       BUN 7       BUN/CREAT RATIO 10       Calcium 9.7       Chloride 107       CO2 25       Creatinine 0.68       Differential Method Auto       eGFR 87  Comment: Estimated GFR calculated using the CKD-EPI creatinine (2021) equation.       Eos # 0.61       Eos % 4.5       Globulin, Total 4.0       Glucose 102       Hematocrit 29.4       Hemoglobin 9.4       Immature Grans (Abs) 0.11       Immature Granulocytes 0.8       Lymph # 1.72       Lymph % 12.6       MCH 25.8       MCHC 32.0       MCV 80.5       Mono # 1.58       Mono % 11.6       MPV 10.5       Neutrophils, Abs 9.57       Neutrophils Relative 70.2       nRBC 0.0       NUCLEATED RBC ABSOLUTE 0.00       Platelet Count 260       Potassium 5.1       PROTEIN TOTAL 6.2       RBC 3.65       RDW 17.2       Sodium 137       WBC 13.63             All pertinent labs from the last  24 hours have been reviewed.    Significant Diagnostics:  I have reviewed all pertinent imaging results/findings within the past 24 hours.    Assessment/Plan:     Active Diagnoses:    Diagnosis Date Noted POA    PRINCIPAL PROBLEM:  Large bowel obstruction [K56.609] 06/22/2025 Yes    Acute post-operative pain [G89.18] 06/23/2025 No    CKD stage 3a, GFR 45-59 ml/min [N18.31] 02/08/2024 Yes    PVD (peripheral vascular disease) [I73.9] 03/13/2023 Yes    Debility [R53.81] 07/16/2022 Yes    Generalized epilepsy [G40.309] 01/13/2022 Yes    Hypertension [I10] 01/07/2022 Yes    Hypothyroidism [E03.9] 01/07/2022 Yes      Problems Resolved During this Admission:         Allyson Tompkins, FROYP  General Surgery  [facility] - [address] [place] [address]

## 2025-06-25 NOTE — PROGRESS NOTES
[facility] - [address] [place] [address] Medicine  Progress Note    Patient Name: Umesh Lassiter  MRN: [id number]  Patient Class: IP- Inpatient   Admission Date: 6/21/2025  Length of Stay: 3 days  Attending Physician: Mannie Brannon MD  Primary Care Provider: Terri Smith FNP        Subjective     Principal Problem:Large bowel obstruction        HPI:  Mr. Lassiter is an 80 Y O male with PMH of epilepsy, HTN, hypothyroidism, PVD who presented to ED with abdominal pain nausea and vomiting for last 2 days. He reported pain is generalized and cramping in nature. He has not had any bowel movement for last 2-3 days and is barely passing gas. He did have abdominal discomfort and constipation issues over the last month. He denies fever, chills. He admits to weight loss. No blood in stool reported.     Overview/Hospital Course:  6/22- s/p ex-lap with sigmoid colectomy and colostomy plus small bowel resection. Home medications and PMH reviewed. Will continue to follow along.     6/23- On PCA for pain control. Pathology pending from resection. PT/OT/mobilize.     6/24- Doing Ok. Continue to follow along.     6/25- Labs and vitals stable. Remains NPO per primary team. May require TPN.         Interval History: Patient seen and examined at the bedside, reports doing Ok, pain reported and expected post op.     Review of Systems   All other systems reviewed and are negative.    Objective:     Vital Signs (Most Recent):  Temp: 99.3 °F (37.4 °C) (06/25/25 1216)  Pulse: 90 (06/25/25 1216)  Resp: 17 (06/25/25 1216)  BP: 110/76 (06/25/25 1216)  SpO2: 97 % (06/25/25 0813) Vital Signs (24h Range):  Temp:  [98 °F (36.7 °C)-99.3 °F (37.4 °C)] 99.3 °F (37.4 °C)  Pulse:  [] 90  Resp:  [17-18] 17  SpO2:  [97 %-99 %] 97 %  BP: (110-148)/(61-76) 110/76     Weight: 90.7 kg (200 lb)  Body mass index is 32.28 kg/m².    Intake/Output Summary (Last 24 hours) at 6/25/2025 1405  Last data filed at 6/24/2025 6560  Gross  per 24 hour   Intake --   Output 50 ml   Net -50 ml         Physical Exam  Vitals and nursing note reviewed.   Constitutional:       Appearance: She is ill-appearing.   HENT:      Head: Normocephalic and atraumatic.      Mouth/Throat:      Mouth: Mucous membranes are moist.   Eyes:      Extraocular Movements: Extraocular movements intact.      Pupils: Pupils are equal, round, and reactive to light.   Cardiovascular:      Rate and Rhythm: Normal rate and regular rhythm.   Pulmonary:      Effort: Pulmonary effort is normal.      Breath sounds: Normal breath sounds.   Abdominal:      General: There is distension.      Tenderness: There is abdominal tenderness.      Comments: Abdominal dressing in place. Colostomy present. NG present.      Musculoskeletal:         General: Normal range of motion.      Cervical back: Normal range of motion and neck supple.   Neurological:      General: No focal deficit present.      Mental Status: She is alert and oriented to person, place, and time. Mental status is at baseline.   Psychiatric:         Mood and Affect: Mood normal.         Behavior: Behavior normal.               Significant Labs: All pertinent labs within the past 24 hours have been reviewed.    Significant Imaging: I have reviewed all pertinent imaging results/findings within the past 24 hours.      Assessment & Plan  Large bowel obstruction  Acute post-operative pain  Presented to ED with abdominal pain and nausea/vomiting.   CT showed colonic distension with retained fecal material secondary to obstructing lesion worrisome for carcinoma at the level of the sigmoid colon LEFT lower quadrant.   Admitted to Gen Surg (Dr. Brannon) and s/p ex-lap on 6/22/25:     Procedures Performed: Exploratory laparotomy with sigmoid colectomy and end colostomy.  Small bowel resection     Significant Findings of the Procedure:  Multiple loops of dilated small bowel and very large distended large intestine.  Invasive malignant appearing mass  in the mid sigmoid colon invading into the retroperitoneum and the adjacent loop of small bowel.  Initially was stuck and a small-bowel resection was performed upon extraction and dissection it did come apart but there was some likely desmoplastic reaction to the small bowel and this was sent with the colon.    Pathology pending.  Management per primary.     Hypertension  Patient's blood pressure range in the last 24 hours was: BP  Min: 110/76  Max: 148/67.The patient's inpatient anti-hypertensive regimen is listed below:  Current Antihypertensives  furosemide tablet 20 mg, Daily, Oral  losartan tablet 50 mg, Daily, Oral  labetaloL injection 10 mg, Every 4 hours PRN, Intravenous    Plan  - BP is controlled, no changes needed to their regimen    CKD stage 3a, GFR 45-59 ml/min  Creatine stable for now. BMP reviewed- noted Estimated Creatinine Clearance: 72.4 mL/min (based on SCr of 0.68 mg/dL). according to latest data. Based on current GFR, CKD stage is stage 3 - GFR 30-59.  Monitor UOP and serial BMP and adjust therapy as needed. Renally dose meds. Avoid nephrotoxic medications and procedures.    Hypothyroidism  TSH WNL.   Resume home Synthroid.     Generalized epilepsy  Resume home valproate but using IV as absorption might be questionable given bowel surgery.   Valproate levels checked.     PVD (peripheral vascular disease)  Resume home Pletal when Ok with surgery team.     Debility  Patient with Acute debility due to neoplastic/malignant related fatigue. The patient's latest AMPAC (Activity Measure for Post Acute Care) Score is listed below.    AM-PAC Score - How much help does the patient need for each activity listed  Basic Mobility Total Score: 13  Turning over in bed (including adjusting bedclothes, sheets and blankets)?: A lot  Sitting down on and standing up from a chair with arms (e.g., wheelchair, bedside commode, etc.): A little  Moving from lying on back to sitting on the side of the bed?: A lot  Moving  to and from a bed to a chair (including a wheelchair)?: A little  Need to walk in hospital room?: A lot  Climbing 3-5 steps with a railing?: Unable    Plan  - Progressive mobility protocol initated  - PT/OT consulted  - Fall precautions in place    VTE Risk Mitigation (From admission, onward)           Ordered     enoxaparin injection 40 mg  Daily         06/22/25 1306     IP VTE HIGH RISK PATIENT  Once         06/22/25 1306     Place sequential compression device  Until discontinued         06/22/25 1306                    Discharge Planning   CARMEN: 6/30/2025     Code Status: Full Code   Medical Readiness for Discharge Date:   Discharge Plan A: Return to nursing home, Skilled Nursing Facility                  Please place Justification for DME      SUN MCGRAW MD  Department of Hospital Medicine   [facility] - [facility] Tele[facility]

## 2025-06-26 PROBLEM — C18.9 COLON ADENOCARCINOMA: Status: ACTIVE | Noted: 2025-06-26

## 2025-06-26 LAB
DHEA SERPL-MCNC: NORMAL
ESTROGEN SERPL-MCNC: NORMAL PG/ML
INSULIN SERPL-ACNC: NORMAL U[IU]/ML
LAB AP GROSS DESCRIPTION: NORMAL
LAB AP LABORATORY NOTES: NORMAL
LAB AP MISMATCH REPAIR PROTEINS: NORMAL
LAB AP SYNOPTIC CHECKLIST: NORMAL
T3RU NFR SERPL: NORMAL %

## 2025-06-26 PROCEDURE — 94761 N-INVAS EAR/PLS OXIMETRY MLT: CPT

## 2025-06-26 PROCEDURE — 63600175 PHARM REV CODE 636 W HCPCS: Performed by: SURGERY

## 2025-06-26 PROCEDURE — 97110 THERAPEUTIC EXERCISES: CPT | Mod: CO

## 2025-06-26 PROCEDURE — 99232 SBSQ HOSP IP/OBS MODERATE 35: CPT | Mod: ,,, | Performed by: INTERNAL MEDICINE

## 2025-06-26 PROCEDURE — 99900035 HC TECH TIME PER 15 MIN (STAT)

## 2025-06-26 PROCEDURE — 27000221 HC OXYGEN, UP TO 24 HOURS

## 2025-06-26 PROCEDURE — 25000003 PHARM REV CODE 250: Performed by: INTERNAL MEDICINE

## 2025-06-26 PROCEDURE — 97530 THERAPEUTIC ACTIVITIES: CPT | Mod: CQ

## 2025-06-26 PROCEDURE — 11000001 HC ACUTE MED/SURG PRIVATE ROOM

## 2025-06-26 RX ADMIN — LOSARTAN POTASSIUM 50 MG: 50 TABLET, FILM COATED ORAL at 09:06

## 2025-06-26 RX ADMIN — SODIUM CHLORIDE 500 MG: 9 INJECTION, SOLUTION INTRAVENOUS at 09:06

## 2025-06-26 RX ADMIN — FUROSEMIDE 20 MG: 20 TABLET ORAL at 09:06

## 2025-06-26 RX ADMIN — POTASSIUM CHLORIDE, DEXTROSE MONOHYDRATE AND SODIUM CHLORIDE: 150; 5; 450 INJECTION, SOLUTION INTRAVENOUS at 09:06

## 2025-06-26 RX ADMIN — ENOXAPARIN SODIUM 40 MG: 40 INJECTION SUBCUTANEOUS at 05:06

## 2025-06-26 RX ADMIN — ALLOPURINOL 100 MG: 100 TABLET ORAL at 09:06

## 2025-06-26 RX ADMIN — MUPIROCIN: 20 OINTMENT TOPICAL at 09:06

## 2025-06-26 RX ADMIN — POTASSIUM CHLORIDE, DEXTROSE MONOHYDRATE AND SODIUM CHLORIDE: 150; 5; 450 INJECTION, SOLUTION INTRAVENOUS at 05:06

## 2025-06-26 RX ADMIN — LEVOTHYROXINE SODIUM 50 MCG: 0.05 TABLET ORAL at 06:06

## 2025-06-26 NOTE — ASSESSMENT & PLAN NOTE
Presented to ED with abdominal pain and nausea/vomiting.   CT showed colonic distension with retained fecal material secondary to obstructing lesion worrisome for carcinoma at the level of the sigmoid colon LEFT lower quadrant.     Admitted to Gen Surg (Dr. Brannon) and s/p ex-lap on 6/22/25:     Procedures Performed: Exploratory laparotomy with sigmoid colectomy and end colostomy.  Small bowel resection     Significant Findings of the Procedure:  Multiple loops of dilated small bowel and very large distended large intestine.  Invasive malignant appearing mass in the mid sigmoid colon invading into the retroperitoneum and the adjacent loop of small bowel.  Initially was stuck and a small-bowel resection was performed upon extraction and dissection it did come apart but there was some likely desmoplastic reaction to the small bowel and this was sent with the colon.    Pathology showed:    Sigmoid colon:   - Moderately-differentiated adenocarcinoma of the sigmoid colon (4.1 cm)  - Tumor invades the pericolonic soft tissue  - Diverticulitis  - Sixteen lymph nodes are negative for metastatic carcinoma (0/16)  - Margins of resection are negative for adenocarcinoma     Jejunum:  - Well-differentiated adenocarcinoma involving small bowel mesentery  - Submucosal lipoma  - Two lymph nodes are negative for metastatic carcinoma (0/2)  - Margins of resection appear viable and are uninvolved by adenocarcinoma    Management per primary.

## 2025-06-26 NOTE — PT/OT/SLP PROGRESS
Physical Therapy Treatment    Patient Name:  Umesh Lassiter   MRN:  [id number]    Recommendations:     Discharge Recommendations: Moderate Intensity Therapy  Discharge Equipment Recommendations: to be determined by next level of care  Barriers to discharge: ongoing medical treatment     Assessment:     Umesh Lassiter is a 80 y.o. female admitted with a medical diagnosis of Large bowel obstruction.  She presents with the following impairments/functional limitations: weakness, impaired endurance, impaired functional mobility, gait instability, decreased lower extremity function, pain     Pt supine in bed upon PTA arrival. Pt states she did not want to sit up in chair today despite encouragement and education from PTA. Pt did agree to get up onto bedside commode. Pt returned to bed post treatment session.     Rehab Prognosis: Fair; patient would benefit from acute skilled PT services to address these deficits and reach maximum level of function.    Recent Surgery: Procedure(s) (LRB):  LAPAROTOMY, EXPLORATORY (N/A)  CREATION, COLOSTOMY (N/A)  EXCISION, SMALL INTESTINE (N/A) 4 Days Post-Op    Plan:     During this hospitalization, patient to be seen 5 x/week to address the identified rehab impairments via gait training, therapeutic activities, therapeutic exercises and progress toward the following goals:    Plan of Care Expires:  07/23/25    Subjective     Chief Complaint: Pain in abdomen  Patient/Family Comments/goals: Pt did not want to sit up today due to pain.   Pain/Comfort:  Pain Rating 1: 8/10  Location 1: abdomen  Pain Addressed 1: Pre-medicate for activity      Objective:     Communicated with RN  prior to session.  Patient found supine with   upon PT entry to room.     General Precautions: Standard, fall  Orthopedic Precautions: N/A  Braces: N/A  Respiratory Status: Nasal cannula, flow 2 L/min     Functional Mobility:  Bed Mobility:     Supine to Sit: moderate assistance  Sit to Supine: moderate  assistance  Transfers:     Sit to Stand:  moderate assistance with rolling walker  Toilet Transfer: moderate assistance with  rolling walker  using  Stand Pivot  Balance: static standing x 2 trials approx 45 sec each      AM-PAC 6 CLICK MOBILITY  Turning over in bed (including adjusting bedclothes, sheets and blankets)?: 2  Sitting down on and standing up from a chair with arms (e.g., wheelchair, bedside commode, etc.): 3  Moving from lying on back to sitting on the side of the bed?: 2  Moving to and from a bed to a chair (including a wheelchair)?: 3  Need to walk in hospital room?: 2  Climbing 3-5 steps with a railing?: 1  Basic Mobility Total Score: 13       Treatment & Education:  Mobility, transfers, and balance as listed above.     Pt education on benefits of OOB activities.     Patient left supine with all lines intact and call button in reach..    GOALS:   Multidisciplinary Problems       Physical Therapy Goals          Problem: Physical Therapy    Goal Priority Disciplines Outcome Interventions   Physical Therapy Goal     PT, PT/OT Progressing    Description: Short Term Goals to be met by: 25    Patient will increase functional independence with mobility by performin. Supine to sit with contact guard assist  2. Sit to stand transfer with contact guard assist using Rolling walker  3. Bed to chair transfer with contact guard assist using Rolling walker  4. Gait  x 100 feet with contact guard assist using Rolling walker  5. Lower extremity exercise program x30 reps per handout, with assistance as needed    Long Term Goals to be met by: 25    Pt will regain full independent functional mobility with Rolling walker to return to home situation and prior activities of daily living.                        DME Justifications:  No DME recommended requiring DME justifications    Time Tracking:     PT Received On: 25  PT Start Time: 942     PT Stop Time: 1005  PT Total Time (min): 23 min      Billable Minutes: Therapeutic Activity 23    Treatment Type: Treatment  PT/PTA: PTA     Number of PTA visits since last PT visit: 2     06/26/2025

## 2025-06-26 NOTE — PLAN OF CARE
Problem: Adult Inpatient Plan of Care  Goal: Plan of Care Review  Outcome: Progressing  Goal: Absence of Hospital-Acquired Illness or Injury  Outcome: Progressing  Goal: Readiness for Transition of Care  Outcome: Progressing     Problem: Infection  Goal: Absence of Infection Signs and Symptoms  Outcome: Progressing     Problem: Wound  Goal: Absence of Infection Signs and Symptoms  Outcome: Progressing

## 2025-06-26 NOTE — PLAN OF CARE
06/26/25 1435   Rounds   Attendance Provider;;Charge nurse;Physical therapist;Pharmacist   Discharge Plan A Return to nursing home;Skilled Nursing Facility   Why the patient remains in the hospital Requires continued medical care   Transition of Care Barriers None     Chart reviewed. Pt remains NPO. Pt plans to return to [facility] when medically ready. Packet remains in [place] [id number]. Ss following

## 2025-06-26 NOTE — PROGRESS NOTES
[facility] - [address] [place] [address]  General Surgery  Progress Note    Subjective:     Interval History: Patient is awake and alert this morning on exam.  NG tube to Low intermittent suction. Less than  100ml dark bilious drainage noted in suction cannister overnight.  Denies nausea or vomiting.  Small amount of liquid stool noted in ostomy bag this morning, Abdomen is soft and mildly distended.  Tender to palpation over midline incision.  Will discontinue NG tube today.  Start clear liquid diet as tolerated.   Post-Op Info:      4 Days Post-Op      Medications:  Continuous Infusions:   dextrose 5 % and 0.45 % NaCl with KCl 20 mEq   Intravenous Continuous 125 mL/hr at 06/26/25 1715 New Bag at 06/26/25 1715    morphine   Intravenous Continuous   New Syringe/Bag at 06/25/25 0340     Scheduled Meds:   allopurinoL  100 mg Oral Daily    enoxparin  40 mg Subcutaneous Daily    furosemide  20 mg Oral Daily    levothyroxine  50 mcg Oral Before breakfast    losartan  50 mg Oral Daily    mupirocin   Nasal BID    valproate sodium (DEPACON) IVPB  500 mg Intravenous QHS     PRN Meds:  Current Facility-Administered Medications:     albuterol, 2.5 mg, Nebulization, Q6H PRN    clorazepate, 7.5 mg, Oral, Nightly PRN    labetalol, 10 mg, Intravenous, Q4H PRN    naloxone, 0.4 mg, Intravenous, QID PRN    ondansetron, 4 mg, Intravenous, Q6H PRN     Objective:     Vital Signs (Most Recent):  Temp: 98.8 °F (37.1 °C) (06/26/25 1637)  Pulse: 96 (06/26/25 1637)  Resp: 16 (06/26/25 0421)  BP: 138/71 (06/26/25 1637)  SpO2: 95 % (06/26/25 1637) Vital Signs (24h Range):  Temp:  [98 °F (36.7 °C)-98.8 °F (37.1 °C)] 98.8 °F (37.1 °C)  Pulse:  [] 96  Resp:  [16] 16  SpO2:  [95 %-100 %] 95 %  BP: (128-165)/(61-71) 138/71       Intake/Output Summary (Last 24 hours) at 6/26/2025 1728  Last data filed at 6/26/2025 0630  Gross per 24 hour   Intake 100 ml   Output 101 ml   Net -1 ml       Physical Exam    Significant Labs:  Recent Lab  Results       None          All pertinent labs from the last 24 hours have been reviewed.    Significant Diagnostics:  I have reviewed all pertinent imaging results/findings within the past 24 hours.    Assessment/Plan:     Active Diagnoses:    Diagnosis Date Noted POA    PRINCIPAL PROBLEM:  Large bowel obstruction [K56.609] 06/22/2025 Yes    Colon adenocarcinoma [C18.9] 06/26/2025 Yes    Acute post-operative pain [G89.18] 06/23/2025 No    CKD stage 3a, GFR 45-59 ml/min [N18.31] 02/08/2024 Yes    PVD (peripheral vascular disease) [I73.9] 03/13/2023 Yes    Debility [R53.81] 07/16/2022 Yes    Generalized epilepsy [G40.309] 01/13/2022 Yes    Hypertension [I10] 01/07/2022 Yes    Hypothyroidism [E03.9] 01/07/2022 Yes      Problems Resolved During this Admission:         Allyson Tompkins, FNP  General Surgery  [facility] - [address] [place] [address]

## 2025-06-26 NOTE — PROGRESS NOTES
[facility] - [address] [place] [address] Medicine  Progress Note    Patient Name: Umesh Lassiter  MRN: [id number]  Patient Class: IP- Inpatient   Admission Date: 6/21/2025  Length of Stay: 4 days  Attending Physician: Mannie Brannon MD  Primary Care Provider: Terri Smith FNP        Subjective     Principal Problem:Large bowel obstruction        HPI:  Mr. Lassiter is an 80 Y O male with PMH of epilepsy, HTN, hypothyroidism, PVD who presented to ED with abdominal pain nausea and vomiting for last 2 days. He reported pain is generalized and cramping in nature. He has not had any bowel movement for last 2-3 days and is barely passing gas. He did have abdominal discomfort and constipation issues over the last month. He denies fever, chills. He admits to weight loss. No blood in stool reported.     Overview/Hospital Course:  6/22- s/p ex-lap with sigmoid colectomy and colostomy plus small bowel resection. Home medications and PMH reviewed. Will continue to follow along.     6/23- On PCA for pain control. Pathology pending from resection. PT/OT/mobilize.     6/24- Doing Ok. Continue to follow along.     6/25- Labs and vitals stable. Remains NPO per primary team. May require TPN.     6/26- Doing Ok. Remains NPO per Gen Surg.     Interval History: Patient seen and examined at the bedside, reports doing Ok, pain is in control.     Review of Systems   All other systems reviewed and are negative.    Objective:     Vital Signs (Most Recent):  Temp: 98 °F (36.7 °C) (06/26/25 1152)  Pulse: 87 (06/26/25 1152)  Resp: 16 (06/26/25 0421)  BP: (!) 165/63 (06/26/25 1152)  SpO2: 100 % (06/26/25 1152) Vital Signs (24h Range):  Temp:  [97.3 °F (36.3 °C)-98.8 °F (37.1 °C)] 98 °F (36.7 °C)  Pulse:  [] 87  Resp:  [16-18] 16  SpO2:  [95 %-100 %] 100 %  BP: (128-165)/(61-69) 165/63     Weight: 90.7 kg (200 lb)  Body mass index is 32.28 kg/m².    Intake/Output Summary (Last 24 hours) at 6/26/2025 1350  Last data  filed at 6/26/2025 0630  Gross per 24 hour   Intake 100 ml   Output 101 ml   Net -1 ml         Physical Exam  Vitals and nursing note reviewed.   Constitutional:       Appearance: She is ill-appearing.   HENT:      Head: Normocephalic and atraumatic.      Mouth/Throat:      Mouth: Mucous membranes are moist.   Eyes:      Extraocular Movements: Extraocular movements intact.      Pupils: Pupils are equal, round, and reactive to light.   Cardiovascular:      Rate and Rhythm: Normal rate and regular rhythm.   Pulmonary:      Effort: Pulmonary effort is normal.      Breath sounds: Normal breath sounds.   Abdominal:      General: There is distension.      Tenderness: There is abdominal tenderness.      Comments: Abdominal dressing in place. Colostomy present. NG present.      Musculoskeletal:         General: Normal range of motion.      Cervical back: Normal range of motion and neck supple.   Neurological:      General: No focal deficit present.      Mental Status: She is alert and oriented to person, place, and time. Mental status is at baseline.   Psychiatric:         Mood and Affect: Mood normal.         Behavior: Behavior normal.               Significant Labs: All pertinent labs within the past 24 hours have been reviewed.    Significant Imaging: I have reviewed all pertinent imaging results/findings within the past 24 hours.      Assessment & Plan  Large bowel obstruction  Acute post-operative pain  Colon adenocarcinoma  Presented to ED with abdominal pain and nausea/vomiting.   CT showed colonic distension with retained fecal material secondary to obstructing lesion worrisome for carcinoma at the level of the sigmoid colon LEFT lower quadrant.     Admitted to Gen Surg (Dr. Brannon) and s/p ex-lap on 6/22/25:     Procedures Performed: Exploratory laparotomy with sigmoid colectomy and end colostomy.  Small bowel resection     Significant Findings of the Procedure:  Multiple loops of dilated small bowel and very large  distended large intestine.  Invasive malignant appearing mass in the mid sigmoid colon invading into the retroperitoneum and the adjacent loop of small bowel.  Initially was stuck and a small-bowel resection was performed upon extraction and dissection it did come apart but there was some likely desmoplastic reaction to the small bowel and this was sent with the colon.    Pathology showed:    Sigmoid colon:   - Moderately-differentiated adenocarcinoma of the sigmoid colon (4.1 cm)  - Tumor invades the pericolonic soft tissue  - Diverticulitis  - Sixteen lymph nodes are negative for metastatic carcinoma (0/16)  - Margins of resection are negative for adenocarcinoma     Jejunum:  - Well-differentiated adenocarcinoma involving small bowel mesentery  - Submucosal lipoma  - Two lymph nodes are negative for metastatic carcinoma (0/2)  - Margins of resection appear viable and are uninvolved by adenocarcinoma    Management per primary.     Hypertension  Patient's blood pressure range in the last 24 hours was: BP  Min: 128/61  Max: 165/63.The patient's inpatient anti-hypertensive regimen is listed below:  Current Antihypertensives  furosemide tablet 20 mg, Daily, Oral  losartan tablet 50 mg, Daily, Oral  labetaloL injection 10 mg, Every 4 hours PRN, Intravenous    Plan  - BP is controlled, no changes needed to their regimen    CKD stage 3a, GFR 45-59 ml/min  Creatine stable for now. BMP reviewed- noted Estimated Creatinine Clearance: 72.4 mL/min (based on SCr of 0.68 mg/dL). according to latest data. Based on current GFR, CKD stage is stage 3 - GFR 30-59.  Monitor UOP and serial BMP and adjust therapy as needed. Renally dose meds. Avoid nephrotoxic medications and procedures.    Hypothyroidism  TSH WNL.   Resume home Synthroid.     Generalized epilepsy  Resume home valproate but using IV as absorption might be questionable given bowel surgery.   Valproate levels checked.     PVD (peripheral vascular disease)  Resume home  Pletal when Ok with surgery team.     Debility  Patient with Acute debility due to neoplastic/malignant related fatigue. The patient's latest AMPAC (Activity Measure for Post Acute Care) Score is listed below.    AM-PAC Score - How much help does the patient need for each activity listed  Basic Mobility Total Score: 13  Turning over in bed (including adjusting bedclothes, sheets and blankets)?: A lot  Sitting down on and standing up from a chair with arms (e.g., wheelchair, bedside commode, etc.): A little  Moving from lying on back to sitting on the side of the bed?: A lot  Moving to and from a bed to a chair (including a wheelchair)?: A little  Need to walk in hospital room?: A lot  Climbing 3-5 steps with a railing?: Unable    Plan  - Progressive mobility protocol initated  - PT/OT consulted  - Fall precautions in place    VTE Risk Mitigation (From admission, onward)           Ordered     enoxaparin injection 40 mg  Daily         06/22/25 1306     IP VTE HIGH RISK PATIENT  Once         06/22/25 1306     Place sequential compression device  Until discontinued         06/22/25 1306                    Discharge Planning   CARMEN: 6/30/2025     Code Status: Full Code   Medical Readiness for Discharge Date:   Discharge Plan A: Return to nursing home, Skilled Nursing Facility                  Please place Justification for DME      SUN MCGRAW MD  Department of Hospital Medicine   [facility] - [facility] Tele[facility]

## 2025-06-26 NOTE — ASSESSMENT & PLAN NOTE
Patient's blood pressure range in the last 24 hours was: BP  Min: 128/61  Max: 165/63.The patient's inpatient anti-hypertensive regimen is listed below:  Current Antihypertensives  furosemide tablet 20 mg, Daily, Oral  losartan tablet 50 mg, Daily, Oral  labetaloL injection 10 mg, Every 4 hours PRN, Intravenous    Plan  - BP is controlled, no changes needed to their regimen

## 2025-06-26 NOTE — PLAN OF CARE
Problem: Adult Inpatient Plan of Care  Goal: Plan of Care Review  Outcome: Progressing  Goal: Patient-Specific Goal (Individualized)  Outcome: Progressing  Goal: Absence of Hospital-Acquired Illness or Injury  Outcome: Progressing  Goal: Optimal Comfort and Wellbeing  Outcome: Progressing  Goal: Readiness for Transition of Care  Outcome: Progressing     Problem: Infection  Goal: Absence of Infection Signs and Symptoms  Outcome: Progressing     Problem: Wound  Goal: Optimal Coping  Outcome: Progressing  Goal: Optimal Functional Ability  Outcome: Progressing  Goal: Absence of Infection Signs and Symptoms  Outcome: Progressing  Goal: Improved Oral Intake  Outcome: Progressing  Goal: Optimal Pain Control and Function  Outcome: Progressing  Goal: Skin Health and Integrity  Outcome: Progressing  Goal: Optimal Wound Healing  Outcome: Progressing      Labs/Medications

## 2025-06-26 NOTE — PT/OT/SLP PROGRESS
Occupational Therapy   Treatment    Name: Umesh Lassiter  MRN: [id number]  Admitting Diagnosis:  Large bowel obstruction  4 Days Post-Op    Recommendations:     Discharge Recommendations: Moderate Intensity Therapy  Discharge Equipment Recommendations:  to be determined by next level of care  Barriers to discharge:       Assessment:     Umesh Lassiter is a 80 y.o. female with a medical diagnosis of Large bowel obstruction. . Performance deficits affecting function are weakness, impaired endurance, impaired self care skills.     Rehab Prognosis:  Good; patient would benefit from acute skilled OT services to address these deficits and reach maximum level of function.       Plan:     Patient to be seen 5 x/week to address the above listed problems via self-care/home management, therapeutic activities, therapeutic exercises  Plan of Care Expires: 07/28/25  Plan of Care Reviewed with: patient    Subjective     Chief Complaint:   Patient/Family Comments/goals:   Pain/Comfort:  Pain Rating 1: 10/10  Location 1: back  Pain Addressed 1: Nurse notified, Distraction    Objective:     Communicated with: Ronnie Marshall RN prior to session.  Patient found HOB elevated with NG tube, peripheral IV, telemetry, colostomy upon OT entry to room.    General Precautions: Standard, fall    Orthopedic Precautions:N/A  Braces: N/A  Respiratory Status: Nasal cannula, flow 2 L/min     Occupational Performance:     Bed Mobility:         Functional Mobility/Transfers:    Functional Mobility:     Activities of Daily Living:  Set up to wash her face      [facility] 6 Click ADL:      Treatment & Education:  Pt performed hand helper with 3 rubber band resistances 20 reps x 2,rom ex's with 1 lb 15 reps x 2 B elbow flex/ext,abd/add,aarom ex's with 1 lb wt 15 reps B hld flex  Patient left HOB elevated with all lines intact and call button in reach    GOALS:   Multidisciplinary Problems       Occupational Therapy Goals          Problem: Occupational  Therapy    Goal Priority Disciplines Outcome Interventions   Occupational Therapy Goal     OT, PT/OT Progressing    Description: STG:  Pt will perform grooming (I)  Pt will bathe with min a  Pt will perform UE dressing with (I)  Pt will perform LE dressing with I/Mod I  Pt will transfer bed/chair/bsc with Mod I  Pt will perform standing task x 2 min with Mod I  Pt will tolerate 20 minutes of tx without fatigue      LT.Restore to max I with self care and mobility.                          DME Justifications:      Time Tracking:     OT Date of Treatment: 25  OT Start Time: 1032  OT Stop Time: 1059  OT Total Time (min): 27 min    Billable Minutes:Therapeutic Exercise 23    OT/ANA LUISA: ANA LUISA          2025

## 2025-06-27 LAB
ALBUMIN SERPL BCP-MCNC: 2.1 G/DL (ref 3.4–4.8)
ALBUMIN/GLOB SERPL: 0.5 {RATIO}
ALP SERPL-CCNC: 86 U/L (ref 40–150)
ALT SERPL W P-5'-P-CCNC: 13 U/L
ANION GAP SERPL CALCULATED.3IONS-SCNC: 13 MMOL/L (ref 7–16)
AST SERPL W P-5'-P-CCNC: 26 U/L (ref 11–45)
BASOPHILS # BLD AUTO: 0.03 K/UL (ref 0–0.2)
BASOPHILS NFR BLD AUTO: 0.3 % (ref 0–1)
BILIRUB SERPL-MCNC: 0.3 MG/DL
BUN SERPL-MCNC: 7 MG/DL (ref 10–20)
BUN/CREAT SERPL: 12 (ref 6–20)
CALCIUM SERPL-MCNC: 9.9 MG/DL (ref 8.4–10.2)
CHLORIDE SERPL-SCNC: 101 MMOL/L (ref 98–107)
CO2 SERPL-SCNC: 27 MMOL/L (ref 23–31)
CREAT SERPL-MCNC: 0.6 MG/DL (ref 0.55–1.02)
DIFFERENTIAL METHOD BLD: ABNORMAL
EGFR (NO RACE VARIABLE) (RUSH/TITUS): 90 ML/MIN/1.73M2
EOSINOPHIL # BLD AUTO: 0.6 K/UL (ref 0–0.5)
EOSINOPHIL NFR BLD AUTO: 6.8 % (ref 1–4)
ERYTHROCYTE [DISTWIDTH] IN BLOOD BY AUTOMATED COUNT: 16.8 % (ref 11.5–14.5)
GLOBULIN SER-MCNC: 4.3 G/DL (ref 2–4)
GLUCOSE SERPL-MCNC: 103 MG/DL (ref 82–115)
HCT VFR BLD AUTO: 30.8 % (ref 38–47)
HGB BLD-MCNC: 9.9 G/DL (ref 12–16)
IMM GRANULOCYTES # BLD AUTO: 0.06 K/UL (ref 0–0.04)
IMM GRANULOCYTES NFR BLD: 0.7 % (ref 0–0.4)
LYMPHOCYTES # BLD AUTO: 1.82 K/UL (ref 1–4.8)
LYMPHOCYTES NFR BLD AUTO: 20.6 % (ref 27–41)
MCH RBC QN AUTO: 25.8 PG (ref 27–31)
MCHC RBC AUTO-ENTMCNC: 32.1 G/DL (ref 32–36)
MCV RBC AUTO: 80.2 FL (ref 80–96)
MONOCYTES # BLD AUTO: 1.3 K/UL (ref 0–0.8)
MONOCYTES NFR BLD AUTO: 14.7 % (ref 2–6)
MPC BLD CALC-MCNC: 10.3 FL (ref 9.4–12.4)
NEUTROPHILS # BLD AUTO: 5.02 K/UL (ref 1.8–7.7)
NEUTROPHILS NFR BLD AUTO: 56.9 % (ref 53–65)
NRBC # BLD AUTO: 0 X10E3/UL
NRBC, AUTO (.00): 0 %
PLATELET # BLD AUTO: 298 K/UL (ref 150–400)
POTASSIUM SERPL-SCNC: 4.5 MMOL/L (ref 3.5–5.1)
PROT SERPL-MCNC: 6.4 G/DL (ref 5.8–7.6)
RBC # BLD AUTO: 3.84 M/UL (ref 4.2–5.4)
SODIUM SERPL-SCNC: 136 MMOL/L (ref 136–145)
WBC # BLD AUTO: 8.83 K/UL (ref 4.5–11)

## 2025-06-27 PROCEDURE — 94761 N-INVAS EAR/PLS OXIMETRY MLT: CPT

## 2025-06-27 PROCEDURE — 36415 COLL VENOUS BLD VENIPUNCTURE: CPT | Performed by: INTERNAL MEDICINE

## 2025-06-27 PROCEDURE — 25000003 PHARM REV CODE 250: Performed by: INTERNAL MEDICINE

## 2025-06-27 PROCEDURE — 97530 THERAPEUTIC ACTIVITIES: CPT | Mod: CQ

## 2025-06-27 PROCEDURE — 80053 COMPREHEN METABOLIC PANEL: CPT | Performed by: INTERNAL MEDICINE

## 2025-06-27 PROCEDURE — 99900035 HC TECH TIME PER 15 MIN (STAT)

## 2025-06-27 PROCEDURE — 85025 COMPLETE CBC W/AUTO DIFF WBC: CPT | Performed by: INTERNAL MEDICINE

## 2025-06-27 PROCEDURE — 97110 THERAPEUTIC EXERCISES: CPT | Mod: CO

## 2025-06-27 PROCEDURE — 63600175 PHARM REV CODE 636 W HCPCS: Performed by: INTERNAL MEDICINE

## 2025-06-27 PROCEDURE — 11000001 HC ACUTE MED/SURG PRIVATE ROOM

## 2025-06-27 PROCEDURE — 25000003 PHARM REV CODE 250: Performed by: NURSE PRACTITIONER

## 2025-06-27 PROCEDURE — 99233 SBSQ HOSP IP/OBS HIGH 50: CPT | Mod: ,,, | Performed by: HOSPITALIST

## 2025-06-27 PROCEDURE — 27000221 HC OXYGEN, UP TO 24 HOURS

## 2025-06-27 PROCEDURE — 63600175 PHARM REV CODE 636 W HCPCS: Performed by: SURGERY

## 2025-06-27 RX ORDER — HYDROCODONE BITARTRATE AND ACETAMINOPHEN 7.5; 325 MG/1; MG/1
1 TABLET ORAL EVERY 6 HOURS PRN
Refills: 0 | Status: DISCONTINUED | OUTPATIENT
Start: 2025-06-27 | End: 2025-06-29

## 2025-06-27 RX ADMIN — FUROSEMIDE 20 MG: 20 TABLET ORAL at 09:06

## 2025-06-27 RX ADMIN — ALLOPURINOL 100 MG: 100 TABLET ORAL at 09:06

## 2025-06-27 RX ADMIN — POTASSIUM CHLORIDE, DEXTROSE MONOHYDRATE AND SODIUM CHLORIDE: 150; 5; 450 INJECTION, SOLUTION INTRAVENOUS at 02:06

## 2025-06-27 RX ADMIN — ONDANSETRON 4 MG: 2 INJECTION INTRAMUSCULAR; INTRAVENOUS at 07:06

## 2025-06-27 RX ADMIN — LOSARTAN POTASSIUM 50 MG: 50 TABLET, FILM COATED ORAL at 09:06

## 2025-06-27 RX ADMIN — SODIUM CHLORIDE 500 MG: 9 INJECTION, SOLUTION INTRAVENOUS at 10:06

## 2025-06-27 RX ADMIN — MUPIROCIN: 20 OINTMENT TOPICAL at 09:06

## 2025-06-27 RX ADMIN — HYDROCODONE BITARTRATE AND ACETAMINOPHEN 1 TABLET: 7.5; 325 TABLET ORAL at 06:06

## 2025-06-27 RX ADMIN — ENOXAPARIN SODIUM 40 MG: 40 INJECTION SUBCUTANEOUS at 04:06

## 2025-06-27 RX ADMIN — HYDROCODONE BITARTRATE AND ACETAMINOPHEN 1 TABLET: 7.5; 325 TABLET ORAL at 12:06

## 2025-06-27 NOTE — NURSING
Discontinued PCA pump with Kelley AZAR and Sebas AZAR. 5 mL left in syringe and wasted in appropriate bin. 5N and 6N share a PCA pump key, I pulled key from pt in 662 and gave johnson to Sebas AZAR to return to 6N.

## 2025-06-27 NOTE — PLAN OF CARE
SS consulted for discharge planning:  Patient may be ready for discharge on Sunday, if she continues to improve.  Will she be able to go back to [place] on Sunday?  GAYLE contacted [facility] and spoke with Yaneli.  She reports that patient will not be able to return on Sunday due to not having patient's meds available from the pharmacy.  It will be Monday before she can return.  Yaneli also requested a few colostomy supplies be sent with patient until they can get what she will need.  GAYLE spoke with patient's nurse, Ronnie, to inform her of this.  GAYLE will continue to follow for DC needs as they arise.

## 2025-06-27 NOTE — PT/OT/SLP PROGRESS
Physical Therapy Treatment    Patient Name:  Umesh Lassiter   MRN:  [id number]    Recommendations:     Discharge Recommendations: Moderate Intensity Therapy  Discharge Equipment Recommendations: to be determined by next level of care  Barriers to discharge: ongoing medical treatment     Assessment:     Umesh Lassiter is a 80 y.o. female admitted with a medical diagnosis of Large bowel obstruction.  She presents with the following impairments/functional limitations: weakness, impaired endurance, impaired functional mobility, gait instability, decreased lower extremity function, pain     Pt supine in bed upon PTA arrival. Pt reports feeling better after NG tube was taken out. Pt agreed to sit up in chair after encouragement from PTA. Pt noticeably fatigued throughout treatment session today.      Rehab Prognosis: Fair; patient would benefit from acute skilled PT services to address these deficits and reach maximum level of function.    Recent Surgery: Procedure(s) (LRB):  LAPAROTOMY, EXPLORATORY (N/A)  CREATION, COLOSTOMY (N/A)  EXCISION, SMALL INTESTINE (N/A) 5 Days Post-Op    Plan:     During this hospitalization, patient to be seen 5 x/week to address the identified rehab impairments via gait training, therapeutic activities, therapeutic exercises and progress toward the following goals:    Plan of Care Expires:  07/23/25    Subjective     Chief Complaint: abdominal pain   Patient/Family Comments/goals: pt states she felt better after NG tube was taken out.   Pain/Comfort:  Pain Rating 1: 4/10  Location 1: back  Pain Rating 2: 4/10  Location 2: abdomen  Pain Addressed 2: Pre-medicate for activity      Objective:     Communicated with RN prior to session.  Patient found supine with peripheral IV, telemetry, colostomy upon PT entry to room.     General Precautions: Standard, fall  Orthopedic Precautions: N/A  Braces: N/A  Respiratory Status: Nasal cannula, flow 2  L/min     Functional Mobility:  Bed Mobility:      Scooting: minimum assistance  Supine to Sit: moderate assistance  Transfers:     Sit to Stand:  minimum assistance with rolling walker  Bed to Chair: minimum assistance with  rolling walker  using  Stand Pivot      AM-PAC 6 CLICK MOBILITY  Turning over in bed (including adjusting bedclothes, sheets and blankets)?: 3  Sitting down on and standing up from a chair with arms (e.g., wheelchair, bedside commode, etc.): 3  Moving from lying on back to sitting on the side of the bed?: 3  Moving to and from a bed to a chair (including a wheelchair)?: 3  Need to walk in hospital room?: 2  Climbing 3-5 steps with a railing?: 1  Basic Mobility Total Score: 15       Treatment & Education:  Mobility and transfers as listed above.     Seated therex: LAQs, AP, hip adduction, QS x 10 reps each     Patient left up in chair with all lines intact and call button in reach..    GOALS:   Multidisciplinary Problems       Physical Therapy Goals          Problem: Physical Therapy    Goal Priority Disciplines Outcome Interventions   Physical Therapy Goal     PT, PT/OT Progressing    Description: Short Term Goals to be met by: 25    Patient will increase functional independence with mobility by performin. Supine to sit with contact guard assist  2. Sit to stand transfer with contact guard assist using Rolling walker  3. Bed to chair transfer with contact guard assist using Rolling walker  4. Gait  x 100 feet with contact guard assist using Rolling walker  5. Lower extremity exercise program x30 reps per handout, with assistance as needed    Long Term Goals to be met by: 25    Pt will regain full independent functional mobility with Rolling walker to return to home situation and prior activities of daily living.                        DME Justifications:  No DME recommended requiring DME justifications    Time Tracking:     PT Received On: 25  PT Start Time: 920     PT Stop Time: 940  PT Total Time (min): 20 min      Billable Minutes: Therapeutic Activity 20    Treatment Type: Treatment  PT/PTA: PTA     Number of PTA visits since last PT visit: 3     06/27/2025

## 2025-06-27 NOTE — PROGRESS NOTES
[facility]  General Surgery  Progress Note    Subjective:     Interval History:  Patient is awake and alert this morning on exam.  She is more talkative today.  The NG tube has been removed.  She has tolerated clear liquids overnight.  We will advanced to full liquid diet today.  She denies nausea or vomiting.  Small amount of stool noted in ostomy bag.  Patient reports abdomen that has tender to palpation over midline incision which is expected postoperatively.  Physical therapy evaluation and treatment ordered.  Patient was encouraged to get up out of bed and move around today.  Continue physical therapy as tolerated.  Will discontinue PCA pump and maintenance fluids today.  Will add PO pain medication prn. Discontinue maynor wick use per Dr Brannon's request. Case management was consulted to assist with discharge planning.  Plan to advance to regular diet tomorrow if tolerated.  Possible discharge on Sunday back to [facility].    Post-Op Info:  Procedure(s) (LRB):  LAPAROTOMY, EXPLORATORY (N/A)  CREATION, COLOSTOMY (N/A)  EXCISION, SMALL INTESTINE (N/A)   5 Days Post-Op      Medications:  Continuous Infusions:   dextrose 5 % and 0.45 % NaCl with KCl 20 mEq   Intravenous Continuous 125 mL/hr at 06/27/25 0257 New Bag at 06/27/25 0257    morphine   Intravenous Continuous   New Syringe/Bag at 06/25/25 0340     Scheduled Meds:   allopurinoL  100 mg Oral Daily    enoxparin  40 mg Subcutaneous Daily    furosemide  20 mg Oral Daily    levothyroxine  50 mcg Oral Before breakfast    losartan  50 mg Oral Daily    valproate sodium (DEPACON) IVPB  500 mg Intravenous QHS     PRN Meds:  Current Facility-Administered Medications:     albuterol, 2.5 mg, Nebulization, Q6H PRN    clorazepate, 7.5 mg, Oral, Nightly PRN    labetalol, 10 mg, Intravenous, Q4H PRN    naloxone, 0.4 mg, Intravenous, QID PRN    ondansetron, 4 mg, Intravenous, Q6H PRN     Objective:     Vital Signs (Most  Recent):  Temp: 98.3 °F (36.8 °C) (06/27/25 0806)  Pulse: 78 (06/27/25 0806)  Resp: 18 (06/27/25 0806)  BP: (!) 141/57 (06/27/25 0806)  SpO2: 98 % (06/27/25 0806) Vital Signs (24h Range):  Temp:  [97.9 °F (36.6 °C)-99 °F (37.2 °C)] 98.3 °F (36.8 °C)  Pulse:  [77-96] 78  Resp:  [16-18] 18  SpO2:  [95 %-100 %] 98 %  BP: (127-165)/(57-71) 141/57       Intake/Output Summary (Last 24 hours) at 6/27/2025 1100  Last data filed at 6/27/2025 0800  Gross per 24 hour   Intake 120 ml   Output 951 ml   Net -831 ml       Physical Exam  Vitals and nursing note reviewed.   HENT:      Head: Normocephalic.      Nose: Nose normal.      Mouth/Throat:      Mouth: Mucous membranes are moist.   Eyes:      Extraocular Movements: Extraocular movements intact.   Cardiovascular:      Rate and Rhythm: Normal rate.      Pulses: Normal pulses.   Pulmonary:      Effort: Pulmonary effort is normal.   Abdominal:      General: Bowel sounds are normal. There is no distension.      Palpations: Abdomen is soft.      Tenderness: There is generalized abdominal tenderness.       Musculoskeletal:         General: Normal range of motion.      Cervical back: Normal range of motion.   Skin:     General: Skin is warm and dry.      Capillary Refill: Capillary refill takes less than 2 seconds.   Neurological:      General: No focal deficit present.      Mental Status: She is alert.   Psychiatric:         Mood and Affect: Mood normal.         Significant Labs:  Recent Lab Results         06/27/25  0330        Albumin/Globulin Ratio 0.5       Albumin 2.1       ALP 86       ALT 13       Anion Gap 13       AST 26       Baso # 0.03       Basophil % 0.3       BILIRUBIN TOTAL 0.3       BUN 7       BUN/CREAT RATIO 12       Calcium 9.9       Chloride 101       CO2 27       Creatinine 0.60       Differential Method Auto       eGFR 90  Comment: Estimated GFR calculated using the CKD-EPI creatinine (2021) equation.       Eos # 0.60       Eos % 6.8       Globulin, Total  4.3       Glucose 103       Hematocrit 30.8       Hemoglobin 9.9       Immature Grans (Abs) 0.06       Immature Granulocytes 0.7       Lymph # 1.82       Lymph % 20.6       MCH 25.8       MCHC 32.1       MCV 80.2       Mono # 1.30       Mono % 14.7       MPV 10.3       Neutrophils, Abs 5.02       Neutrophils Relative 56.9       nRBC 0.0       NUCLEATED RBC ABSOLUTE 0.00       Platelet Count 298       Potassium 4.5       PROTEIN TOTAL 6.4       RBC 3.84       RDW 16.8       Sodium 136       WBC 8.83             All pertinent labs from the last 24 hours have been reviewed.    Significant Diagnostics:  I have reviewed all pertinent imaging results/findings within the past 24 hours.    Assessment/Plan:     Active Diagnoses:    Diagnosis Date Noted POA    PRINCIPAL PROBLEM:  Large bowel obstruction [K56.609] 06/22/2025 Yes    Colon adenocarcinoma [C18.9] 06/26/2025 Yes    Acute post-operative pain [G89.18] 06/23/2025 No    CKD stage 3a, GFR 45-59 ml/min [N18.31] 02/08/2024 Yes    PVD (peripheral vascular disease) [I73.9] 03/13/2023 Yes    Debility [R53.81] 07/16/2022 Yes    Generalized epilepsy [G40.309] 01/13/2022 Yes    Hypertension [I10] 01/07/2022 Yes    Hypothyroidism [E03.9] 01/07/2022 Yes      Problems Resolved During this Admission:         Allyson Tompkins, MELANI  General Surgery  [facility] - [address] [place] [address]

## 2025-06-27 NOTE — PLAN OF CARE
Problem: Adult Inpatient Plan of Care  Goal: Plan of Care Review  Outcome: Progressing  Goal: Patient-Specific Goal (Individualized)  Outcome: Progressing  Goal: Absence of Hospital-Acquired Illness or Injury  Outcome: Progressing  Goal: Optimal Comfort and Wellbeing  Outcome: Progressing  Goal: Readiness for Transition of Care  Outcome: Progressing     Problem: Infection  Goal: Absence of Infection Signs and Symptoms  Outcome: Progressing     Problem: Wound  Goal: Optimal Coping  Outcome: Progressing  Goal: Optimal Functional Ability  Outcome: Progressing  Goal: Absence of Infection Signs and Symptoms  Outcome: Progressing  Goal: Improved Oral Intake  Outcome: Progressing  Goal: Optimal Pain Control and Function  Outcome: Progressing  Goal: Skin Health and Integrity  Outcome: Progressing  Goal: Optimal Wound Healing  Outcome: Progressing     Problem: Fall Injury Risk  Goal: Absence of Fall and Fall-Related Injury  Outcome: Progressing     Problem: Skin Injury Risk Increased  Goal: Skin Health and Integrity  Outcome: Progressing     Problem: Gas Exchange Impaired  Goal: Optimal Gas Exchange  Outcome: Progressing

## 2025-06-27 NOTE — PT/OT/SLP PROGRESS
Occupational Therapy   Treatment    Name: Umesh Lassiter  MRN: [id number]  Admitting Diagnosis:  Large bowel obstruction  5 Days Post-Op    Recommendations:     Discharge Recommendations: Moderate Intensity Therapy  Discharge Equipment Recommendations:  to be determined by next level of care  Barriers to discharge:       Assessment:     Umesh Lassiter is a 80 y.o. female with a medical diagnosis of Large bowel obstruction. . Performance deficits affecting function are weakness, impaired endurance, impaired self care skills.     Rehab Prognosis:  Good and Fair; patient would benefit from acute skilled OT services to address these deficits and reach maximum level of function.       Plan:     Patient to be seen 5 x/week to address the above listed problems via self-care/home management, therapeutic exercises, therapeutic activities  Plan of Care Expires: 07/28/25  Plan of Care Reviewed with: patient    Subjective     Chief Complaint:   Patient/Family Comments/goals:   Pain/Comfort:  Pain Rating 1:  (pt stated I am old so i hurt some where all the time. No pain rate given or location for any pain)    Objective:     Communicated with: Ronnie Marshall RN prior to session.  Patient found up in chair with peripheral IV, colostomy, telemetry upon OT entry to room.    General Precautions: Standard, fall    Orthopedic Precautions:N/A  Braces: N/A  Respiratory Status: Nasal cannula, flow 2 L/min     Occupational Performance:     Bed Mobility:         Functional Mobility/Transfers:    Functional Mobility:     Activities of Daily Living:        [facility] 6 Click ADL:      Treatment & Education:  Pt performed hand helper with 3 rubber band resistances 20 reps x 2, green t ball ex's 10 reps, rom ex's with 1 lb wt 15 reps x 2 B elbow flex/ext, pt declined to cont with any additional ex's stating that she had arthritis and that's all she could do      Patient left up in chair with all lines intact and call button in reach    GOALS:    Multidisciplinary Problems       Occupational Therapy Goals          Problem: Occupational Therapy    Goal Priority Disciplines Outcome Interventions   Occupational Therapy Goal     OT, PT/OT Progressing    Description: STG:  Pt will perform grooming (I)  Pt will bathe with min a  Pt will perform UE dressing with (I)  Pt will perform LE dressing with I/Mod I  Pt will transfer bed/chair/bsc with Mod I  Pt will perform standing task x 2 min with Mod I  Pt will tolerate 20 minutes of tx without fatigue      LT.Restore to max I with self care and mobility.                          DME Justifications:      Time Tracking:     OT Date of Treatment: 25  OT Start Time: 956  OT Stop Time: 100  OT Total Time (min): 10 min    Billable Minutes:Therapeutic Exercise 10    OT/ANA LUISA: ANA LUISA          2025

## 2025-06-27 NOTE — PLAN OF CARE
Problem: Adult Inpatient Plan of Care  Goal: Plan of Care Review  Outcome: Progressing  Goal: Absence of Hospital-Acquired Illness or Injury  Outcome: Progressing  Goal: Optimal Comfort and Wellbeing  Outcome: Progressing     Problem: Infection  Goal: Absence of Infection Signs and Symptoms  Outcome: Progressing     Problem: Wound  Goal: Optimal Functional Ability  Outcome: Progressing  Goal: Absence of Infection Signs and Symptoms  Outcome: Progressing

## 2025-06-28 PROBLEM — I95.9 HYPOTENSION: Status: ACTIVE | Noted: 2025-06-28

## 2025-06-28 PROBLEM — M10.9 ACUTE GOUT OF FOOT: Status: ACTIVE | Noted: 2025-06-28

## 2025-06-28 PROBLEM — E53.8 FOLIC ACID DEFICIENCY: Status: ACTIVE | Noted: 2025-06-28

## 2025-06-28 LAB
25(OH)D3 SERPL-MCNC: 27.2 NG/ML (ref 30–80)
BASOPHILS # BLD AUTO: 0.07 K/UL (ref 0–0.2)
BASOPHILS NFR BLD AUTO: 0.6 % (ref 0–1)
DIFFERENTIAL METHOD BLD: ABNORMAL
EOSINOPHIL # BLD AUTO: 0.58 K/UL (ref 0–0.5)
EOSINOPHIL NFR BLD AUTO: 5.3 % (ref 1–4)
ERYTHROCYTE [DISTWIDTH] IN BLOOD BY AUTOMATED COUNT: 16.8 % (ref 11.5–14.5)
FERRITIN SERPL-MCNC: 221 NG/ML (ref 5–204)
FOLATE SERPL-MCNC: 6.9 NG/ML (ref 7–31.4)
HCT VFR BLD AUTO: 30.6 % (ref 38–47)
HGB BLD-MCNC: 9.7 G/DL (ref 12–16)
IMM GRANULOCYTES # BLD AUTO: 0.29 K/UL (ref 0–0.04)
IMM GRANULOCYTES NFR BLD: 2.6 % (ref 0–0.4)
IRON SATN MFR SERPL: 11 % (ref 20–50)
IRON SERPL-MCNC: 19 UG/DL (ref 50–170)
LYMPHOCYTES # BLD AUTO: 2.59 K/UL (ref 1–4.8)
LYMPHOCYTES NFR BLD AUTO: 23.5 % (ref 27–41)
MCH RBC QN AUTO: 25.3 PG (ref 27–31)
MCHC RBC AUTO-ENTMCNC: 31.7 G/DL (ref 32–36)
MCV RBC AUTO: 79.7 FL (ref 80–96)
MONOCYTES # BLD AUTO: 1.42 K/UL (ref 0–0.8)
MONOCYTES NFR BLD AUTO: 12.9 % (ref 2–6)
MPC BLD CALC-MCNC: 10.5 FL (ref 9.4–12.4)
NEUTROPHILS # BLD AUTO: 6.07 K/UL (ref 1.8–7.7)
NEUTROPHILS NFR BLD AUTO: 55.1 % (ref 53–65)
NRBC # BLD AUTO: 0 X10E3/UL
NRBC, AUTO (.00): 0 %
PLATELET # BLD AUTO: 373 K/UL (ref 150–400)
RBC # BLD AUTO: 3.84 M/UL (ref 4.2–5.4)
TIBC SERPL-MCNC: 150 UG/DL (ref 70–310)
TIBC SERPL-MCNC: 169 UG/DL (ref 250–450)
TRANSFERRIN SERPL-MCNC: 147 MG/DL
VIT B12 SERPL-MCNC: 1354 PG/ML (ref 213–816)
WBC # BLD AUTO: 11.02 K/UL (ref 4.5–11)

## 2025-06-28 PROCEDURE — 63600175 PHARM REV CODE 636 W HCPCS: Performed by: INTERNAL MEDICINE

## 2025-06-28 PROCEDURE — 83540 ASSAY OF IRON: CPT | Performed by: HOSPITALIST

## 2025-06-28 PROCEDURE — 99233 SBSQ HOSP IP/OBS HIGH 50: CPT | Mod: ,,, | Performed by: HOSPITALIST

## 2025-06-28 PROCEDURE — 63600175 PHARM REV CODE 636 W HCPCS: Performed by: STUDENT IN AN ORGANIZED HEALTH CARE EDUCATION/TRAINING PROGRAM

## 2025-06-28 PROCEDURE — 85025 COMPLETE CBC W/AUTO DIFF WBC: CPT | Performed by: HOSPITALIST

## 2025-06-28 PROCEDURE — 99233 SBSQ HOSP IP/OBS HIGH 50: CPT | Mod: 24,,, | Performed by: STUDENT IN AN ORGANIZED HEALTH CARE EDUCATION/TRAINING PROGRAM

## 2025-06-28 PROCEDURE — 84165 PROTEIN E-PHORESIS SERUM: CPT | Performed by: HOSPITALIST

## 2025-06-28 PROCEDURE — 36415 COLL VENOUS BLD VENIPUNCTURE: CPT | Performed by: HOSPITALIST

## 2025-06-28 PROCEDURE — 25000003 PHARM REV CODE 250: Performed by: HOSPITALIST

## 2025-06-28 PROCEDURE — 94761 N-INVAS EAR/PLS OXIMETRY MLT: CPT

## 2025-06-28 PROCEDURE — 63600175 PHARM REV CODE 636 W HCPCS: Performed by: SURGERY

## 2025-06-28 PROCEDURE — 82306 VITAMIN D 25 HYDROXY: CPT | Performed by: HOSPITALIST

## 2025-06-28 PROCEDURE — 25000003 PHARM REV CODE 250: Performed by: NURSE PRACTITIONER

## 2025-06-28 PROCEDURE — 82728 ASSAY OF FERRITIN: CPT | Performed by: HOSPITALIST

## 2025-06-28 PROCEDURE — 82607 VITAMIN B-12: CPT | Performed by: HOSPITALIST

## 2025-06-28 PROCEDURE — 99900035 HC TECH TIME PER 15 MIN (STAT)

## 2025-06-28 PROCEDURE — 25000003 PHARM REV CODE 250: Performed by: INTERNAL MEDICINE

## 2025-06-28 PROCEDURE — 11000001 HC ACUTE MED/SURG PRIVATE ROOM

## 2025-06-28 RX ORDER — NAPROXEN 250 MG/1
250 TABLET ORAL 2 TIMES DAILY WITH MEALS
Status: COMPLETED | OUTPATIENT
Start: 2025-06-28 | End: 2025-06-29

## 2025-06-28 RX ORDER — ALLOPURINOL 100 MG/1
200 TABLET ORAL DAILY
Status: DISCONTINUED | OUTPATIENT
Start: 2025-06-29 | End: 2025-06-30 | Stop reason: HOSPADM

## 2025-06-28 RX ORDER — FOLIC ACID 1 MG/1
1 TABLET ORAL DAILY
Status: DISCONTINUED | OUTPATIENT
Start: 2025-06-28 | End: 2025-06-30 | Stop reason: HOSPADM

## 2025-06-28 RX ADMIN — HYDROCODONE BITARTRATE AND ACETAMINOPHEN 1 TABLET: 7.5; 325 TABLET ORAL at 06:06

## 2025-06-28 RX ADMIN — HYDROCODONE BITARTRATE AND ACETAMINOPHEN 1 TABLET: 7.5; 325 TABLET ORAL at 08:06

## 2025-06-28 RX ADMIN — ALLOPURINOL 100 MG: 100 TABLET ORAL at 08:06

## 2025-06-28 RX ADMIN — HYDROCODONE BITARTRATE AND ACETAMINOPHEN 1 TABLET: 7.5; 325 TABLET ORAL at 01:06

## 2025-06-28 RX ADMIN — ENOXAPARIN SODIUM 40 MG: 40 INJECTION SUBCUTANEOUS at 04:06

## 2025-06-28 RX ADMIN — SODIUM CHLORIDE, POTASSIUM CHLORIDE, SODIUM LACTATE AND CALCIUM CHLORIDE 1000 ML: 600; 310; 30; 20 INJECTION, SOLUTION INTRAVENOUS at 06:06

## 2025-06-28 RX ADMIN — ONDANSETRON 4 MG: 2 INJECTION INTRAMUSCULAR; INTRAVENOUS at 08:06

## 2025-06-28 RX ADMIN — SODIUM CHLORIDE 500 MG: 9 INJECTION, SOLUTION INTRAVENOUS at 09:06

## 2025-06-28 RX ADMIN — HYDROCODONE BITARTRATE AND ACETAMINOPHEN 1 TABLET: 7.5; 325 TABLET ORAL at 12:06

## 2025-06-28 RX ADMIN — LEVOTHYROXINE SODIUM 50 MCG: 0.05 TABLET ORAL at 06:06

## 2025-06-28 RX ADMIN — FOLIC ACID 1 MG: 1 TABLET ORAL at 11:06

## 2025-06-28 NOTE — NURSING
Patient laying in the bed eyes closed aroused to voice and touch able to make needs known ensure comfort safety measures informed with understanding about care treatment medication safety measures and use of the call bell for assistance and needs also informed about colostomy care and to monitor the site for redness drainage edema. Needs are met. Informed about the benefits of mobility ROM. Lines in place. Show use with return demo use of the call bell for assistance and needs. Call bell personal items in reach  1400Family at the bedside patient laying in the bed resting talking with the family informed to the patient and family about medications care and safety measures and the benefits of mobility. Needs are met call bell personal items in reach.   1735Patient laying in the bed resting eyes closed resp even and unlabored needs are met call bell personal items in reach Bed low side rails up

## 2025-06-28 NOTE — ASSESSMENT & PLAN NOTE
status post exploratory laparotomy with sigmoid resection and colostomy    6/28:  Positive stool from ostomy.  Tolerating full liquid diet.  Advance to soft diet.  Patient okay to return to nursing home on Monday.    Some borderline hypotension; IV fluid bolus given and systolic pressure improved

## 2025-06-28 NOTE — ASSESSMENT & PLAN NOTE
Creatine stable for now. BMP reviewed- noted Estimated Creatinine Clearance: 80.6 mL/min (based on SCr of 0.6 mg/dL). according to latest data. Based on current GFR, CKD stage is stage 3 - GFR 30-59.  Monitor UOP and serial BMP and adjust therapy as needed. Renally dose meds. Avoid nephrotoxic medications and procedures.

## 2025-06-28 NOTE — ASSESSMENT & PLAN NOTE
Presented to ED with abdominal pain and nausea/vomiting.   CT showed colonic distension with retained fecal material secondary to obstructing lesion worrisome for carcinoma at the level of the sigmoid colon LEFT lower quadrant.     Admitted to Gen Surg (Dr. Brannon) and s/p ex-lap on 6/22/25:     Procedures Performed: Exploratory laparotomy with sigmoid colectomy and end colostomy.  Small bowel resection     Significant Findings of the Procedure:  Multiple loops of dilated small bowel and very large distended large intestine.  Invasive malignant appearing mass in the mid sigmoid colon invading into the retroperitoneum and the adjacent loop of small bowel.  Initially was stuck and a small-bowel resection was performed upon extraction and dissection it did come apart but there was some likely desmoplastic reaction to the small bowel and this was sent with the colon.    Pathology showed:    Sigmoid colon:   - Moderately-differentiated adenocarcinoma of the sigmoid colon (4.1 cm)  - Tumor invades the pericolonic soft tissue  - Diverticulitis  - Sixteen lymph nodes are negative for metastatic carcinoma (0/16)  - Margins of resection are negative for adenocarcinoma     Jejunum:  - Well-differentiated adenocarcinoma involving small bowel mesentery  - Submucosal lipoma  - Two lymph nodes are negative for metastatic carcinoma (0/2)  - Margins of resection appear viable and are uninvolved by adenocarcinoma    Management per primary.     Presented to ED with abdominal pain and nausea/vomiting.   CT showed colonic distension with retained fecal material secondary to obstructing lesion worrisome for carcinoma at the level of the sigmoid colon LEFT lower quadrant.     Admitted to Gen Surg (Dr. Brannon) and s/p ex-lap on 6/22/25:     Procedures Performed: Exploratory laparotomy with sigmoid colectomy and end colostomy.  Small bowel resection     Significant Findings of the Procedure:  Multiple loops of dilated small bowel and very large  distended large intestine.  Invasive malignant appearing mass in the mid sigmoid colon invading into the retroperitoneum and the adjacent loop of small bowel.  Initially was stuck and a small-bowel resection was performed upon extraction and dissection it did come apart but there was some likely desmoplastic reaction to the small bowel and this was sent with the colon.    Pathology showed:    Sigmoid colon:   - Moderately-differentiated adenocarcinoma of the sigmoid colon (4.1 cm)  - Tumor invades the pericolonic soft tissue  - Diverticulitis  - Sixteen lymph nodes are negative for metastatic carcinoma (0/16)  - Margins of resection are negative for adenocarcinoma     Jejunum:  - Well-differentiated adenocarcinoma involving small bowel mesentery  - Submucosal lipoma  - Two lymph nodes are negative for metastatic carcinoma (0/2)  - Margins of resection appear viable and are uninvolved by adenocarcinoma    Management per primary.     06/27 less pain

## 2025-06-28 NOTE — PROGRESS NOTES
[facility] - [address] [place] [address] Medicine  Progress Note    Patient Name: Umesh Lassiter  MRN: [id number]  Patient Class: IP- Inpatient   Admission Date: 6/21/2025  Length of Stay: 5 days  Attending Physician: Mannie Brannon MD  Primary Care Provider: Terri Smith FNP        Subjective     Principal Problem:Large bowel obstruction        HPI:  Mr. Lassiter is an 80 Y O male with PMH of epilepsy, HTN, hypothyroidism, PVD who presented to ED with abdominal pain nausea and vomiting for last 2 days. He reported pain is generalized and cramping in nature. He has not had any bowel movement for last 2-3 days and is barely passing gas. He did have abdominal discomfort and constipation issues over the last month. He denies fever, chills. He admits to weight loss. No blood in stool reported.     Overview/Hospital Course:  6/22- s/p ex-lap with sigmoid colectomy and colostomy plus small bowel resection. Home medications and PMH reviewed. Will continue to follow along.   6/23- On PCA for pain control. Pathology pending from resection. PT/OT/mobilize.   6/24- Doing Ok. Continue to follow along.   6/25- Labs and vitals stable. Remains NPO per primary team. May require TPN.   6/26- Doing Ok. Remains NPO per Gen Surg.     06/27 Records reviewed. Pt tolerating liquids. Positive BS. Increase activity  PMH of epilepsy, HTN, hypothyroidism, PVD     Interval History:     Review of Systems   Constitutional:  Negative for appetite change, fatigue and fever.   HENT:  Negative for congestion, hearing loss and trouble swallowing.    Respiratory:  Negative for chest tightness, shortness of breath and wheezing.    Cardiovascular:  Negative for chest pain and palpitations.   Gastrointestinal:  Negative for abdominal pain, constipation and nausea.        Colostomy bag  Positive bowel sounds   Genitourinary:  Negative for difficulty urinating and dysuria.   Musculoskeletal:  Negative for back pain and neck  stiffness.   Skin:  Negative for pallor and rash.   Neurological:  Negative for dizziness, speech difficulty and headaches.   Psychiatric/Behavioral:  Negative for confusion and suicidal ideas.      Objective:     Vital Signs (Most Recent):  Temp: 98.9 °F (37.2 °C) (06/27/25 2001)  Pulse: 90 (06/27/25 2001)  Resp: 20 (06/27/25 1849)  BP: 135/67 (06/27/25 2001)  SpO2: 96 % (06/27/25 2008) Vital Signs (24h Range):  Temp:  [97.5 °F (36.4 °C)-99.1 °F (37.3 °C)] 98.9 °F (37.2 °C)  Pulse:  [78-96] 90  Resp:  [18-20] 20  SpO2:  [94 %-98 %] 96 %  BP: (127-146)/(57-68) 135/67     Weight: 87.5 kg (193 lb)  Body mass index is 31.15 kg/m².    Intake/Output Summary (Last 24 hours) at 6/27/2025 2321  Last data filed at 6/27/2025 1213  Gross per 24 hour   Intake 120 ml   Output 1550 ml   Net -1430 ml         Physical Exam  Vitals reviewed.   Constitutional:       General: She is awake. She is not in acute distress.     Appearance: She is well-developed. She is not toxic-appearing.   HENT:      Head: Normocephalic.      Nose: Nose normal.      Mouth/Throat:      Pharynx: Oropharynx is clear.   Eyes:      Extraocular Movements: Extraocular movements intact.      Pupils: Pupils are equal, round, and reactive to light.   Neck:      Thyroid: No thyroid mass.      Vascular: No carotid bruit.   Cardiovascular:      Rate and Rhythm: Normal rate and regular rhythm.      Pulses: Normal pulses.      Heart sounds: Normal heart sounds. No murmur heard.  Pulmonary:      Effort: Pulmonary effort is normal.      Breath sounds: Normal breath sounds and air entry. No wheezing.   Abdominal:      General: Bowel sounds are normal. There is no distension.      Palpations: Abdomen is soft.      Tenderness: There is no abdominal tenderness. There is no rebound.   Musculoskeletal:         General: Normal range of motion.      Cervical back: Neck supple. No rigidity.   Skin:     General: Skin is warm.      Coloration: Skin is not jaundiced.      Findings:  No lesion.   Neurological:      General: No focal deficit present.      Mental Status: She is alert and oriented to person, place, and time.      Cranial Nerves: No cranial nerve deficit.   Psychiatric:         Attention and Perception: Attention normal.         Mood and Affect: Mood normal.         Behavior: Behavior normal. Behavior is cooperative.         Thought Content: Thought content normal.         Cognition and Memory: Cognition normal.               Significant Labs: All pertinent labs within the past 24 hours have been reviewed.  BMP:   Recent Labs   Lab 06/27/25  0330         K 4.5      CO2 27   BUN 7*   CREATININE 0.60   CALCIUM 9.9     CBC:   Recent Labs   Lab 06/27/25  0330   WBC 8.83   HGB 9.9*   HCT 30.8*        CMP:   Recent Labs   Lab 06/27/25  0330      K 4.5      CO2 27      BUN 7*   CREATININE 0.60   CALCIUM 9.9   PROT 6.4   ALBUMIN 2.1*   BILITOT 0.3   ALKPHOS 86   AST 26   ALT 13   ANIONGAP 13       Significant Imaging: I have reviewed all pertinent imaging results/findings within the past 24 hours.    Imaging Results               CT Abdomen Pelvis  Without Contrast (Final result)  Result time 06/22/25 06:15:49      Final result by Pepe Nunez MD (06/22/25 06:15:49)                   Impression:      Colonic distension with retained fecal material secondary to obstructing lesion worrisome for carcinoma at the level of the sigmoid colon LEFT lower quadrant.  Colonoscopy/surgical evaluation recommended.    The preliminary STATRAD and final reports are concordant.    This report was flagged in Epic as abnormal.      Electronically signed by: Pepe Nunez MD  Date:    06/22/2025  Time:    06:15               Narrative:    EXAMINATION:  CT ABDOMEN PELVIS WITHOUT CONTRAST    CLINICAL HISTORY:  Abdominal pain, acute, nonlocalized;    TECHNIQUE:  Low dose axial images, sagittal and coronal reformations were obtained from the lung bases to the  pubic symphysis, Oral contrast was not administered.    COMPARISON:  08/10/2024    FINDINGS:  Heart: Normal in size. No pericardial effusion.    Lung Bases: Well aerated, without consolidation or pleural fluid.    Liver: Normal in size and attenuation, with lobulated cystic lesion unchanged from 08/10/2024 LEFT hepatic lobe    Gallbladder: Cholecystectomy    Bile Ducts: No evidence of dilated ducts.    Pancreas: No mass or peripancreatic fat stranding.    Spleen: Unremarkable.    Adrenals: Unremarkable.    Kidneys/ Ureters: Unremarkable.    Bladder: No evidence of wall thickening.    Reproductive organs: Hysterectomy.    GI Tract/Mesentery: There is dilatation of the colon with retention of stool to the level of the sigmoid colon.  There is rectosigmoid diverticulosis.  At the level of the sigmoid colon, there is abrupt narrowing on axial series 2, image 103 coronal series 601 image 27, with soft tissue density at that level, worrisome for that of obstructing neoplasm at the level of the junction of descending colon and rectosigmoid LEFT lower quadrant.    Peritoneal Space: No ascites. No free air.    Retroperitoneum: No significant adenopathy.    Abdominal wall: Unremarkable.    Vasculature: No significant atherosclerosis or aneurysm.    Bones: No acute fracture.  Scoliosis                                        X-Ray Abdomen AP 1 View (KUB) (Final result)  Result time 06/22/25 06:15:08      Final result by Pepe Nunez MD (06/22/25 06:15:08)                   Impression:      Colonic distension to the level of the sigmoid colon.  CT same date demonstrated findings suspicious for obstructing neoplasm at the level of the sigmoid colon/LEFT lower quadrant.    This report was flagged in Epic as abnormal.      Electronically signed by: Pepe Nunez MD  Date:    06/22/2025  Time:    06:15               Narrative:    EXAMINATION:  XR ABDOMEN AP 1 VIEW    TECHNIQUE:  XR ABDOMEN AP 1 VIEW    FINDINGS:  Colonic  distension to the level of the LEFT lower quadrant, consistent with CT examination same date, reflecting underlying suspicious narrowing, worrisome for carcinoma at the level of sigmoid colon.    No airspace consolidation at the lung bases.No acute bony abnormality.No abnormal soft tissue masses.No abnormal calcific densities.                                    Intake/Output - Last 3 Shifts         06/26 0700  06/27 0659 06/27 0700  06/28 0659    P.O.  120    NG/GT      Total Intake(mL/kg)  120 (1.4)    Urine (mL/kg/hr) 1 (0) 1500 (1)    Drains 100     Other 0     Stool 50     Total Output 151 1500    Net -151 -1380          Unmeasured Urine Occurrence 4 x           Microbiology Results (last 7 days)       ** No results found for the last 168 hours. **                Assessment & Plan  Large bowel obstruction  Acute post-operative pain  Colon adenocarcinoma  Presented to ED with abdominal pain and nausea/vomiting.   CT showed colonic distension with retained fecal material secondary to obstructing lesion worrisome for carcinoma at the level of the sigmoid colon LEFT lower quadrant.     Admitted to Gen Surg (Dr. Brannon) and s/p ex-lap on 6/22/25:     Procedures Performed: Exploratory laparotomy with sigmoid colectomy and end colostomy.  Small bowel resection     Significant Findings of the Procedure:  Multiple loops of dilated small bowel and very large distended large intestine.  Invasive malignant appearing mass in the mid sigmoid colon invading into the retroperitoneum and the adjacent loop of small bowel.  Initially was stuck and a small-bowel resection was performed upon extraction and dissection it did come apart but there was some likely desmoplastic reaction to the small bowel and this was sent with the colon.    Pathology showed:    Sigmoid colon:   - Moderately-differentiated adenocarcinoma of the sigmoid colon (4.1 cm)  - Tumor invades the pericolonic soft tissue  - Diverticulitis  - Sixteen lymph nodes are  negative for metastatic carcinoma (0/16)  - Margins of resection are negative for adenocarcinoma     Jejunum:  - Well-differentiated adenocarcinoma involving small bowel mesentery  - Submucosal lipoma  - Two lymph nodes are negative for metastatic carcinoma (0/2)  - Margins of resection appear viable and are uninvolved by adenocarcinoma    Management per primary.     Presented to ED with abdominal pain and nausea/vomiting.   CT showed colonic distension with retained fecal material secondary to obstructing lesion worrisome for carcinoma at the level of the sigmoid colon LEFT lower quadrant.     Admitted to Gen Surg (Dr. Brannon) and s/p ex-lap on 6/22/25:     Procedures Performed: Exploratory laparotomy with sigmoid colectomy and end colostomy.  Small bowel resection     Significant Findings of the Procedure:  Multiple loops of dilated small bowel and very large distended large intestine.  Invasive malignant appearing mass in the mid sigmoid colon invading into the retroperitoneum and the adjacent loop of small bowel.  Initially was stuck and a small-bowel resection was performed upon extraction and dissection it did come apart but there was some likely desmoplastic reaction to the small bowel and this was sent with the colon.    Pathology showed:    Sigmoid colon:   - Moderately-differentiated adenocarcinoma of the sigmoid colon (4.1 cm)  - Tumor invades the pericolonic soft tissue  - Diverticulitis  - Sixteen lymph nodes are negative for metastatic carcinoma (0/16)  - Margins of resection are negative for adenocarcinoma     Jejunum:  - Well-differentiated adenocarcinoma involving small bowel mesentery  - Submucosal lipoma  - Two lymph nodes are negative for metastatic carcinoma (0/2)  - Margins of resection appear viable and are uninvolved by adenocarcinoma    Management per primary.     06/27 less pain    Hypertension    Vitals:    06/26/25 0747 06/26/25 1152 06/26/25 1637 06/26/25 1955   BP: 135/62 (!) 165/63 138/71  (!) 149/64    06/26/25 2343 06/27/25 0400 06/27/25 0806 06/27/25 1213   BP: 138/68 127/61 (!) 141/57 (!) 146/65    06/27/25 1701 06/27/25 2001   BP: 135/66 135/67       Patient's blood pressure range in the last 24 hours was: BP  Min: 127/61  Max: 146/65.The patient's inpatient anti-hypertensive regimen is listed below:  Current Antihypertensives  furosemide tablet 20 mg, Daily, Oral  losartan tablet 50 mg, Daily, Oral  labetaloL injection 10 mg, Every 4 hours PRN, Intravenous    Plan  - BP is controlled, no changes needed to their regimen    CKD stage 3a, GFR 45-59 ml/min  Creatine stable for now. BMP reviewed- noted Estimated Creatinine Clearance: 80.6 mL/min (based on SCr of 0.6 mg/dL). according to latest data. Based on current GFR, CKD stage is stage 3 - GFR 30-59.  Monitor UOP and serial BMP and adjust therapy as needed. Renally dose meds. Avoid nephrotoxic medications and procedures.    Hypothyroidism  TSH WNL.   Resume home Synthroid.     Generalized epilepsy  Resume home valproate but using IV as absorption might be questionable given bowel surgery.   Valproate levels checked.     PVD (peripheral vascular disease)  Resume home Pletal when Ok with surgery team.     Debility  Patient with Acute debility due to neoplastic/malignant related fatigue. The patient's latest AMPAC (Activity Measure for Post Acute Care) Score is listed below.    AM-PAC Score - How much help does the patient need for each activity listed  Basic Mobility Total Score: 15  Turning over in bed (including adjusting bedclothes, sheets and blankets)?: A little  Sitting down on and standing up from a chair with arms (e.g., wheelchair, bedside commode, etc.): A little  Moving from lying on back to sitting on the side of the bed?: A little  Moving to and from a bed to a chair (including a wheelchair)?: A little  Need to walk in hospital room?: A lot  Climbing 3-5 steps with a railing?: Unable    Plan  - Progressive mobility protocol initated  -  PT/OT consulted  - Fall precautions in place    VTE Risk Mitigation (From admission, onward)           Ordered     enoxaparin injection 40 mg  Daily         06/22/25 1306     IP VTE HIGH RISK PATIENT  Once         06/22/25 1306     Place sequential compression device  Until discontinued         06/22/25 1306                    Discharge Planning   CARMEN: 6/30/2025     Code Status: Full Code   Medical Readiness for Discharge Date:   Discharge Plan A: Return to nursing home, Skilled Nursing Facility                  Please place Justification for DME      Dick Ventura MD  Department of Hospital Medicine   [facility] - [facility] Telemetry

## 2025-06-28 NOTE — ASSESSMENT & PLAN NOTE
Patient with Acute debility due to neoplastic/malignant related fatigue. The patient's latest AMPAC (Activity Measure for Post Acute Care) Score is listed below.    AM-PAC Score - How much help does the patient need for each activity listed  Basic Mobility Total Score: 15  Turning over in bed (including adjusting bedclothes, sheets and blankets)?: A little  Sitting down on and standing up from a chair with arms (e.g., wheelchair, bedside commode, etc.): A little  Moving from lying on back to sitting on the side of the bed?: A little  Moving to and from a bed to a chair (including a wheelchair)?: A little  Need to walk in hospital room?: A lot  Climbing 3-5 steps with a railing?: Unable    Plan  - Progressive mobility protocol initated  - PT/OT consulted  - Fall precautions in place

## 2025-06-28 NOTE — SUBJECTIVE & OBJECTIVE
Interval History:     Review of Systems   Constitutional:  Negative for appetite change, fatigue and fever.   HENT:  Negative for congestion, hearing loss and trouble swallowing.    Respiratory:  Negative for chest tightness, shortness of breath and wheezing.    Cardiovascular:  Negative for chest pain and palpitations.   Gastrointestinal:  Negative for abdominal pain, constipation and nausea.        Colostomy bag  Positive bowel sounds   Genitourinary:  Negative for difficulty urinating and dysuria.   Musculoskeletal:  Negative for back pain and neck stiffness.   Skin:  Negative for pallor and rash.   Neurological:  Negative for dizziness, speech difficulty and headaches.   Psychiatric/Behavioral:  Negative for confusion and suicidal ideas.      Objective:     Vital Signs (Most Recent):  Temp: 98.9 °F (37.2 °C) (06/27/25 2001)  Pulse: 90 (06/27/25 2001)  Resp: 20 (06/27/25 1849)  BP: 135/67 (06/27/25 2001)  SpO2: 96 % (06/27/25 2008) Vital Signs (24h Range):  Temp:  [97.5 °F (36.4 °C)-99.1 °F (37.3 °C)] 98.9 °F (37.2 °C)  Pulse:  [78-96] 90  Resp:  [18-20] 20  SpO2:  [94 %-98 %] 96 %  BP: (127-146)/(57-68) 135/67     Weight: 87.5 kg (193 lb)  Body mass index is 31.15 kg/m².    Intake/Output Summary (Last 24 hours) at 6/27/2025 2321  Last data filed at 6/27/2025 1213  Gross per 24 hour   Intake 120 ml   Output 1550 ml   Net -1430 ml         Physical Exam  Vitals reviewed.   Constitutional:       General: She is awake. She is not in acute distress.     Appearance: She is well-developed. She is not toxic-appearing.   HENT:      Head: Normocephalic.      Nose: Nose normal.      Mouth/Throat:      Pharynx: Oropharynx is clear.   Eyes:      Extraocular Movements: Extraocular movements intact.      Pupils: Pupils are equal, round, and reactive to light.   Neck:      Thyroid: No thyroid mass.      Vascular: No carotid bruit.   Cardiovascular:      Rate and Rhythm: Normal rate and regular rhythm.      Pulses: Normal pulses.       Heart sounds: Normal heart sounds. No murmur heard.  Pulmonary:      Effort: Pulmonary effort is normal.      Breath sounds: Normal breath sounds and air entry. No wheezing.   Abdominal:      General: Bowel sounds are normal. There is no distension.      Palpations: Abdomen is soft.      Tenderness: There is no abdominal tenderness. There is no rebound.   Musculoskeletal:         General: Normal range of motion.      Cervical back: Neck supple. No rigidity.   Skin:     General: Skin is warm.      Coloration: Skin is not jaundiced.      Findings: No lesion.   Neurological:      General: No focal deficit present.      Mental Status: She is alert and oriented to person, place, and time.      Cranial Nerves: No cranial nerve deficit.   Psychiatric:         Attention and Perception: Attention normal.         Mood and Affect: Mood normal.         Behavior: Behavior normal. Behavior is cooperative.         Thought Content: Thought content normal.         Cognition and Memory: Cognition normal.               Significant Labs: All pertinent labs within the past 24 hours have been reviewed.  BMP:   Recent Labs   Lab 06/27/25  0330         K 4.5      CO2 27   BUN 7*   CREATININE 0.60   CALCIUM 9.9     CBC:   Recent Labs   Lab 06/27/25  0330   WBC 8.83   HGB 9.9*   HCT 30.8*        CMP:   Recent Labs   Lab 06/27/25  0330      K 4.5      CO2 27      BUN 7*   CREATININE 0.60   CALCIUM 9.9   PROT 6.4   ALBUMIN 2.1*   BILITOT 0.3   ALKPHOS 86   AST 26   ALT 13   ANIONGAP 13       Significant Imaging: I have reviewed all pertinent imaging results/findings within the past 24 hours.    Imaging Results               CT Abdomen Pelvis  Without Contrast (Final result)  Result time 06/22/25 06:15:49      Final result by Pepe Nunez MD (06/22/25 06:15:49)                   Impression:      Colonic distension with retained fecal material secondary to obstructing lesion worrisome for  carcinoma at the level of the sigmoid colon LEFT lower quadrant.  Colonoscopy/surgical evaluation recommended.    The preliminary [facility] and final reports are concordant.    This report was flagged in Epic as abnormal.      Electronically signed by: Pepe Nunez MD  Date:    06/22/2025  Time:    06:15               Narrative:    EXAMINATION:  CT ABDOMEN PELVIS WITHOUT CONTRAST    CLINICAL HISTORY:  Abdominal pain, acute, nonlocalized;    TECHNIQUE:  Low dose axial images, sagittal and coronal reformations were obtained from the lung bases to the pubic symphysis, Oral contrast was not administered.    COMPARISON:  08/10/2024    FINDINGS:  Heart: Normal in size. No pericardial effusion.    Lung Bases: Well aerated, without consolidation or pleural fluid.    Liver: Normal in size and attenuation, with lobulated cystic lesion unchanged from 08/10/2024 LEFT hepatic lobe    Gallbladder: Cholecystectomy    Bile Ducts: No evidence of dilated ducts.    Pancreas: No mass or peripancreatic fat stranding.    Spleen: Unremarkable.    Adrenals: Unremarkable.    Kidneys/ Ureters: Unremarkable.    Bladder: No evidence of wall thickening.    Reproductive organs: Hysterectomy.    GI Tract/Mesentery: There is dilatation of the colon with retention of stool to the level of the sigmoid colon.  There is rectosigmoid diverticulosis.  At the level of the sigmoid colon, there is abrupt narrowing on axial series 2, image 103 coronal series 601 image 27, with soft tissue density at that level, worrisome for that of obstructing neoplasm at the level of the junction of descending colon and rectosigmoid LEFT lower quadrant.    Peritoneal Space: No ascites. No free air.    Retroperitoneum: No significant adenopathy.    Abdominal wall: Unremarkable.    Vasculature: No significant atherosclerosis or aneurysm.    Bones: No acute fracture.  Scoliosis                                        X-Ray Abdomen AP 1 View (KUB) (Final result)  Result time  06/22/25 06:15:08      Final result by Pepe Nunez MD (06/22/25 06:15:08)                   Impression:      Colonic distension to the level of the sigmoid colon.  CT same date demonstrated findings suspicious for obstructing neoplasm at the level of the sigmoid colon/LEFT lower quadrant.    This report was flagged in Epic as abnormal.      Electronically signed by: Pepe Nunez MD  Date:    06/22/2025  Time:    06:15               Narrative:    EXAMINATION:  XR ABDOMEN AP 1 VIEW    TECHNIQUE:  XR ABDOMEN AP 1 VIEW    FINDINGS:  Colonic distension to the level of the LEFT lower quadrant, consistent with CT examination same date, reflecting underlying suspicious narrowing, worrisome for carcinoma at the level of sigmoid colon.    No airspace consolidation at the lung bases.No acute bony abnormality.No abnormal soft tissue masses.No abnormal calcific densities.                                    Intake/Output - Last 3 Shifts         06/26 0700  06/27 0659 06/27 0700  06/28 0659    P.O.  120    NG/GT      Total Intake(mL/kg)  120 (1.4)    Urine (mL/kg/hr) 1 (0) 1500 (1)    Drains 100     Other 0     Stool 50     Total Output 151 1500    Net -151 -1380          Unmeasured Urine Occurrence 4 x           Microbiology Results (last 7 days)       ** No results found for the last 168 hours. **

## 2025-06-28 NOTE — ASSESSMENT & PLAN NOTE
Systolic blood pressure in the 80s; 1 L IV fluid bolus given; systolic improved to 100s    Holding any hypertensive medications

## 2025-06-28 NOTE — PROGRESS NOTES
[facility]  General Surgery  Progress Note    Subjective:     History of Present Illness:  No notes on file    Post-Op Info:  Procedure(s) (LRB):  LAPAROTOMY, EXPLORATORY (N/A)  CREATION, COLOSTOMY (N/A)  EXCISION, SMALL INTESTINE (N/A)   6 Days Post-Op     Interval History:  Stable, no acute events overnight.  Tolerating full liquid diet.  Positive gas, stool from ostomy    Medications:  Continuous Infusions:  Scheduled Meds:   allopurinoL  100 mg Oral Daily    enoxparin  40 mg Subcutaneous Daily    folic acid  1 mg Oral Daily    levothyroxine  50 mcg Oral Before breakfast    valproate sodium (DEPACON) IVPB  500 mg Intravenous QHS     PRN Meds:  Current Facility-Administered Medications:     albuterol, 2.5 mg, Nebulization, Q6H PRN    clorazepate, 7.5 mg, Oral, Nightly PRN    HYDROcodone-acetaminophen, 1 tablet, Oral, Q6H PRN    labetalol, 10 mg, Intravenous, Q4H PRN    naloxone, 0.4 mg, Intravenous, QID PRN    ondansetron, 4 mg, Intravenous, Q6H PRN     Review of patient's allergies indicates:   Allergen Reactions    Influenza virus vaccines      Pt states had to be admitted to hospital after last flu vaccine     Lexapro [escitalopram]      Objective:     Vital Signs (Most Recent):  Temp: 98 °F (36.7 °C) (06/28/25 0735)  Pulse: 88 (06/28/25 1147)  Resp: 20 (06/28/25 0816)  BP: 102/60 (06/28/25 1147)  SpO2: 95 % (06/28/25 1147) Vital Signs (24h Range):  Temp:  [97.8 °F (36.6 °C)-99.1 °F (37.3 °C)] 98 °F (36.7 °C)  Pulse:  [87-96] 88  Resp:  [16-20] 20  SpO2:  [93 %-96 %] 95 %  BP: ()/(47-68) 102/60     Weight: 88.5 kg (195 lb 3.2 oz)  Body mass index is 31.51 kg/m².    Intake/Output - Last 3 Shifts         06/26 0700 06/27 0659 06/27 0700 06/28 0659 06/28 0700 06/29 0659    P.O.  120     NG/GT       Total Intake(mL/kg)  120 (1.4)     Urine (mL/kg/hr) 1 (0) 1500 (0.7)     Drains 100      Other 0      Stool 50      Total Output 151 1500     Net -151 -2436             Unmeasured Urine Occurrence 4 x 1 x              Physical Exam  Constitutional:       General: She is not in acute distress.     Appearance: Normal appearance.   HENT:      Head: Normocephalic.   Cardiovascular:      Rate and Rhythm: Normal rate.   Pulmonary:      Effort: Pulmonary effort is normal. No respiratory distress.   Abdominal:      General: There is no distension.      Tenderness: There is no abdominal tenderness.          Comments: Colostomy with stool; midline incision clean and intact with staples, no signs of infection or drainage   Musculoskeletal:         General: Normal range of motion.   Skin:     General: Skin is warm.      Coloration: Skin is not jaundiced.   Neurological:      General: No focal deficit present.      Mental Status: She is alert and oriented to person, place, and time.      Cranial Nerves: No cranial nerve deficit.          Significant Labs:  I have reviewed all pertinent lab results within the past 24 hours.  CBC:   Recent Labs   Lab 06/28/25  0527   WBC 11.02*   RBC 3.84*   HGB 9.7*   HCT 30.6*      MCV 79.7*   MCH 25.3*   MCHC 31.7*     BMP:   Recent Labs   Lab 06/27/25  0330         K 4.5      CO2 27   BUN 7*   CREATININE 0.60   CALCIUM 9.9       Significant Diagnostics:  I have reviewed all pertinent imaging results/findings within the past 24 hours.  Assessment/Plan:     Hypotension  Systolic blood pressure in the 80s; 1 L IV fluid bolus given; systolic improved to 100s    Holding any hypertensive medications    Colon adenocarcinoma  status post exploratory laparotomy with sigmoid resection and colostomy    6/28:  Positive stool from ostomy.  Tolerating full liquid diet.  Advance to soft diet.  Patient okay to return to nursing home on Monday.    Some borderline hypotension; IV fluid bolus given and systolic pressure improved        Parker Delgado, DO  General Surgery  [facility] - [facility]

## 2025-06-28 NOTE — ASSESSMENT & PLAN NOTE
Vitals:    06/26/25 0747 06/26/25 1152 06/26/25 1637 06/26/25 1955   BP: 135/62 (!) 165/63 138/71 (!) 149/64    06/26/25 2343 06/27/25 0400 06/27/25 0806 06/27/25 1213   BP: 138/68 127/61 (!) 141/57 (!) 146/65    06/27/25 1701 06/27/25 2001   BP: 135/66 135/67       Patient's blood pressure range in the last 24 hours was: BP  Min: 127/61  Max: 146/65.The patient's inpatient anti-hypertensive regimen is listed below:  Current Antihypertensives  furosemide tablet 20 mg, Daily, Oral  losartan tablet 50 mg, Daily, Oral  labetaloL injection 10 mg, Every 4 hours PRN, Intravenous    Plan  - BP is controlled, no changes needed to their regimen

## 2025-06-28 NOTE — SUBJECTIVE & OBJECTIVE
Interval History:  Stable, no acute events overnight.  Tolerating full liquid diet.  Positive gas, stool from ostomy    Medications:  Continuous Infusions:  Scheduled Meds:   allopurinoL  100 mg Oral Daily    enoxparin  40 mg Subcutaneous Daily    folic acid  1 mg Oral Daily    levothyroxine  50 mcg Oral Before breakfast    valproate sodium (DEPACON) IVPB  500 mg Intravenous QHS     PRN Meds:  Current Facility-Administered Medications:     albuterol, 2.5 mg, Nebulization, Q6H PRN    clorazepate, 7.5 mg, Oral, Nightly PRN    HYDROcodone-acetaminophen, 1 tablet, Oral, Q6H PRN    labetalol, 10 mg, Intravenous, Q4H PRN    naloxone, 0.4 mg, Intravenous, QID PRN    ondansetron, 4 mg, Intravenous, Q6H PRN     Review of patient's allergies indicates:   Allergen Reactions    Influenza virus vaccines      Pt states had to be admitted to hospital after last flu vaccine     Lexapro [escitalopram]      Objective:     Vital Signs (Most Recent):  Temp: 98 °F (36.7 °C) (06/28/25 0735)  Pulse: 88 (06/28/25 1147)  Resp: 20 (06/28/25 0816)  BP: 102/60 (06/28/25 1147)  SpO2: 95 % (06/28/25 1147) Vital Signs (24h Range):  Temp:  [97.8 °F (36.6 °C)-99.1 °F (37.3 °C)] 98 °F (36.7 °C)  Pulse:  [87-96] 88  Resp:  [16-20] 20  SpO2:  [93 %-96 %] 95 %  BP: ()/(47-68) 102/60     Weight: 88.5 kg (195 lb 3.2 oz)  Body mass index is 31.51 kg/m².    Intake/Output - Last 3 Shifts         06/26 0700 06/27 0659 06/27 0700 06/28 0659 06/28 0700 06/29 0659    P.O.  120     NG/GT       Total Intake(mL/kg)  120 (1.4)     Urine (mL/kg/hr) 1 (0) 1500 (0.7)     Drains 100      Other 0      Stool 50      Total Output 151 1500     Net -151 -1380            Unmeasured Urine Occurrence 4 x 1 x              Physical Exam  Constitutional:       General: She is not in acute distress.     Appearance: Normal appearance.   HENT:      Head: Normocephalic.   Cardiovascular:      Rate and Rhythm: Normal rate.   Pulmonary:      Effort: Pulmonary effort is normal.  No respiratory distress.   Abdominal:      General: There is no distension.      Tenderness: There is no abdominal tenderness.          Comments: Colostomy with stool; midline incision clean and intact with staples, no signs of infection or drainage   Musculoskeletal:         General: Normal range of motion.   Skin:     General: Skin is warm.      Coloration: Skin is not jaundiced.   Neurological:      General: No focal deficit present.      Mental Status: She is alert and oriented to person, place, and time.      Cranial Nerves: No cranial nerve deficit.          Significant Labs:  I have reviewed all pertinent lab results within the past 24 hours.  CBC:   Recent Labs   Lab 06/28/25  0527   WBC 11.02*   RBC 3.84*   HGB 9.7*   HCT 30.6*      MCV 79.7*   MCH 25.3*   MCHC 31.7*     BMP:   Recent Labs   Lab 06/27/25  0330         K 4.5      CO2 27   BUN 7*   CREATININE 0.60   CALCIUM 9.9       Significant Diagnostics:  I have reviewed all pertinent imaging results/findings within the past 24 hours.

## 2025-06-29 LAB
CORTIS AM PEAK SERPL-MCNC: 13.2 ΜG/DL (ref 4.5–22.7)
URATE SERPL-MCNC: 4.7 MG/DL (ref 2.6–6)

## 2025-06-29 PROCEDURE — 82533 TOTAL CORTISOL: CPT | Performed by: HOSPITALIST

## 2025-06-29 PROCEDURE — 63600175 PHARM REV CODE 636 W HCPCS: Performed by: SURGERY

## 2025-06-29 PROCEDURE — 25000003 PHARM REV CODE 250: Performed by: HOSPITALIST

## 2025-06-29 PROCEDURE — 25000003 PHARM REV CODE 250: Performed by: NURSE PRACTITIONER

## 2025-06-29 PROCEDURE — 99233 SBSQ HOSP IP/OBS HIGH 50: CPT | Mod: 24,,, | Performed by: STUDENT IN AN ORGANIZED HEALTH CARE EDUCATION/TRAINING PROGRAM

## 2025-06-29 PROCEDURE — 99233 SBSQ HOSP IP/OBS HIGH 50: CPT | Mod: ,,, | Performed by: HOSPITALIST

## 2025-06-29 PROCEDURE — 63600175 PHARM REV CODE 636 W HCPCS: Performed by: INTERNAL MEDICINE

## 2025-06-29 PROCEDURE — 36415 COLL VENOUS BLD VENIPUNCTURE: CPT | Performed by: HOSPITALIST

## 2025-06-29 PROCEDURE — 84550 ASSAY OF BLOOD/URIC ACID: CPT | Performed by: HOSPITALIST

## 2025-06-29 PROCEDURE — 25000003 PHARM REV CODE 250: Performed by: INTERNAL MEDICINE

## 2025-06-29 PROCEDURE — 94761 N-INVAS EAR/PLS OXIMETRY MLT: CPT

## 2025-06-29 PROCEDURE — 11000001 HC ACUTE MED/SURG PRIVATE ROOM

## 2025-06-29 PROCEDURE — 99900035 HC TECH TIME PER 15 MIN (STAT)

## 2025-06-29 RX ORDER — PROCHLORPERAZINE EDISYLATE 5 MG/ML
5 INJECTION INTRAMUSCULAR; INTRAVENOUS EVERY 6 HOURS PRN
Status: DISCONTINUED | OUTPATIENT
Start: 2025-06-29 | End: 2025-06-30 | Stop reason: HOSPADM

## 2025-06-29 RX ORDER — OXYCODONE HYDROCHLORIDE 5 MG/1
10 TABLET ORAL EVERY 6 HOURS PRN
Refills: 0 | Status: DISCONTINUED | OUTPATIENT
Start: 2025-06-29 | End: 2025-06-30 | Stop reason: HOSPADM

## 2025-06-29 RX ORDER — ACETAMINOPHEN 500 MG
1000 TABLET ORAL 3 TIMES DAILY
Status: DISCONTINUED | OUTPATIENT
Start: 2025-06-29 | End: 2025-06-30 | Stop reason: HOSPADM

## 2025-06-29 RX ADMIN — HYDROCODONE BITARTRATE AND ACETAMINOPHEN 1 TABLET: 7.5; 325 TABLET ORAL at 01:06

## 2025-06-29 RX ADMIN — NAPROXEN 250 MG: 250 TABLET ORAL at 09:06

## 2025-06-29 RX ADMIN — ONDANSETRON 4 MG: 2 INJECTION INTRAMUSCULAR; INTRAVENOUS at 06:06

## 2025-06-29 RX ADMIN — NAPROXEN 250 MG: 250 TABLET ORAL at 01:06

## 2025-06-29 RX ADMIN — HYDROCODONE BITARTRATE AND ACETAMINOPHEN 1 TABLET: 7.5; 325 TABLET ORAL at 06:06

## 2025-06-29 RX ADMIN — ALLOPURINOL 200 MG: 100 TABLET ORAL at 09:06

## 2025-06-29 RX ADMIN — ENOXAPARIN SODIUM 40 MG: 40 INJECTION SUBCUTANEOUS at 04:06

## 2025-06-29 RX ADMIN — NAPROXEN 250 MG: 250 TABLET ORAL at 04:06

## 2025-06-29 RX ADMIN — LEVOTHYROXINE SODIUM 50 MCG: 0.05 TABLET ORAL at 06:06

## 2025-06-29 RX ADMIN — OXYCODONE HYDROCHLORIDE 10 MG: 5 TABLET ORAL at 08:06

## 2025-06-29 RX ADMIN — ONDANSETRON 4 MG: 2 INJECTION INTRAMUSCULAR; INTRAVENOUS at 08:06

## 2025-06-29 RX ADMIN — FOLIC ACID 1 MG: 1 TABLET ORAL at 09:06

## 2025-06-29 RX ADMIN — SODIUM CHLORIDE 500 MG: 9 INJECTION, SOLUTION INTRAVENOUS at 09:06

## 2025-06-29 RX ADMIN — HYDROCODONE BITARTRATE AND ACETAMINOPHEN 1 TABLET: 7.5; 325 TABLET ORAL at 03:06

## 2025-06-29 RX ADMIN — ACETAMINOPHEN 1000 MG: 500 TABLET ORAL at 08:06

## 2025-06-29 NOTE — SUBJECTIVE & OBJECTIVE
Interval History:  Stable, no acute events overnight.   Positive gas, stool from ostomy.  Did have some nausea this morning, no vomiting; nausea improved with Zofran    Medications:  Continuous Infusions:  Scheduled Meds:   allopurinoL  200 mg Oral Daily    enoxparin  40 mg Subcutaneous Daily    folic acid  1 mg Oral Daily    levothyroxine  50 mcg Oral Before breakfast    naproxen  250 mg Oral BID WM    valproate sodium (DEPACON) IVPB  500 mg Intravenous QHS     PRN Meds:  Current Facility-Administered Medications:     albuterol, 2.5 mg, Nebulization, Q6H PRN    clorazepate, 7.5 mg, Oral, Nightly PRN    HYDROcodone-acetaminophen, 1 tablet, Oral, Q6H PRN    labetalol, 10 mg, Intravenous, Q4H PRN    naloxone, 0.4 mg, Intravenous, QID PRN    ondansetron, 4 mg, Intravenous, Q6H PRN    prochlorperazine, 5 mg, Intravenous, Q6H PRN     Review of patient's allergies indicates:   Allergen Reactions    Influenza virus vaccines      Pt states had to be admitted to hospital after last flu vaccine     Lexapro [escitalopram]      Objective:     Vital Signs (Most Recent):  Temp: 98.8 °F (37.1 °C) (06/29/25 0805)  Pulse: 76 (06/29/25 0805)  Resp: 18 (06/29/25 0805)  BP: (!) 113/56 (06/29/25 0805)  SpO2: 99 % (06/29/25 0805) Vital Signs (24h Range):  Temp:  [97.4 °F (36.3 °C)-99.1 °F (37.3 °C)] 98.8 °F (37.1 °C)  Pulse:  [76-93] 76  Resp:  [17-20] 18  SpO2:  [92 %-100 %] 99 %  BP: ()/(48-69) 113/56     Weight: 88 kg (194 lb)  Body mass index is 31.31 kg/m².    Intake/Output - Last 3 Shifts         06/27 0700 06/28 0659 06/28 0700 06/29 0659 06/29 0700 06/30 0659    P.O. 120      Total Intake(mL/kg) 120 (1.4)      Urine (mL/kg/hr) 1500 (0.7) 600 (0.3) 200 (1.2)    Drains       Other  0     Stool  200     Total Output 1500 800 200    Net -1380 -800 -200           Unmeasured Urine Occurrence 1 x 1 x              Physical Exam  Constitutional:       General: She is not in acute distress.     Appearance: Normal appearance.    HENT:      Head: Normocephalic.   Cardiovascular:      Rate and Rhythm: Normal rate.   Pulmonary:      Effort: Pulmonary effort is normal. No respiratory distress.   Abdominal:      General: There is no distension.      Tenderness: There is no abdominal tenderness.          Comments: Colostomy with stool; midline incision clean and intact with staples, no signs of infection or drainage   Musculoskeletal:         General: Normal range of motion.   Skin:     General: Skin is warm.      Coloration: Skin is not jaundiced.   Neurological:      General: No focal deficit present.      Mental Status: She is alert and oriented to person, place, and time.      Cranial Nerves: No cranial nerve deficit.          Significant Labs:  I have reviewed all pertinent lab results within the past 24 hours.  CBC:   Recent Labs   Lab 06/28/25  0527   WBC 11.02*   RBC 3.84*   HGB 9.7*   HCT 30.6*      MCV 79.7*   MCH 25.3*   MCHC 31.7*     BMP:   Recent Labs   Lab 06/27/25  0330         K 4.5      CO2 27   BUN 7*   CREATININE 0.60   CALCIUM 9.9       Significant Diagnostics:  I have reviewed all pertinent imaging results/findings within the past 24 hours.

## 2025-06-29 NOTE — SUBJECTIVE & OBJECTIVE
Interval History:     Review of Systems   Constitutional:  Negative for appetite change, fatigue and fever.   HENT:  Negative for congestion, hearing loss and trouble swallowing.    Respiratory:  Negative for chest tightness, shortness of breath and wheezing.    Cardiovascular:  Negative for chest pain and palpitations.   Gastrointestinal:  Negative for abdominal pain, constipation and nausea.        Colostomy bag  Positive bowel sounds   Genitourinary:  Negative for difficulty urinating and dysuria.   Musculoskeletal:  Negative for back pain and neck stiffness.   Skin:  Negative for pallor and rash.   Neurological:  Negative for dizziness, speech difficulty and headaches.   Psychiatric/Behavioral:  Negative for confusion and suicidal ideas.      Objective:     Vital Signs (Most Recent):  Temp: 99.1 °F (37.3 °C) (06/28/25 1542)  Pulse: 93 (06/1945)  Resp: 18 (06/28/25 1917)  BP: (!) 92/48 (06/1945)  SpO2: (!) 92 % (06/1945) Vital Signs (24h Range):  Temp:  [97.8 °F (36.6 °C)-99.1 °F (37.3 °C)] 99.1 °F (37.3 °C)  Pulse:  [87-95] 93  Resp:  [16-20] 18  SpO2:  [92 %-100 %] 92 %  BP: ()/(47-68) 92/48     Weight: 88.5 kg (195 lb 3.2 oz)  Body mass index is 31.51 kg/m².  No intake or output data in the 24 hours ending 06/28/25 2200        Physical Exam  Vitals reviewed.   Constitutional:       General: She is awake. She is not in acute distress.     Appearance: She is well-developed. She is not toxic-appearing.   HENT:      Head: Normocephalic.      Nose: Nose normal.      Mouth/Throat:      Pharynx: Oropharynx is clear.   Eyes:      Extraocular Movements: Extraocular movements intact.      Pupils: Pupils are equal, round, and reactive to light.   Neck:      Thyroid: No thyroid mass.      Vascular: No carotid bruit.   Cardiovascular:      Rate and Rhythm: Normal rate and regular rhythm.      Pulses: Normal pulses.      Heart sounds: Normal heart sounds. No murmur heard.  Pulmonary:      Effort:  Pulmonary effort is normal.      Breath sounds: Normal breath sounds and air entry. No wheezing.   Abdominal:      General: Bowel sounds are normal. There is no distension.      Palpations: Abdomen is soft.      Tenderness: There is no abdominal tenderness. There is no rebound.   Musculoskeletal:         General: Normal range of motion.      Cervical back: Neck supple. No rigidity.   Skin:     General: Skin is warm.      Coloration: Skin is not jaundiced.      Findings: No lesion.   Neurological:      General: No focal deficit present.      Mental Status: She is alert and oriented to person, place, and time.      Cranial Nerves: No cranial nerve deficit.   Psychiatric:         Attention and Perception: Attention normal.         Mood and Affect: Mood normal.         Behavior: Behavior normal. Behavior is cooperative.         Thought Content: Thought content normal.         Cognition and Memory: Cognition normal.               Significant Labs: All pertinent labs within the past 24 hours have been reviewed.  BMP:   Recent Labs   Lab 06/27/25  0330         K 4.5      CO2 27   BUN 7*   CREATININE 0.60   CALCIUM 9.9     CBC:   Recent Labs   Lab 06/27/25  0330 06/28/25  0527   WBC 8.83 11.02*   HGB 9.9* 9.7*   HCT 30.8* 30.6*    373     CMP:   Recent Labs   Lab 06/27/25  0330      K 4.5      CO2 27      BUN 7*   CREATININE 0.60   CALCIUM 9.9   PROT 6.4   ALBUMIN 2.1*   BILITOT 0.3   ALKPHOS 86   AST 26   ALT 13   ANIONGAP 13       Significant Imaging: I have reviewed all pertinent imaging results/findings within the past 24 hours.    Imaging Results               CT Abdomen Pelvis  Without Contrast (Final result)  Result time 06/22/25 06:15:49      Final result by Pepe Nunez MD (06/22/25 06:15:49)                   Impression:      Colonic distension with retained fecal material secondary to obstructing lesion worrisome for carcinoma at the level of the sigmoid colon LEFT  lower quadrant.  Colonoscopy/surgical evaluation recommended.    The preliminary [facility] and final reports are concordant.    This report was flagged in Epic as abnormal.      Electronically signed by: Pepe Nunez MD  Date:    06/22/2025  Time:    06:15               Narrative:    EXAMINATION:  CT ABDOMEN PELVIS WITHOUT CONTRAST    CLINICAL HISTORY:  Abdominal pain, acute, nonlocalized;    TECHNIQUE:  Low dose axial images, sagittal and coronal reformations were obtained from the lung bases to the pubic symphysis, Oral contrast was not administered.    COMPARISON:  08/10/2024    FINDINGS:  Heart: Normal in size. No pericardial effusion.    Lung Bases: Well aerated, without consolidation or pleural fluid.    Liver: Normal in size and attenuation, with lobulated cystic lesion unchanged from 08/10/2024 LEFT hepatic lobe    Gallbladder: Cholecystectomy    Bile Ducts: No evidence of dilated ducts.    Pancreas: No mass or peripancreatic fat stranding.    Spleen: Unremarkable.    Adrenals: Unremarkable.    Kidneys/ Ureters: Unremarkable.    Bladder: No evidence of wall thickening.    Reproductive organs: Hysterectomy.    GI Tract/Mesentery: There is dilatation of the colon with retention of stool to the level of the sigmoid colon.  There is rectosigmoid diverticulosis.  At the level of the sigmoid colon, there is abrupt narrowing on axial series 2, image 103 coronal series 601 image 27, with soft tissue density at that level, worrisome for that of obstructing neoplasm at the level of the junction of descending colon and rectosigmoid LEFT lower quadrant.    Peritoneal Space: No ascites. No free air.    Retroperitoneum: No significant adenopathy.    Abdominal wall: Unremarkable.    Vasculature: No significant atherosclerosis or aneurysm.    Bones: No acute fracture.  Scoliosis                                        X-Ray Abdomen AP 1 View (KUB) (Final result)  Result time 06/22/25 06:15:08      Final result by Mayra  MD Pepe (06/22/25 06:15:08)                   Impression:      Colonic distension to the level of the sigmoid colon.  CT same date demonstrated findings suspicious for obstructing neoplasm at the level of the sigmoid colon/LEFT lower quadrant.    This report was flagged in Epic as abnormal.      Electronically signed by: Pepe Nunez MD  Date:    06/22/2025  Time:    06:15               Narrative:    EXAMINATION:  XR ABDOMEN AP 1 VIEW    TECHNIQUE:  XR ABDOMEN AP 1 VIEW    FINDINGS:  Colonic distension to the level of the LEFT lower quadrant, consistent with CT examination same date, reflecting underlying suspicious narrowing, worrisome for carcinoma at the level of sigmoid colon.    No airspace consolidation at the lung bases.No acute bony abnormality.No abnormal soft tissue masses.No abnormal calcific densities.                                    Intake/Output - Last 3 Shifts         06/27 0700  06/28 0659 06/28 0700  06/29 0659    P.O. 120     Total Intake(mL/kg) 120 (1.4)     Urine (mL/kg/hr) 1500 (0.7)     Drains      Other      Stool      Total Output 1500     Net -1380           Unmeasured Urine Occurrence 1 x           Microbiology Results (last 7 days)       ** No results found for the last 168 hours. **

## 2025-06-29 NOTE — PROGRESS NOTES
[facility] - [facility]  General Surgery  Progress Note    Subjective:     History of Present Illness:  No notes on file    Post-Op Info:  Procedure(s) (LRB):  LAPAROTOMY, EXPLORATORY (N/A)  CREATION, COLOSTOMY (N/A)  EXCISION, SMALL INTESTINE (N/A)   7 Days Post-Op     Interval History:  Stable, no acute events overnight.   Positive gas, stool from ostomy.  Did have some nausea this morning, no vomiting; nausea improved with Zofran    Medications:  Continuous Infusions:  Scheduled Meds:   allopurinoL  200 mg Oral Daily    enoxparin  40 mg Subcutaneous Daily    folic acid  1 mg Oral Daily    levothyroxine  50 mcg Oral Before breakfast    naproxen  250 mg Oral BID WM    valproate sodium (DEPACON) IVPB  500 mg Intravenous QHS     PRN Meds:  Current Facility-Administered Medications:     albuterol, 2.5 mg, Nebulization, Q6H PRN    clorazepate, 7.5 mg, Oral, Nightly PRN    HYDROcodone-acetaminophen, 1 tablet, Oral, Q6H PRN    labetalol, 10 mg, Intravenous, Q4H PRN    naloxone, 0.4 mg, Intravenous, QID PRN    ondansetron, 4 mg, Intravenous, Q6H PRN    prochlorperazine, 5 mg, Intravenous, Q6H PRN     Review of patient's allergies indicates:   Allergen Reactions    Influenza virus vaccines      Pt states had to be admitted to hospital after last flu vaccine     Lexapro [escitalopram]      Objective:     Vital Signs (Most Recent):  Temp: 98.8 °F (37.1 °C) (06/29/25 0805)  Pulse: 76 (06/29/25 0805)  Resp: 18 (06/29/25 0805)  BP: (!) 113/56 (06/29/25 0805)  SpO2: 99 % (06/29/25 0805) Vital Signs (24h Range):  Temp:  [97.4 °F (36.3 °C)-99.1 °F (37.3 °C)] 98.8 °F (37.1 °C)  Pulse:  [76-93] 76  Resp:  [17-20] 18  SpO2:  [92 %-100 %] 99 %  BP: ()/(48-69) 113/56     Weight: 88 kg (194 lb)  Body mass index is 31.31 kg/m².    Intake/Output - Last 3 Shifts         06/27 0700 06/28 0659 06/28 0700 06/29 0659 06/29 0700 06/30 0659    P.O. 120      Total Intake(mL/kg) 120 (1.4)      Urine (mL/kg/hr)  1500 (0.7) 600 (0.3) 200 (1.2)    Drains       Other  0     Stool  200     Total Output 1500 800 200    Net -1380 -800 -200           Unmeasured Urine Occurrence 1 x 1 x              Physical Exam  Constitutional:       General: She is not in acute distress.     Appearance: Normal appearance.   HENT:      Head: Normocephalic.   Cardiovascular:      Rate and Rhythm: Normal rate.   Pulmonary:      Effort: Pulmonary effort is normal. No respiratory distress.   Abdominal:      General: There is no distension.      Tenderness: There is no abdominal tenderness.          Comments: Colostomy with stool; midline incision clean and intact with staples, no signs of infection or drainage   Musculoskeletal:         General: Normal range of motion.   Skin:     General: Skin is warm.      Coloration: Skin is not jaundiced.   Neurological:      General: No focal deficit present.      Mental Status: She is alert and oriented to person, place, and time.      Cranial Nerves: No cranial nerve deficit.          Significant Labs:  I have reviewed all pertinent lab results within the past 24 hours.  CBC:   Recent Labs   Lab 06/28/25  0527   WBC 11.02*   RBC 3.84*   HGB 9.7*   HCT 30.6*      MCV 79.7*   MCH 25.3*   MCHC 31.7*     BMP:   Recent Labs   Lab 06/27/25  0330         K 4.5      CO2 27   BUN 7*   CREATININE 0.60   CALCIUM 9.9       Significant Diagnostics:  I have reviewed all pertinent imaging results/findings within the past 24 hours.  Assessment/Plan:     Hypotension  Systolic blood pressure in the 80s; 1 L IV fluid bolus given; systolic improved to 100s    Holding any hypertensive medications    Colon adenocarcinoma  status post exploratory laparotomy with sigmoid resection and colostomy    6/28:  Positive stool from ostomy.  Tolerating full liquid diet.  Advance to soft diet.  Patient okay to return to nursing home on Monday.    Some borderline hypotension; IV fluid bolus given and systolic pressure  improved    6/29: BP stable. Go slow with diet.  Added compazine.  If still nauseated, will get KUB        Parker Delgado, DO  General Surgery  [facility] - [address] [place] [address]

## 2025-06-29 NOTE — ASSESSMENT & PLAN NOTE
status post exploratory laparotomy with sigmoid resection and colostomy    6/28:  Positive stool from ostomy.  Tolerating full liquid diet.  Advance to soft diet.  Patient okay to return to nursing home on Monday.    Some borderline hypotension; IV fluid bolus given and systolic pressure improved    6/29: BP stable. Go slow with diet.  Added compazine.  If still nauseated, will get KUB

## 2025-06-29 NOTE — ASSESSMENT & PLAN NOTE
Creatine stable for now. BMP reviewed- noted Estimated Creatinine Clearance: 81 mL/min (based on SCr of 0.6 mg/dL). according to latest data. Based on current GFR, CKD stage is stage 3 - GFR 30-59.  Monitor UOP and serial BMP and adjust therapy as needed. Renally dose meds. Avoid nephrotoxic medications and procedures.

## 2025-06-29 NOTE — PROGRESS NOTES
[facility] - [address] [place] [address] Medicine  Progress Note    Patient Name: Umesh Lassiter  MRN: [id number]  Patient Class: IP- Inpatient   Admission Date: 6/21/2025  Length of Stay: 6 days  Attending Physician: Mannie Brannon MD  Primary Care Provider: Terri Smith FNP        Subjective     Principal Problem:Large bowel obstruction        HPI:  Mr. Lassiter is an 80 Y O male with PMH of epilepsy, HTN, hypothyroidism, PVD who presented to ED with abdominal pain nausea and vomiting for last 2 days. He reported pain is generalized and cramping in nature. He has not had any bowel movement for last 2-3 days and is barely passing gas. He did have abdominal discomfort and constipation issues over the last month. He denies fever, chills. He admits to weight loss. No blood in stool reported.     Overview/Hospital Course:  6/22- s/p ex-lap with sigmoid colectomy and colostomy plus small bowel resection. Home medications and PMH reviewed. Will continue to follow along.   6/23- On PCA for pain control. Pathology pending from resection. PT/OT/mobilize.   6/24- Doing Ok. Continue to follow along.   6/25- Labs and vitals stable. Remains NPO per primary team. May require TPN.   6/26- Doing Ok. Remains NPO per Gen Surg.     06/27 Records reviewed. Pt tolerating liquids. Positive BS. Increase activity  PMH of epilepsy, HTN, hypothyroidism, PVD   06/28 Diet advanced. Increase activity  Big toe and foot pain with minimal ROM sores feet she feels gout fairup.    Interval History:     Review of Systems   Constitutional:  Negative for appetite change, fatigue and fever.   HENT:  Negative for congestion, hearing loss and trouble swallowing.    Respiratory:  Negative for chest tightness, shortness of breath and wheezing.    Cardiovascular:  Negative for chest pain and palpitations.   Gastrointestinal:  Negative for abdominal pain, constipation and nausea.        Colostomy bag  Positive bowel sounds    Genitourinary:  Negative for difficulty urinating and dysuria.   Musculoskeletal:  Negative for back pain and neck stiffness.   Skin:  Negative for pallor and rash.   Neurological:  Negative for dizziness, speech difficulty and headaches.   Psychiatric/Behavioral:  Negative for confusion and suicidal ideas.      Objective:     Vital Signs (Most Recent):  Temp: 99.1 °F (37.3 °C) (06/28/25 1542)  Pulse: 93 (06/1943)  Resp: 18 (06/28/25 1917)  BP: (!) 92/48 (06/1943)  SpO2: (!) 92 % (06/1943) Vital Signs (24h Range):  Temp:  [97.8 °F (36.6 °C)-99.1 °F (37.3 °C)] 99.1 °F (37.3 °C)  Pulse:  [87-95] 93  Resp:  [16-20] 18  SpO2:  [92 %-100 %] 92 %  BP: ()/(47-68) 92/48     Weight: 88.5 kg (195 lb 3.2 oz)  Body mass index is 31.51 kg/m².  No intake or output data in the 24 hours ending 06/28/25 2200        Physical Exam  Vitals reviewed.   Constitutional:       General: She is awake. She is not in acute distress.     Appearance: She is well-developed. She is not toxic-appearing.   HENT:      Head: Normocephalic.      Nose: Nose normal.      Mouth/Throat:      Pharynx: Oropharynx is clear.   Eyes:      Extraocular Movements: Extraocular movements intact.      Pupils: Pupils are equal, round, and reactive to light.   Neck:      Thyroid: No thyroid mass.      Vascular: No carotid bruit.   Cardiovascular:      Rate and Rhythm: Normal rate and regular rhythm.      Pulses: Normal pulses.      Heart sounds: Normal heart sounds. No murmur heard.  Pulmonary:      Effort: Pulmonary effort is normal.      Breath sounds: Normal breath sounds and air entry. No wheezing.   Abdominal:      General: Bowel sounds are normal. There is no distension.      Palpations: Abdomen is soft.      Tenderness: There is no abdominal tenderness. There is no rebound.   Musculoskeletal:         General: Normal range of motion.      Cervical back: Neck supple. No rigidity.   Skin:     General: Skin is warm.      Coloration: Skin is  not jaundiced.      Findings: No lesion.   Neurological:      General: No focal deficit present.      Mental Status: She is alert and oriented to person, place, and time.      Cranial Nerves: No cranial nerve deficit.   Psychiatric:         Attention and Perception: Attention normal.         Mood and Affect: Mood normal.         Behavior: Behavior normal. Behavior is cooperative.         Thought Content: Thought content normal.         Cognition and Memory: Cognition normal.               Significant Labs: All pertinent labs within the past 24 hours have been reviewed.  BMP:   Recent Labs   Lab 06/27/25  0330         K 4.5      CO2 27   BUN 7*   CREATININE 0.60   CALCIUM 9.9     CBC:   Recent Labs   Lab 06/27/25  0330 06/28/25  0527   WBC 8.83 11.02*   HGB 9.9* 9.7*   HCT 30.8* 30.6*    373     CMP:   Recent Labs   Lab 06/27/25  0330      K 4.5      CO2 27      BUN 7*   CREATININE 0.60   CALCIUM 9.9   PROT 6.4   ALBUMIN 2.1*   BILITOT 0.3   ALKPHOS 86   AST 26   ALT 13   ANIONGAP 13       Significant Imaging: I have reviewed all pertinent imaging results/findings within the past 24 hours.    Imaging Results               CT Abdomen Pelvis  Without Contrast (Final result)  Result time 06/22/25 06:15:49      Final result by Pepe Nunez MD (06/22/25 06:15:49)                   Impression:      Colonic distension with retained fecal material secondary to obstructing lesion worrisome for carcinoma at the level of the sigmoid colon LEFT lower quadrant.  Colonoscopy/surgical evaluation recommended.    The preliminary STATRAD and final reports are concordant.    This report was flagged in Epic as abnormal.      Electronically signed by: Pepe Nunez MD  Date:    06/22/2025  Time:    06:15               Narrative:    EXAMINATION:  CT ABDOMEN PELVIS WITHOUT CONTRAST    CLINICAL HISTORY:  Abdominal pain, acute, nonlocalized;    TECHNIQUE:  Low dose axial images, sagittal and  coronal reformations were obtained from the lung bases to the pubic symphysis, Oral contrast was not administered.    COMPARISON:  08/10/2024    FINDINGS:  Heart: Normal in size. No pericardial effusion.    Lung Bases: Well aerated, without consolidation or pleural fluid.    Liver: Normal in size and attenuation, with lobulated cystic lesion unchanged from 08/10/2024 LEFT hepatic lobe    Gallbladder: Cholecystectomy    Bile Ducts: No evidence of dilated ducts.    Pancreas: No mass or peripancreatic fat stranding.    Spleen: Unremarkable.    Adrenals: Unremarkable.    Kidneys/ Ureters: Unremarkable.    Bladder: No evidence of wall thickening.    Reproductive organs: Hysterectomy.    GI Tract/Mesentery: There is dilatation of the colon with retention of stool to the level of the sigmoid colon.  There is rectosigmoid diverticulosis.  At the level of the sigmoid colon, there is abrupt narrowing on axial series 2, image 103 coronal series 601 image 27, with soft tissue density at that level, worrisome for that of obstructing neoplasm at the level of the junction of descending colon and rectosigmoid LEFT lower quadrant.    Peritoneal Space: No ascites. No free air.    Retroperitoneum: No significant adenopathy.    Abdominal wall: Unremarkable.    Vasculature: No significant atherosclerosis or aneurysm.    Bones: No acute fracture.  Scoliosis                                        X-Ray Abdomen AP 1 View (KUB) (Final result)  Result time 06/22/25 06:15:08      Final result by Pepe Nunez MD (06/22/25 06:15:08)                   Impression:      Colonic distension to the level of the sigmoid colon.  CT same date demonstrated findings suspicious for obstructing neoplasm at the level of the sigmoid colon/LEFT lower quadrant.    This report was flagged in Epic as abnormal.      Electronically signed by: Pepe Nunez MD  Date:    06/22/2025  Time:    06:15               Narrative:    EXAMINATION:  XR ABDOMEN AP 1  VIEW    TECHNIQUE:  XR ABDOMEN AP 1 VIEW    FINDINGS:  Colonic distension to the level of the LEFT lower quadrant, consistent with CT examination same date, reflecting underlying suspicious narrowing, worrisome for carcinoma at the level of sigmoid colon.    No airspace consolidation at the lung bases.No acute bony abnormality.No abnormal soft tissue masses.No abnormal calcific densities.                                    Intake/Output - Last 3 Shifts         06/27 0700  06/28 0659 06/28 0700  06/29 0659    P.O. 120     Total Intake(mL/kg) 120 (1.4)     Urine (mL/kg/hr) 1500 (0.7)     Drains      Other      Stool      Total Output 1500     Net -1380           Unmeasured Urine Occurrence 1 x           Microbiology Results (last 7 days)       ** No results found for the last 168 hours. **                Assessment & Plan  Large bowel obstruction  Acute post-operative pain  Colon adenocarcinoma  Presented to ED with abdominal pain and nausea/vomiting.   CT showed colonic distension with retained fecal material secondary to obstructing lesion worrisome for carcinoma at the level of the sigmoid colon LEFT lower quadrant.     Admitted to Gen Surg (Dr. Brannon) and s/p ex-lap on 6/22/25:     Procedures Performed: Exploratory laparotomy with sigmoid colectomy and end colostomy.  Small bowel resection     Significant Findings of the Procedure:  Multiple loops of dilated small bowel and very large distended large intestine.  Invasive malignant appearing mass in the mid sigmoid colon invading into the retroperitoneum and the adjacent loop of small bowel.  Initially was stuck and a small-bowel resection was performed upon extraction and dissection it did come apart but there was some likely desmoplastic reaction to the small bowel and this was sent with the colon.    Pathology showed:    Sigmoid colon:   - Moderately-differentiated adenocarcinoma of the sigmoid colon (4.1 cm)  - Tumor invades the pericolonic soft tissue  -  Diverticulitis  - Sixteen lymph nodes are negative for metastatic carcinoma (0/16)  - Margins of resection are negative for adenocarcinoma     Jejunum:  - Well-differentiated adenocarcinoma involving small bowel mesentery  - Submucosal lipoma  - Two lymph nodes are negative for metastatic carcinoma (0/2)  - Margins of resection appear viable and are uninvolved by adenocarcinoma    Management per primary.     Presented to ED with abdominal pain and nausea/vomiting.   CT showed colonic distension with retained fecal material secondary to obstructing lesion worrisome for carcinoma at the level of the sigmoid colon LEFT lower quadrant.     Admitted to Gen Surg (Dr. Brannon) and s/p ex-lap on 6/22/25:     Procedures Performed: Exploratory laparotomy with sigmoid colectomy and end colostomy.  Small bowel resection     Significant Findings of the Procedure:  Multiple loops of dilated small bowel and very large distended large intestine.  Invasive malignant appearing mass in the mid sigmoid colon invading into the retroperitoneum and the adjacent loop of small bowel.  Initially was stuck and a small-bowel resection was performed upon extraction and dissection it did come apart but there was some likely desmoplastic reaction to the small bowel and this was sent with the colon.    Pathology showed:    Sigmoid colon:   - Moderately-differentiated adenocarcinoma of the sigmoid colon (4.1 cm)  - Tumor invades the pericolonic soft tissue  - Diverticulitis  - Sixteen lymph nodes are negative for metastatic carcinoma (0/16)  - Margins of resection are negative for adenocarcinoma     Jejunum:  - Well-differentiated adenocarcinoma involving small bowel mesentery  - Submucosal lipoma  - Two lymph nodes are negative for metastatic carcinoma (0/2)  - Margins of resection appear viable and are uninvolved by adenocarcinoma    Management per primary.     06/27 less pain    Hypertension    Vitals:    06/27/25 2001 06/28/25 0007 06/28/25 0446  06/28/25 0509   BP: 135/67 103/68 (!) 85/51 (!) 87/48    06/28/25 0627 06/28/25 0632 06/28/25 0735 06/28/25 1147   BP: (!) 88/52 (!) 90/47 (!) 99/49 102/60    06/28/25 1542 06/1943   BP: (!) 103/56 (!) 92/48       Patient's blood pressure range in the last 24 hours was: BP  Min: 85/51  Max: 103/56.The patient's inpatient anti-hypertensive regimen is listed below:  Current Antihypertensives  labetaloL injection 10 mg, Every 4 hours PRN, Intravenous    Plan  - BP is controlled, no changes needed to their regimen    CKD stage 3a, GFR 45-59 ml/min  Creatine stable for now. BMP reviewed- noted Estimated Creatinine Clearance: 81 mL/min (based on SCr of 0.6 mg/dL). according to latest data. Based on current GFR, CKD stage is stage 3 - GFR 30-59.  Monitor UOP and serial BMP and adjust therapy as needed. Renally dose meds. Avoid nephrotoxic medications and procedures.    Hypothyroidism  TSH WNL.   Resume home Synthroid.     Generalized epilepsy  Resume home valproate but using IV as absorption might be questionable given bowel surgery.   Valproate levels checked.     PVD (peripheral vascular disease)  Resume home Pletal when Ok with surgery team.     Debility  Patient with Acute debility due to neoplastic/malignant related fatigue. The patient's latest AMPAC (Activity Measure for Post Acute Care) Score is listed below.    AM-PAC Score - How much help does the patient need for each activity listed  Basic Mobility Total Score: 15  Turning over in bed (including adjusting bedclothes, sheets and blankets)?: A little  Sitting down on and standing up from a chair with arms (e.g., wheelchair, bedside commode, etc.): A little  Moving from lying on back to sitting on the side of the bed?: A little  Moving to and from a bed to a chair (including a wheelchair)?: A little  Need to walk in hospital room?: A lot  Climbing 3-5 steps with a railing?: Unable    Plan  - Progressive mobility protocol initated  - PT/OT consulted  - Fall  precautions in place    Folic acid deficiency  replace    Hypotension    Watch   VTE Risk Mitigation (From admission, onward)           Ordered     enoxaparin injection 40 mg  Daily         06/22/25 1306     IP VTE HIGH RISK PATIENT  Once         06/22/25 1306     Place sequential compression device  Until discontinued         06/22/25 1306                    Discharge Planning   CARMEN: 6/30/2025     Code Status: Full Code   Medical Readiness for Discharge Date:   Discharge Plan A: Return to nursing home, Skilled Nursing Facility                  Please place Justification for DME      Dick Ventura MD  Department of Hospital Medicine   [facility] - [facility]

## 2025-06-29 NOTE — ASSESSMENT & PLAN NOTE
Vitals:    06/27/25 2001 06/28/25 0007 06/28/25 0446 06/28/25 0509   BP: 135/67 103/68 (!) 85/51 (!) 87/48    06/28/25 0627 06/28/25 0632 06/28/25 0735 06/28/25 1147   BP: (!) 88/52 (!) 90/47 (!) 99/49 102/60    06/28/25 1542 06/1943   BP: (!) 103/56 (!) 92/48       Patient's blood pressure range in the last 24 hours was: BP  Min: 85/51  Max: 103/56.The patient's inpatient anti-hypertensive regimen is listed below:  Current Antihypertensives  labetaloL injection 10 mg, Every 4 hours PRN, Intravenous    Plan  - BP is controlled, no changes needed to their regimen

## 2025-06-30 VITALS
WEIGHT: 226.38 LBS | DIASTOLIC BLOOD PRESSURE: 55 MMHG | OXYGEN SATURATION: 95 % | RESPIRATION RATE: 18 BRPM | TEMPERATURE: 99 F | SYSTOLIC BLOOD PRESSURE: 116 MMHG | HEART RATE: 78 BPM | BODY MASS INDEX: 36.38 KG/M2 | HEIGHT: 66 IN

## 2025-06-30 LAB
ALBUMIN SERPL BCP-MCNC: 2.2 G/DL (ref 3.4–4.8)
ALBUMIN/GLOB SERPL: 0.6 {RATIO}
ALP SERPL-CCNC: 78 U/L (ref 40–150)
ALT SERPL W P-5'-P-CCNC: 12 U/L
ANION GAP SERPL CALCULATED.3IONS-SCNC: 12 MMOL/L (ref 7–16)
ANISOCYTOSIS BLD QL SMEAR: ABNORMAL
AST SERPL W P-5'-P-CCNC: 26 U/L (ref 11–45)
BASOPHILS # BLD AUTO: 0.04 K/UL (ref 0–0.2)
BASOPHILS NFR BLD AUTO: 0.5 % (ref 0–1)
BASOPHILS NFR BLD MANUAL: 1 % (ref 0–1)
BILIRUB SERPL-MCNC: 0.1 MG/DL
BUN SERPL-MCNC: 16 MG/DL (ref 10–20)
BUN/CREAT SERPL: 19 (ref 6–20)
CALCIUM SERPL-MCNC: 8.8 MG/DL (ref 8.4–10.2)
CHLORIDE SERPL-SCNC: 104 MMOL/L (ref 98–107)
CO2 SERPL-SCNC: 25 MMOL/L (ref 23–31)
CREAT SERPL-MCNC: 0.86 MG/DL (ref 0.55–1.02)
DIFFERENTIAL METHOD BLD: ABNORMAL
EGFR (NO RACE VARIABLE) (RUSH/TITUS): 68 ML/MIN/1.73M2
EOSINOPHIL # BLD AUTO: 0.65 K/UL (ref 0–0.5)
EOSINOPHIL NFR BLD AUTO: 7.8 % (ref 1–4)
EOSINOPHIL NFR BLD MANUAL: 5 % (ref 1–4)
ERYTHROCYTE [DISTWIDTH] IN BLOOD BY AUTOMATED COUNT: 16.5 % (ref 11.5–14.5)
GLOBULIN SER-MCNC: 3.6 G/DL (ref 2–4)
GLUCOSE SERPL-MCNC: 96 MG/DL (ref 82–115)
HCT VFR BLD AUTO: 27.7 % (ref 38–47)
HGB BLD-MCNC: 8.7 G/DL (ref 12–16)
HYPOCHROMIA BLD QL SMEAR: ABNORMAL
IMM GRANULOCYTES # BLD AUTO: 0.47 K/UL (ref 0–0.04)
IMM GRANULOCYTES NFR BLD: 5.7 % (ref 0–0.4)
LYMPHOCYTES # BLD AUTO: 2.06 K/UL (ref 1–4.8)
LYMPHOCYTES NFR BLD AUTO: 24.8 % (ref 27–41)
LYMPHOCYTES NFR BLD MANUAL: 27 % (ref 27–41)
MCH RBC QN AUTO: 25.1 PG (ref 27–31)
MCHC RBC AUTO-ENTMCNC: 31.4 G/DL (ref 32–36)
MCV RBC AUTO: 80.1 FL (ref 80–96)
MONOCYTES # BLD AUTO: 1.28 K/UL (ref 0–0.8)
MONOCYTES NFR BLD AUTO: 15.4 % (ref 2–6)
MONOCYTES NFR BLD MANUAL: 12 % (ref 2–6)
MPC BLD CALC-MCNC: 10.6 FL (ref 9.4–12.4)
NEUTROPHILS # BLD AUTO: 3.79 K/UL (ref 1.8–7.7)
NEUTROPHILS NFR BLD AUTO: 45.8 % (ref 53–65)
NEUTS BAND NFR BLD MANUAL: 7 % (ref 1–5)
NEUTS SEG NFR BLD MANUAL: 48 % (ref 50–62)
NRBC # BLD AUTO: 0 X10E3/UL
NRBC, AUTO (.00): 0 %
OVALOCYTES BLD QL SMEAR: ABNORMAL
PLATELET # BLD AUTO: 401 K/UL (ref 150–400)
PLATELET MORPHOLOGY: ABNORMAL
POTASSIUM SERPL-SCNC: 4.2 MMOL/L (ref 3.5–5.1)
PROT SERPL-MCNC: 5.8 G/DL (ref 5.8–7.6)
RBC # BLD AUTO: 3.46 M/UL (ref 4.2–5.4)
SODIUM SERPL-SCNC: 137 MMOL/L (ref 136–145)
TARGETS BLD QL SMEAR: ABNORMAL
WBC # BLD AUTO: 8.29 K/UL (ref 4.5–11)

## 2025-06-30 PROCEDURE — 97530 THERAPEUTIC ACTIVITIES: CPT | Mod: CO

## 2025-06-30 PROCEDURE — 97116 GAIT TRAINING THERAPY: CPT | Mod: CQ

## 2025-06-30 PROCEDURE — 25000003 PHARM REV CODE 250: Performed by: HOSPITALIST

## 2025-06-30 PROCEDURE — 63600175 PHARM REV CODE 636 W HCPCS: Performed by: SURGERY

## 2025-06-30 PROCEDURE — 85025 COMPLETE CBC W/AUTO DIFF WBC: CPT | Performed by: INTERNAL MEDICINE

## 2025-06-30 PROCEDURE — 99900035 HC TECH TIME PER 15 MIN (STAT)

## 2025-06-30 PROCEDURE — 97110 THERAPEUTIC EXERCISES: CPT | Mod: CQ

## 2025-06-30 PROCEDURE — 80053 COMPREHEN METABOLIC PANEL: CPT | Performed by: INTERNAL MEDICINE

## 2025-06-30 PROCEDURE — 25000003 PHARM REV CODE 250: Performed by: INTERNAL MEDICINE

## 2025-06-30 PROCEDURE — 94761 N-INVAS EAR/PLS OXIMETRY MLT: CPT

## 2025-06-30 PROCEDURE — 99233 SBSQ HOSP IP/OBS HIGH 50: CPT | Mod: ,,, | Performed by: HOSPITALIST

## 2025-06-30 PROCEDURE — 36415 COLL VENOUS BLD VENIPUNCTURE: CPT | Performed by: INTERNAL MEDICINE

## 2025-06-30 RX ORDER — HYDROCODONE BITARTRATE AND ACETAMINOPHEN 7.5; 325 MG/1; MG/1
1 TABLET ORAL EVERY 6 HOURS PRN
Qty: 21 TABLET | Refills: 0 | Status: SHIPPED | OUTPATIENT
Start: 2025-06-30 | End: 2025-06-30

## 2025-06-30 RX ORDER — ONDANSETRON 4 MG/1
4 TABLET, ORALLY DISINTEGRATING ORAL EVERY 6 HOURS PRN
Qty: 15 TABLET | Refills: 0 | Status: SHIPPED | OUTPATIENT
Start: 2025-06-30

## 2025-06-30 RX ORDER — HYDROCODONE BITARTRATE AND ACETAMINOPHEN 7.5; 325 MG/1; MG/1
1 TABLET ORAL EVERY 6 HOURS PRN
Qty: 21 TABLET | Refills: 0 | Status: SHIPPED | OUTPATIENT
Start: 2025-06-30

## 2025-06-30 RX ADMIN — ACETAMINOPHEN 1000 MG: 500 TABLET ORAL at 03:06

## 2025-06-30 RX ADMIN — OXYCODONE HYDROCHLORIDE 10 MG: 5 TABLET ORAL at 09:06

## 2025-06-30 RX ADMIN — OXYCODONE HYDROCHLORIDE 10 MG: 5 TABLET ORAL at 01:06

## 2025-06-30 RX ADMIN — ALLOPURINOL 200 MG: 100 TABLET ORAL at 09:06

## 2025-06-30 RX ADMIN — ACETAMINOPHEN 1000 MG: 500 TABLET ORAL at 09:06

## 2025-06-30 RX ADMIN — OXYCODONE HYDROCHLORIDE 10 MG: 5 TABLET ORAL at 03:06

## 2025-06-30 RX ADMIN — ENOXAPARIN SODIUM 40 MG: 40 INJECTION SUBCUTANEOUS at 04:06

## 2025-06-30 RX ADMIN — FOLIC ACID 1 MG: 1 TABLET ORAL at 09:06

## 2025-06-30 RX ADMIN — LEVOTHYROXINE SODIUM 50 MCG: 0.05 TABLET ORAL at 06:06

## 2025-06-30 NOTE — CHAPLAIN
I visited ms. Lassiter as a follow up to previous visits.  When asked how she was doing, she indicated not so good but did not give any specifics.  Permission to pray was obtained.    Will continue to follow while in the hospital.      Rev Libia Keys    Spiritual Care Department  [phone]

## 2025-06-30 NOTE — PLAN OF CARE
[facility] - [facility] Telemetry  Discharge Final Note    Primary Care Provider: Terri Smith FNP    Expected Discharge Date: 6/30/2025    Final Discharge Note (most recent)       Final Note - 06/30/25 1042          Final Note    Assessment Type Final Discharge Note     Anticipated Discharge Disposition Skilled Nursing Facility     What phone number can be called within the next 1-3 days to see how you are doing after discharge? [phone]        Post-Acute Status    Post-Acute Authorization Placement     Post-Acute Placement Status Set-up Complete/Auth obtained     Patient choice form signed by patient/caregiver List with quality metrics by geographic area provided;List from CMS Compare;List from System Post-Acute Care     Discharge Delays None known at this time                     Important Message from Medicare  Important Message from Medicare regarding Discharge Appeal Rights: Given to patient/caregiver, Explained to patient/caregiver, Signed/date by patient/caregiver     Date IMM was signed: 06/30/25  Time IMM was signed: 0900     Follow-up providers       Mannie Brannon MD   Specialty: General Surgery, Surgery    [address] [place] [address] [place] [address]   Phone: [phone]       Next Steps: Follow up in 2 week(s)              After-discharge care                Destination       [facility]   Service: Skilled Nursing    [address] [place] [address]   Phone: [phone]                             Pt dc to [facility]. Ss faxed dc paperwork. IM updated. No further needs noted

## 2025-06-30 NOTE — DISCHARGE INSTRUCTIONS
Daily ostomy care as instructed.     Hospitalist Discharge orders  *Notify your healthcare provider if you experience any of the following: temperature >100.4  *Notify your healthcare provider if you experience any of the following: redness, tenderness, or any signs or symptoms of infection (pain, swelling, redness, odor or green/yellow drainage around incision site).  *Notify your healthcare provider if you experience any of the following: difficulty breathing or increased cough.  *Notify your physician if you experience any persistent nausea, vomiting, diarrhea or headache.  *Notify your physician if you experience any of the following: severe uncontrolled pain, worsening rash, increased confusion, weakness, dizziness, lightheadedness, or visual disturbances.

## 2025-06-30 NOTE — ASSESSMENT & PLAN NOTE
Uric acid less than 5 in symptoms to feed her likely arthritis not gout.  This would go along with no severe inflammatory changes to big toe.  Symptoms did improve with low-dose nonsteroidal anti-inflammatory agents

## 2025-06-30 NOTE — NURSING
[facility] papers faxed. Report called to Leona at [facility]. Discharge instructions provided, verbally expressed understanding. Patient belongings gathered. Pt resting, awaiting transport. Safety measures in place. Will cont to monitor.

## 2025-06-30 NOTE — ASSESSMENT & PLAN NOTE
Creatine stable for now. BMP reviewed- noted Estimated Creatinine Clearance: 80.8 mL/min (based on SCr of 0.6 mg/dL). according to latest data. Based on current GFR, CKD stage is stage 3 - GFR 30-59.  Monitor UOP and serial BMP and adjust therapy as needed. Renally dose meds. Avoid nephrotoxic medications and procedures.

## 2025-06-30 NOTE — ASSESSMENT & PLAN NOTE
Vitals:    06/28/25 0735 06/28/25 1147 06/28/25 1542 06/1943   BP: (!) 99/49 102/60 (!) 103/56 (!) 92/48    06/29/25 0029 06/29/25 0410 06/29/25 0805 06/29/25 1226   BP: 108/69 (!) 99/51 (!) 113/56 (!) 111/52    06/29/25 1659 06/29/25 1959   BP: (!) 102/53 128/72       Patient's blood pressure range in the last 24 hours was: BP  Min: 99/51  Max: 128/72.The patient's inpatient anti-hypertensive regimen is listed below:  Current Antihypertensives  labetaloL injection 10 mg, Every 4 hours PRN, Intravenous    Plan  - BP is controlled, no changes needed to their regimen

## 2025-06-30 NOTE — ASSESSMENT & PLAN NOTE
Presented to ED with abdominal pain and nausea/vomiting.   CT showed colonic distension with retained fecal material secondary to obstructing lesion worrisome for carcinoma at the level of the sigmoid colon LEFT lower quadrant.     Admitted to Gen Surg (Dr. Brannon) and s/p ex-lap on 6/22/25:     Procedures Performed: Exploratory laparotomy with sigmoid colectomy and end colostomy.  Small bowel resection     Significant Findings of the Procedure:  Multiple loops of dilated small bowel and very large distended large intestine.  Invasive malignant appearing mass in the mid sigmoid colon invading into the retroperitoneum and the adjacent loop of small bowel.  Initially was stuck and a small-bowel resection was performed upon extraction and dissection it did come apart but there was some likely desmoplastic reaction to the small bowel and this was sent with the colon.    Pathology showed:    Sigmoid colon:   - Moderately-differentiated adenocarcinoma of the sigmoid colon (4.1 cm)  - Tumor invades the pericolonic soft tissue  - Diverticulitis  - Sixteen lymph nodes are negative for metastatic carcinoma (0/16)  - Margins of resection are negative for adenocarcinoma     Jejunum:  - Well-differentiated adenocarcinoma involving small bowel mesentery  - Submucosal lipoma  - Two lymph nodes are negative for metastatic carcinoma (0/2)  - Margins of resection appear viable and are uninvolved by adenocarcinoma    Management per primary.     Presented to ED with abdominal pain and nausea/vomiting.   CT showed colonic distension with retained fecal material secondary to obstructing lesion worrisome for carcinoma at the level of the sigmoid colon LEFT lower quadrant.     Admitted to Gen Surg (Dr. Brannon) and s/p ex-lap on 6/22/25:     Procedures Performed: Exploratory laparotomy with sigmoid colectomy and end colostomy.  Small bowel resection     Significant Findings of the Procedure:  Multiple loops of dilated small bowel and very large  distended large intestine.  Invasive malignant appearing mass in the mid sigmoid colon invading into the retroperitoneum and the adjacent loop of small bowel.  Initially was stuck and a small-bowel resection was performed upon extraction and dissection it did come apart but there was some likely desmoplastic reaction to the small bowel and this was sent with the colon.    Pathology showed:    Sigmoid colon:   - Moderately-differentiated adenocarcinoma of the sigmoid colon (4.1 cm)  - Tumor invades the pericolonic soft tissue  - Diverticulitis  - Sixteen lymph nodes are negative for metastatic carcinoma (0/16)  - Margins of resection are negative for adenocarcinoma     Jejunum:  - Well-differentiated adenocarcinoma involving small bowel mesentery  - Submucosal lipoma  - Two lymph nodes are negative for metastatic carcinoma (0/2)  - Margins of resection appear viable and are uninvolved by adenocarcinoma    Management per primary.     06/27 less pain    Presented to ED with abdominal pain and nausea/vomiting.   CT showed colonic distension with retained fecal material secondary to obstructing lesion worrisome for carcinoma at the level of the sigmoid colon LEFT lower quadrant.     Admitted to Gen Surg (Dr. Brannon) and s/p ex-lap on 6/22/25:     Procedures Performed: Exploratory laparotomy with sigmoid colectomy and end colostomy.  Small bowel resection     Significant Findings of the Procedure:  Multiple loops of dilated small bowel and very large distended large intestine.  Invasive malignant appearing mass in the mid sigmoid colon invading into the retroperitoneum and the adjacent loop of small bowel.  Initially was stuck and a small-bowel resection was performed upon extraction and dissection it did come apart but there was some likely desmoplastic reaction to the small bowel and this was sent with the colon.    Pathology showed:    Sigmoid colon:   - Moderately-differentiated adenocarcinoma of the sigmoid colon (4.1  cm)  - Tumor invades the pericolonic soft tissue  - Diverticulitis  - Sixteen lymph nodes are negative for metastatic carcinoma (0/16)  - Margins of resection are negative for adenocarcinoma     Jejunum:  - Well-differentiated adenocarcinoma involving small bowel mesentery  - Submucosal lipoma  - Two lymph nodes are negative for metastatic carcinoma (0/2)  - Margins of resection appear viable and are uninvolved by adenocarcinoma    Management per primary.     Presented to ED with abdominal pain and nausea/vomiting.   CT showed colonic distension with retained fecal material secondary to obstructing lesion worrisome for carcinoma at the level of the sigmoid colon LEFT lower quadrant.     Admitted to Gen Surg (Dr. Brannon) and s/p ex-lap on 6/22/25:     Procedures Performed: Exploratory laparotomy with sigmoid colectomy and end colostomy.  Small bowel resection     Significant Findings of the Procedure:  Multiple loops of dilated small bowel and very large distended large intestine.  Invasive malignant appearing mass in the mid sigmoid colon invading into the retroperitoneum and the adjacent loop of small bowel.  Initially was stuck and a small-bowel resection was performed upon extraction and dissection it did come apart but there was some likely desmoplastic reaction to the small bowel and this was sent with the colon.    Pathology showed:    Sigmoid colon:   - Moderately-differentiated adenocarcinoma of the sigmoid colon (4.1 cm)  - Tumor invades the pericolonic soft tissue  - Diverticulitis  - Sixteen lymph nodes are negative for metastatic carcinoma (0/16)  - Margins of resection are negative for adenocarcinoma     Jejunum:  - Well-differentiated adenocarcinoma involving small bowel mesentery  - Submucosal lipoma  - Two lymph nodes are negative for metastatic carcinoma (0/2)  - Margins of resection appear viable and are uninvolved by adenocarcinoma    Management per primary.     06/27 less pain

## 2025-06-30 NOTE — NURSING
Pt discharged, transported via stretcher bed per [facility] [place] [facility].

## 2025-06-30 NOTE — PT/OT/SLP PROGRESS
Physical Therapy Treatment    Patient Name:  Umesh Lassiter   MRN:  [id number]    Recommendations:     Discharge Recommendations: Moderate Intensity Therapy  Discharge Equipment Recommendations: to be determined by next level of care  Barriers to discharge: ongoing medical treatment    Assessment:     Umesh Lassiter is a 80 y.o. female admitted with a medical diagnosis of Large bowel obstruction.  She presents with the following impairments/functional limitations: impaired self care skills, impaired endurance.    Pt participated well with PT, able to ambulate with RW and perform exercise much better than anticipated    PT POC discussed with Matthew Garcia,PT      Rehab Prognosis: Good and Fair; patient would benefit from acute skilled PT services to address these deficits and reach maximum level of function.    Recent Surgery: Procedure(s) (LRB):  LAPAROTOMY, EXPLORATORY (N/A)  CREATION, COLOSTOMY (N/A)  EXCISION, SMALL INTESTINE (N/A) 8 Days Post-Op    Plan:     During this hospitalization, patient to be seen 5 x/week to address the identified rehab impairments via gait training, therapeutic activities, therapeutic exercises and progress toward the following goals:    Plan of Care Expires:  07/23/25    Subjective     Chief Complaint: Large bowel obstruction   Patient/Family Comments/goals: pt agreeable  Pain/Comfort:         Objective:     Communicated with Codie Taylor RN prior to session.  Patient found HOB elevated with telemetry, SCD, colostomy upon PT entry to room.     General Precautions: Standard, fall  Orthopedic Precautions: N/A  Braces: N/A  Respiratory Status: Room air     Functional Mobility:  Bed Mobility:     Supine to Sit: minimum assistance  Transfers:     Sit to Stand:  minimum assistance and of 1-2 persons with rolling walker  Gait: 30' with RW and contact guard to minimum assist; slow selvin, mild axial flexion, pushing RW too far ahead at times      AM-PAC 6 CLICK MOBILITY  Turning over in bed  (including adjusting bedclothes, sheets and blankets)?: 4  Sitting down on and standing up from a chair with arms (e.g., wheelchair, bedside commode, etc.): 3  Moving from lying on back to sitting on the side of the bed?: 3  Moving to and from a bed to a chair (including a wheelchair)?: 3  Need to walk in hospital room?: 3  Climbing 3-5 steps with a railing?: 2  Basic Mobility Total Score: 18       Treatment & Education:  Pt performed 2 x 15 reps (B) LE exercises: ap, quad set, glut set, straight leg raise, hip ab/adduction, long arc quad, heel slide with active assist       Patient left up in chair with all lines intact and call button in reach..    GOALS:   Multidisciplinary Problems       Physical Therapy Goals          Problem: Physical Therapy    Goal Priority Disciplines Outcome Interventions   Physical Therapy Goal     PT, PT/OT Progressing    Description: Short Term Goals to be met by: 25    Patient will increase functional independence with mobility by performin. Supine to sit with contact guard assist  2. Sit to stand transfer with contact guard assist using Rolling walker  3. Bed to chair transfer with contact guard assist using Rolling walker  4. Gait  x 100 feet with contact guard assist using Rolling walker  5. Lower extremity exercise program x30 reps per handout, with assistance as needed    Long Term Goals to be met by: 25    Pt will regain full independent functional mobility with Rolling walker to return to home situation and prior activities of daily living.                        DME Justifications:      Time Tracking:     PT Received On: 25  PT Start Time: 1044     PT Stop Time: 1112  PT Total Time (min): 28 min     Billable Minutes: Gait Training 8 and Therapeutic Exercise 15    Treatment Type: Treatment  PT/PTA: PTA     Number of PTA visits since last PT visit: 2025

## 2025-06-30 NOTE — PLAN OF CARE
Matthew spoke with Nanette charge RN at [facility]. [place] requested that pt be at [facility] by 5pm. Matthew notified DOE Mackay. Pt's discharge has been completed and faxed to [facility] since 1030am.

## 2025-06-30 NOTE — ASSESSMENT & PLAN NOTE
status post exploratory laparotomy with sigmoid resection and colostomy    6/28:  Positive stool from ostomy.  Tolerating full liquid diet.  Advance to soft diet.  Patient okay to return to nursing home on Monday.    Some borderline hypotension; IV fluid bolus given and systolic pressure improved    6/29: BP stable. Go slow with diet.  Added compazine.  If still nauseated, will get KUB    06/30/25 nausea has resolved.  Patient will be discharged to nursing home today.

## 2025-06-30 NOTE — HOSPITAL COURSE
Patient is 8 days post op exploratory laparotomy, colostomy creation, and bowel resection.  Patient is doing well.  She is tolerating regular diet.  She had nausea yesterday but was relieved by zofran.  She denies any further nausea.  Abdomen is soft and non-distended.  Midline abdominal incision noted dry with staples intact.  Patient denies pain.  She will be discharged back to [facility] today.  Patient will follow up with General surgery clinic in 2 weeks.  Patient verbalizes understanding agrees with plan of care.  She will be given prescription for Norco and Zofran to take as needed for pain and nausea.  She was given written prescriptions of this to take back to the nursing home with her.

## 2025-06-30 NOTE — PROGRESS NOTES
[facility] - [address] [place] [address] Medicine  Progress Note    Patient Name: Umesh Lassiter  MRN: [id number]  Patient Class: IP- Inpatient   Admission Date: 6/21/2025  Length of Stay: 7 days  Attending Physician: Mannie Brannon MD  Primary Care Provider: Terri Smith FNP        Subjective     Principal Problem:Large bowel obstruction        HPI:  Mr. Lassiter is an 80 Y O male with PMH of epilepsy, HTN, hypothyroidism, PVD who presented to ED with abdominal pain nausea and vomiting for last 2 days. He reported pain is generalized and cramping in nature. He has not had any bowel movement for last 2-3 days and is barely passing gas. He did have abdominal discomfort and constipation issues over the last month. He denies fever, chills. He admits to weight loss. No blood in stool reported.     Overview/Hospital Course:  6/22- s/p ex-lap with sigmoid colectomy and colostomy plus small bowel resection. Home medications and PMH reviewed. Will continue to follow along.   6/23- On PCA for pain control. Pathology pending from resection. PT/OT/mobilize.   6/24- Doing Ok. Continue to follow along.   6/25- Labs and vitals stable. Remains NPO per primary team. May require TPN.   6/26- Doing Ok. Remains NPO per Gen Surg.     06/27 Records reviewed. Pt tolerating liquids. Positive BS. Increase activity  PMH of epilepsy, HTN, hypothyroidism, PVD   06/28 Diet advanced. Increase activity  06/29 tolerating diet, nonsteroidal anti-inflammatory help feet pain, feet pain likely arthritic not gout    Interval History:     Review of Systems   Constitutional:  Negative for appetite change, fatigue and fever.   HENT:  Negative for congestion, hearing loss and trouble swallowing.    Respiratory:  Negative for chest tightness, shortness of breath and wheezing.    Cardiovascular:  Negative for chest pain and palpitations.   Gastrointestinal:  Negative for abdominal pain, constipation and nausea.        Colostomy  bag  Positive bowel sounds   Genitourinary:  Negative for difficulty urinating and dysuria.   Musculoskeletal:  Negative for back pain and neck stiffness.   Skin:  Negative for pallor and rash.   Neurological:  Negative for dizziness, speech difficulty and headaches.   Psychiatric/Behavioral:  Negative for confusion and suicidal ideas.      Objective:     Vital Signs (Most Recent):  Temp: 98.6 °F (37 °C) (06/29/25 1959)  Pulse: 86 (06/29/25 1959)  Resp: 18 (06/29/25 2032)  BP: 128/72 (06/29/25 1959)  SpO2: 98 % (06/29/25 1959) Vital Signs (24h Range):  Temp:  [97.4 °F (36.3 °C)-98.8 °F (37.1 °C)] 98.6 °F (37 °C)  Pulse:  [76-93] 86  Resp:  [17-18] 18  SpO2:  [94 %-99 %] 98 %  BP: ()/(51-72) 128/72     Weight: 88 kg (194 lb)  Body mass index is 31.31 kg/m².    Intake/Output Summary (Last 24 hours) at 6/29/2025 2320  Last data filed at 6/29/2025 1700  Gross per 24 hour   Intake 360 ml   Output 650 ml   Net -290 ml           Physical Exam  Vitals reviewed.   Constitutional:       General: She is awake. She is not in acute distress.     Appearance: She is well-developed. She is not toxic-appearing.   HENT:      Head: Normocephalic.      Nose: Nose normal.      Mouth/Throat:      Pharynx: Oropharynx is clear.   Eyes:      Extraocular Movements: Extraocular movements intact.      Pupils: Pupils are equal, round, and reactive to light.   Neck:      Thyroid: No thyroid mass.      Vascular: No carotid bruit.   Cardiovascular:      Rate and Rhythm: Normal rate and regular rhythm.      Pulses: Normal pulses.      Heart sounds: Normal heart sounds. No murmur heard.  Pulmonary:      Effort: Pulmonary effort is normal.      Breath sounds: Normal breath sounds and air entry. No wheezing.   Abdominal:      General: Bowel sounds are normal. There is no distension.      Palpations: Abdomen is soft.      Tenderness: There is no abdominal tenderness. There is no rebound.   Musculoskeletal:         General: Normal range of motion.  "     Cervical back: Neck supple. No rigidity.   Skin:     General: Skin is warm.      Coloration: Skin is not jaundiced.      Findings: No lesion.   Neurological:      General: No focal deficit present.      Mental Status: She is alert and oriented to person, place, and time.      Cranial Nerves: No cranial nerve deficit.   Psychiatric:         Attention and Perception: Attention normal.         Mood and Affect: Mood normal.         Behavior: Behavior normal. Behavior is cooperative.         Thought Content: Thought content normal.         Cognition and Memory: Cognition normal.               Significant Labs: All pertinent labs within the past 24 hours have been reviewed.  BMP:   No results for input(s): "GLU", "NA", "K", "CL", "CO2", "BUN", "CREATININE", "CALCIUM", "MG" in the last 48 hours.    CBC:   Recent Labs   Lab 06/28/25  0527   WBC 11.02*   HGB 9.7*   HCT 30.6*        CMP:   No results for input(s): "NA", "K", "CL", "CO2", "GLU", "BUN", "CREATININE", "CALCIUM", "PROT", "ALBUMIN", "BILITOT", "ALKPHOS", "AST", "ALT", "ANIONGAP", "EGFRNONAA" in the last 48 hours.    Invalid input(s): "ESTGFAFRICA"      Significant Imaging: I have reviewed all pertinent imaging results/findings within the past 24 hours.    Imaging Results               CT Abdomen Pelvis  Without Contrast (Final result)  Result time 06/22/25 06:15:49      Final result by Pepe Nunez MD (06/22/25 06:15:49)                   Impression:      Colonic distension with retained fecal material secondary to obstructing lesion worrisome for carcinoma at the level of the sigmoid colon LEFT lower quadrant.  Colonoscopy/surgical evaluation recommended.    The preliminary STATRAD and final reports are concordant.    This report was flagged in Epic as abnormal.      Electronically signed by: Pepe Nunez MD  Date:    06/22/2025  Time:    06:15               Narrative:    EXAMINATION:  CT ABDOMEN PELVIS WITHOUT CONTRAST    CLINICAL " HISTORY:  Abdominal pain, acute, nonlocalized;    TECHNIQUE:  Low dose axial images, sagittal and coronal reformations were obtained from the lung bases to the pubic symphysis, Oral contrast was not administered.    COMPARISON:  08/10/2024    FINDINGS:  Heart: Normal in size. No pericardial effusion.    Lung Bases: Well aerated, without consolidation or pleural fluid.    Liver: Normal in size and attenuation, with lobulated cystic lesion unchanged from 08/10/2024 LEFT hepatic lobe    Gallbladder: Cholecystectomy    Bile Ducts: No evidence of dilated ducts.    Pancreas: No mass or peripancreatic fat stranding.    Spleen: Unremarkable.    Adrenals: Unremarkable.    Kidneys/ Ureters: Unremarkable.    Bladder: No evidence of wall thickening.    Reproductive organs: Hysterectomy.    GI Tract/Mesentery: There is dilatation of the colon with retention of stool to the level of the sigmoid colon.  There is rectosigmoid diverticulosis.  At the level of the sigmoid colon, there is abrupt narrowing on axial series 2, image 103 coronal series 601 image 27, with soft tissue density at that level, worrisome for that of obstructing neoplasm at the level of the junction of descending colon and rectosigmoid LEFT lower quadrant.    Peritoneal Space: No ascites. No free air.    Retroperitoneum: No significant adenopathy.    Abdominal wall: Unremarkable.    Vasculature: No significant atherosclerosis or aneurysm.    Bones: No acute fracture.  Scoliosis                                        X-Ray Abdomen AP 1 View (KUB) (Final result)  Result time 06/22/25 06:15:08      Final result by Pepe Nunez MD (06/22/25 06:15:08)                   Impression:      Colonic distension to the level of the sigmoid colon.  CT same date demonstrated findings suspicious for obstructing neoplasm at the level of the sigmoid colon/LEFT lower quadrant.    This report was flagged in Epic as abnormal.      Electronically signed by: Pepe Nunez  MD  Date:    06/22/2025  Time:    06:15               Narrative:    EXAMINATION:  XR ABDOMEN AP 1 VIEW    TECHNIQUE:  XR ABDOMEN AP 1 VIEW    FINDINGS:  Colonic distension to the level of the LEFT lower quadrant, consistent with CT examination same date, reflecting underlying suspicious narrowing, worrisome for carcinoma at the level of sigmoid colon.    No airspace consolidation at the lung bases.No acute bony abnormality.No abnormal soft tissue masses.No abnormal calcific densities.                                    Intake/Output - Last 3 Shifts         06/28 0700  06/29 0659 06/29 0700 06/30 0659    P.O.  360    Total Intake(mL/kg)  360 (4.1)    Urine (mL/kg/hr) 600 (0.3) 350 (0.2)    Other 0     Stool 200 100    Total Output 800 450    Net -800 -90          Unmeasured Urine Occurrence 1 x 1 x          Microbiology Results (last 7 days)       ** No results found for the last 168 hours. **                Assessment & Plan  Large bowel obstruction  Acute post-operative pain  Colon adenocarcinoma  Presented to ED with abdominal pain and nausea/vomiting.   CT showed colonic distension with retained fecal material secondary to obstructing lesion worrisome for carcinoma at the level of the sigmoid colon LEFT lower quadrant.     Admitted to Gen Surg (Dr. Brannon) and s/p ex-lap on 6/22/25:     Procedures Performed: Exploratory laparotomy with sigmoid colectomy and end colostomy.  Small bowel resection     Significant Findings of the Procedure:  Multiple loops of dilated small bowel and very large distended large intestine.  Invasive malignant appearing mass in the mid sigmoid colon invading into the retroperitoneum and the adjacent loop of small bowel.  Initially was stuck and a small-bowel resection was performed upon extraction and dissection it did come apart but there was some likely desmoplastic reaction to the small bowel and this was sent with the colon.    Pathology showed:    Sigmoid colon:   -  Moderately-differentiated adenocarcinoma of the sigmoid colon (4.1 cm)  - Tumor invades the pericolonic soft tissue  - Diverticulitis  - Sixteen lymph nodes are negative for metastatic carcinoma (0/16)  - Margins of resection are negative for adenocarcinoma     Jejunum:  - Well-differentiated adenocarcinoma involving small bowel mesentery  - Submucosal lipoma  - Two lymph nodes are negative for metastatic carcinoma (0/2)  - Margins of resection appear viable and are uninvolved by adenocarcinoma    Management per primary.     Presented to ED with abdominal pain and nausea/vomiting.   CT showed colonic distension with retained fecal material secondary to obstructing lesion worrisome for carcinoma at the level of the sigmoid colon LEFT lower quadrant.     Admitted to Gen Surg (Dr. Brannon) and s/p ex-lap on 6/22/25:     Procedures Performed: Exploratory laparotomy with sigmoid colectomy and end colostomy.  Small bowel resection     Significant Findings of the Procedure:  Multiple loops of dilated small bowel and very large distended large intestine.  Invasive malignant appearing mass in the mid sigmoid colon invading into the retroperitoneum and the adjacent loop of small bowel.  Initially was stuck and a small-bowel resection was performed upon extraction and dissection it did come apart but there was some likely desmoplastic reaction to the small bowel and this was sent with the colon.    Pathology showed:    Sigmoid colon:   - Moderately-differentiated adenocarcinoma of the sigmoid colon (4.1 cm)  - Tumor invades the pericolonic soft tissue  - Diverticulitis  - Sixteen lymph nodes are negative for metastatic carcinoma (0/16)  - Margins of resection are negative for adenocarcinoma     Jejunum:  - Well-differentiated adenocarcinoma involving small bowel mesentery  - Submucosal lipoma  - Two lymph nodes are negative for metastatic carcinoma (0/2)  - Margins of resection appear viable and are uninvolved by  adenocarcinoma    Management per primary.     06/27 less pain    Presented to ED with abdominal pain and nausea/vomiting.   CT showed colonic distension with retained fecal material secondary to obstructing lesion worrisome for carcinoma at the level of the sigmoid colon LEFT lower quadrant.     Admitted to Gen Surg (Dr. Brannon) and s/p ex-lap on 6/22/25:     Procedures Performed: Exploratory laparotomy with sigmoid colectomy and end colostomy.  Small bowel resection     Significant Findings of the Procedure:  Multiple loops of dilated small bowel and very large distended large intestine.  Invasive malignant appearing mass in the mid sigmoid colon invading into the retroperitoneum and the adjacent loop of small bowel.  Initially was stuck and a small-bowel resection was performed upon extraction and dissection it did come apart but there was some likely desmoplastic reaction to the small bowel and this was sent with the colon.    Pathology showed:    Sigmoid colon:   - Moderately-differentiated adenocarcinoma of the sigmoid colon (4.1 cm)  - Tumor invades the pericolonic soft tissue  - Diverticulitis  - Sixteen lymph nodes are negative for metastatic carcinoma (0/16)  - Margins of resection are negative for adenocarcinoma     Jejunum:  - Well-differentiated adenocarcinoma involving small bowel mesentery  - Submucosal lipoma  - Two lymph nodes are negative for metastatic carcinoma (0/2)  - Margins of resection appear viable and are uninvolved by adenocarcinoma    Management per primary.     Presented to ED with abdominal pain and nausea/vomiting.   CT showed colonic distension with retained fecal material secondary to obstructing lesion worrisome for carcinoma at the level of the sigmoid colon LEFT lower quadrant.     Admitted to Gen Surg (Dr. Brannon) and s/p ex-lap on 6/22/25:     Procedures Performed: Exploratory laparotomy with sigmoid colectomy and end colostomy.  Small bowel resection     Significant Findings of the  Procedure:  Multiple loops of dilated small bowel and very large distended large intestine.  Invasive malignant appearing mass in the mid sigmoid colon invading into the retroperitoneum and the adjacent loop of small bowel.  Initially was stuck and a small-bowel resection was performed upon extraction and dissection it did come apart but there was some likely desmoplastic reaction to the small bowel and this was sent with the colon.    Pathology showed:    Sigmoid colon:   - Moderately-differentiated adenocarcinoma of the sigmoid colon (4.1 cm)  - Tumor invades the pericolonic soft tissue  - Diverticulitis  - Sixteen lymph nodes are negative for metastatic carcinoma (0/16)  - Margins of resection are negative for adenocarcinoma     Jejunum:  - Well-differentiated adenocarcinoma involving small bowel mesentery  - Submucosal lipoma  - Two lymph nodes are negative for metastatic carcinoma (0/2)  - Margins of resection appear viable and are uninvolved by adenocarcinoma    Management per primary.     06/27 less pain    Hypertension    Vitals:    06/28/25 0735 06/28/25 1147 06/28/25 1542 06/1943   BP: (!) 99/49 102/60 (!) 103/56 (!) 92/48    06/29/25 0029 06/29/25 0410 06/29/25 0805 06/29/25 1226   BP: 108/69 (!) 99/51 (!) 113/56 (!) 111/52    06/29/25 1659 06/29/25 1959   BP: (!) 102/53 128/72       Patient's blood pressure range in the last 24 hours was: BP  Min: 99/51  Max: 128/72.The patient's inpatient anti-hypertensive regimen is listed below:  Current Antihypertensives  labetaloL injection 10 mg, Every 4 hours PRN, Intravenous    Plan  - BP is controlled, no changes needed to their regimen    CKD stage 3a, GFR 45-59 ml/min  Creatine stable for now. BMP reviewed- noted Estimated Creatinine Clearance: 80.8 mL/min (based on SCr of 0.6 mg/dL). according to latest data. Based on current GFR, CKD stage is stage 3 - GFR 30-59.  Monitor UOP and serial BMP and adjust therapy as needed. Renally dose meds. Avoid  nephrotoxic medications and procedures.    Hypothyroidism  TSH WNL.   Resume home Synthroid.     Generalized epilepsy  Resume home valproate but using IV as absorption might be questionable given bowel surgery.   Valproate levels checked.     PVD (peripheral vascular disease)  Resume home Pletal when Ok with surgery team.     Debility  Patient with Acute debility due to neoplastic/malignant related fatigue. The patient's latest AMPAC (Activity Measure for Post Acute Care) Score is listed below.    AM-PAC Score - How much help does the patient need for each activity listed  Basic Mobility Total Score: 15  Turning over in bed (including adjusting bedclothes, sheets and blankets)?: A little  Sitting down on and standing up from a chair with arms (e.g., wheelchair, bedside commode, etc.): A little  Moving from lying on back to sitting on the side of the bed?: A little  Moving to and from a bed to a chair (including a wheelchair)?: A little  Need to walk in hospital room?: A lot  Climbing 3-5 steps with a railing?: Unable    Plan  - Progressive mobility protocol initated  - PT/OT consulted  - Fall precautions in place    Folic acid deficiency  replace    Hypotension    Watch   Acute gout of foot    Uric acid less than 5 in symptoms to feed her likely arthritis not gout.  This would go along with no severe inflammatory changes to big toe.  Symptoms did improve with low-dose nonsteroidal anti-inflammatory agents  VTE Risk Mitigation (From admission, onward)           Ordered     enoxaparin injection 40 mg  Daily         06/22/25 1306     IP VTE HIGH RISK PATIENT  Once         06/22/25 1306     Place sequential compression device  Until discontinued         06/22/25 1306                    Discharge Planning   CARMEN: 6/30/2025     Code Status: Full Code   Medical Readiness for Discharge Date:   Discharge Plan A: Return to nursing home, Skilled Nursing Facility                  Please place Justification for DINA BATES  MD Lorenzo  Department of Hospital Medicine   [facility] - [address] [place] [address]

## 2025-06-30 NOTE — SUBJECTIVE & OBJECTIVE
Interval History:     Review of Systems   Constitutional:  Negative for appetite change, fatigue and fever.   HENT:  Negative for congestion, hearing loss and trouble swallowing.    Respiratory:  Negative for chest tightness, shortness of breath and wheezing.    Cardiovascular:  Negative for chest pain and palpitations.   Gastrointestinal:  Negative for abdominal pain, constipation and nausea.        Colostomy bag  Positive bowel sounds   Genitourinary:  Negative for difficulty urinating and dysuria.   Musculoskeletal:  Negative for back pain and neck stiffness.   Skin:  Negative for pallor and rash.   Neurological:  Negative for dizziness, speech difficulty and headaches.   Psychiatric/Behavioral:  Negative for confusion and suicidal ideas.      Objective:     Vital Signs (Most Recent):  Temp: 98.6 °F (37 °C) (06/29/25 1959)  Pulse: 86 (06/29/25 1959)  Resp: 18 (06/29/25 2032)  BP: 128/72 (06/29/25 1959)  SpO2: 98 % (06/29/25 1959) Vital Signs (24h Range):  Temp:  [97.4 °F (36.3 °C)-98.8 °F (37.1 °C)] 98.6 °F (37 °C)  Pulse:  [76-93] 86  Resp:  [17-18] 18  SpO2:  [94 %-99 %] 98 %  BP: ()/(51-72) 128/72     Weight: 88 kg (194 lb)  Body mass index is 31.31 kg/m².    Intake/Output Summary (Last 24 hours) at 6/29/2025 2320  Last data filed at 6/29/2025 1700  Gross per 24 hour   Intake 360 ml   Output 650 ml   Net -290 ml           Physical Exam  Vitals reviewed.   Constitutional:       General: She is awake. She is not in acute distress.     Appearance: She is well-developed. She is not toxic-appearing.   HENT:      Head: Normocephalic.      Nose: Nose normal.      Mouth/Throat:      Pharynx: Oropharynx is clear.   Eyes:      Extraocular Movements: Extraocular movements intact.      Pupils: Pupils are equal, round, and reactive to light.   Neck:      Thyroid: No thyroid mass.      Vascular: No carotid bruit.   Cardiovascular:      Rate and Rhythm: Normal rate and regular rhythm.      Pulses: Normal pulses.       "Heart sounds: Normal heart sounds. No murmur heard.  Pulmonary:      Effort: Pulmonary effort is normal.      Breath sounds: Normal breath sounds and air entry. No wheezing.   Abdominal:      General: Bowel sounds are normal. There is no distension.      Palpations: Abdomen is soft.      Tenderness: There is no abdominal tenderness. There is no rebound.   Musculoskeletal:         General: Normal range of motion.      Cervical back: Neck supple. No rigidity.   Skin:     General: Skin is warm.      Coloration: Skin is not jaundiced.      Findings: No lesion.   Neurological:      General: No focal deficit present.      Mental Status: She is alert and oriented to person, place, and time.      Cranial Nerves: No cranial nerve deficit.   Psychiatric:         Attention and Perception: Attention normal.         Mood and Affect: Mood normal.         Behavior: Behavior normal. Behavior is cooperative.         Thought Content: Thought content normal.         Cognition and Memory: Cognition normal.               Significant Labs: All pertinent labs within the past 24 hours have been reviewed.  BMP:   No results for input(s): "GLU", "NA", "K", "CL", "CO2", "BUN", "CREATININE", "CALCIUM", "MG" in the last 48 hours.    CBC:   Recent Labs   Lab 06/28/25  0527   WBC 11.02*   HGB 9.7*   HCT 30.6*        CMP:   No results for input(s): "NA", "K", "CL", "CO2", "GLU", "BUN", "CREATININE", "CALCIUM", "PROT", "ALBUMIN", "BILITOT", "ALKPHOS", "AST", "ALT", "ANIONGAP", "EGFRNONAA" in the last 48 hours.    Invalid input(s): "ESTGFAFRICA"      Significant Imaging: I have reviewed all pertinent imaging results/findings within the past 24 hours.    Imaging Results               CT Abdomen Pelvis  Without Contrast (Final result)  Result time 06/22/25 06:15:49      Final result by Pepe Nunez MD (06/22/25 06:15:49)                   Impression:      Colonic distension with retained fecal material secondary to obstructing lesion " worrisome for carcinoma at the level of the sigmoid colon LEFT lower quadrant.  Colonoscopy/surgical evaluation recommended.    The preliminary [facility] and final reports are concordant.    This report was flagged in Epic as abnormal.      Electronically signed by: Pepe Nunez MD  Date:    06/22/2025  Time:    06:15               Narrative:    EXAMINATION:  CT ABDOMEN PELVIS WITHOUT CONTRAST    CLINICAL HISTORY:  Abdominal pain, acute, nonlocalized;    TECHNIQUE:  Low dose axial images, sagittal and coronal reformations were obtained from the lung bases to the pubic symphysis, Oral contrast was not administered.    COMPARISON:  08/10/2024    FINDINGS:  Heart: Normal in size. No pericardial effusion.    Lung Bases: Well aerated, without consolidation or pleural fluid.    Liver: Normal in size and attenuation, with lobulated cystic lesion unchanged from 08/10/2024 LEFT hepatic lobe    Gallbladder: Cholecystectomy    Bile Ducts: No evidence of dilated ducts.    Pancreas: No mass or peripancreatic fat stranding.    Spleen: Unremarkable.    Adrenals: Unremarkable.    Kidneys/ Ureters: Unremarkable.    Bladder: No evidence of wall thickening.    Reproductive organs: Hysterectomy.    GI Tract/Mesentery: There is dilatation of the colon with retention of stool to the level of the sigmoid colon.  There is rectosigmoid diverticulosis.  At the level of the sigmoid colon, there is abrupt narrowing on axial series 2, image 103 coronal series 601 image 27, with soft tissue density at that level, worrisome for that of obstructing neoplasm at the level of the junction of descending colon and rectosigmoid LEFT lower quadrant.    Peritoneal Space: No ascites. No free air.    Retroperitoneum: No significant adenopathy.    Abdominal wall: Unremarkable.    Vasculature: No significant atherosclerosis or aneurysm.    Bones: No acute fracture.  Scoliosis                                        X-Ray Abdomen AP 1 View (KUB) (Final result)   Result time 06/22/25 06:15:08      Final result by Pepe Nunez MD (06/22/25 06:15:08)                   Impression:      Colonic distension to the level of the sigmoid colon.  CT same date demonstrated findings suspicious for obstructing neoplasm at the level of the sigmoid colon/LEFT lower quadrant.    This report was flagged in Epic as abnormal.      Electronically signed by: Pepe Nunez MD  Date:    06/22/2025  Time:    06:15               Narrative:    EXAMINATION:  XR ABDOMEN AP 1 VIEW    TECHNIQUE:  XR ABDOMEN AP 1 VIEW    FINDINGS:  Colonic distension to the level of the LEFT lower quadrant, consistent with CT examination same date, reflecting underlying suspicious narrowing, worrisome for carcinoma at the level of sigmoid colon.    No airspace consolidation at the lung bases.No acute bony abnormality.No abnormal soft tissue masses.No abnormal calcific densities.                                    Intake/Output - Last 3 Shifts         06/28 0700  06/29 0659 06/29 0700  06/30 0659    P.O.  360    Total Intake(mL/kg)  360 (4.1)    Urine (mL/kg/hr) 600 (0.3) 350 (0.2)    Other 0     Stool 200 100    Total Output 800 450    Net -800 -90          Unmeasured Urine Occurrence 1 x 1 x          Microbiology Results (last 7 days)       ** No results found for the last 168 hours. **

## 2025-06-30 NOTE — PT/OT/SLP PROGRESS
Occupational Therapy   Treatment    Name: Umesh Lassiter  MRN: [id number]  Admitting Diagnosis:  Large bowel obstruction  8 Days Post-Op    Recommendations:     Discharge Recommendations: Moderate Intensity Therapy  Discharge Equipment Recommendations:  to be determined by next level of care  Barriers to discharge:       Assessment:     Umesh Lassiter is a 80 y.o. female with a medical diagnosis of Large bowel obstruction.  Performance deficits affecting function are weakness, impaired endurance, impaired self care skills.     Rehab Prognosis:  Good; patient would benefit from acute skilled OT services to address these deficits and reach maximum level of function.       Plan:     Patient to be seen 5 x/week to address the above listed problems via self-care/home management, therapeutic activities, therapeutic exercises  Plan of Care Expires: 07/28/25  Plan of Care Reviewed with: patient    Subjective     Chief Complaint:   Patient/Family Comments/goals:   Pain/Comfort:  Pain Rating 1: 0/10    Objective:     Communicated with: Codie Taylor RN prior to session.  Patient found up in chair with colostomy, peripheral IV, telemetry upon OT entry to room.    General Precautions: Standard, fall    Orthopedic Precautions:N/A  Braces: N/A  Respiratory Status: Room air     Occupational Performance:     Bed Mobility:         Functional Mobility/Transfers:    Functional Mobility:     Activities of Daily Living:  Set up to wash her face   I to apply lip balm  I with mouthwash  Set up to brush her teeth        AMPAC 6 Click ADL:      Treatment & Education:  Pt performed hand helper with 3 rubber band resistances 10 reps x 2, rom ex's with 1 lb wt  10 reps x  2 R elbow flex/ext,abd/add.w/o wt on L 10 reps x 2 elbow flex/ext,abd/add, red t band on R 10 reps x 2 elbow flex/ext     Patient left up in chair with all lines intact and call button in reach    GOALS:   Multidisciplinary Problems       Occupational Therapy Goals           Problem: Occupational Therapy    Goal Priority Disciplines Outcome Interventions   Occupational Therapy Goal     OT, PT/OT Progressing    Description: STG:  Pt will perform grooming (I)  Pt will bathe with min a  Pt will perform UE dressing with (I)  Pt will perform LE dressing with I/Mod I  Pt will transfer bed/chair/bsc with Mod I  Pt will perform standing task x 2 min with Mod I  Pt will tolerate 20 minutes of tx without fatigue      LT.Restore to max I with self care and mobility.                          DME Justifications:      Time Tracking:     OT Date of Treatment: 25  OT Start Time: 1401  OT Stop Time: 1431  OT Total Time (min): 30 min    Billable Minutes:Therapeutic Activity 25    OT/ANA LUISA: ANA LUISA          2025

## 2025-06-30 NOTE — DISCHARGE SUMMARY
[facility] - [address] [place] [address]  General Surgery  Discharge Summary      Patient Name: Umesh Lassiter  MRN: [id number]  Admission Date: 6/21/2025  Hospital Length of Stay: 8 days  Discharge Date and Time: 06/30/2025 10:32 AM  Attending Physician: Shun Brannon MD   Discharging Provider: MELANI Wilson  Primary Care Provider: Terri Smith FNP    HPI:   No notes on file    Procedure(s) (LRB):  LAPAROTOMY, EXPLORATORY (N/A)  CREATION, COLOSTOMY (N/A)  EXCISION, SMALL INTESTINE (N/A)      Indwelling Lines/Drains at time of discharge:   Lines/Drains/Airways       Drain  Duration                  Colostomy 06/22/25 1009 Other (see comments) LLQ 8 days    Female External Cap Collection (Enzo Only) 06/26/25 0630 4 days                  Hospital Course: Patient is 8 days post op exploratory laparotomy, colostomy creation, and bowel resection.  Patient is doing well.  She is tolerating regular diet.  She had nausea yesterday but was relieved by zofran.  She denies any further nausea.  Abdomen is soft and non-distended.  Midline abdominal incision noted dry with staples intact.  Patient denies pain.  She will be discharged back to [facility] today.  Patient will follow up with General surgery clinic in 2 weeks.  Patient verbalizes understanding agrees with plan of care.  She will be given prescription for Norco and Zofran to take as needed for pain and nausea.  She was given written prescriptions of this to take back to the nursing home with her.    Goals of Care Treatment Preferences:  Code Status: Full Code      Consults:   Consults (From admission, onward)          Status Ordering Provider     Inpatient consult to Social Work  Once        Provider:  (Not yet assigned)    Completed HAILEY MCINTOSH     Inpatient consult to Internal Medicine  Once        Provider:  (Not yet assigned)    Completed SHUN BRANNON            Significant Diagnostic Studies: Labs: BMP:   Recent Labs   Lab  06/30/25  0322   GLU 96      K 4.2      CO2 25   BUN 16   CREATININE 0.86   CALCIUM 8.8   , CMP   Recent Labs   Lab 06/30/25  0322      K 4.2      CO2 25   GLU 96   BUN 16   CREATININE 0.86   CALCIUM 8.8   PROT 5.8   ALBUMIN 2.2*   BILITOT 0.1   ALKPHOS 78   AST 26   ALT 12   ANIONGAP 12   , CBC   Recent Labs   Lab 06/30/25 0322   WBC 8.29   HGB 8.7*   HCT 27.7*   *   , and All labs within the past 24 hours have been reviewed.  Specimen (24h ago, onward)      None            Pending Diagnostic Studies:       Procedure Component Value Units Date/Time    EXTRA TUBES [[id number]] Collected: 06/28/25 0333    Order Status: Sent Lab Status: In process Updated: 06/28/25 0333    Specimen: Blood, Venous     Narrative:      The following orders were created for panel order EXTRA TUBES.  Procedure                               Abnormality         Status                     ---------                               -----------         ------                     Lavender Top Hold[[id number]]                               In process                   Please view results for these tests on the individual orders.    Protein Electrophoresis, Serum with Reflex TAYLOR [[id number]] Collected: 06/28/25 0259    Order Status: Sent Lab Status: In process Updated: 06/28/25 0330    Specimen: Blood           Final Active Diagnoses:    Diagnosis Date Noted POA    PRINCIPAL PROBLEM:  Large bowel obstruction [K56.609] 06/22/2025 Yes    Folic acid deficiency [E53.8] 06/28/2025 Yes    Hypotension [I95.9] 06/28/2025 No    Acute gout of foot [M10.9] 06/28/2025 No    Colon adenocarcinoma [C18.9] 06/26/2025 Yes    Acute post-operative pain [G89.18] 06/23/2025 No    CKD stage 3a, GFR 45-59 ml/min [N18.31] 02/08/2024 Yes    PVD (peripheral vascular disease) [I73.9] 03/13/2023 Yes    Debility [R53.81] 07/16/2022 Yes    Generalized epilepsy [G40.309] 01/13/2022 Yes    Hypertension [I10] 01/07/2022 Yes    Hypothyroidism [E03.9]  01/07/2022 Yes      Problems Resolved During this Admission:      Discharged Condition: good    Disposition: Nursing Facility    Follow Up:   Follow-up Information       Mannie Brannon MD Follow up in 2 week(s).    Specialties: General Surgery, Surgery  Contact information:  [address] [place] [address] [place] [address]  [phone]                           Patient Instructions:      Diet Adult Regular     Lifting restrictions   Order Comments: Do not lift greater than 5 lbs for the next 2 weeks.     Remove dressing in 24 hours     Notify your health care provider if you experience any of the following:  temperature >100.4     Notify your health care provider if you experience any of the following:  persistent nausea and vomiting or diarrhea     Notify your health care provider if you experience any of the following:  severe uncontrolled pain     Notify your health care provider if you experience any of the following:  redness, tenderness, or signs of infection (pain, swelling, redness, odor or green/yellow discharge around incision site)     Notify your health care provider if you experience any of the following:  difficulty breathing or increased cough     Notify your health care provider if you experience any of the following:  severe persistent headache     Notify your health care provider if you experience any of the following:  worsening rash     Notify your health care provider if you experience any of the following:  persistent dizziness, light-headedness, or visual disturbances     Notify your health care provider if you experience any of the following:  increased confusion or weakness     Notify your health care provider if you experience any of the following:     Activity as tolerated     Shower on day dressing removed (No bath)     Medications:  Reconciled Home Medications:      Medication List        START taking these medications      HYDROcodone-acetaminophen 7.5-325 mg per tablet  Commonly known as:  NORCO  Take 1 tablet by mouth every 6 (six) hours as needed for Pain.     ondansetron 4 MG Tbdl  Commonly known as: ZOFRAN-ODT  Take 1 tablet (4 mg total) by mouth every 6 (six) hours as needed (nausea).            CHANGE how you take these medications      divalproex  MG Tb24 24 hr tablet  Commonly known as: DEPAKOTE ER  TAKE 2 TABLETS BY MOUTH EVERY NIGHT AT BEDTIME FOR SEIZURES  What changed:   how much to take  how to take this  when to take this            CONTINUE taking these medications      acetaminophen-codeine 300-30mg 300-30 mg Tab  Commonly known as: TYLENOL #3  Take 1 tablet by mouth every 12 (twelve) hours as needed (pain).     albuterol 90 mcg/actuation inhaler  Commonly known as: PROVENTIL/VENTOLIN HFA  Inhale 2 puffs into the lungs every 6 (six) hours as needed for Wheezing.     allopurinoL 100 MG tablet  Commonly known as: ZYLOPRIM  Take 200 mg by mouth every evening. May also give 100 mg by mouth daily PRN for elevated uric acid     ascorbic acid (vitamin C) 500 MG tablet  Commonly known as: VITAMIN C  Take 500 mg by mouth 2 (two) times daily.     cilostazoL 50 MG Tab  Commonly known as: PLETAL  Take 1 tablet (50 mg total) by mouth 2 (two) times daily.     clorazepate 7.5 MG Tab  Commonly known as: TRANXENE  Take 3.75 mg by mouth 2 (two) times daily.     furosemide 20 MG tablet  Commonly known as: LASIX  Take 1 tablet (20 mg total) by mouth once daily.     levothyroxine 50 MCG tablet  Commonly known as: SYNTHROID  Take 1 tablet (50 mcg total) by mouth before breakfast.     losartan 50 MG tablet  Commonly known as: COZAAR  Take 1 tablet (50 mg total) by mouth once daily.     melatonin 10 mg Tab  Take 10 mg by mouth every evening.     meloxicam 7.5 MG tablet  Commonly known as: MOBIC  Take 7.5 mg by mouth 2 (two) times a day.     multivitamin per tablet  Commonly known as: THERAGRAN  Take 1 tablet by mouth once daily.     polyethylene glycol 17 gram Pwpk  Commonly known as: GLYCOLAX  Take 17  g by mouth once daily.            Time spent on the discharge of patient: 25 minutes    Allyson Tompkins, FROYP  General Surgery  [facility] - [address] [place] [address]

## 2025-07-01 ENCOUNTER — TELEPHONE (OUTPATIENT)
Dept: SURGERY | Facility: CLINIC | Age: 80
End: 2025-07-01
Payer: MEDICARE

## 2025-07-01 NOTE — PROGRESS NOTES
[facility] - [address] [place] [address] Medicine  Progress Note    Patient Name: Umesh Lassiter  MRN: [id number]  Patient Class: IP- Inpatient   Admission Date: 6/21/2025  Length of Stay: 8 days  Attending Physician: No att. providers found  Primary Care Provider: Terri Smith FNP        Subjective     Principal Problem:Large bowel obstruction        HPI:  Mr. Lassiter is an 80 Y O male with PMH of epilepsy, HTN, hypothyroidism, PVD who presented to ED with abdominal pain nausea and vomiting for last 2 days. He reported pain is generalized and cramping in nature. He has not had any bowel movement for last 2-3 days and is barely passing gas. He did have abdominal discomfort and constipation issues over the last month. He denies fever, chills. He admits to weight loss. No blood in stool reported.     Overview/Hospital Course:  6/22- s/p ex-lap with sigmoid colectomy and colostomy plus small bowel resection. Home medications and PMH reviewed. Will continue to follow along.   6/23- On PCA for pain control. Pathology pending from resection. PT/OT/mobilize.   6/24- Doing Ok. Continue to follow along.   6/25- Labs and vitals stable. Remains NPO per primary team. May require TPN.   6/26- Doing Ok. Remains NPO per Gen Surg.     06/27 Records reviewed. Pt tolerating liquids. Positive BS. Increase activity  PMH of epilepsy, HTN, hypothyroidism, PVD   06/28 Diet advanced. Increase activity  06/29 tolerating diet, nonsteroidal anti-inflammatory help feet pain, feet pain likely arthritic not gout  06/30 arthritis better, tolerating diet, discuss with Dr. Brannon, he is going to set up outpatient follow up with Oncology.  Surgical plans to discharge back to nursing home which I agreed.  Patient is in good spirits    Interval History:     Review of Systems   Constitutional:  Negative for appetite change, fatigue and fever.   HENT:  Negative for congestion, hearing loss and trouble swallowing.    Respiratory:   Negative for chest tightness, shortness of breath and wheezing.    Cardiovascular:  Negative for chest pain and palpitations.   Gastrointestinal:  Negative for abdominal pain, constipation and nausea.        Colostomy bag  Positive bowel sounds   Genitourinary:  Negative for difficulty urinating and dysuria.   Musculoskeletal:  Negative for back pain and neck stiffness.   Skin:  Negative for pallor and rash.   Neurological:  Negative for dizziness, speech difficulty and headaches.   Psychiatric/Behavioral:  Negative for confusion and suicidal ideas.      Objective:     Vital Signs (Most Recent):  Temp: 98.8 °F (37.1 °C) (06/30/25 1650)  Pulse: 78 (06/30/25 1650)  Resp: 18 (06/30/25 1650)  BP: (!) 116/55 (06/30/25 1650)  SpO2: 95 % (06/30/25 1650) Vital Signs (24h Range):  Temp:  [97.4 °F (36.3 °C)-98.8 °F (37.1 °C)] 98.8 °F (37.1 °C)  Pulse:  [74-87] 78  Resp:  [16-19] 18  SpO2:  [95 %-98 %] 95 %  BP: (104-125)/(51-75) 116/55     Weight: 102.7 kg (226 lb 6.4 oz)  Body mass index is 36.54 kg/m².  No intake or output data in the 24 hours ending 06/30/25 2111          Physical Exam  Vitals reviewed.   Constitutional:       General: She is awake. She is not in acute distress.     Appearance: She is well-developed. She is not toxic-appearing.   HENT:      Head: Normocephalic.      Nose: Nose normal.      Mouth/Throat:      Pharynx: Oropharynx is clear.   Eyes:      Extraocular Movements: Extraocular movements intact.      Pupils: Pupils are equal, round, and reactive to light.   Neck:      Thyroid: No thyroid mass.      Vascular: No carotid bruit.   Cardiovascular:      Rate and Rhythm: Normal rate and regular rhythm.      Pulses: Normal pulses.      Heart sounds: Normal heart sounds. No murmur heard.  Pulmonary:      Effort: Pulmonary effort is normal.      Breath sounds: Normal breath sounds and air entry. No wheezing.   Abdominal:      General: Bowel sounds are normal. There is no distension.      Palpations: Abdomen  is soft.      Tenderness: There is no abdominal tenderness. There is no rebound.   Musculoskeletal:         General: Normal range of motion.      Cervical back: Neck supple. No rigidity.   Skin:     General: Skin is warm.      Coloration: Skin is not jaundiced.      Findings: No lesion.   Neurological:      General: No focal deficit present.      Mental Status: She is alert and oriented to person, place, and time.      Cranial Nerves: No cranial nerve deficit.   Psychiatric:         Attention and Perception: Attention normal.         Mood and Affect: Mood normal.         Behavior: Behavior normal. Behavior is cooperative.         Thought Content: Thought content normal.         Cognition and Memory: Cognition normal.               Significant Labs: All pertinent labs within the past 24 hours have been reviewed.  BMP:   Recent Labs   Lab 06/30/25  0322   GLU 96      K 4.2      CO2 25   BUN 16   CREATININE 0.86   CALCIUM 8.8       CBC:   Recent Labs   Lab 06/30/25  0322   WBC 8.29   HGB 8.7*   HCT 27.7*   *     CMP:   Recent Labs   Lab 06/30/25  0322      K 4.2      CO2 25   GLU 96   BUN 16   CREATININE 0.86   CALCIUM 8.8   PROT 5.8   ALBUMIN 2.2*   BILITOT 0.1   ALKPHOS 78   AST 26   ALT 12   ANIONGAP 12         Significant Imaging: I have reviewed all pertinent imaging results/findings within the past 24 hours.    Imaging Results               CT Abdomen Pelvis  Without Contrast (Final result)  Result time 06/22/25 06:15:49      Final result by Pepe Nunez MD (06/22/25 06:15:49)                   Impression:      Colonic distension with retained fecal material secondary to obstructing lesion worrisome for carcinoma at the level of the sigmoid colon LEFT lower quadrant.  Colonoscopy/surgical evaluation recommended.    The preliminary STATRAD and final reports are concordant.    This report was flagged in Epic as abnormal.      Electronically signed by: Pepe Nunez  MD  Date:    06/22/2025  Time:    06:15               Narrative:    EXAMINATION:  CT ABDOMEN PELVIS WITHOUT CONTRAST    CLINICAL HISTORY:  Abdominal pain, acute, nonlocalized;    TECHNIQUE:  Low dose axial images, sagittal and coronal reformations were obtained from the lung bases to the pubic symphysis, Oral contrast was not administered.    COMPARISON:  08/10/2024    FINDINGS:  Heart: Normal in size. No pericardial effusion.    Lung Bases: Well aerated, without consolidation or pleural fluid.    Liver: Normal in size and attenuation, with lobulated cystic lesion unchanged from 08/10/2024 LEFT hepatic lobe    Gallbladder: Cholecystectomy    Bile Ducts: No evidence of dilated ducts.    Pancreas: No mass or peripancreatic fat stranding.    Spleen: Unremarkable.    Adrenals: Unremarkable.    Kidneys/ Ureters: Unremarkable.    Bladder: No evidence of wall thickening.    Reproductive organs: Hysterectomy.    GI Tract/Mesentery: There is dilatation of the colon with retention of stool to the level of the sigmoid colon.  There is rectosigmoid diverticulosis.  At the level of the sigmoid colon, there is abrupt narrowing on axial series 2, image 103 coronal series 601 image 27, with soft tissue density at that level, worrisome for that of obstructing neoplasm at the level of the junction of descending colon and rectosigmoid LEFT lower quadrant.    Peritoneal Space: No ascites. No free air.    Retroperitoneum: No significant adenopathy.    Abdominal wall: Unremarkable.    Vasculature: No significant atherosclerosis or aneurysm.    Bones: No acute fracture.  Scoliosis                                        X-Ray Abdomen AP 1 View (KUB) (Final result)  Result time 06/22/25 06:15:08      Final result by Pepe Nunez MD (06/22/25 06:15:08)                   Impression:      Colonic distension to the level of the sigmoid colon.  CT same date demonstrated findings suspicious for obstructing neoplasm at the level of the sigmoid  colon/LEFT lower quadrant.    This report was flagged in Epic as abnormal.      Electronically signed by: Pepe Nunez MD  Date:    06/22/2025  Time:    06:15               Narrative:    EXAMINATION:  XR ABDOMEN AP 1 VIEW    TECHNIQUE:  XR ABDOMEN AP 1 VIEW    FINDINGS:  Colonic distension to the level of the LEFT lower quadrant, consistent with CT examination same date, reflecting underlying suspicious narrowing, worrisome for carcinoma at the level of sigmoid colon.    No airspace consolidation at the lung bases.No acute bony abnormality.No abnormal soft tissue masses.No abnormal calcific densities.                                    Intake/Output - Last 3 Shifts         06/29 0700  06/30 0659 06/30 0700  07/01 0659    P.O. 360     Total Intake(mL/kg) 360 (3.5)     Urine (mL/kg/hr) 350 (0.1)     Other      Stool 100     Total Output 450     Net -90           Unmeasured Urine Occurrence 2 x           Microbiology Results (last 7 days)       ** No results found for the last 168 hours. **                Assessment & Plan  Large bowel obstruction  Acute post-operative pain  Colon adenocarcinoma  Presented to ED with abdominal pain and nausea/vomiting.   CT showed colonic distension with retained fecal material secondary to obstructing lesion worrisome for carcinoma at the level of the sigmoid colon LEFT lower quadrant.     Admitted to Gen Surg (Dr. Brannon) and s/p ex-lap on 6/22/25:     Procedures Performed: Exploratory laparotomy with sigmoid colectomy and end colostomy.  Small bowel resection     Significant Findings of the Procedure:  Multiple loops of dilated small bowel and very large distended large intestine.  Invasive malignant appearing mass in the mid sigmoid colon invading into the retroperitoneum and the adjacent loop of small bowel.  Initially was stuck and a small-bowel resection was performed upon extraction and dissection it did come apart but there was some likely desmoplastic reaction to the small  bowel and this was sent with the colon.    Pathology showed:    Sigmoid colon:   - Moderately-differentiated adenocarcinoma of the sigmoid colon (4.1 cm)  - Tumor invades the pericolonic soft tissue  - Diverticulitis  - Sixteen lymph nodes are negative for metastatic carcinoma (0/16)  - Margins of resection are negative for adenocarcinoma     Jejunum:  - Well-differentiated adenocarcinoma involving small bowel mesentery  - Submucosal lipoma  - Two lymph nodes are negative for metastatic carcinoma (0/2)  - Margins of resection appear viable and are uninvolved by adenocarcinoma    Management per primary.     06/27 less pain    Presented to ED with abdominal pain and nausea/vomiting.   CT showed colonic distension with retained fecal material secondary to obstructing lesion worrisome for carcinoma at the level of the sigmoid colon LEFT lower quadrant.     Admitted to Gen Surg (Dr. Brannon) and s/p ex-lap on 6/22/25:     Procedures Performed: Exploratory laparotomy with sigmoid colectomy and end colostomy.  Small bowel resection     Significant Findings of the Procedure:  Multiple loops of dilated small bowel and very large distended large intestine.  Invasive malignant appearing mass in the mid sigmoid colon invading into the retroperitoneum and the adjacent loop of small bowel.  Initially was stuck and a small-bowel resection was performed upon extraction and dissection it did come apart but there was some likely desmoplastic reaction to the small bowel and this was sent with the colon.    Pathology showed:    Sigmoid colon:   - Moderately-differentiated adenocarcinoma of the sigmoid colon (4.1 cm)  - Tumor invades the pericolonic soft tissue  - Diverticulitis  - Sixteen lymph nodes are negative for metastatic carcinoma (0/16)  - Margins of resection are negative for adenocarcinoma     Jejunum:  - Well-differentiated adenocarcinoma involving small bowel mesentery  - Submucosal lipoma  - Two lymph nodes are negative for  metastatic carcinoma (0/2)  - Margins of resection appear viable and are uninvolved by adenocarcinoma    Management per primary.     06/27 less pain    Presented to ED with abdominal pain and nausea/vomiting.   CT showed colonic distension with retained fecal material secondary to obstructing lesion worrisome for carcinoma at the level of the sigmoid colon LEFT lower quadrant.     Admitted to Gen Surg (Dr. Brannon) and s/p ex-lap on 6/22/25:     Procedures Performed: Exploratory laparotomy with sigmoid colectomy and end colostomy.  Small bowel resection     Significant Findings of the Procedure:  Multiple loops of dilated small bowel and very large distended large intestine.  Invasive malignant appearing mass in the mid sigmoid colon invading into the retroperitoneum and the adjacent loop of small bowel.  Initially was stuck and a small-bowel resection was performed upon extraction and dissection it did come apart but there was some likely desmoplastic reaction to the small bowel and this was sent with the colon.    Pathology showed:    Sigmoid colon:   - Moderately-differentiated adenocarcinoma of the sigmoid colon (4.1 cm)  - Tumor invades the pericolonic soft tissue  - Diverticulitis  - Sixteen lymph nodes are negative for metastatic carcinoma (0/16)  - Margins of resection are negative for adenocarcinoma     Jejunum:  - Well-differentiated adenocarcinoma involving small bowel mesentery  - Submucosal lipoma  - Two lymph nodes are negative for metastatic carcinoma (0/2)  - Margins of resection appear viable and are uninvolved by adenocarcinoma    Management per primary.       06/27 less pain  06/30 Dr. Brannon to make outpatient follow up arrangements with Oncology    Hypertension    Vitals:    06/29/25 0029 06/29/25 0410 06/29/25 0805 06/29/25 1226   BP: 108/69 (!) 99/51 (!) 113/56 (!) 111/52    06/29/25 1659 06/29/25 1959 06/30/25 0051 06/30/25 0446   BP: (!) 102/53 128/72 (!) 125/55 104/75    06/30/25 1143 06/30/25  1650   BP: (!) 107/51 (!) 116/55       Patient's blood pressure range in the last 24 hours was: BP  Min: 104/75  Max: 125/55.The patient's inpatient anti-hypertensive regimen is listed below:  Current Antihypertensives       Plan  - BP is controlled, no changes needed to their regimen    CKD stage 3a, GFR 45-59 ml/min  Creatine stable for now. BMP reviewed- noted Estimated Creatinine Clearance: 61.1 mL/min (based on SCr of 0.86 mg/dL). according to latest data. Based on current GFR, CKD stage is stage 3 - GFR 30-59.  Monitor UOP and serial BMP and adjust therapy as needed. Renally dose meds. Avoid nephrotoxic medications and procedures.    Hypothyroidism  TSH WNL.   Resume home Synthroid.     Generalized epilepsy  Resume home valproate but using IV as absorption might be questionable given bowel surgery.   Valproate levels checked.     PVD (peripheral vascular disease)  Resume home Pletal when Ok with surgery team.     Debility  Patient with Acute debility due to neoplastic/malignant related fatigue. The patient's latest AMPAC (Activity Measure for Post Acute Care) Score is listed below.    AM-PAC Score - How much help does the patient need for each activity listed  Basic Mobility Total Score: 18  Turning over in bed (including adjusting bedclothes, sheets and blankets)?: None  Sitting down on and standing up from a chair with arms (e.g., wheelchair, bedside commode, etc.): A little  Moving from lying on back to sitting on the side of the bed?: A little  Moving to and from a bed to a chair (including a wheelchair)?: A little  Need to walk in hospital room?: A little  Climbing 3-5 steps with a railing?: A lot    Plan  - Progressive mobility protocol initated  - PT/OT consulted  - Fall precautions in place    Folic acid deficiency  replace    Hypotension    Watch   06/30 better  Acute gout of foot    Uric acid less than 5 in symptoms to feed her likely arthritis not gout.  This would go along with no severe  inflammatory changes to big toe.  Symptoms did improve with low-dose nonsteroidal anti-inflammatory agents  VTE Risk Mitigation (From admission, onward)           Ordered     IP VTE HIGH RISK PATIENT  Once         06/22/25 1306                    Discharge Planning   CARMEN: 6/30/2025     Code Status: Prior   Medical Readiness for Discharge Date: 6/30/2025  Discharge Plan A: Return to nursing home, Skilled Nursing Facility   Discharge Delays: None known at this time                    Dick Ventura MD  Department of Hospital Medicine   [facility] - [facility] Tele[facility]

## 2025-07-01 NOTE — SUBJECTIVE & OBJECTIVE
Interval History:     Review of Systems   Constitutional:  Negative for appetite change, fatigue and fever.   HENT:  Negative for congestion, hearing loss and trouble swallowing.    Respiratory:  Negative for chest tightness, shortness of breath and wheezing.    Cardiovascular:  Negative for chest pain and palpitations.   Gastrointestinal:  Negative for abdominal pain, constipation and nausea.        Colostomy bag  Positive bowel sounds   Genitourinary:  Negative for difficulty urinating and dysuria.   Musculoskeletal:  Negative for back pain and neck stiffness.   Skin:  Negative for pallor and rash.   Neurological:  Negative for dizziness, speech difficulty and headaches.   Psychiatric/Behavioral:  Negative for confusion and suicidal ideas.      Objective:     Vital Signs (Most Recent):  Temp: 98.8 °F (37.1 °C) (06/30/25 1650)  Pulse: 78 (06/30/25 1650)  Resp: 18 (06/30/25 1650)  BP: (!) 116/55 (06/30/25 1650)  SpO2: 95 % (06/30/25 1650) Vital Signs (24h Range):  Temp:  [97.4 °F (36.3 °C)-98.8 °F (37.1 °C)] 98.8 °F (37.1 °C)  Pulse:  [74-87] 78  Resp:  [16-19] 18  SpO2:  [95 %-98 %] 95 %  BP: (104-125)/(51-75) 116/55     Weight: 102.7 kg (226 lb 6.4 oz)  Body mass index is 36.54 kg/m².  No intake or output data in the 24 hours ending 06/30/25 2111          Physical Exam  Vitals reviewed.   Constitutional:       General: She is awake. She is not in acute distress.     Appearance: She is well-developed. She is not toxic-appearing.   HENT:      Head: Normocephalic.      Nose: Nose normal.      Mouth/Throat:      Pharynx: Oropharynx is clear.   Eyes:      Extraocular Movements: Extraocular movements intact.      Pupils: Pupils are equal, round, and reactive to light.   Neck:      Thyroid: No thyroid mass.      Vascular: No carotid bruit.   Cardiovascular:      Rate and Rhythm: Normal rate and regular rhythm.      Pulses: Normal pulses.      Heart sounds: Normal heart sounds. No murmur heard.  Pulmonary:      Effort:  Pulmonary effort is normal.      Breath sounds: Normal breath sounds and air entry. No wheezing.   Abdominal:      General: Bowel sounds are normal. There is no distension.      Palpations: Abdomen is soft.      Tenderness: There is no abdominal tenderness. There is no rebound.   Musculoskeletal:         General: Normal range of motion.      Cervical back: Neck supple. No rigidity.   Skin:     General: Skin is warm.      Coloration: Skin is not jaundiced.      Findings: No lesion.   Neurological:      General: No focal deficit present.      Mental Status: She is alert and oriented to person, place, and time.      Cranial Nerves: No cranial nerve deficit.   Psychiatric:         Attention and Perception: Attention normal.         Mood and Affect: Mood normal.         Behavior: Behavior normal. Behavior is cooperative.         Thought Content: Thought content normal.         Cognition and Memory: Cognition normal.               Significant Labs: All pertinent labs within the past 24 hours have been reviewed.  BMP:   Recent Labs   Lab 06/30/25  0322   GLU 96      K 4.2      CO2 25   BUN 16   CREATININE 0.86   CALCIUM 8.8       CBC:   Recent Labs   Lab 06/30/25  0322   WBC 8.29   HGB 8.7*   HCT 27.7*   *     CMP:   Recent Labs   Lab 06/30/25  0322      K 4.2      CO2 25   GLU 96   BUN 16   CREATININE 0.86   CALCIUM 8.8   PROT 5.8   ALBUMIN 2.2*   BILITOT 0.1   ALKPHOS 78   AST 26   ALT 12   ANIONGAP 12         Significant Imaging: I have reviewed all pertinent imaging results/findings within the past 24 hours.    Imaging Results               CT Abdomen Pelvis  Without Contrast (Final result)  Result time 06/22/25 06:15:49      Final result by Pepe Nunez MD (06/22/25 06:15:49)                   Impression:      Colonic distension with retained fecal material secondary to obstructing lesion worrisome for carcinoma at the level of the sigmoid colon LEFT lower quadrant.   Colonoscopy/surgical evaluation recommended.    The preliminary STATRAD and final reports are concordant.    This report was flagged in Epic as abnormal.      Electronically signed by: Pepe Nunez MD  Date:    06/22/2025  Time:    06:15               Narrative:    EXAMINATION:  CT ABDOMEN PELVIS WITHOUT CONTRAST    CLINICAL HISTORY:  Abdominal pain, acute, nonlocalized;    TECHNIQUE:  Low dose axial images, sagittal and coronal reformations were obtained from the lung bases to the pubic symphysis, Oral contrast was not administered.    COMPARISON:  08/10/2024    FINDINGS:  Heart: Normal in size. No pericardial effusion.    Lung Bases: Well aerated, without consolidation or pleural fluid.    Liver: Normal in size and attenuation, with lobulated cystic lesion unchanged from 08/10/2024 LEFT hepatic lobe    Gallbladder: Cholecystectomy    Bile Ducts: No evidence of dilated ducts.    Pancreas: No mass or peripancreatic fat stranding.    Spleen: Unremarkable.    Adrenals: Unremarkable.    Kidneys/ Ureters: Unremarkable.    Bladder: No evidence of wall thickening.    Reproductive organs: Hysterectomy.    GI Tract/Mesentery: There is dilatation of the colon with retention of stool to the level of the sigmoid colon.  There is rectosigmoid diverticulosis.  At the level of the sigmoid colon, there is abrupt narrowing on axial series 2, image 103 coronal series 601 image 27, with soft tissue density at that level, worrisome for that of obstructing neoplasm at the level of the junction of descending colon and rectosigmoid LEFT lower quadrant.    Peritoneal Space: No ascites. No free air.    Retroperitoneum: No significant adenopathy.    Abdominal wall: Unremarkable.    Vasculature: No significant atherosclerosis or aneurysm.    Bones: No acute fracture.  Scoliosis                                        X-Ray Abdomen AP 1 View (KUB) (Final result)  Result time 06/22/25 06:15:08      Final result by Pepe Nunez MD  (06/22/25 06:15:08)                   Impression:      Colonic distension to the level of the sigmoid colon.  CT same date demonstrated findings suspicious for obstructing neoplasm at the level of the sigmoid colon/LEFT lower quadrant.    This report was flagged in Epic as abnormal.      Electronically signed by: Pepe Nunez MD  Date:    06/22/2025  Time:    06:15               Narrative:    EXAMINATION:  XR ABDOMEN AP 1 VIEW    TECHNIQUE:  XR ABDOMEN AP 1 VIEW    FINDINGS:  Colonic distension to the level of the LEFT lower quadrant, consistent with CT examination same date, reflecting underlying suspicious narrowing, worrisome for carcinoma at the level of sigmoid colon.    No airspace consolidation at the lung bases.No acute bony abnormality.No abnormal soft tissue masses.No abnormal calcific densities.                                    Intake/Output - Last 3 Shifts         06/29 0700  06/30 0659 06/30 0700  07/01 0659    P.O. 360     Total Intake(mL/kg) 360 (3.5)     Urine (mL/kg/hr) 350 (0.1)     Other      Stool 100     Total Output 450     Net -90           Unmeasured Urine Occurrence 2 x           Microbiology Results (last 7 days)       ** No results found for the last 168 hours. **

## 2025-07-01 NOTE — TELEPHONE ENCOUNTER
Informed nurse patients incision could be open to air if not draining. Informed her to not take staples out until wound is  assessed by Dr. Brannon in clinic [facility]. Clarified that oncology referral will be set up when she is seen in clinic for PO. Nurse Brooke denies further questions at this time.

## 2025-07-01 NOTE — ASSESSMENT & PLAN NOTE
Patient with Acute debility due to neoplastic/malignant related fatigue. The patient's latest AMPAC (Activity Measure for Post Acute Care) Score is listed below.    AM-PAC Score - How much help does the patient need for each activity listed  Basic Mobility Total Score: 18  Turning over in bed (including adjusting bedclothes, sheets and blankets)?: None  Sitting down on and standing up from a chair with arms (e.g., wheelchair, bedside commode, etc.): A little  Moving from lying on back to sitting on the side of the bed?: A little  Moving to and from a bed to a chair (including a wheelchair)?: A little  Need to walk in hospital room?: A little  Climbing 3-5 steps with a railing?: A lot    Plan  - Progressive mobility protocol initated  - PT/OT consulted  - Fall precautions in place

## 2025-07-01 NOTE — ASSESSMENT & PLAN NOTE
Vitals:    06/29/25 0029 06/29/25 0410 06/29/25 0805 06/29/25 1226   BP: 108/69 (!) 99/51 (!) 113/56 (!) 111/52    06/29/25 1659 06/29/25 1959 06/30/25 0051 06/30/25 0446   BP: (!) 102/53 128/72 (!) 125/55 104/75    06/30/25 1143 06/30/25 1650   BP: (!) 107/51 (!) 116/55       Patient's blood pressure range in the last 24 hours was: BP  Min: 104/75  Max: 125/55.The patient's inpatient anti-hypertensive regimen is listed below:  Current Antihypertensives       Plan  - BP is controlled, no changes needed to their regimen

## 2025-07-01 NOTE — ASSESSMENT & PLAN NOTE
Presented to ED with abdominal pain and nausea/vomiting.   CT showed colonic distension with retained fecal material secondary to obstructing lesion worrisome for carcinoma at the level of the sigmoid colon LEFT lower quadrant.     Admitted to Gen Surg (Dr. Brannon) and s/p ex-lap on 6/22/25:     Procedures Performed: Exploratory laparotomy with sigmoid colectomy and end colostomy.  Small bowel resection     Significant Findings of the Procedure:  Multiple loops of dilated small bowel and very large distended large intestine.  Invasive malignant appearing mass in the mid sigmoid colon invading into the retroperitoneum and the adjacent loop of small bowel.  Initially was stuck and a small-bowel resection was performed upon extraction and dissection it did come apart but there was some likely desmoplastic reaction to the small bowel and this was sent with the colon.    Pathology showed:    Sigmoid colon:   - Moderately-differentiated adenocarcinoma of the sigmoid colon (4.1 cm)  - Tumor invades the pericolonic soft tissue  - Diverticulitis  - Sixteen lymph nodes are negative for metastatic carcinoma (0/16)  - Margins of resection are negative for adenocarcinoma     Jejunum:  - Well-differentiated adenocarcinoma involving small bowel mesentery  - Submucosal lipoma  - Two lymph nodes are negative for metastatic carcinoma (0/2)  - Margins of resection appear viable and are uninvolved by adenocarcinoma    Management per primary.     06/27 less pain    Presented to ED with abdominal pain and nausea/vomiting.   CT showed colonic distension with retained fecal material secondary to obstructing lesion worrisome for carcinoma at the level of the sigmoid colon LEFT lower quadrant.     Admitted to Gen Surg (Dr. Brannon) and s/p ex-lap on 6/22/25:     Procedures Performed: Exploratory laparotomy with sigmoid colectomy and end colostomy.  Small bowel resection     Significant Findings of the Procedure:  Multiple loops of dilated small  bowel and very large distended large intestine.  Invasive malignant appearing mass in the mid sigmoid colon invading into the retroperitoneum and the adjacent loop of small bowel.  Initially was stuck and a small-bowel resection was performed upon extraction and dissection it did come apart but there was some likely desmoplastic reaction to the small bowel and this was sent with the colon.    Pathology showed:    Sigmoid colon:   - Moderately-differentiated adenocarcinoma of the sigmoid colon (4.1 cm)  - Tumor invades the pericolonic soft tissue  - Diverticulitis  - Sixteen lymph nodes are negative for metastatic carcinoma (0/16)  - Margins of resection are negative for adenocarcinoma     Jejunum:  - Well-differentiated adenocarcinoma involving small bowel mesentery  - Submucosal lipoma  - Two lymph nodes are negative for metastatic carcinoma (0/2)  - Margins of resection appear viable and are uninvolved by adenocarcinoma    Management per primary.     06/27 less pain    Presented to ED with abdominal pain and nausea/vomiting.   CT showed colonic distension with retained fecal material secondary to obstructing lesion worrisome for carcinoma at the level of the sigmoid colon LEFT lower quadrant.     Admitted to Gen Surg (Dr. Brannon) and s/p ex-lap on 6/22/25:     Procedures Performed: Exploratory laparotomy with sigmoid colectomy and end colostomy.  Small bowel resection     Significant Findings of the Procedure:  Multiple loops of dilated small bowel and very large distended large intestine.  Invasive malignant appearing mass in the mid sigmoid colon invading into the retroperitoneum and the adjacent loop of small bowel.  Initially was stuck and a small-bowel resection was performed upon extraction and dissection it did come apart but there was some likely desmoplastic reaction to the small bowel and this was sent with the colon.    Pathology showed:    Sigmoid colon:   - Moderately-differentiated adenocarcinoma of the  sigmoid colon (4.1 cm)  - Tumor invades the pericolonic soft tissue  - Diverticulitis  - Sixteen lymph nodes are negative for metastatic carcinoma (0/16)  - Margins of resection are negative for adenocarcinoma     Jejunum:  - Well-differentiated adenocarcinoma involving small bowel mesentery  - Submucosal lipoma  - Two lymph nodes are negative for metastatic carcinoma (0/2)  - Margins of resection appear viable and are uninvolved by adenocarcinoma    Management per primary.       06/27 less pain  06/30 Dr. Brannon to make outpatient follow up arrangements with Oncology

## 2025-07-01 NOTE — TELEPHONE ENCOUNTER
Copied from CRM #[id number]. Topic: General Inquiry - Patient Advice  >> Jul 1, 2025 10:09 AM Kate wrote:  Who Called: Nurse Cox from [facility] in [place] pertaining to pt. Umesh Lassiter    Caller is requesting assistance/information from provider's office.        Preferred Method of Contact: Phone Call    Best Call Back Number, if different: [phone] Nurse Cox  Additional Information: Clarification on orders (did not receive any upon discharge)

## 2025-07-01 NOTE — ASSESSMENT & PLAN NOTE
Creatine stable for now. BMP reviewed- noted Estimated Creatinine Clearance: 61.1 mL/min (based on SCr of 0.86 mg/dL). according to latest data. Based on current GFR, CKD stage is stage 3 - GFR 30-59.  Monitor UOP and serial BMP and adjust therapy as needed. Renally dose meds. Avoid nephrotoxic medications and procedures.

## 2025-07-02 LAB
ALBUMIN PE, BLOOD: 2.5 G/DL (ref 3.5–5.2)
ALPHA1 GLOB SERPL ELPH-MCNC: 0.3 G/DL (ref 0.1–0.4)
ALPHA2 GLOB SERPL ELPH-MCNC: 0.9 G/DL (ref 0.4–1.3)
B-GLOBULIN SERPL ELPH-MCNC: 0.8 G/DL (ref 0.5–1.5)
GAMMA GLOB SERPL ELPH-MCNC: 0.7 G/DL (ref 0.5–1.8)
PATH INTERP BLD-IMP: ABNORMAL
PROT SERPL-MCNC: 5.2 G/DL (ref 5.8–7.6)

## 2025-07-03 ENCOUNTER — PATIENT OUTREACH (OUTPATIENT)
Dept: ADMINISTRATIVE | Facility: CLINIC | Age: 80
End: 2025-07-03
Payer: MEDICARE

## 2025-07-09 ENCOUNTER — TELEPHONE (OUTPATIENT)
Dept: SURGERY | Facility: CLINIC | Age: 80
End: 2025-07-09
Payer: MEDICARE

## 2025-07-09 ENCOUNTER — HOSPITAL ENCOUNTER (INPATIENT)
Facility: HOSPITAL | Age: 80
LOS: 5 days | Discharge: NURSING, REHAB OR LONG-TERM CARE | DRG: 394 | End: 2025-07-14
Attending: SURGERY | Admitting: SURGERY
Payer: MEDICARE

## 2025-07-09 DIAGNOSIS — K56.609 BOWEL OBSTRUCTION: ICD-10-CM

## 2025-07-09 DIAGNOSIS — C18.7 MALIGNANT NEOPLASM OF SIGMOID COLON: Primary | ICD-10-CM

## 2025-07-09 PROBLEM — K56.690 OTHER PARTIAL INTESTINAL OBSTRUCTION: Status: ACTIVE | Noted: 2025-07-09

## 2025-07-09 LAB
ALBUMIN SERPL BCP-MCNC: 2.3 G/DL (ref 3.4–4.8)
ALBUMIN/GLOB SERPL: 0.6 {RATIO}
ALP SERPL-CCNC: 87 U/L (ref 40–150)
ALT SERPL W P-5'-P-CCNC: 7 U/L
ANION GAP SERPL CALCULATED.3IONS-SCNC: 10 MMOL/L (ref 7–16)
AST SERPL W P-5'-P-CCNC: 46 U/L (ref 11–45)
BASOPHILS # BLD AUTO: 0.02 K/UL (ref 0–0.2)
BASOPHILS NFR BLD AUTO: 0.3 % (ref 0–1)
BILIRUB SERPL-MCNC: 0.2 MG/DL
BILIRUB UR QL STRIP: NEGATIVE
BUN SERPL-MCNC: 25 MG/DL (ref 10–20)
BUN/CREAT SERPL: 17 (ref 6–20)
CALCIUM SERPL-MCNC: 8.7 MG/DL (ref 8.4–10.2)
CHLORIDE SERPL-SCNC: 94 MMOL/L (ref 98–107)
CLARITY UR: CLEAR
CO2 SERPL-SCNC: 26 MMOL/L (ref 23–31)
COLOR UR: NORMAL
CREAT SERPL-MCNC: 1.43 MG/DL (ref 0.55–1.02)
DIFFERENTIAL METHOD BLD: ABNORMAL
EGFR (NO RACE VARIABLE) (RUSH/TITUS): 37 ML/MIN/1.73M2
EOSINOPHIL # BLD AUTO: 0.54 K/UL (ref 0–0.5)
EOSINOPHIL NFR BLD AUTO: 6.8 % (ref 1–4)
ERYTHROCYTE [DISTWIDTH] IN BLOOD BY AUTOMATED COUNT: 16.9 % (ref 11.5–14.5)
GLOBULIN SER-MCNC: 3.8 G/DL (ref 2–4)
GLUCOSE SERPL-MCNC: 104 MG/DL (ref 82–115)
GLUCOSE UR STRIP-MCNC: NORMAL MG/DL
HCT VFR BLD AUTO: 28.6 % (ref 38–47)
HGB BLD-MCNC: 9.2 G/DL (ref 12–16)
IMM GRANULOCYTES # BLD AUTO: 0.16 K/UL (ref 0–0.04)
IMM GRANULOCYTES NFR BLD: 2 % (ref 0–0.4)
KETONES UR STRIP-SCNC: NEGATIVE MG/DL
LEUKOCYTE ESTERASE UR QL STRIP: NEGATIVE
LIPASE SERPL-CCNC: 13 U/L
LYMPHOCYTES # BLD AUTO: 1.83 K/UL (ref 1–4.8)
LYMPHOCYTES NFR BLD AUTO: 23.1 % (ref 27–41)
MCH RBC QN AUTO: 25.1 PG (ref 27–31)
MCHC RBC AUTO-ENTMCNC: 32.2 G/DL (ref 32–36)
MCV RBC AUTO: 78.1 FL (ref 80–96)
MONOCYTES # BLD AUTO: 0.92 K/UL (ref 0–0.8)
MONOCYTES NFR BLD AUTO: 11.6 % (ref 2–6)
MPC BLD CALC-MCNC: 9.9 FL (ref 9.4–12.4)
NEUTROPHILS # BLD AUTO: 4.46 K/UL (ref 1.8–7.7)
NEUTROPHILS NFR BLD AUTO: 56.2 % (ref 53–65)
NITRITE UR QL STRIP: NEGATIVE
NRBC # BLD AUTO: 0 X10E3/UL
NRBC, AUTO (.00): 0 %
PH UR STRIP: 5.5 PH UNITS
PLATELET # BLD AUTO: 496 K/UL (ref 150–400)
POTASSIUM SERPL-SCNC: 4.7 MMOL/L (ref 3.5–5.1)
PROT SERPL-MCNC: 6.1 G/DL (ref 5.8–7.6)
PROT UR QL STRIP: NEGATIVE
RBC # BLD AUTO: 3.66 M/UL (ref 4.2–5.4)
RBC # UR STRIP: NEGATIVE /UL
SODIUM SERPL-SCNC: 125 MMOL/L (ref 136–145)
SP GR UR STRIP: 1.01
UROBILINOGEN UR STRIP-ACNC: NORMAL MG/DL
WBC # BLD AUTO: 7.93 K/UL (ref 4.5–11)

## 2025-07-09 PROCEDURE — 99900035 HC TECH TIME PER 15 MIN (STAT)

## 2025-07-09 PROCEDURE — 25000003 PHARM REV CODE 250: Performed by: NURSE PRACTITIONER

## 2025-07-09 PROCEDURE — 80053 COMPREHEN METABOLIC PANEL: CPT | Performed by: NURSE PRACTITIONER

## 2025-07-09 PROCEDURE — 36415 COLL VENOUS BLD VENIPUNCTURE: CPT | Performed by: NURSE PRACTITIONER

## 2025-07-09 PROCEDURE — 96374 THER/PROPH/DIAG INJ IV PUSH: CPT

## 2025-07-09 PROCEDURE — 63600175 PHARM REV CODE 636 W HCPCS: Performed by: NURSE PRACTITIONER

## 2025-07-09 PROCEDURE — 11000001 HC ACUTE MED/SURG PRIVATE ROOM

## 2025-07-09 PROCEDURE — 85025 COMPLETE CBC W/AUTO DIFF WBC: CPT | Performed by: NURSE PRACTITIONER

## 2025-07-09 PROCEDURE — 94799 UNLISTED PULMONARY SVC/PX: CPT

## 2025-07-09 PROCEDURE — 94761 N-INVAS EAR/PLS OXIMETRY MLT: CPT

## 2025-07-09 PROCEDURE — 81003 URINALYSIS AUTO W/O SCOPE: CPT | Performed by: NURSE PRACTITIONER

## 2025-07-09 PROCEDURE — 99285 EMERGENCY DEPT VISIT HI MDM: CPT | Mod: 25

## 2025-07-09 PROCEDURE — 83690 ASSAY OF LIPASE: CPT | Performed by: NURSE PRACTITIONER

## 2025-07-09 PROCEDURE — 96361 HYDRATE IV INFUSION ADD-ON: CPT

## 2025-07-09 RX ORDER — MORPHINE SULFATE 4 MG/ML
4 INJECTION, SOLUTION INTRAMUSCULAR; INTRAVENOUS EVERY 4 HOURS PRN
Refills: 0 | Status: DISCONTINUED | OUTPATIENT
Start: 2025-07-09 | End: 2025-07-14 | Stop reason: HOSPADM

## 2025-07-09 RX ORDER — ONDANSETRON HYDROCHLORIDE 2 MG/ML
4 INJECTION, SOLUTION INTRAVENOUS
Status: COMPLETED | OUTPATIENT
Start: 2025-07-09 | End: 2025-07-09

## 2025-07-09 RX ORDER — TALC
6 POWDER (GRAM) TOPICAL NIGHTLY PRN
Status: DISCONTINUED | OUTPATIENT
Start: 2025-07-09 | End: 2025-07-14 | Stop reason: HOSPADM

## 2025-07-09 RX ORDER — SODIUM CHLORIDE 9 MG/ML
1000 INJECTION, SOLUTION INTRAVENOUS
Status: COMPLETED | OUTPATIENT
Start: 2025-07-09 | End: 2025-07-09

## 2025-07-09 RX ORDER — LEVOTHYROXINE SODIUM 50 UG/1
50 TABLET ORAL
Status: DISCONTINUED | OUTPATIENT
Start: 2025-07-10 | End: 2025-07-14 | Stop reason: HOSPADM

## 2025-07-09 RX ORDER — ONDANSETRON HYDROCHLORIDE 2 MG/ML
4 INJECTION, SOLUTION INTRAVENOUS EVERY 12 HOURS PRN
Status: DISCONTINUED | OUTPATIENT
Start: 2025-07-09 | End: 2025-07-14 | Stop reason: HOSPADM

## 2025-07-09 RX ORDER — ENOXAPARIN SODIUM 100 MG/ML
40 INJECTION SUBCUTANEOUS EVERY 24 HOURS
Status: DISCONTINUED | OUTPATIENT
Start: 2025-07-09 | End: 2025-07-14 | Stop reason: HOSPADM

## 2025-07-09 RX ORDER — ACETAMINOPHEN 325 MG/1
650 TABLET ORAL EVERY 8 HOURS PRN
Status: DISCONTINUED | OUTPATIENT
Start: 2025-07-09 | End: 2025-07-14 | Stop reason: HOSPADM

## 2025-07-09 RX ORDER — DEXTROSE MONOHYDRATE, SODIUM CHLORIDE, AND POTASSIUM CHLORIDE 50; 1.49; 4.5 G/1000ML; G/1000ML; G/1000ML
INJECTION, SOLUTION INTRAVENOUS CONTINUOUS
Status: DISCONTINUED | OUTPATIENT
Start: 2025-07-09 | End: 2025-07-14 | Stop reason: HOSPADM

## 2025-07-09 RX ORDER — FUROSEMIDE 20 MG/1
20 TABLET ORAL DAILY
Status: DISCONTINUED | OUTPATIENT
Start: 2025-07-10 | End: 2025-07-14 | Stop reason: HOSPADM

## 2025-07-09 RX ORDER — LOSARTAN POTASSIUM 50 MG/1
50 TABLET ORAL DAILY
Status: DISCONTINUED | OUTPATIENT
Start: 2025-07-10 | End: 2025-07-14 | Stop reason: HOSPADM

## 2025-07-09 RX ORDER — ASCORBIC ACID 500 MG
500 TABLET ORAL 2 TIMES DAILY
Status: DISCONTINUED | OUTPATIENT
Start: 2025-07-09 | End: 2025-07-14 | Stop reason: HOSPADM

## 2025-07-09 RX ORDER — CILOSTAZOL 50 MG/1
50 TABLET ORAL 2 TIMES DAILY
Status: DISCONTINUED | OUTPATIENT
Start: 2025-07-09 | End: 2025-07-14 | Stop reason: HOSPADM

## 2025-07-09 RX ORDER — ALLOPURINOL 100 MG/1
200 TABLET ORAL NIGHTLY
Status: DISCONTINUED | OUTPATIENT
Start: 2025-07-09 | End: 2025-07-14 | Stop reason: HOSPADM

## 2025-07-09 RX ORDER — DIVALPROEX SODIUM 500 MG/1
1000 TABLET, FILM COATED, EXTENDED RELEASE ORAL NIGHTLY
Status: DISCONTINUED | OUTPATIENT
Start: 2025-07-09 | End: 2025-07-14 | Stop reason: HOSPADM

## 2025-07-09 RX ORDER — ALBUTEROL SULFATE 90 UG/1
2 INHALANT RESPIRATORY (INHALATION) EVERY 6 HOURS PRN
Status: DISCONTINUED | OUTPATIENT
Start: 2025-07-09 | End: 2025-07-10

## 2025-07-09 RX ORDER — LIDOCAINE HYDROCHLORIDE 10 MG/ML
1 INJECTION, SOLUTION EPIDURAL; INFILTRATION; INTRACAUDAL; PERINEURAL ONCE
Status: DISCONTINUED | OUTPATIENT
Start: 2025-07-09 | End: 2025-07-14 | Stop reason: HOSPADM

## 2025-07-09 RX ADMIN — DIVALPROEX SODIUM 1000 MG: 500 TABLET, FILM COATED, EXTENDED RELEASE ORAL at 09:07

## 2025-07-09 RX ADMIN — ENOXAPARIN SODIUM 40 MG: 40 INJECTION SUBCUTANEOUS at 04:07

## 2025-07-09 RX ADMIN — OXYCODONE HYDROCHLORIDE AND ACETAMINOPHEN 500 MG: 500 TABLET ORAL at 09:07

## 2025-07-09 RX ADMIN — SODIUM CHLORIDE 1000 ML: 9 INJECTION, SOLUTION INTRAVENOUS at 12:07

## 2025-07-09 RX ADMIN — POTASSIUM CHLORIDE, DEXTROSE MONOHYDRATE AND SODIUM CHLORIDE: 150; 5; 450 INJECTION, SOLUTION INTRAVENOUS at 04:07

## 2025-07-09 RX ADMIN — ALLOPURINOL 200 MG: 100 TABLET ORAL at 09:07

## 2025-07-09 RX ADMIN — ONDANSETRON 4 MG: 2 INJECTION INTRAMUSCULAR; INTRAVENOUS at 12:07

## 2025-07-09 RX ADMIN — MORPHINE SULFATE 4 MG: 4 INJECTION INTRAVENOUS at 09:07

## 2025-07-09 RX ADMIN — CILOSTAZOL 50 MG: 50 TABLET ORAL at 09:07

## 2025-07-09 NOTE — H&P
[facility] - Emergency Department  General Surgery  History & Physical    Patient Name: Umesh Lassiter  MRN: [id number]  Admission Date: 7/9/2025  Attending Physician: Mannie Brannon MD   Primary Care Provider: Terri Smith FNP    Patient information was obtained from patient, past medical records, ER records, and primary team.     Subjective:     Chief Complaint:   Chief Complaint   Patient presents with    Abdominal Pain     Pt c/o abdominal pain for 2 months. Pt is a resident at [facility] and has hx of ileus, pt has a colostomy and repots little output.         History of Present Illness: Patient presents to ER from [facility] with complaint of abdominal pain and nausea.  Patient denies vomiting.  Reports generalized abdominal pain and bloating.  She states her symptoms started yesterday. Reports hard stools in ostomy until today.  Now with liquid tan colored stools. She had xray done at nursing home yesterday that showed possible ileus.  She is status post diagnostic laparotomy with colon resection and ostomy per Dr Brannon on 06/22/25.  She was just discharged last week.  Reports decreased appetite. Denies fever.     No current facility-administered medications on file prior to encounter.     Current Outpatient Medications on File Prior to Encounter   Medication Sig    acetaminophen-codeine 300-30mg (TYLENOL #3) 300-30 mg Tab Take 1 tablet by mouth every 12 (twelve) hours as needed (pain).    albuterol (PROVENTIL/VENTOLIN HFA) 90 mcg/actuation inhaler Inhale 2 puffs into the lungs every 6 (six) hours as needed for Wheezing. (Patient taking differently: Inhale 2 puffs into the lungs every 6 (six) hours as needed for Wheezing or Shortness of Breath.)    allopurinoL (ZYLOPRIM) 100 MG tablet Take 200 mg by mouth every evening. May also give 100 mg by mouth daily PRN for elevated uric acid    ascorbic acid, vitamin C, (VITAMIN C) 500 MG tablet Take 500 mg by mouth 2 (two) times  daily.    cilostazoL (PLETAL) 50 MG Tab Take 1 tablet (50 mg total) by mouth 2 (two) times daily.    clorazepate (TRANXENE) 7.5 MG Tab Take 3.75 mg by mouth 2 (two) times daily.    divalproex ER (DEPAKOTE ER) 500 MG Tb24 24 hr tablet TAKE 2 TABLETS BY MOUTH EVERY NIGHT AT BEDTIME FOR SEIZURES (Patient taking differently: Take 1,000 mg by mouth every evening. TAKE 2 TABLETS BY MOUTH EVERY NIGHT AT BEDTIME FOR SEIZURES)    furosemide (LASIX) 20 MG tablet Take 1 tablet (20 mg total) by mouth once daily.    HYDROcodone-acetaminophen (NORCO) 7.5-325 mg per tablet Take 1 tablet by mouth every 6 (six) hours as needed for Pain.    levothyroxine (SYNTHROID) 50 MCG tablet Take 1 tablet (50 mcg total) by mouth before breakfast.    losartan (COZAAR) 50 MG tablet Take 1 tablet (50 mg total) by mouth once daily.    melatonin 10 mg Tab Take 10 mg by mouth every evening.    meloxicam (MOBIC) 7.5 MG tablet Take 7.5 mg by mouth 2 (two) times a day.    multivitamin (THERAGRAN) per tablet Take 1 tablet by mouth once daily.    ondansetron (ZOFRAN-ODT) 4 MG TbDL Take 1 tablet (4 mg total) by mouth every 6 (six) hours as needed (nausea).    polyethylene glycol (GLYCOLAX) 17 gram PwPk Take 17 g by mouth once daily.       Review of patient's allergies indicates:   Allergen Reactions    Influenza virus vaccines      Pt states had to be admitted to hospital after last flu vaccine     Lexapro [escitalopram]        Past Medical History:   Diagnosis Date    Anxiety     Asthma     Cellulitis of left lower extremity 01/13/2022    Chronic wound infection of abdomen 06/06/2022    CVA (cerebral vascular accident) 05/23/2018    DVT (deep venous thrombosis) 2019    Epilepsy     Hyperlipidemia     Hypertension     Hypothyroid     Left leg pain 11/29/2021    Non-pressure chronic ulcer of left lower leg with fat layer exposed     Open wound of lower limb 07/16/2022    PVD (peripheral vascular disease)     Venous stasis     Wound infection     left lower  leg     Past Surgical History:   Procedure Laterality Date    ABDOMINAL SURGERY      COLOSTOMY Left 2025    COLOSTOMY N/A 6/22/2025    Procedure: CREATION, COLOSTOMY;  Surgeon: Mannie Brannon MD;  Location: [facility];  Service: General;  Laterality: N/A;    EXCISION, SMALL INTESTINE N/A 6/22/2025    Procedure: EXCISION, SMALL INTESTINE;  Surgeon: Mannie Brannon MD;  Location: [facility] OR;  Service: General;  Laterality: N/A;    KNEE ARTHROSCOPY Left     x 2    LAPAROTOMY, EXPLORATORY N/A 6/22/2025    Procedure: LAPAROTOMY, EXPLORATORY;  Surgeon: Mannie Brannon MD;  Location: [facility] OR;  Service: General;  Laterality: N/A;    LASER ABLATION Right 04/20/2018    GSV LASER ABLATION PERFORMED BY DR. KARMA BUTCHER.    LASER ABLATION Left 04/27/2018    SSV LASER ABLATION PERFORMED BY DR. KARMA BUTCHER.    LASER ABLATION Right 12/17/2020    GSV ( DUPLICATED )LASER ABLATION PERFORMED BY DR. KARMA BUTCHER.    LASER ABLATION Left 01/08/2021    GSV LASER ABLATION PERFORMED BY DR. KARMA BUTCHER.    Remove cataracts, insert lens Bilateral     SHOULDER ARTHROSCOPY Left     SINUS SURGERY      TOTAL ABDOMINAL HYSTERECTOMY W/ BILATERAL SALPINGOOPHORECTOMY      VENOUS ABLATION Right 02/28/2020    DISTAL GSV VARITHENA ABLATION PERFORMED BY DR. KARMA BUTCHER.     Family History       Problem Relation (Age of Onset)    Alzheimer's disease Mother    Cancer Brother    Diabetes Sister, Brother    Endometrial cancer Mother, Sister    Heart disease Mother, Daughter    Hypertension Sister    Lung cancer Father    No Known Problems Son, Son, Son, Sister, Sister, Sister, Sister, Sister    Polycystic kidney disease Sister    Seizures Brother    Sickle cell anemia Daughter    Thyroid disease Sister          Tobacco Use    Smoking status: Never    Smokeless tobacco: Never   Vaping Use    Vaping status: Never Used   Substance and Sexual Activity    Alcohol use: Never    Drug use: Never    Sexual activity: Not Currently     Birth  control/protection: Abstinence     Review of Systems   Constitutional:  Positive for activity change, appetite change and fatigue.   Gastrointestinal:  Positive for abdominal distention, abdominal pain, constipation, diarrhea and nausea.   All other systems reviewed and are negative.    Objective:     Vital Signs (Most Recent):  Temp: 97.7 °F (36.5 °C) (07/09/25 1150)  Pulse: 81 (07/09/25 1150)  Resp: 19 (07/09/25 1150)  BP: 110/67 (07/09/25 1150)  SpO2: 100 % (07/09/25 1150) Vital Signs (24h Range):  Temp:  [97.7 °F (36.5 °C)] 97.7 °F (36.5 °C)  Pulse:  [81] 81  Resp:  [19] 19  SpO2:  [100 %] 100 %  BP: (110)/(67) 110/67     Weight: 99.8 kg (220 lb)  Body mass index is 35.51 kg/m².     Physical Exam  Vitals and nursing note reviewed.   Constitutional:       Appearance: She is ill-appearing.   HENT:      Head: Normocephalic.      Nose: Nose normal.      Mouth/Throat:      Mouth: Mucous membranes are moist.   Eyes:      Extraocular Movements: Extraocular movements intact.   Cardiovascular:      Rate and Rhythm: Normal rate.   Pulmonary:      Effort: Pulmonary effort is normal.      Breath sounds: Stridor present.   Abdominal:      General: Abdomen is protuberant. There is distension.      Palpations: Abdomen is soft.      Tenderness: There is generalized abdominal tenderness.      Hernia: No hernia is present.          Comments: Staples intact without drainage from midline incision   Musculoskeletal:         General: Normal range of motion.      Cervical back: Normal range of motion.   Skin:     General: Skin is warm and dry.      Capillary Refill: Capillary refill takes 2 to 3 seconds.   Neurological:      General: No focal deficit present.      Mental Status: She is alert and oriented to person, place, and time.   Psychiatric:         Mood and Affect: Mood normal.            I have reviewed all pertinent lab results within the past 24 hours.  CBC:   Recent Labs   Lab 07/09/25  1252   WBC 7.93   RBC 3.66*   HGB 9.2*  "  HCT 28.6*   *   MCV 78.1*   MCH 25.1*   MCHC 32.2     BMP:   Recent Labs   Lab 07/09/25  1252      *   K 4.7   CL 94*   CO2 26   BUN 25*   CREATININE 1.43*   CALCIUM 8.7     CMP:   Recent Labs   Lab 07/09/25  1252      CALCIUM 8.7   ALBUMIN 2.3*   PROT 6.1   *   K 4.7   CO2 26   CL 94*   BUN 25*   CREATININE 1.43*   ALKPHOS 87   ALT 7   AST 46*   BILITOT 0.2     LFTs:   Recent Labs   Lab 07/09/25  1252   ALT 7   AST 46*   ALKPHOS 87   BILITOT 0.2   PROT 6.1   ALBUMIN 2.3*     Coagulation: No results for input(s): "LABPROT", "INR", "APTT" in the last 168 hours.  Microbiology Results (last 7 days)       ** No results found for the last 168 hours. **          Specimen (24h ago, onward)      None          Recent Labs   Lab 07/09/25  1419   COLORU Light Yellow   SPECGRAV 1.010   PHUR 5.5   PROTEINUA Negative   NITRITE Negative   LEUKOCYTESUR Negative   UROBILINOGEN Normal       Significant Diagnostics:  I have reviewed all pertinent imaging results/findings within the past 24 hours.    Assessment/Plan:     Other partial intestinal obstruction  07/09/25 npo, ng tube to LIS, Gastrograffin in a.m..      VTE Risk Mitigation (From admission, onward)           Ordered     enoxaparin injection 40 mg  Daily         07/09/25 1551     Place sequential compression device  Until discontinued         07/09/25 1551     IP VTE HIGH RISK PATIENT  Once         07/09/25 1550                    MELANI Wilson  General Surgery  [facility] - Emergency Department  "

## 2025-07-09 NOTE — TELEPHONE ENCOUNTER
Copied from CRM #[id number]. Topic: General Inquiry - Return Call  >> Jul 9, 2025  9:29 AM Kate wrote:  Who Called: Nurse Practitioner Brooke from [facility] Lassiter    Patient is returning phone call    Who Left Message for Patient: Nurse Tamiko Conteh  Does the patient know what this is regarding?: Yes    Symptoms (please be specific): ileus symptoms  How long has patient had these symptoms:  few weeks      Preferred Method of Contact: Phone Call  Best Call Back Number, if different: Please call [phone]  Additional Information: Nurse Cox stated to please call her number [phone]      States pt had an xray yesterday and it came back for suspicious ileus. Informed NP to send pt to ER per Allyson Tompkins NP for further testing. Verbalizes understanding and denies further questions at this time.
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
## 2025-07-09 NOTE — HPI
Patient presents to ER from [facility] with complaint of abdominal pain and nausea.  Patient denies vomiting.  Reports generalized abdominal pain and bloating.  She states her symptoms started yesterday. Reports hard stools in ostomy until today.  Now with liquid tan colored stools. She had xray done at nursing home yesterday that showed possible ileus.  She is status post diagnostic laparotomy with colon resection and ostomy per Dr Brannon on 06/22/25.  She was just discharged last week.  Reports decreased appetite. Denies fever.

## 2025-07-09 NOTE — TELEPHONE ENCOUNTER
Copied from CRM #[id number]. Topic: General Inquiry - Patient Advice  >> Jul 9, 2025  8:21 AM Danitza wrote:  Who Called: [facility]   [phone]    Caller is requesting assistance/information from provider's office.    Symptoms (please be specific): still in significant pain, possible ileus?   How long has patient had these symptoms:  few weeks   List of preferred pharmacies on file (remove unneeded): [unfilled]  If different, enter pharmacy into here including location and phone number:       Preferred Method of Contact: Phone Call  Patient's Preferred Phone Number on File: [phone]   Best Call Back Number, if different:  Additional Information:      Spoke with nurse at nursing home. States resident just had a pain pill. States she is having regular output out of her colostomy, denies fever chills, and denies vomiting. States no issues with stoma. States no other concerns at this time.

## 2025-07-10 LAB
ANION GAP SERPL CALCULATED.3IONS-SCNC: 11 MMOL/L (ref 7–16)
BUN SERPL-MCNC: 20 MG/DL (ref 10–20)
BUN/CREAT SERPL: 20 (ref 6–20)
CALCIUM SERPL-MCNC: 8.3 MG/DL (ref 8.4–10.2)
CHLORIDE SERPL-SCNC: 102 MMOL/L (ref 98–107)
CO2 SERPL-SCNC: 22 MMOL/L (ref 23–31)
CREAT SERPL-MCNC: 0.98 MG/DL (ref 0.55–1.02)
EGFR (NO RACE VARIABLE) (RUSH/TITUS): 58 ML/MIN/1.73M2
GLUCOSE SERPL-MCNC: 105 MG/DL (ref 82–115)
POTASSIUM SERPL-SCNC: 5 MMOL/L (ref 3.5–5.1)
SODIUM SERPL-SCNC: 130 MMOL/L (ref 136–145)

## 2025-07-10 PROCEDURE — 25500020 PHARM REV CODE 255: Performed by: SURGERY

## 2025-07-10 PROCEDURE — 11000001 HC ACUTE MED/SURG PRIVATE ROOM

## 2025-07-10 PROCEDURE — 25000003 PHARM REV CODE 250: Performed by: NURSE PRACTITIONER

## 2025-07-10 PROCEDURE — 99900035 HC TECH TIME PER 15 MIN (STAT)

## 2025-07-10 PROCEDURE — 80048 BASIC METABOLIC PNL TOTAL CA: CPT | Performed by: NURSE PRACTITIONER

## 2025-07-10 PROCEDURE — 94761 N-INVAS EAR/PLS OXIMETRY MLT: CPT

## 2025-07-10 PROCEDURE — 63600175 PHARM REV CODE 636 W HCPCS: Performed by: NURSE PRACTITIONER

## 2025-07-10 PROCEDURE — 36415 COLL VENOUS BLD VENIPUNCTURE: CPT | Performed by: NURSE PRACTITIONER

## 2025-07-10 RX ORDER — DIATRIZOATE MEGLUMINE AND DIATRIZOATE SODIUM 660; 100 MG/ML; MG/ML
240 SOLUTION ORAL; RECTAL
Status: COMPLETED | OUTPATIENT
Start: 2025-07-10 | End: 2025-07-10

## 2025-07-10 RX ORDER — ALBUTEROL SULFATE 0.83 MG/ML
2.5 SOLUTION RESPIRATORY (INHALATION) EVERY 6 HOURS PRN
Status: DISCONTINUED | OUTPATIENT
Start: 2025-07-10 | End: 2025-07-14 | Stop reason: HOSPADM

## 2025-07-10 RX ADMIN — LOSARTAN POTASSIUM 50 MG: 50 TABLET, FILM COATED ORAL at 10:07

## 2025-07-10 RX ADMIN — CILOSTAZOL 50 MG: 50 TABLET ORAL at 09:07

## 2025-07-10 RX ADMIN — CILOSTAZOL 50 MG: 50 TABLET ORAL at 10:07

## 2025-07-10 RX ADMIN — FUROSEMIDE 20 MG: 20 TABLET ORAL at 10:07

## 2025-07-10 RX ADMIN — OXYCODONE HYDROCHLORIDE AND ACETAMINOPHEN 500 MG: 500 TABLET ORAL at 09:07

## 2025-07-10 RX ADMIN — POTASSIUM CHLORIDE, DEXTROSE MONOHYDRATE AND SODIUM CHLORIDE: 150; 5; 450 INJECTION, SOLUTION INTRAVENOUS at 02:07

## 2025-07-10 RX ADMIN — ALLOPURINOL 200 MG: 100 TABLET ORAL at 09:07

## 2025-07-10 RX ADMIN — DIATRIZOATE MEGLUMINE AND DIATRIZOATE SODIUM 240 ML: 660; 100 LIQUID ORAL; RECTAL at 04:07

## 2025-07-10 RX ADMIN — MORPHINE SULFATE 4 MG: 4 INJECTION INTRAVENOUS at 02:07

## 2025-07-10 RX ADMIN — ENOXAPARIN SODIUM 40 MG: 40 INJECTION SUBCUTANEOUS at 05:07

## 2025-07-10 RX ADMIN — ONDANSETRON 4 MG: 2 INJECTION INTRAMUSCULAR; INTRAVENOUS at 02:07

## 2025-07-10 RX ADMIN — OXYCODONE HYDROCHLORIDE AND ACETAMINOPHEN 500 MG: 500 TABLET ORAL at 10:07

## 2025-07-10 RX ADMIN — LEVOTHYROXINE SODIUM 50 MCG: 0.05 TABLET ORAL at 06:07

## 2025-07-10 RX ADMIN — DIVALPROEX SODIUM 1000 MG: 500 TABLET, FILM COATED, EXTENDED RELEASE ORAL at 09:07

## 2025-07-10 NOTE — PROGRESS NOTES
[facility]  Wound Care    Patient Name:  Umesh Lassiter   MRN:  [id number]  Date: 7/10/2025  Diagnosis: <principal problem not specified>    History:     Past Medical History:   Diagnosis Date    Anxiety     Asthma     Cellulitis of left lower extremity 01/13/2022    Chronic wound infection of abdomen 06/06/2022    CVA (cerebral vascular accident) 05/23/2018    DVT (deep venous thrombosis) 2019    Epilepsy     Hyperlipidemia     Hypertension     Hypothyroid     Left leg pain 11/29/2021    Non-pressure chronic ulcer of left lower leg with fat layer exposed     Open wound of lower limb 07/16/2022    PVD (peripheral vascular disease)     Venous stasis     Wound infection     left lower leg       Social History[1]    Precautions:     Allergies as of 07/09/2025 - Reviewed 07/09/2025   Allergen Reaction Noted    Influenza virus vaccines  04/12/2022    Lexapro [escitalopram]  04/12/2021       [facility] Assessment Details/Treatment     Narrative: Seen patient for initial preventative skin care measures.  No foam borders, redness, or open wounds noted to bilateral heels and sacral.  Consult wound care of any findings.    07/10/2025        [1]   Social History  Socioeconomic History    Marital status:    Tobacco Use    Smoking status: Never    Smokeless tobacco: Never   Vaping Use    Vaping status: Never Used   Substance and Sexual Activity    Alcohol use: Never    Drug use: Never    Sexual activity: Not Currently     Birth control/protection: Abstinence     Social Drivers of Health     Financial Resource Strain: Low Risk  (6/22/2025)    Overall Financial Resource Strain (CARDIA)     Difficulty of Paying Living Expenses: Not hard at all   Food Insecurity: No Food Insecurity (6/22/2025)    Hunger Vital Sign     Worried About Running Out of Food in the Last Year: Never true     Ran Out of Food in the Last Year: Never true   Transportation Needs: No Transportation Needs (6/22/2025)    PRAPARE -  Transportation     Lack of Transportation (Medical): No     Lack of Transportation (Non-Medical): No   Physical Activity: Insufficiently Active (2/7/2023)    Exercise Vital Sign     Days of Exercise per Week: 3 days     Minutes of Exercise per Session: 30 min   Stress: No Stress Concern Present (6/22/2025)    [employer] - Occupational Stress Questionnaire     Feeling of Stress : Not at all   Housing Stability: Low Risk  (6/22/2025)    Housing Stability Vital Sign     Unable to Pay for Housing in the Last Year: No     Homeless in the Last Year: No

## 2025-07-10 NOTE — PROGRESS NOTES
[facility]  General Surgery  Progress Note    Subjective:     History of Present Illness:  Patient presents to ER from [facility] with complaint of abdominal pain and nausea.  Patient denies vomiting.  Reports generalized abdominal pain and bloating.  She states her symptoms started yesterday. Reports hard stools in ostomy until today.  Now with liquid tan colored stools. She had xray done at nursing home yesterday that showed possible ileus.  She is status post diagnostic laparotomy with colon resection and ostomy per Dr Brannon on 06/22/25.  She was just discharged last week.  Reports decreased appetite. Denies fever.     Post-Op Info:  * No surgery found *         Interval History: 07/10/25 patient was awake and alert and this morning.  She is complaining she has been incontinent of urine and desires to be changed.  NG tube connected to low intermittent suction with less than 200 cc of light drainage noted in suction canister.  Liquid stool noted in ostomy bag.  Abdomen is mildly distended but nontender.  Patient reports improvement of her symptoms overnight.  She has not had any nausea or vomiting.  Will remain NPO.  Gastrografin challenge today per NG tube.     Medications:  Continuous Infusions:   dextrose 5 % and 0.45 % NaCl with KCl 20 mEq   Intravenous Continuous 125 mL/hr at 07/10/25 1049 Restarted at 07/10/25 1049     Scheduled Meds:   allopurinoL  200 mg Oral QHS    ascorbic acid (vitamin C)  500 mg Oral BID    cilostazoL  50 mg Oral BID    divalproex ER  1,000 mg Oral QHS    enoxparin  40 mg Subcutaneous Daily    furosemide  20 mg Oral Daily    levothyroxine  50 mcg Oral Before breakfast    LIDOcaine (PF) 10 mg/ml (1%)  1 mL Other Once    losartan  50 mg Oral Daily     PRN Meds:  Current Facility-Administered Medications:     acetaminophen, 650 mg, Oral, Q8H PRN    albuterol sulfate, 2.5 mg, Nebulization, Q6H PRN    diatrizoate meglumineand-diatrizoate sodium, 240 mL, Per NG  tube, ONCE PRN    melatonin, 6 mg, Oral, Nightly PRN    morphine, 4 mg, Intravenous, Q4H PRN    ondansetron, 4 mg, Intravenous, Q12H PRN     Review of patient's allergies indicates:   Allergen Reactions    Influenza virus vaccines      Pt states had to be admitted to hospital after last flu vaccine     Lexapro [escitalopram]      Objective:     Vital Signs (Most Recent):  Temp: 97.2 °F (36.2 °C) (07/10/25 0832)  Pulse: 80 (07/10/25 0850)  Resp: 18 (07/10/25 0850)  BP: (!) 145/70 (07/10/25 0832)  SpO2: 98 % (07/10/25 0850) Vital Signs (24h Range):  Temp:  [97.2 °F (36.2 °C)-98.4 °F (36.9 °C)] 97.2 °F (36.2 °C)  Pulse:  [77-94] 80  Resp:  [16-19] 18  SpO2:  [94 %-100 %] 98 %  BP: (110-164)/(58-76) 145/70     Weight: 99.8 kg (220 lb)  Body mass index is 35.51 kg/m².    Intake/Output - Last 3 Shifts         07/08 0700  07/09 0659 07/09 0700  07/10 0659 07/10 0700  07/11 0659    I.V. (mL/kg)  1000 (10)     Total Intake(mL/kg)  1000 (10)     Net  +1000            Unmeasured Urine Occurrence  1 x     Unmeasured Stool Occurrence  0 x              Physical Exam  Vitals reviewed.   HENT:      Head: Normocephalic.      Nose: Nose normal.      Mouth/Throat:      Mouth: Mucous membranes are moist.   Eyes:      Extraocular Movements: Extraocular movements intact.   Cardiovascular:      Rate and Rhythm: Normal rate.   Pulmonary:      Effort: Pulmonary effort is normal.      Breath sounds: Normal breath sounds.   Abdominal:      General: Bowel sounds are normal. There is no distension.      Palpations: Abdomen is soft.      Tenderness: There is no abdominal tenderness.       Musculoskeletal:         General: Normal range of motion.      Cervical back: Normal range of motion and neck supple.   Skin:     General: Skin is warm and dry.      Capillary Refill: Capillary refill takes 2 to 3 seconds.   Neurological:      General: No focal deficit present.      Mental Status: She is alert and oriented to person, place, and time.    Psychiatric:         Mood and Affect: Mood normal.        Significant Labs:  I have reviewed all pertinent lab results within the past 24 hours.  CBC:   Recent Labs   Lab 07/09/25  1252   WBC 7.93   RBC 3.66*   HGB 9.2*   HCT 28.6*   *   MCV 78.1*   MCH 25.1*   MCHC 32.2     BMP:   Recent Labs   Lab 07/10/25  0423      *   K 5.0      CO2 22*   BUN 20   CREATININE 0.98   CALCIUM 8.3*     CMP:   Recent Labs   Lab 07/09/25  1252 07/10/25  0423    105   CALCIUM 8.7 8.3*   ALBUMIN 2.3*  --    PROT 6.1  --    * 130*   K 4.7 5.0   CO2 26 22*   CL 94* 102   BUN 25* 20   CREATININE 1.43* 0.98   ALKPHOS 87  --    ALT 7  --    AST 46*  --    BILITOT 0.2  --      Microbiology Results (last 7 days)       ** No results found for the last 168 hours. **          Specimen (24h ago, onward)      None          Recent Labs   Lab 07/09/25  1419   COLORU Light Yellow   SPECGRAV 1.010   PHUR 5.5   PROTEINUA Negative   NITRITE Negative   LEUKOCYTESUR Negative   UROBILINOGEN Normal       Significant Diagnostics:  I have reviewed all pertinent imaging results/findings within the past 24 hours.   Assessment/Plan:     Other partial intestinal obstruction  07/09/25 npo, ng tube to LIS, Gastrograffin in a.m..    07/10/25 NPO.  NG tube to low intermittent suction, clamped for Gastrografin challenge today.        Allyson Tompkins, FROYP  General Surgery  [facility]

## 2025-07-10 NOTE — PHARMACY MED REC
"Admission Medication History     The home medication history was taken by Emeli Reagan.    You may go to "Admission" then "Reconcile Home Medications" tabs to review and/or act upon these items.     The home medication list has been updated by the Pharmacy department.   Please read ALL comments highlighted in yellow.   Please address this information as you see fit.    Feel free to contact us if you have any questions or require assistance.    Medications listed below were obtained from: [facility] [facility]    Current Outpatient Medications on File Prior to Encounter   Medication Sig Dispense Refill Last Dose/Taking    acetaminophen-codeine 300-30mg (TYLENOL #3) 300-30 mg Tab Take 1 tablet by mouth every 12 (twelve) hours as needed (pain).   7/9/2025    albuterol (PROVENTIL/VENTOLIN HFA) 90 mcg/actuation inhaler Inhale 2 puffs into the lungs every 6 (six) hours as needed for Wheezing. (Patient taking differently: Inhale 2 puffs into the lungs every 6 (six) hours as needed for Wheezing or Shortness of Breath.) 18 g 3 7/9/2025    allopurinoL (ZYLOPRIM) 100 MG tablet Take 200 mg by mouth every evening. May also give 100 mg by mouth daily PRN for elevated uric acid   7/9/2025    ascorbic acid, vitamin C, (VITAMIN C) 500 MG tablet Take 500 mg by mouth 2 (two) times daily.   7/9/2025    cilostazoL (PLETAL) 50 MG Tab Take 1 tablet (50 mg total) by mouth 2 (two) times daily. 180 tablet 3 7/9/2025    clorazepate (TRANXENE) 7.5 MG Tab Take 3.75 mg by mouth 2 (two) times daily.   7/9/2025    divalproex ER (DEPAKOTE ER) 500 MG Tb24 24 hr tablet TAKE 2 TABLETS BY MOUTH EVERY NIGHT AT BEDTIME FOR SEIZURES (Patient taking differently: Take 1,000 mg by mouth every evening. TAKE 2 TABLETS BY MOUTH EVERY NIGHT AT BEDTIME FOR SEIZURES) 180 tablet 3 7/8/2025    furosemide (LASIX) 20 MG tablet Take 1 tablet (20 mg total) by mouth once daily. 90 tablet 3 7/9/2025    HYDROcodone-acetaminophen (NORCO) 7.5-325 mg per " tablet Take 1 tablet by mouth every 6 (six) hours as needed for Pain. 21 tablet 0 7/9/2025    levothyroxine (SYNTHROID) 50 MCG tablet Take 1 tablet (50 mcg total) by mouth before breakfast. 90 tablet 3 7/9/2025    losartan (COZAAR) 50 MG tablet Take 1 tablet (50 mg total) by mouth once daily. 90 tablet 3 7/9/2025    melatonin 10 mg Tab Take 10 mg by mouth every evening.   7/8/2025    meloxicam (MOBIC) 7.5 MG tablet Take 7.5 mg by mouth 2 (two) times a day.   7/9/2025    multivitamin (THERAGRAN) per tablet Take 1 tablet by mouth once daily.   7/9/2025    ondansetron (ZOFRAN-ODT) 4 MG TbDL Take 1 tablet (4 mg total) by mouth every 6 (six) hours as needed (nausea). 15 tablet 0 7/9/2025    polyethylene glycol (GLYCOLAX) 17 gram PwPk Take 17 g by mouth once daily.   7/9/2025         Potential issues to be addressed PRIOR TO DISCHARGE  No issues identified.    Emeli Reagan  Medication Access Specialist  EXT. [id number]    .

## 2025-07-10 NOTE — PLAN OF CARE
[facility]  Initial Discharge Assessment       Primary Care Provider: Terri Smith FNP    Admission Diagnosis: Bowel obstruction [K56.609]    Admission Date: 7/9/2025  Expected Discharge Date: 7/17/2025    Transition of Care Barriers: None    Payor: MEDICARE / Plan: MEDICARE PART A & B / Product Type: Government /     Extended Emergency Contact Information  Primary Emergency Contact: nazia munoz  Address: [address]           [place] [address] [place]  Mobile Phone: [phone]  Relation: Daughter  Preferred language: English   needed? No  Secondary Emergency Contact: Almas Lassiter  Address: [address] [place] [address]  Home Phone: [phone]  Mobile Phone: [phone]  Relation: Son    Discharge Plan A: Return to nursing home, Skilled Nursing Facility  Discharge Plan B: Return to Nursing Home, Skilled Nursing Facility      [id number] [place], [place] - [address] [facility] [address]  [address]  [facility] [place] [address]  Phone: [phone] Fax: [phone]    [facility] - [address]  [address] [facility] [address] [address]  Phone: [phone] Fax: [phone]    [facility] - [place], [place] - [address]  [address]  [place] [address]  Phone: [phone] Fax: [phone]      Initial Assessment (most recent)       Adult Discharge Assessment - 07/10/25 1312          Discharge Assessment    Assessment Type Discharge Planning Assessment     Confirmed/corrected address, phone number and insurance Yes     Confirmed Demographics Correct on Facesheet     Source of Information patient;family     People in Home facility resident     Facility Arrived From: [facility]     Do you expect to return to your current living situation? Yes     Do you have help at home or someone to help you  manage your care at home? Yes     Who are your caregiver(s) and their phone number(s)? facility     Prior to hospitilization cognitive status: Unable to Assess     Current cognitive status: Alert/Oriented     Walking or Climbing Stairs Difficulty yes     Walking or Climbing Stairs ambulation difficulty, requires equipment     Mobility Management wc, rw     Dressing/Bathing Difficulty yes     Dressing/Bathing bathing difficulty, assistance 1 person;dressing difficulty, assistance 1 person     Dressing/Bathing Management facility     Home Accessibility wheelchair accessible     Home Layout Able to live on 1st floor     Equipment Currently Used at Home walker, rolling;wheelchair     Readmission within 30 days? Yes     Patient currently being followed by outpatient case management? No     Do you currently have service(s) that help you manage your care at home? No     Do you take prescription medications? Yes     Do you have prescription coverage? Yes     Do you have any problems affording any of your prescribed medications? No     Is the patient taking medications as prescribed? yes     Who is going to help you get home at discharge? family/agency     How do you get to doctors appointments? agency     Are you on dialysis? No     Do you take coumadin? No     Discharge Plan A Return to nursing home;Skilled Nursing Facility     Discharge Plan B Return to Nursing Home;Skilled Nursing Facility     DME Needed Upon Discharge  none     Discharge Plan discussed with: Patient;Adult children     Transition of Care Barriers None                   Ss spoke with pt and family at bedside. Pt is a resident at [facility] and plans to return when medically ready. Pt has required dme at facility. IM obtained. SDOH deferred due to facility resident. Packet started and placed in cubby 467. Ss following    Ss consulted stating pt's family inquiring if pt could discharge back to [facility] under skilled care instead of nh.  Ss informed pt, family, and MD of criteria for pt to be able to return to [facility] under skilled care vs returning as nh resident.     Met with pt and family to review discharge recommendation of NH/SNF and is agreeable to plan    Patient/family provided list of facilities in-network with patient's payor plan. Providers that are owned, operated, or affiliated with [facility] are included on the list.     Notified that referral sent to below listed facilities from in-network list based on proximity to home/family support:   [facility]  2.  3.  4.  5. (can send more than 5)    Patient/family instructed to identify preference.    Preferred Facility: (if more than 1, listed in order of descending preference)  1.  OR  Patient has declined to select a preferred provider and elects placement with the first accepting in network provider that is available to provide services as ordered by the physician       If an additional preferred facility not listed above is identified, additional referral to be sent. If above facilities unable to accept, will send additional referrals to in-network providers.

## 2025-07-10 NOTE — SUBJECTIVE & OBJECTIVE
Interval History: 07/10/25 patient was awake and alert and this morning.  She is complaining she has been incontinent of urine and desires to be changed.  NG tube connected to low intermittent suction with less than 200 cc of light drainage noted in suction canister.  Liquid stool noted in ostomy bag.  Abdomen is mildly distended but nontender.  Patient reports improvement of her symptoms overnight.  She has not had any nausea or vomiting.  Will remain NPO.  Gastrografin challenge today per NG tube.     Medications:  Continuous Infusions:   dextrose 5 % and 0.45 % NaCl with KCl 20 mEq   Intravenous Continuous 125 mL/hr at 07/10/25 1049 Restarted at 07/10/25 1049     Scheduled Meds:   allopurinoL  200 mg Oral QHS    ascorbic acid (vitamin C)  500 mg Oral BID    cilostazoL  50 mg Oral BID    divalproex ER  1,000 mg Oral QHS    enoxparin  40 mg Subcutaneous Daily    furosemide  20 mg Oral Daily    levothyroxine  50 mcg Oral Before breakfast    LIDOcaine (PF) 10 mg/ml (1%)  1 mL Other Once    losartan  50 mg Oral Daily     PRN Meds:  Current Facility-Administered Medications:     acetaminophen, 650 mg, Oral, Q8H PRN    albuterol sulfate, 2.5 mg, Nebulization, Q6H PRN    diatrizoate meglumineand-diatrizoate sodium, 240 mL, Per NG tube, ONCE PRN    melatonin, 6 mg, Oral, Nightly PRN    morphine, 4 mg, Intravenous, Q4H PRN    ondansetron, 4 mg, Intravenous, Q12H PRN     Review of patient's allergies indicates:   Allergen Reactions    Influenza virus vaccines      Pt states had to be admitted to hospital after last flu vaccine     Lexapro [escitalopram]      Objective:     Vital Signs (Most Recent):  Temp: 97.2 °F (36.2 °C) (07/10/25 0832)  Pulse: 80 (07/10/25 0850)  Resp: 18 (07/10/25 0850)  BP: (!) 145/70 (07/10/25 0832)  SpO2: 98 % (07/10/25 0850) Vital Signs (24h Range):  Temp:  [97.2 °F (36.2 °C)-98.4 °F (36.9 °C)] 97.2 °F (36.2 °C)  Pulse:  [77-94] 80  Resp:  [16-19] 18  SpO2:  [94 %-100 %] 98 %  BP:  (110-164)/(58-76) 145/70     Weight: 99.8 kg (220 lb)  Body mass index is 35.51 kg/m².    Intake/Output - Last 3 Shifts         07/08 0700 07/09 0659 07/09 0700  07/10 0659 07/10 0700 07/11 0659    I.V. (mL/kg)  1000 (10)     Total Intake(mL/kg)  1000 (10)     Net  +1000            Unmeasured Urine Occurrence  1 x     Unmeasured Stool Occurrence  0 x              Physical Exam  Vitals reviewed.   HENT:      Head: Normocephalic.      Nose: Nose normal.      Mouth/Throat:      Mouth: Mucous membranes are moist.   Eyes:      Extraocular Movements: Extraocular movements intact.   Cardiovascular:      Rate and Rhythm: Normal rate.   Pulmonary:      Effort: Pulmonary effort is normal.      Breath sounds: Normal breath sounds.   Abdominal:      General: Bowel sounds are normal. There is no distension.      Palpations: Abdomen is soft.      Tenderness: There is no abdominal tenderness.       Musculoskeletal:         General: Normal range of motion.      Cervical back: Normal range of motion and neck supple.   Skin:     General: Skin is warm and dry.      Capillary Refill: Capillary refill takes 2 to 3 seconds.   Neurological:      General: No focal deficit present.      Mental Status: She is alert and oriented to person, place, and time.   Psychiatric:         Mood and Affect: Mood normal.        Significant Labs:  I have reviewed all pertinent lab results within the past 24 hours.  CBC:   Recent Labs   Lab 07/09/25  1252   WBC 7.93   RBC 3.66*   HGB 9.2*   HCT 28.6*   *   MCV 78.1*   MCH 25.1*   MCHC 32.2     BMP:   Recent Labs   Lab 07/10/25  0423      *   K 5.0      CO2 22*   BUN 20   CREATININE 0.98   CALCIUM 8.3*     CMP:   Recent Labs   Lab 07/09/25  1252 07/10/25  0423    105   CALCIUM 8.7 8.3*   ALBUMIN 2.3*  --    PROT 6.1  --    * 130*   K 4.7 5.0   CO2 26 22*   CL 94* 102   BUN 25* 20   CREATININE 1.43* 0.98   ALKPHOS 87  --    ALT 7  --    AST 46*  --    BILITOT 0.2  --       Microbiology Results (last 7 days)       ** No results found for the last 168 hours. **          Specimen (24h ago, onward)      None          Recent Labs   Lab 07/09/25  1419   COLORU Light Yellow   SPECGRAV 1.010   PHUR 5.5   PROTEINUA Negative   NITRITE Negative   LEUKOCYTESUR Negative   UROBILINOGEN Normal       Significant Diagnostics:  I have reviewed all pertinent imaging results/findings within the past 24 hours.

## 2025-07-10 NOTE — HOSPITAL COURSE
07/10/25 patient was awake and alert and this morning.  She is complaining she has been incontinent of urine and desires to be changed.  NG tube connected to low intermittent suction with less than 200 cc of light drainage noted in suction canister.  Liquid stool noted in ostomy bag.  Abdomen is mildly distended but nontender.  Patient reports improvement of her symptoms overnight.  She has not had any nausea or vomiting.  Will remain NPO.  Gastrografin challenge today per NG tube.     07/11/25 Ng is out this morning.  Patient denies nausea or vomiting.  Liquid stool, brown in color noted in ostomy bag.  Abdomen soft and non-distended.  Denies pain.  Will start clear liquid diet as tolerated.  Possibly advance diet tomorrow.  Case management assisting in discharge back to [facility] bed.

## 2025-07-10 NOTE — ASSESSMENT & PLAN NOTE
07/09/25 npo, ng tube to LIS, Gastrograffin in a.m..    07/10/25 NPO.  NG tube to low intermittent suction, clamped for Gastrografin challenge today.

## 2025-07-11 PROCEDURE — 63600175 PHARM REV CODE 636 W HCPCS: Performed by: NURSE PRACTITIONER

## 2025-07-11 PROCEDURE — 94761 N-INVAS EAR/PLS OXIMETRY MLT: CPT

## 2025-07-11 PROCEDURE — 25000003 PHARM REV CODE 250: Performed by: NURSE PRACTITIONER

## 2025-07-11 PROCEDURE — 11000001 HC ACUTE MED/SURG PRIVATE ROOM

## 2025-07-11 RX ADMIN — DIVALPROEX SODIUM 1000 MG: 500 TABLET, FILM COATED, EXTENDED RELEASE ORAL at 09:07

## 2025-07-11 RX ADMIN — POTASSIUM CHLORIDE, DEXTROSE MONOHYDRATE AND SODIUM CHLORIDE: 150; 5; 450 INJECTION, SOLUTION INTRAVENOUS at 10:07

## 2025-07-11 RX ADMIN — MORPHINE SULFATE 4 MG: 4 INJECTION INTRAVENOUS at 09:07

## 2025-07-11 RX ADMIN — ENOXAPARIN SODIUM 40 MG: 40 INJECTION SUBCUTANEOUS at 05:07

## 2025-07-11 RX ADMIN — MORPHINE SULFATE 4 MG: 4 INJECTION INTRAVENOUS at 08:07

## 2025-07-11 RX ADMIN — POTASSIUM CHLORIDE, DEXTROSE MONOHYDRATE AND SODIUM CHLORIDE: 150; 5; 450 INJECTION, SOLUTION INTRAVENOUS at 06:07

## 2025-07-11 RX ADMIN — CILOSTAZOL 50 MG: 50 TABLET ORAL at 09:07

## 2025-07-11 RX ADMIN — OXYCODONE HYDROCHLORIDE AND ACETAMINOPHEN 500 MG: 500 TABLET ORAL at 08:07

## 2025-07-11 RX ADMIN — LOSARTAN POTASSIUM 50 MG: 50 TABLET, FILM COATED ORAL at 08:07

## 2025-07-11 RX ADMIN — CILOSTAZOL 50 MG: 50 TABLET ORAL at 08:07

## 2025-07-11 RX ADMIN — ALLOPURINOL 200 MG: 100 TABLET ORAL at 09:07

## 2025-07-11 RX ADMIN — FUROSEMIDE 20 MG: 20 TABLET ORAL at 08:07

## 2025-07-11 RX ADMIN — POTASSIUM CHLORIDE, DEXTROSE MONOHYDRATE AND SODIUM CHLORIDE: 150; 5; 450 INJECTION, SOLUTION INTRAVENOUS at 01:07

## 2025-07-11 RX ADMIN — OXYCODONE HYDROCHLORIDE AND ACETAMINOPHEN 500 MG: 500 TABLET ORAL at 09:07

## 2025-07-11 NOTE — PLAN OF CARE
07/11/25 1335   Rounds   Attendance Provider;;Charge nurse;Physical therapist;Pharmacist   Discharge Plan A Return to nursing home;Skilled Nursing Facility   Why the patient remains in the hospital Requires continued medical care   Transition of Care Barriers None     Chart reviewed. Pt's NG tube removed, possibly advancing diet. Pt to work with pt/ot. Plan to return to [facility] when medically ready. Undetermined at this time if pt will return as nh patient or snf. Packet remains in [place] [id number]. Ss following

## 2025-07-11 NOTE — PLAN OF CARE
Problem: Adult Inpatient Plan of Care  Goal: Plan of Care Review  Outcome: Progressing  Goal: Patient-Specific Goal (Individualized)  Outcome: Progressing  Goal: Absence of Hospital-Acquired Illness or Injury  Outcome: Progressing  Goal: Optimal Comfort and Wellbeing  Outcome: Progressing     Problem: Wound  Goal: Optimal Coping  Outcome: Progressing  Goal: Optimal Functional Ability  Outcome: Progressing  Goal: Absence of Infection Signs and Symptoms  Outcome: Progressing

## 2025-07-11 NOTE — NURSING
Pt NG tube clogged due to night medicine being too chalky and not flushing through tube; notified Dr. Brannon and stated ok to take out NG tube and can leave out if pt does not have any nausea or vomiting

## 2025-07-11 NOTE — SUBJECTIVE & OBJECTIVE
Interval History: 07/11/25 Ng is out this morning.  Patient denies nausea or vomiting.  Liquid stool, brown in color noted in ostomy bag.  Abdomen soft and non-distended.  Denies pain.  Will start clear liquid diet as tolerated.  Possibly advance diet tomorrow.  Case management assisting in discharge back to [facility].      Medications:  Continuous Infusions:   dextrose 5 % and 0.45 % NaCl with KCl 20 mEq   Intravenous Continuous 125 mL/hr at 07/11/25 0120 New Bag at 07/11/25 0120     Scheduled Meds:   allopurinoL  200 mg Oral QHS    ascorbic acid (vitamin C)  500 mg Oral BID    cilostazoL  50 mg Oral BID    divalproex ER  1,000 mg Oral QHS    enoxparin  40 mg Subcutaneous Daily    furosemide  20 mg Oral Daily    levothyroxine  50 mcg Oral Before breakfast    LIDOcaine (PF) 10 mg/ml (1%)  1 mL Other Once    losartan  50 mg Oral Daily     PRN Meds:  Current Facility-Administered Medications:     acetaminophen, 650 mg, Oral, Q8H PRN    albuterol sulfate, 2.5 mg, Nebulization, Q6H PRN    melatonin, 6 mg, Oral, Nightly PRN    morphine, 4 mg, Intravenous, Q4H PRN    ondansetron, 4 mg, Intravenous, Q12H PRN     Review of patient's allergies indicates:   Allergen Reactions    Influenza virus vaccines      Pt states had to be admitted to hospital after last flu vaccine     Lexapro [escitalopram]      Objective:     Vital Signs (Most Recent):  Temp: 97.6 °F (36.4 °C) (07/11/25 0751)  Pulse: 103 (07/11/25 0752)  Resp: 20 (07/11/25 0808)  BP: (!) 124/49 (07/11/25 0752)  SpO2: 95 % (07/11/25 0752) Vital Signs (24h Range):  Temp:  [97.3 °F (36.3 °C)-97.9 °F (36.6 °C)] 97.6 °F (36.4 °C)  Pulse:  [] 103  Resp:  [18-20] 20  SpO2:  [93 %-100 %] 95 %  BP: (106-165)/(49-77) 124/49     Weight: 99.8 kg (220 lb)  Body mass index is 35.51 kg/m².    Intake/Output - Last 3 Shifts         07/09 0700  07/10 0659 07/10 0700  07/11 0659 07/11 0700  07/12 0659    I.V. (mL/kg) 1000 (10)      Total Intake(mL/kg) 1000 (10)      Other   880     Stool  450     Total Output  1330     Net +1000 -1330            Unmeasured Urine Occurrence 1 x      Unmeasured Stool Occurrence 0 x 1 x              Physical Exam  Vitals and nursing note reviewed.   HENT:      Head: Normocephalic.      Nose: Nose normal.      Mouth/Throat:      Mouth: Mucous membranes are moist.   Eyes:      Extraocular Movements: Extraocular movements intact.   Cardiovascular:      Rate and Rhythm: Tachycardia present.   Pulmonary:      Effort: Pulmonary effort is normal.   Abdominal:      General: Abdomen is protuberant. Bowel sounds are normal.      Palpations: Abdomen is soft.      Tenderness: There is generalized abdominal tenderness.       Musculoskeletal:         General: Normal range of motion.      Cervical back: Normal range of motion.   Skin:     General: Skin is warm and dry.      Capillary Refill: Capillary refill takes 2 to 3 seconds.   Neurological:      General: No focal deficit present.      Mental Status: She is alert and oriented to person, place, and time.   Psychiatric:         Mood and Affect: Mood normal.          Significant Labs:  I have reviewed all pertinent lab results within the past 24 hours.  CBC:   Recent Labs   Lab 07/09/25  1252   WBC 7.93   RBC 3.66*   HGB 9.2*   HCT 28.6*   *   MCV 78.1*   MCH 25.1*   MCHC 32.2     BMP:   Recent Labs   Lab 07/10/25  0423      *   K 5.0      CO2 22*   BUN 20   CREATININE 0.98   CALCIUM 8.3*     CMP:   Recent Labs   Lab 07/09/25  1252 07/10/25  0423    105   CALCIUM 8.7 8.3*   ALBUMIN 2.3*  --    PROT 6.1  --    * 130*   K 4.7 5.0   CO2 26 22*   CL 94* 102   BUN 25* 20   CREATININE 1.43* 0.98   ALKPHOS 87  --    ALT 7  --    AST 46*  --    BILITOT 0.2  --      Microbiology Results (last 7 days)       ** No results found for the last 168 hours. **          Specimen (24h ago, onward)      None          Recent Labs   Lab 07/09/25  1419   COLORU Light Yellow   SPECGRAV 1.010   PHUR 5.5    PROTEINUA Negative   NITRITE Negative   LEUKOCYTESUR Negative   UROBILINOGEN Normal       Significant Diagnostics:  I have reviewed all pertinent imaging results/findings within the past 24 hours.   Render Post-Care Instructions In Note?: no Medical Necessity Clause: This procedure was medically necessary because the lesions that were treated were: Medical Necessity Information: It is in your best interest to select a reason for this procedure from the list below. All of these items fulfill various CMS LCD requirements except the new and changing color options. Consent: The patient's consent was obtained including but not limited to risks of crusting, scabbing, blistering, scarring, darker or lighter pigmentary change, recurrence, incomplete removal and infection. Detail Level: Detailed Post-Care Instructions: I reviewed with the patient in detail post-care instructions. Patient is to wear sunprotection, and avoid picking at any of the treated lesions. Pt may apply Vaseline to crusted or scabbing areas.

## 2025-07-11 NOTE — ASSESSMENT & PLAN NOTE
07/09/25 npo, ng tube to LIS, Gastrograffin in a.m..    07/10/25 NPO.  NG tube to low intermittent suction, clamped for Gastrografin challenge today.    07/11/25 NG out.  Clear liquid diet.  Advance diet tomorrow if clears tolerated today.  DC back to [facility]( will probably be Monday)

## 2025-07-11 NOTE — PROGRESS NOTES
[facility]  General Surgery  Progress Note    Subjective:     History of Present Illness:  Patient presents to ER from [facility] with complaint of abdominal pain and nausea.  Patient denies vomiting.  Reports generalized abdominal pain and bloating.  She states her symptoms started yesterday. Reports hard stools in ostomy until today.  Now with liquid tan colored stools. She had xray done at nursing home yesterday that showed possible ileus.  She is status post diagnostic laparotomy with colon resection and ostomy per Dr Brannon on 06/22/25.  She was just discharged last week.  Reports decreased appetite. Denies fever.     Post-Op Info:  * No surgery found *         Interval History: 07/11/25 Ng is out this morning.  Patient denies nausea or vomiting.  Liquid stool, brown in color noted in ostomy bag.  Abdomen soft and non-distended.  Denies pain.  Will start clear liquid diet as tolerated.  Possibly advance diet tomorrow.  Case management assisting in discharge back to [facility].      Medications:  Continuous Infusions:   dextrose 5 % and 0.45 % NaCl with KCl 20 mEq   Intravenous Continuous 125 mL/hr at 07/11/25 0120 New Bag at 07/11/25 0120     Scheduled Meds:   allopurinoL  200 mg Oral QHS    ascorbic acid (vitamin C)  500 mg Oral BID    cilostazoL  50 mg Oral BID    divalproex ER  1,000 mg Oral QHS    enoxparin  40 mg Subcutaneous Daily    furosemide  20 mg Oral Daily    levothyroxine  50 mcg Oral Before breakfast    LIDOcaine (PF) 10 mg/ml (1%)  1 mL Other Once    losartan  50 mg Oral Daily     PRN Meds:  Current Facility-Administered Medications:     acetaminophen, 650 mg, Oral, Q8H PRN    albuterol sulfate, 2.5 mg, Nebulization, Q6H PRN    melatonin, 6 mg, Oral, Nightly PRN    morphine, 4 mg, Intravenous, Q4H PRN    ondansetron, 4 mg, Intravenous, Q12H PRN     Review of patient's allergies indicates:   Allergen Reactions    Influenza virus vaccines      Pt states had to be  admitted to hospital after last flu vaccine     Lexapro [escitalopram]      Objective:     Vital Signs (Most Recent):  Temp: 97.6 °F (36.4 °C) (07/11/25 0751)  Pulse: 103 (07/11/25 0752)  Resp: 20 (07/11/25 0808)  BP: (!) 124/49 (07/11/25 0752)  SpO2: 95 % (07/11/25 0752) Vital Signs (24h Range):  Temp:  [97.3 °F (36.3 °C)-97.9 °F (36.6 °C)] 97.6 °F (36.4 °C)  Pulse:  [] 103  Resp:  [18-20] 20  SpO2:  [93 %-100 %] 95 %  BP: (106-165)/(49-77) 124/49     Weight: 99.8 kg (220 lb)  Body mass index is 35.51 kg/m².    Intake/Output - Last 3 Shifts         07/09 0700  07/10 0659 07/10 0700 07/11 0659 07/11 0700 07/12 0659    I.V. (mL/kg) 1000 (10)      Total Intake(mL/kg) 1000 (10)      Other  880     Stool  450     Total Output  1330     Net +1000 -1330            Unmeasured Urine Occurrence 1 x      Unmeasured Stool Occurrence 0 x 1 x              Physical Exam  Vitals and nursing note reviewed.   HENT:      Head: Normocephalic.      Nose: Nose normal.      Mouth/Throat:      Mouth: Mucous membranes are moist.   Eyes:      Extraocular Movements: Extraocular movements intact.   Cardiovascular:      Rate and Rhythm: Tachycardia present.   Pulmonary:      Effort: Pulmonary effort is normal.   Abdominal:      General: Abdomen is protuberant. Bowel sounds are normal.      Palpations: Abdomen is soft.      Tenderness: There is generalized abdominal tenderness.       Musculoskeletal:         General: Normal range of motion.      Cervical back: Normal range of motion.   Skin:     General: Skin is warm and dry.      Capillary Refill: Capillary refill takes 2 to 3 seconds.   Neurological:      General: No focal deficit present.      Mental Status: She is alert and oriented to person, place, and time.   Psychiatric:         Mood and Affect: Mood normal.          Significant Labs:  I have reviewed all pertinent lab results within the past 24 hours.  CBC:   Recent Labs   Lab 07/09/25  1252   WBC 7.93   RBC 3.66*   HGB 9.2*    HCT 28.6*   *   MCV 78.1*   MCH 25.1*   MCHC 32.2     BMP:   Recent Labs   Lab 07/10/25  0423      *   K 5.0      CO2 22*   BUN 20   CREATININE 0.98   CALCIUM 8.3*     CMP:   Recent Labs   Lab 07/09/25  1252 07/10/25  0423    105   CALCIUM 8.7 8.3*   ALBUMIN 2.3*  --    PROT 6.1  --    * 130*   K 4.7 5.0   CO2 26 22*   CL 94* 102   BUN 25* 20   CREATININE 1.43* 0.98   ALKPHOS 87  --    ALT 7  --    AST 46*  --    BILITOT 0.2  --      Microbiology Results (last 7 days)       ** No results found for the last 168 hours. **          Specimen (24h ago, onward)      None          Recent Labs   Lab 07/09/25  1419   COLORU Light Yellow   SPECGRAV 1.010   PHUR 5.5   PROTEINUA Negative   NITRITE Negative   LEUKOCYTESUR Negative   UROBILINOGEN Normal       Significant Diagnostics:  I have reviewed all pertinent imaging results/findings within the past 24 hours.  Assessment/Plan:     Other partial intestinal obstruction  07/09/25 npo, ng tube to LIS, Gastrograffin in a.m..    07/10/25 NPO.  NG tube to low intermittent suction, clamped for Gastrografin challenge today.    07/11/25 NG out.  Clear liquid diet.  Advance diet tomorrow if clears tolerated today.  DC back to [facility]( will probably be Monday)        Allyson Tompkins, FROYP  General Surgery  [facility]

## 2025-07-12 PROCEDURE — 25000003 PHARM REV CODE 250: Performed by: NURSE PRACTITIONER

## 2025-07-12 PROCEDURE — 99900035 HC TECH TIME PER 15 MIN (STAT)

## 2025-07-12 PROCEDURE — 11000001 HC ACUTE MED/SURG PRIVATE ROOM

## 2025-07-12 PROCEDURE — 94761 N-INVAS EAR/PLS OXIMETRY MLT: CPT

## 2025-07-12 PROCEDURE — 63600175 PHARM REV CODE 636 W HCPCS: Performed by: NURSE PRACTITIONER

## 2025-07-12 PROCEDURE — 99024 POSTOP FOLLOW-UP VISIT: CPT | Mod: ,,, | Performed by: STUDENT IN AN ORGANIZED HEALTH CARE EDUCATION/TRAINING PROGRAM

## 2025-07-12 RX ADMIN — ALLOPURINOL 200 MG: 100 TABLET ORAL at 08:07

## 2025-07-12 RX ADMIN — FUROSEMIDE 20 MG: 20 TABLET ORAL at 09:07

## 2025-07-12 RX ADMIN — LOSARTAN POTASSIUM 50 MG: 50 TABLET, FILM COATED ORAL at 09:07

## 2025-07-12 RX ADMIN — POTASSIUM CHLORIDE, DEXTROSE MONOHYDRATE AND SODIUM CHLORIDE: 150; 5; 450 INJECTION, SOLUTION INTRAVENOUS at 09:07

## 2025-07-12 RX ADMIN — MORPHINE SULFATE 4 MG: 4 INJECTION INTRAVENOUS at 06:07

## 2025-07-12 RX ADMIN — OXYCODONE HYDROCHLORIDE AND ACETAMINOPHEN 500 MG: 500 TABLET ORAL at 09:07

## 2025-07-12 RX ADMIN — POTASSIUM CHLORIDE, DEXTROSE MONOHYDRATE AND SODIUM CHLORIDE: 150; 5; 450 INJECTION, SOLUTION INTRAVENOUS at 10:07

## 2025-07-12 RX ADMIN — DIVALPROEX SODIUM 1000 MG: 500 TABLET, FILM COATED, EXTENDED RELEASE ORAL at 08:07

## 2025-07-12 RX ADMIN — MORPHINE SULFATE 4 MG: 4 INJECTION INTRAVENOUS at 09:07

## 2025-07-12 RX ADMIN — POTASSIUM CHLORIDE, DEXTROSE MONOHYDRATE AND SODIUM CHLORIDE: 150; 5; 450 INJECTION, SOLUTION INTRAVENOUS at 02:07

## 2025-07-12 RX ADMIN — CILOSTAZOL 50 MG: 50 TABLET ORAL at 09:07

## 2025-07-12 RX ADMIN — LEVOTHYROXINE SODIUM 50 MCG: 0.05 TABLET ORAL at 06:07

## 2025-07-12 RX ADMIN — OXYCODONE HYDROCHLORIDE AND ACETAMINOPHEN 500 MG: 500 TABLET ORAL at 08:07

## 2025-07-12 RX ADMIN — ENOXAPARIN SODIUM 40 MG: 40 INJECTION SUBCUTANEOUS at 05:07

## 2025-07-12 RX ADMIN — CILOSTAZOL 50 MG: 50 TABLET ORAL at 08:07

## 2025-07-12 NOTE — PLAN OF CARE
Problem: Adult Inpatient Plan of Care  Goal: Plan of Care Review  Outcome: Progressing  Goal: Patient-Specific Goal (Individualized)  Outcome: Progressing  Goal: Absence of Hospital-Acquired Illness or Injury  Outcome: Progressing  Goal: Optimal Comfort and Wellbeing  Outcome: Progressing     Problem: Wound  Goal: Optimal Coping  Outcome: Progressing

## 2025-07-12 NOTE — PT/OT/SLP PROGRESS
Physical Therapy      Patient Name:  Umesh Lassiter   MRN:  [id number]    Patient not seen today secondary to Patient fatigue, Pain. Will follow-up 7/13.    Patient declined AM and PM attempts at PT evaluation due to pain.

## 2025-07-12 NOTE — PROGRESS NOTES
[facility]  General Surgery  Progress Note    Subjective:     History of Present Illness:  Patient presents to ER from [facility] with complaint of abdominal pain and nausea.  Patient denies vomiting.  Reports generalized abdominal pain and bloating.  She states her symptoms started yesterday. Reports hard stools in ostomy until today.  Now with liquid tan colored stools. She had xray done at nursing home yesterday that showed possible ileus.  She is status post diagnostic laparotomy with colon resection and ostomy per Dr Brannon on 06/22/25.  She was just discharged last week.  Reports decreased appetite. Denies fever.     Post-Op Info:  * No surgery found *         Interval History:  Stable, no acute events overnight.  No nausea or vomiting.  Tolerating clear liquids.  Positive stool in colostomy bag    Medications:  Continuous Infusions:   dextrose 5 % and 0.45 % NaCl with KCl 20 mEq   Intravenous Continuous 125 mL/hr at 07/12/25 1059 New Bag at 07/12/25 1059     Scheduled Meds:   allopurinoL  200 mg Oral QHS    ascorbic acid (vitamin C)  500 mg Oral BID    cilostazoL  50 mg Oral BID    divalproex ER  1,000 mg Oral QHS    enoxparin  40 mg Subcutaneous Daily    furosemide  20 mg Oral Daily    levothyroxine  50 mcg Oral Before breakfast    LIDOcaine (PF) 10 mg/ml (1%)  1 mL Other Once    losartan  50 mg Oral Daily     PRN Meds:  Current Facility-Administered Medications:     acetaminophen, 650 mg, Oral, Q8H PRN    albuterol sulfate, 2.5 mg, Nebulization, Q6H PRN    melatonin, 6 mg, Oral, Nightly PRN    morphine, 4 mg, Intravenous, Q4H PRN    ondansetron, 4 mg, Intravenous, Q12H PRN     Review of patient's allergies indicates:   Allergen Reactions    Influenza virus vaccines      Pt states had to be admitted to hospital after last flu vaccine     Lexapro [escitalopram]      Objective:     Vital Signs (Most Recent):  Temp: 99.2 °F (37.3 °C) (07/12/25 0811)  Pulse: 87 (07/12/25 0811)  Resp:  18 (07/12/25 0948)  BP: (!) 109/51 (07/12/25 0811)  SpO2: 97 % (07/12/25 0811) Vital Signs (24h Range):  Temp:  [97.8 °F (36.6 °C)-99.2 °F (37.3 °C)] 99.2 °F (37.3 °C)  Pulse:  [84-99] 87  Resp:  [17-18] 18  SpO2:  [96 %-100 %] 97 %  BP: ()/(51-64) 109/51     Weight: 99.8 kg (220 lb 0.3 oz)  Body mass index is 35.51 kg/m².    Intake/Output - Last 3 Shifts         07/10 0700  07/11 0659 07/11 0700 07/12 0659 07/12 0700 07/13 0659    I.V. (mL/kg)       Total Intake(mL/kg)       Urine (mL/kg/hr)  0 (0)     Other 880      Stool 450 300     Total Output 1330 300     Net -1330 -300            Unmeasured Urine Occurrence  2 x     Unmeasured Stool Occurrence 1 x  1 x             Physical Exam  Constitutional:       General: She is not in acute distress.     Appearance: Normal appearance.   HENT:      Head: Normocephalic.   Cardiovascular:      Rate and Rhythm: Normal rate.   Pulmonary:      Effort: Pulmonary effort is normal. No respiratory distress.   Abdominal:      General: There is no distension.      Tenderness: There is no abdominal tenderness.          Comments: Left abdominal colostomy with stool.  No abdominal distention   Musculoskeletal:         General: Normal range of motion.   Skin:     General: Skin is warm.      Coloration: Skin is not jaundiced.   Neurological:      General: No focal deficit present.      Mental Status: She is alert and oriented to person, place, and time.      Cranial Nerves: No cranial nerve deficit.          Significant Labs:  I have reviewed all pertinent lab results within the past 24 hours.  CBC:   Recent Labs   Lab 07/09/25  1252   WBC 7.93   RBC 3.66*   HGB 9.2*   HCT 28.6*   *   MCV 78.1*   MCH 25.1*   MCHC 32.2     BMP:   Recent Labs   Lab 07/10/25  0423      *   K 5.0      CO2 22*   BUN 20   CREATININE 0.98   CALCIUM 8.3*       Significant Diagnostics:  I have reviewed all pertinent imaging results/findings within the past 24  hours.  Assessment/Plan:     Other partial intestinal obstruction  07/09/25 npo, ng tube to LIS, Gastrograffin in a.m..    07/10/25 NPO.  NG tube to low intermittent suction, clamped for Gastrografin challenge today.    07/11/25 NG out.  Clear liquid diet.  Advance diet tomorrow if clears tolerated today.  DC back to [facility]( will probably be Monday)    7/12:  Doing well, tolerating clear liquid diet.  Advance to full liquid diet.        Parker Delgado, DO  General Surgery  [facility]

## 2025-07-12 NOTE — PT/OT/SLP PROGRESS
Occupational Therapy      Patient Name:  Umesh Lassiter   MRN:  [id number]    Patient not seen today secondary to Patient unwilling to participate, Pain. While OT and PT were starting evaluations, pt suffered severe sharp pains at her incision/ colostomy site. She declined to do anything more at that time with c/o pain. OT returned in PM and pt still refused to participate in fear of hurting again. Therapist encouraged pt that as she moves more, she will be able to move with less pain. Pt had expressed a desire to have therapy at the NH when she returns. Therapist informed pt and her son that pt would not be able to get admitted to [facility] there if she does not participate with therapy there. Pt verbalized understanding.  Will follow-up 7/13/2025.    7/12/2025

## 2025-07-12 NOTE — SUBJECTIVE & OBJECTIVE
Interval History:  Stable, no acute events overnight.  No nausea or vomiting.  Tolerating clear liquids.  Positive stool in colostomy bag    Medications:  Continuous Infusions:   dextrose 5 % and 0.45 % NaCl with KCl 20 mEq   Intravenous Continuous 125 mL/hr at 07/12/25 1059 New Bag at 07/12/25 1059     Scheduled Meds:   allopurinoL  200 mg Oral QHS    ascorbic acid (vitamin C)  500 mg Oral BID    cilostazoL  50 mg Oral BID    divalproex ER  1,000 mg Oral QHS    enoxparin  40 mg Subcutaneous Daily    furosemide  20 mg Oral Daily    levothyroxine  50 mcg Oral Before breakfast    LIDOcaine (PF) 10 mg/ml (1%)  1 mL Other Once    losartan  50 mg Oral Daily     PRN Meds:  Current Facility-Administered Medications:     acetaminophen, 650 mg, Oral, Q8H PRN    albuterol sulfate, 2.5 mg, Nebulization, Q6H PRN    melatonin, 6 mg, Oral, Nightly PRN    morphine, 4 mg, Intravenous, Q4H PRN    ondansetron, 4 mg, Intravenous, Q12H PRN     Review of patient's allergies indicates:   Allergen Reactions    Influenza virus vaccines      Pt states had to be admitted to hospital after last flu vaccine     Lexapro [escitalopram]      Objective:     Vital Signs (Most Recent):  Temp: 99.2 °F (37.3 °C) (07/12/25 0811)  Pulse: 87 (07/12/25 0811)  Resp: 18 (07/12/25 0948)  BP: (!) 109/51 (07/12/25 0811)  SpO2: 97 % (07/12/25 0811) Vital Signs (24h Range):  Temp:  [97.8 °F (36.6 °C)-99.2 °F (37.3 °C)] 99.2 °F (37.3 °C)  Pulse:  [84-99] 87  Resp:  [17-18] 18  SpO2:  [96 %-100 %] 97 %  BP: ()/(51-64) 109/51     Weight: 99.8 kg (220 lb 0.3 oz)  Body mass index is 35.51 kg/m².    Intake/Output - Last 3 Shifts         07/10 0700  07/11 0659 07/11 0700  07/12 0659 07/12 0700  07/13 0659    I.V. (mL/kg)       Total Intake(mL/kg)       Urine (mL/kg/hr)  0 (0)     Other 880      Stool 450 300     Total Output 1330 300     Net -1330 -300            Unmeasured Urine Occurrence  2 x     Unmeasured Stool Occurrence 1 x  1 x             Physical  Exam  Constitutional:       General: She is not in acute distress.     Appearance: Normal appearance.   HENT:      Head: Normocephalic.   Cardiovascular:      Rate and Rhythm: Normal rate.   Pulmonary:      Effort: Pulmonary effort is normal. No respiratory distress.   Abdominal:      General: There is no distension.      Tenderness: There is no abdominal tenderness.          Comments: Left abdominal colostomy with stool.  No abdominal distention   Musculoskeletal:         General: Normal range of motion.   Skin:     General: Skin is warm.      Coloration: Skin is not jaundiced.   Neurological:      General: No focal deficit present.      Mental Status: She is alert and oriented to person, place, and time.      Cranial Nerves: No cranial nerve deficit.          Significant Labs:  I have reviewed all pertinent lab results within the past 24 hours.  CBC:   Recent Labs   Lab 07/09/25  1252   WBC 7.93   RBC 3.66*   HGB 9.2*   HCT 28.6*   *   MCV 78.1*   MCH 25.1*   MCHC 32.2     BMP:   Recent Labs   Lab 07/10/25  0423      *   K 5.0      CO2 22*   BUN 20   CREATININE 0.98   CALCIUM 8.3*       Significant Diagnostics:  I have reviewed all pertinent imaging results/findings within the past 24 hours.

## 2025-07-12 NOTE — ASSESSMENT & PLAN NOTE
07/09/25 npo, ng tube to LIS, Gastrograffin in a.m..    07/10/25 NPO.  NG tube to low intermittent suction, clamped for Gastrografin challenge today.    07/11/25 NG out.  Clear liquid diet.  Advance diet tomorrow if clears tolerated today.  DC back to [facility]( will probably be Monday)    7/12:  Doing well, tolerating clear liquid diet.  Advance to full liquid diet.

## 2025-07-13 PROCEDURE — 97165 OT EVAL LOW COMPLEX 30 MIN: CPT

## 2025-07-13 PROCEDURE — 99024 POSTOP FOLLOW-UP VISIT: CPT | Mod: ,,, | Performed by: STUDENT IN AN ORGANIZED HEALTH CARE EDUCATION/TRAINING PROGRAM

## 2025-07-13 PROCEDURE — 97161 PT EVAL LOW COMPLEX 20 MIN: CPT

## 2025-07-13 PROCEDURE — 63600175 PHARM REV CODE 636 W HCPCS: Performed by: NURSE PRACTITIONER

## 2025-07-13 PROCEDURE — 11000001 HC ACUTE MED/SURG PRIVATE ROOM

## 2025-07-13 PROCEDURE — 25000003 PHARM REV CODE 250: Performed by: NURSE PRACTITIONER

## 2025-07-13 RX ADMIN — ENOXAPARIN SODIUM 40 MG: 40 INJECTION SUBCUTANEOUS at 05:07

## 2025-07-13 RX ADMIN — POTASSIUM CHLORIDE, DEXTROSE MONOHYDRATE AND SODIUM CHLORIDE: 150; 5; 450 INJECTION, SOLUTION INTRAVENOUS at 01:07

## 2025-07-13 RX ADMIN — POTASSIUM CHLORIDE, DEXTROSE MONOHYDRATE AND SODIUM CHLORIDE: 150; 5; 450 INJECTION, SOLUTION INTRAVENOUS at 10:07

## 2025-07-13 RX ADMIN — MORPHINE SULFATE 4 MG: 4 INJECTION INTRAVENOUS at 08:07

## 2025-07-13 RX ADMIN — CILOSTAZOL 50 MG: 50 TABLET ORAL at 08:07

## 2025-07-13 RX ADMIN — LOSARTAN POTASSIUM 50 MG: 50 TABLET, FILM COATED ORAL at 08:07

## 2025-07-13 RX ADMIN — DIVALPROEX SODIUM 1000 MG: 500 TABLET, FILM COATED, EXTENDED RELEASE ORAL at 08:07

## 2025-07-13 RX ADMIN — LEVOTHYROXINE SODIUM 50 MCG: 0.05 TABLET ORAL at 05:07

## 2025-07-13 RX ADMIN — POTASSIUM CHLORIDE, DEXTROSE MONOHYDRATE AND SODIUM CHLORIDE: 150; 5; 450 INJECTION, SOLUTION INTRAVENOUS at 05:07

## 2025-07-13 RX ADMIN — OXYCODONE HYDROCHLORIDE AND ACETAMINOPHEN 500 MG: 500 TABLET ORAL at 10:07

## 2025-07-13 RX ADMIN — OXYCODONE HYDROCHLORIDE AND ACETAMINOPHEN 500 MG: 500 TABLET ORAL at 08:07

## 2025-07-13 RX ADMIN — ALLOPURINOL 200 MG: 100 TABLET ORAL at 08:07

## 2025-07-13 RX ADMIN — FUROSEMIDE 20 MG: 20 TABLET ORAL at 08:07

## 2025-07-13 NOTE — SUBJECTIVE & OBJECTIVE
Interval History:  Stable, no acute events overnight.  No nausea or vomiting.  Tolerating full liquids.  Positive stool in colostomy bag    Medications:  Continuous Infusions:   dextrose 5 % and 0.45 % NaCl with KCl 20 mEq   Intravenous Continuous 125 mL/hr at 07/13/25 0504 New Bag at 07/13/25 0504     Scheduled Meds:   allopurinoL  200 mg Oral QHS    ascorbic acid (vitamin C)  500 mg Oral BID    cilostazoL  50 mg Oral BID    divalproex ER  1,000 mg Oral QHS    enoxparin  40 mg Subcutaneous Daily    furosemide  20 mg Oral Daily    levothyroxine  50 mcg Oral Before breakfast    LIDOcaine (PF) 10 mg/ml (1%)  1 mL Other Once    losartan  50 mg Oral Daily     PRN Meds:  Current Facility-Administered Medications:     acetaminophen, 650 mg, Oral, Q8H PRN    albuterol sulfate, 2.5 mg, Nebulization, Q6H PRN    melatonin, 6 mg, Oral, Nightly PRN    morphine, 4 mg, Intravenous, Q4H PRN    ondansetron, 4 mg, Intravenous, Q12H PRN     Review of patient's allergies indicates:   Allergen Reactions    Influenza virus vaccines      Pt states had to be admitted to hospital after last flu vaccine     Lexapro [escitalopram]      Objective:     Vital Signs (Most Recent):  Temp: 98.4 °F (36.9 °C) (07/13/25 0811)  Pulse: 95 (07/13/25 0811)  Resp: 20 (07/13/25 0811)  BP: 131/62 (07/13/25 0811)  SpO2: 99 % (07/13/25 0811) Vital Signs (24h Range):  Temp:  [97.7 °F (36.5 °C)-98.4 °F (36.9 °C)] 98.4 °F (36.9 °C)  Pulse:  [88-99] 95  Resp:  [18-20] 20  SpO2:  [94 %-99 %] 99 %  BP: (121-157)/(60-67) 131/62     Weight: 87.3 kg (192 lb 6.4 oz)  Body mass index is 31.05 kg/m².    Intake/Output - Last 3 Shifts         07/11 0700 07/12 0659 07/12 0700 07/13 0659 07/13 0700 07/14 0659    Urine (mL/kg/hr) 0 (0) 1 (0)     Other  300     Stool 300      Total Output 300 301     Net -300 -301            Unmeasured Urine Occurrence 2 x 1 x     Unmeasured Stool Occurrence  1 x              Physical Exam  Constitutional:       General: She is not in acute  distress.     Appearance: Normal appearance.   HENT:      Head: Normocephalic.   Cardiovascular:      Rate and Rhythm: Normal rate.   Pulmonary:      Effort: Pulmonary effort is normal. No respiratory distress.   Abdominal:      General: There is no distension.      Tenderness: There is no abdominal tenderness.          Comments: Left abdominal colostomy with stool.  No abdominal distention   Musculoskeletal:         General: Normal range of motion.   Skin:     General: Skin is warm.      Coloration: Skin is not jaundiced.   Neurological:      General: No focal deficit present.      Mental Status: She is alert and oriented to person, place, and time.      Cranial Nerves: No cranial nerve deficit.          Significant Labs:  I have reviewed all pertinent lab results within the past 24 hours.  CBC:   Recent Labs   Lab 07/09/25  1252   WBC 7.93   RBC 3.66*   HGB 9.2*   HCT 28.6*   *   MCV 78.1*   MCH 25.1*   MCHC 32.2     BMP:   Recent Labs   Lab 07/10/25  0423      *   K 5.0      CO2 22*   BUN 20   CREATININE 0.98   CALCIUM 8.3*       Significant Diagnostics:  I have reviewed all pertinent imaging results/findings within the past 24 hours.

## 2025-07-13 NOTE — PLAN OF CARE
Problem: Wound  Goal: Optimal Coping  Outcome: Progressing  Goal: Optimal Functional Ability  Outcome: Progressing  Goal: Absence of Infection Signs and Symptoms  Outcome: Progressing  Goal: Improved Oral Intake  Outcome: Progressing  Goal: Optimal Pain Control and Function  Outcome: Progressing  Goal: Skin Health and Integrity  Outcome: Progressing  Goal: Optimal Wound Healing  Outcome: Progressing     Problem: Fall Injury Risk  Goal: Absence of Fall and Fall-Related Injury  Outcome: Progressing     Problem: Skin Injury Risk Increased  Goal: Skin Health and Integrity  Outcome: Progressing     Problem: Fatigue  Goal: Improved Activity Tolerance  Outcome: Progressing      all other ROS negative except as per HPI

## 2025-07-13 NOTE — ASSESSMENT & PLAN NOTE
07/09/25 npo, ng tube to LIS, Gastrograffin in a.m..    07/10/25 NPO.  NG tube to low intermittent suction, clamped for Gastrografin challenge today.    07/11/25 NG out.  Clear liquid diet.  Advance diet tomorrow if clears tolerated today.  DC back to [facility]( will probably be Monday)    7/12:  Doing well, tolerating clear liquid diet.  Advance to full liquid diet.    7/13: Tolerating fulls; wants to stay on full liquid diet for a few days before advancing to soft diet

## 2025-07-13 NOTE — PROGRESS NOTES
[facility]  General Surgery  Progress Note    Subjective:     History of Present Illness:  Patient presents to ER from [facility] with complaint of abdominal pain and nausea.  Patient denies vomiting.  Reports generalized abdominal pain and bloating.  She states her symptoms started yesterday. Reports hard stools in ostomy until today.  Now with liquid tan colored stools. She had xray done at nursing home yesterday that showed possible ileus.  She is status post diagnostic laparotomy with colon resection and ostomy per Dr Brannon on 06/22/25.  She was just discharged last week.  Reports decreased appetite. Denies fever.     Post-Op Info:  * No surgery found *         Interval History:  Stable, no acute events overnight.  No nausea or vomiting.  Tolerating full liquids.  Positive stool in colostomy bag    Medications:  Continuous Infusions:   dextrose 5 % and 0.45 % NaCl with KCl 20 mEq   Intravenous Continuous 125 mL/hr at 07/13/25 0504 New Bag at 07/13/25 0504     Scheduled Meds:   allopurinoL  200 mg Oral QHS    ascorbic acid (vitamin C)  500 mg Oral BID    cilostazoL  50 mg Oral BID    divalproex ER  1,000 mg Oral QHS    enoxparin  40 mg Subcutaneous Daily    furosemide  20 mg Oral Daily    levothyroxine  50 mcg Oral Before breakfast    LIDOcaine (PF) 10 mg/ml (1%)  1 mL Other Once    losartan  50 mg Oral Daily     PRN Meds:  Current Facility-Administered Medications:     acetaminophen, 650 mg, Oral, Q8H PRN    albuterol sulfate, 2.5 mg, Nebulization, Q6H PRN    melatonin, 6 mg, Oral, Nightly PRN    morphine, 4 mg, Intravenous, Q4H PRN    ondansetron, 4 mg, Intravenous, Q12H PRN     Review of patient's allergies indicates:   Allergen Reactions    Influenza virus vaccines      Pt states had to be admitted to hospital after last flu vaccine     Lexapro [escitalopram]      Objective:     Vital Signs (Most Recent):  Temp: 98.4 °F (36.9 °C) (07/13/25 0811)  Pulse: 95 (07/13/25 0811)  Resp:  20 (07/13/25 0811)  BP: 131/62 (07/13/25 0811)  SpO2: 99 % (07/13/25 0811) Vital Signs (24h Range):  Temp:  [97.7 °F (36.5 °C)-98.4 °F (36.9 °C)] 98.4 °F (36.9 °C)  Pulse:  [88-99] 95  Resp:  [18-20] 20  SpO2:  [94 %-99 %] 99 %  BP: (121-157)/(60-67) 131/62     Weight: 87.3 kg (192 lb 6.4 oz)  Body mass index is 31.05 kg/m².    Intake/Output - Last 3 Shifts         07/11 0700 07/12 0659 07/12 0700 07/13 0659 07/13 0700 07/14 0659    Urine (mL/kg/hr) 0 (0) 1 (0)     Other  300     Stool 300      Total Output 300 301     Net -300 -301            Unmeasured Urine Occurrence 2 x 1 x     Unmeasured Stool Occurrence  1 x              Physical Exam  Constitutional:       General: She is not in acute distress.     Appearance: Normal appearance.   HENT:      Head: Normocephalic.   Cardiovascular:      Rate and Rhythm: Normal rate.   Pulmonary:      Effort: Pulmonary effort is normal. No respiratory distress.   Abdominal:      General: There is no distension.      Tenderness: There is no abdominal tenderness.          Comments: Left abdominal colostomy with stool.  No abdominal distention   Musculoskeletal:         General: Normal range of motion.   Skin:     General: Skin is warm.      Coloration: Skin is not jaundiced.   Neurological:      General: No focal deficit present.      Mental Status: She is alert and oriented to person, place, and time.      Cranial Nerves: No cranial nerve deficit.          Significant Labs:  I have reviewed all pertinent lab results within the past 24 hours.  CBC:   Recent Labs   Lab 07/09/25  1252   WBC 7.93   RBC 3.66*   HGB 9.2*   HCT 28.6*   *   MCV 78.1*   MCH 25.1*   MCHC 32.2     BMP:   Recent Labs   Lab 07/10/25  0423      *   K 5.0      CO2 22*   BUN 20   CREATININE 0.98   CALCIUM 8.3*       Significant Diagnostics:  I have reviewed all pertinent imaging results/findings within the past 24 hours.  Assessment/Plan:     Other partial intestinal  obstruction  07/09/25 npo, ng tube to LIS, Gastrograffin in a.m..    07/10/25 NPO.  NG tube to low intermittent suction, clamped for Gastrografin challenge today.    07/11/25 NG out.  Clear liquid diet.  Advance diet tomorrow if clears tolerated today.  DC back to [facility]( will probably be Monday)    7/12:  Doing well, tolerating clear liquid diet.  Advance to full liquid diet.    7/13: Tolerating fulls; wants to stay on full liquid diet for a few days before advancing to soft diet        Parker Delgado, DO  General Surgery  [facility]

## 2025-07-13 NOTE — PLAN OF CARE
Problem: Adult Inpatient Plan of Care  Goal: Plan of Care Review  Outcome: Progressing  Goal: Patient-Specific Goal (Individualized)  Outcome: Progressing  Goal: Absence of Hospital-Acquired Illness or Injury  Outcome: Progressing  Goal: Optimal Comfort and Wellbeing  Outcome: Progressing     Problem: Wound  Goal: Optimal Coping  Outcome: Progressing  Goal: Optimal Functional Ability  Outcome: Progressing  Goal: Absence of Infection Signs and Symptoms  Outcome: Progressing  Goal: Improved Oral Intake  Outcome: Progressing  Goal: Optimal Pain Control and Function  Outcome: Progressing

## 2025-07-13 NOTE — PLAN OF CARE
Problem: Physical Therapy  Goal: Physical Therapy Goal  Description: Short term goals:  . Supine to sit with [place]  . Sit to supine with [place]  . Sit to stand transfer with Modified [place]  . Gait  x 50 feet with Contact Guard Assistance using Rolling Walker.     Long term goals:  Patient will gain highest level functional mobility with lowest level of assistive device to return to desired living arrangement and prior ADL's.     Outcome: Progressing

## 2025-07-13 NOTE — PT/OT/SLP EVAL
Physical Therapy Evaluation    Patient Name:  Umesh Lassiter   MRN:  [id number]    Recommendations:     Discharge Recommendations: Moderate Intensity Therapy   Discharge Equipment Recommendations: to be determined by next level of care   Barriers to discharge: ongoing treatment    Assessment:     Umesh Lassiter is a 80 y.o. female admitted with a medical diagnosis of <principal problem not specified>.  She presents with the following impairments/functional limitations: weakness, impaired endurance, impaired self care skills, impaired functional mobility     Patient agreeable to evaluation today. At baseline she is ambulatory with RW. During eval she was able to stand but not able to attempt gait due to weakness. Would benefit from rehab at [place]. .    Rehab Prognosis: Good; patient would benefit from acute skilled PT services to address these deficits and reach maximum level of function.    Recent Surgery: * No surgery found *      Plan:     During this hospitalization, patient to be seen 5 x/week to address the identified rehab impairments via gait training, therapeutic activities, therapeutic exercises, neuromuscular re-education and progress toward the following goals:    Plan of Care Expires:  08/10/25    Subjective     Chief Complaint: weakness  Patient/Family Comments/goals: agreeable  Pain/Comfort:  Pain Rating 1: 0/10    Patients cultural, spiritual, Rastafari conflicts given the current situation: no    Living Environment:  NH  Prior to admission, patients level of function was ambulatory with RW.  Equipment used at home: walker, rolling.  DME owned (not currently used): none.  Upon discharge, patient will have assistance from facility staff.    Objective:     Communicated with nurse prior to session.  Patient found HOB elevated with SCD  upon PT entry to room.    General Precautions: Standard, fall  Orthopedic Precautions:    Braces:    Respiratory Status: Room air    Exams:  RLE ROM: WFL  RLE Strength:  WFL  LLE ROM: WFL  LLE Strength: WFL    Functional Mobility:  Bed Mobility:     Supine to Sit: contact guard assistance  Sit to Supine: minimum assistance  Balance: CGA for EOB sitting balance  Sit to stand: min assist x2 with RW        AM-PAC 6 CLICK MOBILITY  Total Score:15       Treatment & Education:  Patient educated on the role of physical therapy in the acute care setting, call light usage, and safety including calling nurse for mobility  Patient educated on importance of OOB activity and remaining up in chair.  PT answered all patient questions within PT scope of practice  Mobility as noted      Patient left HOB elevated with all lines intact, call button in reach, and nurse notified.    GOALS:   Multidisciplinary Problems       Physical Therapy Goals          Problem: Physical Therapy    Goal Priority Disciplines Outcome Interventions   Physical Therapy Goal     PT, PT/OT Progressing    Description: Short term goals:  . Supine to sit with [place]  . Sit to supine with [place]  . Sit to stand transfer with Modified [place]  . Gait  x 50 feet with Contact Guard Assistance using Rolling Walker.     Long term goals:  Patient will gain highest level functional mobility with lowest level of assistive device to return to desired living arrangement and prior ADL's.                          DME Justifications:  NA    History:     Past Medical History:   Diagnosis Date    Anxiety     Asthma     Cellulitis of left lower extremity 01/13/2022    Chronic wound infection of abdomen 06/06/2022    CVA (cerebral vascular accident) 05/23/2018    DVT (deep venous thrombosis) 2019    Epilepsy     Hyperlipidemia     Hypertension     Hypothyroid     Left leg pain 11/29/2021    Non-pressure chronic ulcer of left lower leg with fat layer exposed     Open wound of lower limb 07/16/2022    PVD (peripheral vascular disease)     Venous stasis     Wound infection     left lower leg       Past Surgical History:   Procedure  Laterality Date    ABDOMINAL SURGERY      COLOSTOMY Left 2025    COLOSTOMY N/A 6/22/2025    Procedure: CREATION, COLOSTOMY;  Surgeon: Mannie Brannon MD;  Location: [facility] OR;  Service: General;  Laterality: N/A;    EXCISION, SMALL INTESTINE N/A 6/22/2025    Procedure: EXCISION, SMALL INTESTINE;  Surgeon: Mannie Brannon MD;  Location: [facility] OR;  Service: General;  Laterality: N/A;    KNEE ARTHROSCOPY Left     x 2    LAPAROTOMY, EXPLORATORY N/A 6/22/2025    Procedure: LAPAROTOMY, EXPLORATORY;  Surgeon: Mannie Brannon MD;  Location: [facility] OR;  Service: General;  Laterality: N/A;    LASER ABLATION Right 04/20/2018    GSV LASER ABLATION PERFORMED BY DR. KARMA BUTCHER.    LASER ABLATION Left 04/27/2018    SSV LASER ABLATION PERFORMED BY DR. KARMA BUTCHER.    LASER ABLATION Right 12/17/2020    GSV ( DUPLICATED )LASER ABLATION PERFORMED BY DR. KARMA BUTCHER.    LASER ABLATION Left 01/08/2021    GSV LASER ABLATION PERFORMED BY DR. KARMA BUTCHER.    Remove cataracts, insert lens Bilateral     SHOULDER ARTHROSCOPY Left     SINUS SURGERY      TOTAL ABDOMINAL HYSTERECTOMY W/ BILATERAL SALPINGOOPHORECTOMY      VENOUS ABLATION Right 02/28/2020    DISTAL GSV VARITHENA ABLATION PERFORMED BY DR. KARMA BUTCHER.       Time Tracking:     PT Received On: 07/13/25  PT Start Time: 0905     PT Stop Time: 0925  PT Total Time (min): 20 min     Billable Minutes: Evaluation 20 07/13/2025

## 2025-07-13 NOTE — PT/OT/SLP EVAL
Occupational Therapy   Evaluation    Name: Umesh Lassiter  MRN: [id number]  Admitting Diagnosis: <principal problem not specified>  Recent Surgery: * No surgery found *      Recommendations:     Discharge Recommendations: Moderate Intensity Therapy  Discharge Equipment Recommendations:  to be determined by next level of care  Barriers to discharge:  None    Assessment:     Umesh Lassiter is a 80 y.o. female with a medical diagnosis of bowel obstruction.  She presents with alert and agreeable to treatment. Performance deficits affecting function: weakness, impaired endurance, impaired self care skills, gait instability, impaired functional mobility, pain.      Rehab Prognosis: Good; patient would benefit from acute skilled OT services to address these deficits and reach maximum level of function.       Plan:     Patient to be seen 5 x/week to address the above listed problems via self-care/home management, therapeutic activities, therapeutic exercises  Plan of Care Expires: 08/10/25  Plan of Care Reviewed with: patient    Subjective     Chief Complaint: bowel obstruction  Patient/Family Comments/goals: Pt is wanting to have moderate intensity therapy at discharge    Occupational Profile:  Living Environment: Pt is a resident of nursing home  Previous level of function: Pt was ambulating ion  and performing her own self-care independently at the NH prior to her resection and ostomy on 6/22/2025  Roles and Routines: Pt performs her self-care  Equipment Used at Home: walker, rolling, wheelchair  Assistance upon Discharge: PT will have assistance from facility staff    Pain/Comfort:  Pain Rating 1: 0/10  Pain Rating Post-Intervention 1: 0/10    Patients cultural, spiritual, Sikhism conflicts given the current situation: no    Objective:     Communicated with: DOE Long prior to session.  Patient found HOB elevated with peripheral IV, SCD upon OT entry to room.    General Precautions: Standard,  fall  Orthopedic Precautions: N/A  Braces: N/A  Respiratory Status: Room air    Occupational Performance:    Bed Mobility:    Patient completed Rolling/Turning to Left with  minimum assistance  Patient completed Rolling/Turning to Right with minimum assistance  Patient completed Scooting/Bridging with stand by assistance  Patient completed Supine to Sit with minimum assistance  Patient completed Sit to Supine with moderate assistance    Functional Mobility/Transfers:  Patient completed Sit <> Stand Transfer with minimum assistance  with  rolling walker   Functional Mobility: Pt did not attempt to take steps    Activities of Daily Living:  Feeding:  independence    Grooming: stand by assistance with access to supplies while sitting up EOB  Upper Body Dressing: maximal assistance gown as robe due to IV line  Lower Body Dressing: total assistance for socks    Cognitive/Visual Perceptual:  Cognitive/Psychosocial Skills:     -       Oriented to: Person, Place, and Situation   -       Follows Commands/attention:Follows one-step commands  -       Communication: clear/fluent  -       Safety awareness/insight to disability: intact   -       Mood/Affect/Coping skills/emotional control: Cooperative  Visual/Perceptual:      -Intact      Physical Exam:  Balance:    -       sitting with SBA, standing with CGA/ min(A) with RW  Dominant hand:    -       right  Upper Extremity Range of Motion:     -       Right Upper Extremity: WFL  -       Left Upper Extremity: WFL  Upper Extremity Strength:    -       Right Upper Extremity: WFL  -       Left Upper Extremity: WFL   Strength:    -       Right Upper Extremity: WFL  -       Left Upper Extremity: WFL  Gross motor coordination:   WFL    AMPAC 6 Click ADL:  AMPAC Total Score: 14    Treatment & Education:  Pt educated on OT role/POC.   Importance of OOB activity with staff assistance.  Importance of sitting up in the chair throughout the day as tolerated, especially for meals   Safety  during functional t/f and mobility with use of RW  Importance of assisting with self-care activities   All questions/concerns answered within OT scope of practice     Patient left HOB elevated with all lines intact, call button in reach, and RN notified    GOALS:   Multidisciplinary Problems       Occupational Therapy Goals          Problem: Occupational Therapy    Goal Priority Disciplines Outcome Interventions   Occupational Therapy Goal     OT, PT/OT Ongoing    Description: STG: (in 1 week)  Pt will perform grooming with setup  Pt will bathe with setup and min(A)  Pt will perform UE dressing with setup and min(A)  Pt will perform LE dressing with setup and min(A)  Pt will sit EOB x 10 min with independence  Pt will transfer bed/chair/bsc with RW and CGA  Pt will perform standing task x 3 min with RW and CGA   Pt will tolerate 15 minutes of tx without fatigue      LTG: (in 5 weeks)  1.Restore to max I with self care and mobility.                             History:     Past Medical History:   Diagnosis Date    Anxiety     Asthma     Cellulitis of left lower extremity 01/13/2022    Chronic wound infection of abdomen 06/06/2022    CVA (cerebral vascular accident) 05/23/2018    DVT (deep venous thrombosis) 2019    Epilepsy     Hyperlipidemia     Hypertension     Hypothyroid     Left leg pain 11/29/2021    Non-pressure chronic ulcer of left lower leg with fat layer exposed     Open wound of lower limb 07/16/2022    PVD (peripheral vascular disease)     Venous stasis     Wound infection     left lower leg         Past Surgical History:   Procedure Laterality Date    ABDOMINAL SURGERY      COLOSTOMY Left 2025    COLOSTOMY N/A 6/22/2025    Procedure: CREATION, COLOSTOMY;  Surgeon: Mannie Brannon MD;  Location: [facility] OR;  Service: General;  Laterality: N/A;    EXCISION, SMALL INTESTINE N/A 6/22/2025    Procedure: EXCISION, SMALL INTESTINE;  Surgeon: Mannie Brannon MD;  Location: [facility] OR;  Service: General;   Laterality: N/A;    KNEE ARTHROSCOPY Left     x 2    LAPAROTOMY, EXPLORATORY N/A 6/22/2025    Procedure: LAPAROTOMY, EXPLORATORY;  Surgeon: Mannie Brannon MD;  Location: [facility];  Service: General;  Laterality: N/A;    LASER ABLATION Right 04/20/2018    GSV LASER ABLATION PERFORMED BY DR. KARMA BUTCHER.    LASER ABLATION Left 04/27/2018    SSV LASER ABLATION PERFORMED BY DR. KARMA BUTCHER.    LASER ABLATION Right 12/17/2020    GSV ( DUPLICATED )LASER ABLATION PERFORMED BY DR. KARMA BUTCHER.    LASER ABLATION Left 01/08/2021    GSV LASER ABLATION PERFORMED BY DR. KARMA BUTCHER.    Remove cataracts, insert lens Bilateral     SHOULDER ARTHROSCOPY Left     SINUS SURGERY      TOTAL ABDOMINAL HYSTERECTOMY W/ BILATERAL SALPINGOOPHORECTOMY      VENOUS ABLATION Right 02/28/2020    DISTAL GSV VARITHENA ABLATION PERFORMED BY DR. KARMA BUTCHER.       Time Tracking:     OT Date of Treatment: 07/13/25  OT Start Time: 0905  OT Stop Time: 0928  OT Total Time (min): 23 min    Billable Minutes:Evaluation low complexity    7/13/2025

## 2025-07-13 NOTE — PLAN OF CARE
Problem: Occupational Therapy  Goal: Occupational Therapy Goal  Description: STG: (in 1 week)  Pt will perform grooming with setup  Pt will bathe with setup and min(A)  Pt will perform UE dressing with setup and min(A)  Pt will perform LE dressing with setup and min(A)  Pt will sit EOB x 10 min with independence  Pt will transfer bed/chair/bsc with RW and CGA  Pt will perform standing task x 3 min with RW and CGA   Pt will tolerate 15 minutes of tx without fatigue      LTG: (in 5 weeks)  1.Restore to max I with self care and mobility.    Outcome: Ongoing

## 2025-07-14 VITALS
HEART RATE: 88 BPM | OXYGEN SATURATION: 97 % | HEIGHT: 66 IN | BODY MASS INDEX: 30.92 KG/M2 | WEIGHT: 192.38 LBS | SYSTOLIC BLOOD PRESSURE: 122 MMHG | DIASTOLIC BLOOD PRESSURE: 64 MMHG | TEMPERATURE: 98 F | RESPIRATION RATE: 18 BRPM

## 2025-07-14 PROCEDURE — 63600175 PHARM REV CODE 636 W HCPCS: Performed by: NURSE PRACTITIONER

## 2025-07-14 PROCEDURE — 94761 N-INVAS EAR/PLS OXIMETRY MLT: CPT

## 2025-07-14 PROCEDURE — 25000003 PHARM REV CODE 250: Performed by: NURSE PRACTITIONER

## 2025-07-14 RX ADMIN — LOSARTAN POTASSIUM 50 MG: 50 TABLET, FILM COATED ORAL at 10:07

## 2025-07-14 RX ADMIN — OXYCODONE HYDROCHLORIDE AND ACETAMINOPHEN 500 MG: 500 TABLET ORAL at 10:07

## 2025-07-14 RX ADMIN — MORPHINE SULFATE 4 MG: 4 INJECTION INTRAVENOUS at 02:07

## 2025-07-14 RX ADMIN — CILOSTAZOL 50 MG: 50 TABLET ORAL at 10:07

## 2025-07-14 RX ADMIN — LEVOTHYROXINE SODIUM 50 MCG: 0.05 TABLET ORAL at 06:07

## 2025-07-14 RX ADMIN — MORPHINE SULFATE 4 MG: 4 INJECTION INTRAVENOUS at 10:07

## 2025-07-14 RX ADMIN — FUROSEMIDE 20 MG: 20 TABLET ORAL at 10:07

## 2025-07-14 RX ADMIN — POTASSIUM CHLORIDE, DEXTROSE MONOHYDRATE AND SODIUM CHLORIDE: 150; 5; 450 INJECTION, SOLUTION INTRAVENOUS at 06:07

## 2025-07-14 NOTE — PLAN OF CARE
[facility]  Discharge Final Note    Primary Care Provider: Terri Smith FNP    Expected Discharge Date: 7/14/2025    Final Discharge Note (most recent)       Final Note - 07/14/25 1437          Final Note    Assessment Type Final Discharge Note     Anticipated Discharge Disposition Skilled Nursing Facility     What phone number can be called within the next 1-3 days to see how you are doing after discharge? [phone]        Post-Acute Status    Post-Acute Authorization Placement     Post-Acute Placement Status Set-up Complete/Auth obtained     Patient choice form signed by patient/caregiver List with quality metrics by geographic area provided;List from CMS Compare;List from System Post-Acute Care     Discharge Delays None known at this time                     Important Message from Medicare  Important Message from Medicare regarding Discharge Appeal Rights: Given to patient/caregiver, Explained to patient/caregiver, Signed/date by patient/caregiver     Date IMM was signed: 07/14/25  Time IMM was signed: 0853     Follow-up providers       Mannie Brannon MD   Specialty: General Surgery, Surgery    [address]  [facility] [address]   Phone: [phone]       Next Steps: Follow up in 2 week(s)    Christa Dove MD   Specialty: Hematology and Oncology    [address]  [place] [address]  [facility] [address]   Phone: [phone]       Next Steps: Follow up              After-discharge care                Destination       [facility]   Service: Skilled Nursing    [address] [place] [address]   Phone: [phone]                             Pt dc to [facility]. DC paperwork faxed. IM updated. No further needs noted

## 2025-07-14 NOTE — PLAN OF CARE
Problem: Adult Inpatient Plan of Care  Goal: Plan of Care Review  Outcome: Progressing  Goal: Patient-Specific Goal (Individualized)  Outcome: Progressing  Goal: Absence of Hospital-Acquired Illness or Injury  Outcome: Progressing  Goal: Optimal Comfort and Wellbeing  Outcome: Progressing  Goal: Readiness for Transition of Care  Outcome: Progressing     Problem: Wound  Goal: Optimal Coping  Outcome: Progressing  Goal: Optimal Functional Ability  Outcome: Progressing  Goal: Absence of Infection Signs and Symptoms  Outcome: Progressing  Goal: Improved Oral Intake  Outcome: Progressing  Goal: Optimal Pain Control and Function  Outcome: Progressing  Goal: Skin Health and Integrity  Outcome: Progressing  Goal: Optimal Wound Healing  Outcome: Progressing     Problem: Fall Injury Risk  Goal: Absence of Fall and Fall-Related Injury  Outcome: Progressing     Problem: Skin Injury Risk Increased  Goal: Skin Health and Integrity  Outcome: Progressing     Problem: Fatigue  Goal: Improved Activity Tolerance  Outcome: Progressing

## 2025-07-14 NOTE — PLAN OF CARE
Ss faxed updates to [facility]. Ss following    1330: SS spoke with Monserrat at [facility], pt is able to return as SNF when medically ready. Ss notified MD. Pt's packet to return to [facility] remains in cubby 467. Ss following

## 2025-07-14 NOTE — DISCHARGE SUMMARY
[facility]  General Surgery  Discharge Summary      Patient Name: Umesh Lassiter  MRN: [id number]  Admission Date: 7/9/2025  Hospital Length of Stay: 5 days  Discharge Date and Time: 07/14/2025 2:00 PM  Attending Physician: Mannie Brannon MD   Discharging Provider: Mannie Brannon MD  Primary Care Provider: Terri Smith FNP    HPI:   Patient presents to ER from [facility] with complaint of abdominal pain and nausea.  Patient denies vomiting.  Reports generalized abdominal pain and bloating.  She states her symptoms started yesterday. Reports hard stools in ostomy until today.  Now with liquid tan colored stools. She had xray done at nursing home yesterday that showed possible ileus.  She is status post diagnostic laparotomy with colon resection and ostomy per Dr Brannon on 06/22/25.  She was just discharged last week.  Reports decreased appetite. Denies fever.     * No surgery found *      Indwelling Lines/Drains at time of discharge:   Lines/Drains/Airways       Drain  Duration                  Colostomy 06/22/25 1009 Other (see comments) LLQ 22 days                  Hospital Course: 07/10/25 patient was awake and alert and this morning.  She is complaining she has been incontinent of urine and desires to be changed.  NG tube connected to low intermittent suction with less than 200 cc of light drainage noted in suction canister.  Liquid stool noted in ostomy bag.  Abdomen is mildly distended but nontender.  Patient reports improvement of her symptoms overnight.  She has not had any nausea or vomiting.  Will remain NPO.  Gastrografin challenge today per NG tube.     07/11/25 Ng is out this morning.  Patient denies nausea or vomiting.  Liquid stool, brown in color noted in ostomy bag.  Abdomen soft and non-distended.  Denies pain.  Will start clear liquid diet as tolerated.  Possibly advance diet tomorrow.  Case management assisting in discharge back to [facility] bed.   "    Patient was tolerating a diet no nausea or vomiting ostomy is working good and felt comfortable going to a rehab facility.    Goals of Care Treatment Preferences:  Code Status: Full Code      Consults:   Consults (From admission, onward)          Status Ordering Provider     Inpatient consult to Social Work  Once        Provider:  (Not yet assigned)    HAILEY Koch            Significant Diagnostic Studies: Labs: BMP: No results for input(s): "GLU", "NA", "K", "CL", "CO2", "BUN", "CREATININE", "CALCIUM", "MG" in the last 48 hours., CMP No results for input(s): "NA", "K", "CL", "CO2", "GLU", "BUN", "CREATININE", "CALCIUM", "PROT", "ALBUMIN", "BILITOT", "ALKPHOS", "AST", "ALT", "ANIONGAP", "ESTGFRAFRICA", "EGFRNONAA" in the last 48 hours., and CBC No results for input(s): "WBC", "HGB", "HCT", "PLT" in the last 48 hours.    Pending Diagnostic Studies:       None          Final Active Diagnoses:    Diagnosis Date Noted POA    PRINCIPAL PROBLEM:  Other partial intestinal obstruction [K56.690] 07/09/2025 Yes    CKD stage 3a, GFR 45-59 ml/min [N18.31] 02/08/2024 Yes    PVD (peripheral vascular disease) [I73.9] 03/13/2023 Yes    Chronic right shoulder pain [M25.511, G89.29] 07/16/2022 Yes    Venous ulcer of left leg [I83.029, L97.929] 01/13/2022 Yes    Generalized epilepsy [G40.309] 01/13/2022 Yes    Hypertension [I10] 01/07/2022 Yes    Hypothyroidism [E03.9] 01/07/2022 Yes      Problems Resolved During this Admission:      Discharged Condition: good    Disposition: Rehab Facility    Follow Up:   Contact information for follow-up providers       Mannie Brannon MD Follow up in 2 week(s).    Specialties: General Surgery, Surgery  Contact information:  [address] [place] [address] [phone]               Christa Dove MD .    Specialty: Hematology and Oncology  Contact information:  [address]  [place] [address]  [facility] [address]  [phone]                       Contact information for " after-discharge care       Destination       [facility] CLAUDIO FELIX .    Service: Skilled Nursing  Contact information:  [address]  [place] [address]  [phone]                                 Patient Instructions:      Ambulatory referral/consult to Oncology   Standing Status: Future   Referral Priority: Routine Referral Type: Consultation   Referral Reason: Specialty Services Required   Referred to Provider: OCTAVIO ESQUIVEL Requested Specialty: Hematology and Oncology   Number of Visits Requested: 1     Diet Adult Regular     Remove dressing in 24 hours     Notify your health care provider if you experience any of the following:  temperature >100.4     Notify your health care provider if you experience any of the following:  persistent nausea and vomiting or diarrhea     Notify your health care provider if you experience any of the following:  severe uncontrolled pain     Notify your health care provider if you experience any of the following:  redness, tenderness, or signs of infection (pain, swelling, redness, odor or green/yellow discharge around incision site)     Notify your health care provider if you experience any of the following:  difficulty breathing or increased cough     Notify your health care provider if you experience any of the following:  severe persistent headache     Notify your health care provider if you experience any of the following:  worsening rash     Notify your health care provider if you experience any of the following:  persistent dizziness, light-headedness, or visual disturbances     Notify your health care provider if you experience any of the following:  increased confusion or weakness     Notify your health care provider if you experience any of the following:     Shower on day dressing removed (No bath)     Medications:  Reconciled Home Medications:      Medication List        CHANGE how you take these medications      divalproex  MG Tb24 24 hr  tablet  Commonly known as: DEPAKOTE ER  TAKE 2 TABLETS BY MOUTH EVERY NIGHT AT BEDTIME FOR SEIZURES  What changed:   how much to take  how to take this  when to take this            CONTINUE taking these medications      acetaminophen-codeine 300-30mg 300-30 mg Tab  Commonly known as: TYLENOL #3  Take 1 tablet by mouth every 12 (twelve) hours as needed (pain).     albuterol 90 mcg/actuation inhaler  Commonly known as: PROVENTIL/VENTOLIN HFA  Inhale 2 puffs into the lungs every 6 (six) hours as needed for Wheezing.     allopurinoL 100 MG tablet  Commonly known as: ZYLOPRIM  Take 200 mg by mouth every evening. May also give 100 mg by mouth daily PRN for elevated uric acid     ascorbic acid (vitamin C) 500 MG tablet  Commonly known as: VITAMIN C  Take 500 mg by mouth 2 (two) times daily.     cilostazoL 50 MG Tab  Commonly known as: PLETAL  Take 1 tablet (50 mg total) by mouth 2 (two) times daily.     clorazepate 7.5 MG Tab  Commonly known as: TRANXENE  Take 3.75 mg by mouth 2 (two) times daily.     furosemide 20 MG tablet  Commonly known as: LASIX  Take 1 tablet (20 mg total) by mouth once daily.     HYDROcodone-acetaminophen 7.5-325 mg per tablet  Commonly known as: NORCO  Take 1 tablet by mouth every 6 (six) hours as needed for Pain.     levothyroxine 50 MCG tablet  Commonly known as: SYNTHROID  Take 1 tablet (50 mcg total) by mouth before breakfast.     losartan 50 MG tablet  Commonly known as: COZAAR  Take 1 tablet (50 mg total) by mouth once daily.     melatonin 10 mg Tab  Take 10 mg by mouth every evening.     meloxicam 7.5 MG tablet  Commonly known as: MOBIC  Take 7.5 mg by mouth 2 (two) times a day.     multivitamin per tablet  Commonly known as: THERAGRAN  Take 1 tablet by mouth once daily.     ondansetron 4 MG Tbdl  Commonly known as: ZOFRAN-ODT  Take 1 tablet (4 mg total) by mouth every 6 (six) hours as needed (nausea).     polyethylene glycol 17 gram Pwpk  Commonly known as: GLYCOLAX  Take 17 g by mouth  once daily.            Time spent on the discharge of patient: 10 minutes    Mannie Brannon MD  General Surgery  [facility]

## 2025-07-15 ENCOUNTER — TELEPHONE (OUTPATIENT)
Dept: SURGERY | Facility: CLINIC | Age: 80
End: 2025-07-15
Payer: MEDICARE

## 2025-07-15 NOTE — TELEPHONE ENCOUNTER
Copied from CRM #[id number]. Topic: General Inquiry - Patient Advice  >> Jul 15, 2025  9:36 AM Haritha wrote:  Who Called: Terri with [facility]    She is calling about a referral that was sent on this patient.     Patient's Preferred Phone Number on File: [phone]        Informed terri it was ok if patient waited til Monday to be seen by the oncologist.

## 2025-07-18 NOTE — PHYSICIAN QUERY
Please specify the diagnosis  that correspond(s) to the indicators listed in the query message:  Hyponatremia

## 2025-07-28 ENCOUNTER — HOSPITAL ENCOUNTER (INPATIENT)
Facility: HOSPITAL | Age: 80
LOS: 2 days | DRG: 682 | End: 2025-07-31
Attending: EMERGENCY MEDICINE | Admitting: INTERNAL MEDICINE
Payer: MEDICARE

## 2025-07-28 DIAGNOSIS — N39.0 ACUTE UTI: ICD-10-CM

## 2025-07-28 DIAGNOSIS — E86.0 DEHYDRATION WITH HYPONATREMIA: ICD-10-CM

## 2025-07-28 DIAGNOSIS — G40.909 SEIZURE DISORDER: ICD-10-CM

## 2025-07-28 DIAGNOSIS — N17.9 AKI (ACUTE KIDNEY INJURY): ICD-10-CM

## 2025-07-28 DIAGNOSIS — M10.9 GOUT, ARTHRITIS: ICD-10-CM

## 2025-07-28 DIAGNOSIS — C18.9 COLON ADENOCARCINOMA: ICD-10-CM

## 2025-07-28 DIAGNOSIS — R29.818 ACUTE FOCAL NEUROLOGICAL DEFICIT: ICD-10-CM

## 2025-07-28 DIAGNOSIS — E87.5 HYPERKALEMIA: ICD-10-CM

## 2025-07-28 DIAGNOSIS — E87.1 DEHYDRATION WITH HYPONATREMIA: ICD-10-CM

## 2025-07-28 DIAGNOSIS — G93.41 ACUTE METABOLIC ENCEPHALOPATHY: ICD-10-CM

## 2025-07-28 DIAGNOSIS — G93.41 ACUTE METABOLIC ENCEPHALOPATHY: Primary | ICD-10-CM

## 2025-07-28 DIAGNOSIS — I10 ESSENTIAL (PRIMARY) HYPERTENSION: ICD-10-CM

## 2025-07-28 LAB
ALBUMIN SERPL BCP-MCNC: 2.7 G/DL (ref 3.4–4.8)
ALBUMIN/GLOB SERPL: 0.6 {RATIO}
ALP SERPL-CCNC: 77 U/L (ref 40–150)
ALT SERPL W P-5'-P-CCNC: <7 U/L
ANION GAP SERPL CALCULATED.3IONS-SCNC: 14 MMOL/L (ref 7–16)
AST SERPL W P-5'-P-CCNC: 29 U/L (ref 11–45)
BASOPHILS # BLD AUTO: 0.02 K/UL (ref 0–0.2)
BASOPHILS NFR BLD AUTO: 0.2 % (ref 0–1)
BILIRUB SERPL-MCNC: 0.3 MG/DL
BUN SERPL-MCNC: 56 MG/DL (ref 10–20)
BUN/CREAT SERPL: 16 (ref 6–20)
CALCIUM SERPL-MCNC: 10.5 MG/DL (ref 8.4–10.2)
CHLORIDE SERPL-SCNC: 98 MMOL/L (ref 98–107)
CHOLEST SERPL-MCNC: 176 MG/DL
CHOLEST/HDLC SERPL: 3.9 {RATIO}
CO2 SERPL-SCNC: 23 MMOL/L (ref 23–31)
CREAT SERPL-MCNC: 3.6 MG/DL (ref 0.55–1.02)
DIFFERENTIAL METHOD BLD: ABNORMAL
EGFR (NO RACE VARIABLE) (RUSH/TITUS): 12 ML/MIN/1.73M2
EOSINOPHIL # BLD AUTO: 0.49 K/UL (ref 0–0.5)
EOSINOPHIL NFR BLD AUTO: 5.5 % (ref 1–4)
ERYTHROCYTE [DISTWIDTH] IN BLOOD BY AUTOMATED COUNT: 17.2 % (ref 11.5–14.5)
GLOBULIN SER-MCNC: 4.2 G/DL (ref 2–4)
GLUCOSE SERPL-MCNC: 76 MG/DL (ref 70–105)
GLUCOSE SERPL-MCNC: 79 MG/DL (ref 82–115)
HCT VFR BLD AUTO: 30 % (ref 38–47)
HDLC SERPL-MCNC: 45 MG/DL (ref 35–60)
HGB BLD-MCNC: 9.8 G/DL (ref 12–16)
IMM GRANULOCYTES # BLD AUTO: 0.18 K/UL (ref 0–0.04)
IMM GRANULOCYTES NFR BLD: 2 % (ref 0–0.4)
INR BLD: 1.03
LDLC SERPL CALC-MCNC: 111 MG/DL
LDLC/HDLC SERPL: 2.5 {RATIO}
LYMPHOCYTES # BLD AUTO: 3.61 K/UL (ref 1–4.8)
LYMPHOCYTES NFR BLD AUTO: 40.9 % (ref 27–41)
MCH RBC QN AUTO: 25.3 PG (ref 27–31)
MCHC RBC AUTO-ENTMCNC: 32.7 G/DL (ref 32–36)
MCV RBC AUTO: 77.3 FL (ref 80–96)
MONOCYTES # BLD AUTO: 1.05 K/UL (ref 0–0.8)
MONOCYTES NFR BLD AUTO: 11.9 % (ref 2–6)
MPC BLD CALC-MCNC: 10 FL (ref 9.4–12.4)
NEUTROPHILS # BLD AUTO: 3.48 K/UL (ref 1.8–7.7)
NEUTROPHILS NFR BLD AUTO: 39.5 % (ref 53–65)
NONHDLC SERPL-MCNC: 131 MG/DL
NRBC # BLD AUTO: 0 X10E3/UL
NRBC, AUTO (.00): 0 %
PLATELET # BLD AUTO: 306 K/UL (ref 150–400)
POTASSIUM SERPL-SCNC: 5.4 MMOL/L (ref 3.5–5.1)
PROT SERPL-MCNC: 6.9 G/DL (ref 5.8–7.6)
PROTHROMBIN TIME: 13.6 SECONDS (ref 11.7–14.7)
RBC # BLD AUTO: 3.88 M/UL (ref 4.2–5.4)
SODIUM SERPL-SCNC: 130 MMOL/L (ref 136–145)
TRIGL SERPL-MCNC: 100 MG/DL (ref 37–140)
TSH SERPL DL<=0.005 MIU/L-ACNC: 6.49 UIU/ML (ref 0.35–4.94)
VALPROATE SERPL-MCNC: 53 ΜG/ML (ref 50–100)
VLDLC SERPL-MCNC: 20 MG/DL
WBC # BLD AUTO: 8.83 K/UL (ref 4.5–11)

## 2025-07-28 PROCEDURE — 93010 ELECTROCARDIOGRAM REPORT: CPT | Mod: ,,, | Performed by: INTERNAL MEDICINE

## 2025-07-28 PROCEDURE — 93005 ELECTROCARDIOGRAM TRACING: CPT

## 2025-07-28 PROCEDURE — 83735 ASSAY OF MAGNESIUM: CPT | Performed by: HOSPITALIST

## 2025-07-28 PROCEDURE — 82962 GLUCOSE BLOOD TEST: CPT

## 2025-07-28 PROCEDURE — 84443 ASSAY THYROID STIM HORMONE: CPT | Performed by: EMERGENCY MEDICINE

## 2025-07-28 PROCEDURE — 25000003 PHARM REV CODE 250: Performed by: EMERGENCY MEDICINE

## 2025-07-28 PROCEDURE — 82607 VITAMIN B-12: CPT | Performed by: HOSPITALIST

## 2025-07-28 PROCEDURE — 36415 COLL VENOUS BLD VENIPUNCTURE: CPT | Performed by: EMERGENCY MEDICINE

## 2025-07-28 PROCEDURE — 96360 HYDRATION IV INFUSION INIT: CPT

## 2025-07-28 PROCEDURE — 85610 PROTHROMBIN TIME: CPT | Performed by: EMERGENCY MEDICINE

## 2025-07-28 PROCEDURE — 82550 ASSAY OF CK (CPK): CPT | Performed by: INTERNAL MEDICINE

## 2025-07-28 PROCEDURE — 80061 LIPID PANEL: CPT | Performed by: EMERGENCY MEDICINE

## 2025-07-28 PROCEDURE — 85025 COMPLETE CBC W/AUTO DIFF WBC: CPT | Performed by: EMERGENCY MEDICINE

## 2025-07-28 PROCEDURE — 80053 COMPREHEN METABOLIC PANEL: CPT | Performed by: EMERGENCY MEDICINE

## 2025-07-28 PROCEDURE — 84100 ASSAY OF PHOSPHORUS: CPT | Performed by: HOSPITALIST

## 2025-07-28 PROCEDURE — 80164 ASSAY DIPROPYLACETIC ACD TOT: CPT | Performed by: EMERGENCY MEDICINE

## 2025-07-28 RX ORDER — ACETAMINOPHEN 500 MG
1000 TABLET ORAL
Status: COMPLETED | OUTPATIENT
Start: 2025-07-28 | End: 2025-07-28

## 2025-07-28 RX ADMIN — ACETAMINOPHEN 1000 MG: 500 TABLET ORAL at 11:07

## 2025-07-29 PROBLEM — E87.1 DEHYDRATION WITH HYPONATREMIA: Status: ACTIVE | Noted: 2025-07-29

## 2025-07-29 PROBLEM — E86.0 DEHYDRATION WITH HYPONATREMIA: Status: ACTIVE | Noted: 2025-07-29

## 2025-07-29 PROBLEM — C18.9 COLON ADENOCARCINOMA: Status: ACTIVE | Noted: 2025-06-26

## 2025-07-29 PROBLEM — G93.41 ACUTE METABOLIC ENCEPHALOPATHY: Status: ACTIVE | Noted: 2025-07-29

## 2025-07-29 PROBLEM — G40.909 SEIZURE DISORDER: Status: ACTIVE | Noted: 2025-07-29

## 2025-07-29 PROBLEM — N17.9 AKI (ACUTE KIDNEY INJURY): Status: ACTIVE | Noted: 2025-07-29

## 2025-07-29 LAB
ANION GAP SERPL CALCULATED.3IONS-SCNC: 12 MMOL/L (ref 7–16)
BACTERIA #/AREA URNS HPF: ABNORMAL /HPF
BILIRUB UR QL STRIP: NEGATIVE
BUN SERPL-MCNC: 52 MG/DL (ref 10–20)
BUN/CREAT SERPL: 20 (ref 6–20)
CALCIUM SERPL-MCNC: 9.6 MG/DL (ref 8.4–10.2)
CHLORIDE SERPL-SCNC: 104 MMOL/L (ref 98–107)
CK SERPL-CCNC: 117 U/L (ref 29–168)
CLARITY UR: ABNORMAL
CO2 SERPL-SCNC: 21 MMOL/L (ref 23–31)
COLOR UR: YELLOW
CREAT SERPL-MCNC: 2.57 MG/DL (ref 0.55–1.02)
EGFR (NO RACE VARIABLE) (RUSH/TITUS): 18 ML/MIN/1.73M2
FOLATE SERPL-MCNC: 14.4 NG/ML (ref 7–31.4)
GLUCOSE SERPL-MCNC: 71 MG/DL (ref 70–105)
GLUCOSE SERPL-MCNC: 85 MG/DL (ref 82–115)
GLUCOSE UR STRIP-MCNC: NORMAL MG/DL
HYALINE CASTS #/AREA URNS LPF: ABNORMAL /LPF
KETONES UR STRIP-SCNC: NEGATIVE MG/DL
LEUKOCYTE ESTERASE UR QL STRIP: ABNORMAL
MAGNESIUM SERPL-MCNC: 2.2 MG/DL (ref 1.6–2.6)
MUCOUS, UA: ABNORMAL /LPF
NITRITE UR QL STRIP: NEGATIVE
OHS QRS DURATION: 82 MS
OHS QTC CALCULATION: 382 MS
PH UR STRIP: 5.5 PH UNITS
PHOSPHATE SERPL-MCNC: 4.7 MG/DL (ref 2.3–4.7)
POTASSIUM SERPL-SCNC: 5.1 MMOL/L (ref 3.5–5.1)
PROT UR QL STRIP: NEGATIVE
RBC # UR STRIP: NEGATIVE /UL
RBC #/AREA URNS HPF: 5 /HPF
SARS-COV-2 RDRP RESP QL NAA+PROBE: NEGATIVE
SODIUM SERPL-SCNC: 132 MMOL/L (ref 136–145)
SP GR UR STRIP: 1.01
SQUAMOUS #/AREA URNS LPF: ABNORMAL /HPF
UROBILINOGEN UR STRIP-ACNC: NORMAL MG/DL
VIT B12 SERPL-MCNC: 1651 PG/ML (ref 213–816)
WBC #/AREA URNS HPF: 66 /HPF

## 2025-07-29 PROCEDURE — 97161 PT EVAL LOW COMPLEX 20 MIN: CPT

## 2025-07-29 PROCEDURE — 81003 URINALYSIS AUTO W/O SCOPE: CPT | Performed by: EMERGENCY MEDICINE

## 2025-07-29 PROCEDURE — 25000003 PHARM REV CODE 250: Performed by: EMERGENCY MEDICINE

## 2025-07-29 PROCEDURE — 63600175 PHARM REV CODE 636 W HCPCS: Performed by: HOSPITALIST

## 2025-07-29 PROCEDURE — 99285 EMERGENCY DEPT VISIT HI MDM: CPT | Mod: 25

## 2025-07-29 PROCEDURE — 25000003 PHARM REV CODE 250: Performed by: INTERNAL MEDICINE

## 2025-07-29 PROCEDURE — 82962 GLUCOSE BLOOD TEST: CPT

## 2025-07-29 PROCEDURE — 92610 EVALUATE SWALLOWING FUNCTION: CPT

## 2025-07-29 PROCEDURE — 87077 CULTURE AEROBIC IDENTIFY: CPT | Performed by: EMERGENCY MEDICINE

## 2025-07-29 PROCEDURE — 80048 BASIC METABOLIC PNL TOTAL CA: CPT | Performed by: INTERNAL MEDICINE

## 2025-07-29 PROCEDURE — 25000003 PHARM REV CODE 250: Performed by: HOSPITALIST

## 2025-07-29 PROCEDURE — 36415 COLL VENOUS BLD VENIPUNCTURE: CPT | Performed by: INTERNAL MEDICINE

## 2025-07-29 PROCEDURE — 11000001 HC ACUTE MED/SURG PRIVATE ROOM

## 2025-07-29 PROCEDURE — 94761 N-INVAS EAR/PLS OXIMETRY MLT: CPT

## 2025-07-29 PROCEDURE — 99223 1ST HOSP IP/OBS HIGH 75: CPT | Mod: ,,, | Performed by: HOSPITALIST

## 2025-07-29 PROCEDURE — 87635 SARS-COV-2 COVID-19 AMP PRB: CPT | Performed by: EMERGENCY MEDICINE

## 2025-07-29 RX ORDER — TRAZODONE HYDROCHLORIDE 50 MG/1
50 TABLET ORAL NIGHTLY PRN
Status: DISCONTINUED | OUTPATIENT
Start: 2025-07-29 | End: 2025-07-31 | Stop reason: HOSPADM

## 2025-07-29 RX ORDER — DOCUSATE SODIUM 100 MG/1
100 CAPSULE, LIQUID FILLED ORAL 2 TIMES DAILY PRN
Status: DISCONTINUED | OUTPATIENT
Start: 2025-07-29 | End: 2025-07-31 | Stop reason: HOSPADM

## 2025-07-29 RX ORDER — ONDANSETRON HYDROCHLORIDE 2 MG/ML
8 INJECTION, SOLUTION INTRAVENOUS EVERY 6 HOURS PRN
Status: DISCONTINUED | OUTPATIENT
Start: 2025-07-29 | End: 2025-07-31 | Stop reason: HOSPADM

## 2025-07-29 RX ORDER — SODIUM CHLORIDE 9 MG/ML
INJECTION, SOLUTION INTRAVENOUS CONTINUOUS
Status: DISCONTINUED | OUTPATIENT
Start: 2025-07-29 | End: 2025-07-30

## 2025-07-29 RX ORDER — ALLOPURINOL 100 MG/1
200 TABLET ORAL NIGHTLY
Status: DISCONTINUED | OUTPATIENT
Start: 2025-07-29 | End: 2025-07-29

## 2025-07-29 RX ORDER — BISACODYL 5 MG
10 TABLET, DELAYED RELEASE (ENTERIC COATED) ORAL DAILY PRN
Status: DISCONTINUED | OUTPATIENT
Start: 2025-07-29 | End: 2025-07-31 | Stop reason: HOSPADM

## 2025-07-29 RX ORDER — TALC
6 POWDER (GRAM) TOPICAL NIGHTLY PRN
Status: DISCONTINUED | OUTPATIENT
Start: 2025-07-29 | End: 2025-07-31 | Stop reason: HOSPADM

## 2025-07-29 RX ORDER — DIPHENHYDRAMINE HCL 25 MG
50 CAPSULE ORAL EVERY 6 HOURS PRN
Status: DISCONTINUED | OUTPATIENT
Start: 2025-07-29 | End: 2025-07-31 | Stop reason: HOSPADM

## 2025-07-29 RX ORDER — CEFTRIAXONE 1 G/1
1 INJECTION, POWDER, FOR SOLUTION INTRAMUSCULAR; INTRAVENOUS
Status: DISCONTINUED | OUTPATIENT
Start: 2025-07-29 | End: 2025-07-29

## 2025-07-29 RX ORDER — ALUMINUM HYDROXIDE, MAGNESIUM HYDROXIDE, AND SIMETHICONE 2400; 240; 2400 MG/30ML; MG/30ML; MG/30ML
30 SUSPENSION ORAL EVERY 6 HOURS PRN
Status: DISCONTINUED | OUTPATIENT
Start: 2025-07-29 | End: 2025-07-31 | Stop reason: HOSPADM

## 2025-07-29 RX ORDER — DIVALPROEX SODIUM 500 MG/1
1000 TABLET, FILM COATED, EXTENDED RELEASE ORAL NIGHTLY
Status: DISCONTINUED | OUTPATIENT
Start: 2025-07-29 | End: 2025-07-31 | Stop reason: HOSPADM

## 2025-07-29 RX ORDER — MIRTAZAPINE 7.5 MG/1
7.5 TABLET, FILM COATED ORAL NIGHTLY
COMMUNITY

## 2025-07-29 RX ORDER — POLYETHYLENE GLYCOL 3350 17 G/17G
17 POWDER, FOR SOLUTION ORAL DAILY
Status: DISCONTINUED | OUTPATIENT
Start: 2025-07-29 | End: 2025-07-31 | Stop reason: HOSPADM

## 2025-07-29 RX ORDER — ALLOPURINOL 100 MG/1
100 TABLET ORAL DAILY
Status: DISCONTINUED | OUTPATIENT
Start: 2025-07-30 | End: 2025-07-31 | Stop reason: HOSPADM

## 2025-07-29 RX ORDER — ACETAMINOPHEN 650 MG/1
650 SUPPOSITORY RECTAL EVERY 6 HOURS PRN
Status: DISCONTINUED | OUTPATIENT
Start: 2025-07-29 | End: 2025-07-31 | Stop reason: HOSPADM

## 2025-07-29 RX ORDER — CEFEPIME HYDROCHLORIDE 1 G/1
1 INJECTION, POWDER, FOR SOLUTION INTRAMUSCULAR; INTRAVENOUS
Status: DISCONTINUED | OUTPATIENT
Start: 2025-07-29 | End: 2025-07-31

## 2025-07-29 RX ORDER — GUAIFENESIN AND DEXTROMETHORPHAN HYDROBROMIDE 10; 100 MG/5ML; MG/5ML
10 SYRUP ORAL EVERY 6 HOURS PRN
Status: DISCONTINUED | OUTPATIENT
Start: 2025-07-29 | End: 2025-07-31 | Stop reason: HOSPADM

## 2025-07-29 RX ORDER — LEVOTHYROXINE SODIUM 50 UG/1
50 TABLET ORAL
Status: DISCONTINUED | OUTPATIENT
Start: 2025-07-29 | End: 2025-07-31 | Stop reason: HOSPADM

## 2025-07-29 RX ORDER — DIVALPROEX SODIUM 500 MG/1
500 TABLET, FILM COATED, EXTENDED RELEASE ORAL DAILY
Status: DISCONTINUED | OUTPATIENT
Start: 2025-07-29 | End: 2025-07-29

## 2025-07-29 RX ORDER — ACETAMINOPHEN 500 MG
1000 TABLET ORAL EVERY 6 HOURS PRN
Status: DISCONTINUED | OUTPATIENT
Start: 2025-07-29 | End: 2025-07-31 | Stop reason: HOSPADM

## 2025-07-29 RX ORDER — HEPARIN SODIUM 5000 [USP'U]/ML
5000 INJECTION, SOLUTION INTRAVENOUS; SUBCUTANEOUS EVERY 12 HOURS
Status: DISCONTINUED | OUTPATIENT
Start: 2025-07-29 | End: 2025-07-31 | Stop reason: HOSPADM

## 2025-07-29 RX ADMIN — DIVALPROEX SODIUM 1000 MG: 500 TABLET, FILM COATED, EXTENDED RELEASE ORAL at 08:07

## 2025-07-29 RX ADMIN — LEVOTHYROXINE SODIUM 50 MCG: 0.05 TABLET ORAL at 06:07

## 2025-07-29 RX ADMIN — ONDANSETRON 8 MG: 2 INJECTION INTRAMUSCULAR; INTRAVENOUS at 12:07

## 2025-07-29 RX ADMIN — HEPARIN SODIUM 5000 UNITS: 5000 INJECTION, SOLUTION INTRAVENOUS; SUBCUTANEOUS at 08:07

## 2025-07-29 RX ADMIN — SODIUM CHLORIDE 1000 ML: 9 INJECTION, SOLUTION INTRAVENOUS at 12:07

## 2025-07-29 RX ADMIN — SODIUM CHLORIDE: 9 INJECTION, SOLUTION INTRAVENOUS at 04:07

## 2025-07-29 RX ADMIN — SODIUM CHLORIDE: 9 INJECTION, SOLUTION INTRAVENOUS at 12:07

## 2025-07-29 RX ADMIN — POLYETHYLENE GLYCOL 3350 17 G: 17 POWDER, FOR SOLUTION ORAL at 08:07

## 2025-07-29 RX ADMIN — ALLOPURINOL 200 MG: 100 TABLET ORAL at 03:07

## 2025-07-29 RX ADMIN — CEFEPIME 1 G: 1 INJECTION, POWDER, FOR SOLUTION INTRAMUSCULAR; INTRAVENOUS at 06:07

## 2025-07-29 RX ADMIN — CEFEPIME 1 G: 1 INJECTION, POWDER, FOR SOLUTION INTRAMUSCULAR; INTRAVENOUS at 05:07

## 2025-07-29 NOTE — ASSESSMENT & PLAN NOTE
Patient's blood pressure range in the last 24 hours was: BP  Min: 125/43  Max: 156/58.The patient's inpatient anti-hypertensive regimen is listed below:  Current Antihypertensives       Plan  - BP is controlled, no changes needed to their regimen  - Holding ARB and lasix for now.  Mild hypotension upon arrival  -  restart BP meds as tolerated.

## 2025-07-29 NOTE — TELEMEDICINE CONSULT
[facility]  Vascular Neurology  Comprehensive Stroke Center  TeleVascular Neurology Acute Consultation Note        Consult Information  Consult to Telemedicine - Acute Stroke  Consult performed by: Edwin Gonzalez MD  Consult ordered by: Jack Larson MD          Consulting Provider: JACK LARSON   Current Providers  No providers found    Patient Location:  [facility] EMERGENCY DEPART* Emergency Department    Spoke hospital nurse at bedside with patient assisting consultant.  Patient information was obtained from ER records.       Stroke Documentation  Acute Stroke Times   Last Known Normal Date: 07/28/25  Last Known Normal Time: 1930  Stroke Team Called Date: 07/28/25  Stroke Team Called Time: 2206  Stroke Team Arrival Date: 07/28/25  Stroke Team Arrival Time: 2208  CT Interpretation Time: 2215  Thrombolytic Therapy Recommended: No    NIH Scale:  1a. Level of Consciousness: 1-->Not alert, but arousable by minor stimulation to obey, answer, or respond  1b. LOC Questions: 0-->Answers both questions correctly  1c. LOC Commands: 0-->Performs both tasks correctly  2. Best Gaze: 0-->Normal  3. Visual: 0-->No visual loss  4. Facial Palsy: 0-->Normal symmetrical movements  5a. Motor Arm, Left: 0-->No drift, limb holds 90 (or 45) degrees for full 10 secs  5b. Motor Arm, Right: 0-->No drift, limb holds 90 (or 45) degrees for full 10 secs  6a. Motor Leg, Left: 3-->No effort against gravity, leg falls to bed immediately  6b. Motor Leg, Right: 3-->No effort against gravity, leg falls to bed immediately  7. Limb Ataxia: 0-->Absent  8. Sensory: 0-->Normal, no sensory loss  9. Best Language: 0-->No aphasia, normal  10. Dysarthria: 0-->Normal  11. Extinction and Inattention (formerly Neglect): 0-->No abnormality  Total (NIH Stroke Scale): 7      Modified [place] Baseline: Score: 0  Modified [place] Discharge:    [place] Coma Scale:     ABCD2 Score:    [id number]-CAB Score:    HAS -BLED  "Score:    ICH Score:    Hunt [employer] Classification:      Height 5' 6" (1.676 m), not currently breastfeeding.      In my opinion, this was a: Tier 1; VAN Stroke Assessment: Negative     Medical Decision Making  HPI:  80 y.o. female with a history of HLD, hypothyroidism, epilepsy, PVD, CKD, depression, colon adenocarcinoma who presented from a NH with confusion and left facial weakness.         Images personally reviewed and interpreted:  Study: Head CT  Study Interpretation: No acute ischemia or hemorrhage.     Assessment and plan:  79 y/o female with history as above who presents with encephalopathy and left  facial weakness.      Exam ->  Slight lethargy but arouses easily to voice and follows simple commands.  Oriented to person, place and month.  Slow to respond.  No aphasia  No facial asymmetry or ophthalmoplegia appreciated     Acute ischemic stroke is felt to be less likely.  In any case, she is not a TNK  candidate due to history of colon cancer.  One possible explanation is an unwitnessed seizure with postictal state.     Recommend MRI brain.   If acute ischemia noted, advised  and Plavix 300 load along with Neurology consult for further workup and recommendations.  Check Depakote level.  Toxic/metabolic workup per ED.    Has trouble tracking to  Lytics recommendation: Thrombolytic therapy not recommended due to History of colon cancer    Thrombectomy recommendation: No; at this time symptoms not suggestive of large vessel occlusion  Placement recommendation: pending further studies  admit to inpatient               ROS  Physical Exam  Past Medical History:   Diagnosis Date    Anxiety     Asthma     Cellulitis of left lower extremity 01/13/2022    Chronic wound infection of abdomen 06/06/2022    CVA (cerebral vascular accident) 05/23/2018    DVT (deep venous thrombosis) 2019    Epilepsy     Hyperlipidemia     Hypertension     Hypothyroid     Left leg pain 11/29/2021    Non-pressure chronic ulcer of " left lower leg with fat layer exposed     Open wound of lower limb 07/16/2022    PVD (peripheral vascular disease)     Venous stasis     Wound infection     left lower leg     Past Surgical History:   Procedure Laterality Date    ABDOMINAL SURGERY      COLOSTOMY Left 2025    COLOSTOMY N/A 6/22/2025    Procedure: CREATION, COLOSTOMY;  Surgeon: Mannie Brannon MD;  Location: [facility];  Service: General;  Laterality: N/A;    EXCISION, SMALL INTESTINE N/A 6/22/2025    Procedure: EXCISION, SMALL INTESTINE;  Surgeon: Mannie Brannon MD;  Location: [facility] OR;  Service: General;  Laterality: N/A;    KNEE ARTHROSCOPY Left     x 2    LAPAROTOMY, EXPLORATORY N/A 6/22/2025    Procedure: LAPAROTOMY, EXPLORATORY;  Surgeon: Mannie Brannon MD;  Location: [facility];  Service: General;  Laterality: N/A;    LASER ABLATION Right 04/20/2018    GSV LASER ABLATION PERFORMED BY DR. KARMA BUTCHER.    LASER ABLATION Left 04/27/2018    SSV LASER ABLATION PERFORMED BY DR. KARMA BUTCHER.    LASER ABLATION Right 12/17/2020    GSV ( DUPLICATED )LASER ABLATION PERFORMED BY DR. KARMA BUTCHER.    LASER ABLATION Left 01/08/2021    GSV LASER ABLATION PERFORMED BY DR. KARMA BUTCHER.    Remove cataracts, insert lens Bilateral     SHOULDER ARTHROSCOPY Left     SINUS SURGERY      TOTAL ABDOMINAL HYSTERECTOMY W/ BILATERAL SALPINGOOPHORECTOMY      VENOUS ABLATION Right 02/28/2020    DISTAL GSV VARITHENA ABLATION PERFORMED BY DR. KARMA BUTCHER.     Family History   Problem Relation Name Age of Onset    Alzheimer's disease Mother      Endometrial cancer Mother      Heart disease Mother      Lung cancer Father      Diabetes Sister      Endometrial cancer Sister      Hypertension Sister      Polycystic kidney disease Sister      Diabetes Brother      Sickle cell anemia Daughter      Thyroid disease Sister      Seizures Brother      Heart disease Daughter          7 MONTHS    No Known Problems Son      No Known Problems Son      No Known Problems Son       Cancer Brother      No Known Problems Sister      No Known Problems Sister      No Known Problems Sister      No Known Problems Sister      No Known Problems Sister         Diagnoses  Confusion/Encephalopathy    Edwin Gonzalez MD      Emergent/Acute neurological consultation requested by spoke provider due to critical concerns for possible cerebrovascular event that could result in permanent loss of neurologic/bodily function, severe disability or death of this patient.  Immediate/timely evaluation by a highly prepared expert is paramount for optimal outcomes  High risk for neurological deterioration if not properly diagnosed  High risk for neurological deterioration if not treated promplty/as soon as possible  Complex diagnostic evaluation may be required (advanced imaging)  High risk treatment options (thrombolytics and/or thrombectomy)    Patient care was coordinated with spoke provider, including but not limted to    Discussing likely diagnosis/etiology of symptoms  Making recommendations for further diagnostic studies  Making recommendations for intravenous thrombolytics or other advanced therapies  Making recommendations for disposition (admission/transfer for higher level of care)      Neurology consultation requested by spoke provider. Audiovisual encounter with the patient performed using a secure connection.  Results and impressions from the visit are documented on this note and were communicated to the consulting provider/team via direct communication. The note has been shared for addition to the patients electronic medical record.

## 2025-07-29 NOTE — ED PROVIDER NOTES
Encounter Date: 7/28/2025    SCRIBE #1 NOTE: Mimi JENKINS, am scribing for, and in the presence of,  Jack Daniels MD.       History     Chief Complaint   Patient presents with    Altered Mental Status     SENT FROM [place] FOR EVAL OF AMS; POSSIBLY GIVEN DOUBLE DOSE OF NORCO     Patient is an 80 year old female with a history of CVA, DVT, epilepsy, HTN, HLD, CKD, Hypothyroid and PVD presenting to the ED with concerns of altered mental status and left sided weakness that began sometime after 19:00 tonight, 3 hours PTA. Per [facility], patient was found to be lethargic with decreased responsiveness and left sided facial droop at 21:00. While in ED there is no aphasia, weakness, facial asymmetry and patient is alert and oriented to person and place. No other complaints at this time. NIH stroke score 0.       The history is provided by the EMS personnel.     Review of patient's allergies indicates:   Allergen Reactions    Influenza virus vaccines      Pt states had to be admitted to hospital after last flu vaccine     Lexapro [escitalopram]      Past Medical History:   Diagnosis Date    Colon adenocarcinoma 06/26/2025    CVA (cerebral vascular accident) 05/23/2018    Essential (primary) hypertension     Generalized epilepsy 01/13/2022    Gout, arthritis     Pseudomonas aeruginosa infection     BLE cellulitis    Venous stasis ulcers      Past Surgical History:   Procedure Laterality Date    CHOLECYSTECTOMY      COLOSTOMY N/A 06/22/2025    Procedure: CREATION, COLOSTOMY;  Surgeon: Mannie Brannon MD;  Location: [facility] OR;  Service: General;  Laterality: N/A;    EXCISION, SMALL INTESTINE N/A 06/22/2025    Procedure: EXCISION, SMALL INTESTINE;  Surgeon: Mannie Brannon MD;  Location: [facility] OR;  Service: General;  Laterality: N/A;    KNEE ARTHROSCOPY Left     x 2    LAPAROTOMY, EXPLORATORY N/A 06/22/2025    Procedure: LAPAROTOMY, EXPLORATORY;  Surgeon: Mannie Brannon MD;  Location: [facility]  OR;  Service: General;  Laterality: N/A;    LASER ABLATION Right 04/20/2018    GSV LASER ABLATION PERFORMED BY DR. KARMA BUTCHER.    LASER ABLATION Left 04/27/2018    SSV LASER ABLATION PERFORMED BY DR. KARMA BUTCHER.    LASER ABLATION Right 12/17/2020    GSV ( DUPLICATED )LASER ABLATION PERFORMED BY DR. KARMA BUTCHER.    LASER ABLATION Left 01/08/2021    GSV LASER ABLATION PERFORMED BY DR. KARMA BUTCHER.    SHOULDER ARTHROSCOPY Left     SINUS SURGERY      TOTAL ABDOMINAL HYSTERECTOMY W/ BILATERAL SALPINGOOPHORECTOMY      VENOUS ABLATION Right 02/28/2020    DISTAL GSV VARITHENA ABLATION PERFORMED BY DR. KARMA BUTCHER.     Family History   Problem Relation Name Age of Onset    Alzheimer's disease Mother      Endometrial cancer Mother      Heart disease Mother      Lung cancer Father      Diabetes Sister      Endometrial cancer Sister      Hypertension Sister      Polycystic kidney disease Sister      Diabetes Brother      Sickle cell anemia Daughter      Thyroid disease Sister      Seizures Brother      Heart disease Daughter          7 MONTHS    No Known Problems Son      No Known Problems Son      No Known Problems Son      Cancer Brother      No Known Problems Sister      No Known Problems Sister      No Known Problems Sister      No Known Problems Sister      No Known Problems Sister       Social History[1]  Review of Systems   Constitutional:  Positive for fatigue. Negative for activity change, appetite change, chills, diaphoresis and fever.   HENT:  Negative for congestion, postnasal drip, rhinorrhea and tinnitus.    Respiratory:  Negative for cough and shortness of breath.    Cardiovascular:  Negative for chest pain, palpitations and leg swelling.   Gastrointestinal:  Negative for abdominal pain, constipation, diarrhea, nausea and vomiting.   Genitourinary:  Negative for decreased urine volume, difficulty urinating, dysuria, frequency, hematuria and urgency.   Neurological:  Positive for facial asymmetry and  weakness. Negative for dizziness, light-headedness and headaches.       Physical Exam     Initial Vitals   BP Pulse Resp Temp SpO2   07/28/25 2216 07/28/25 2216 07/28/25 2219 07/28/25 2224 07/28/25 2216   (!) 156/58 (!) 112 17 98.6 °F (37 °C) 96 %      MAP       --                Physical Exam    Nursing note and vitals reviewed.  Constitutional: Vital signs are normal. She appears well-developed and well-nourished.   HENT:   Head: Normocephalic and atraumatic.   Eyes: Conjunctivae and EOM are normal. Pupils are equal, round, and reactive to light.   Neck: Neck supple.   Normal range of motion.  Cardiovascular:  Normal rate, regular rhythm, normal heart sounds and normal pulses.           Pulmonary/Chest: Breath sounds normal.   Abdominal: Abdomen is soft. Bowel sounds are normal.   Musculoskeletal:         General: Normal range of motion.      Cervical back: Normal range of motion and neck supple.     Neurological: She is alert and oriented to person, place, and time. She has normal strength.   Skin: Skin is warm, dry and intact.   Psychiatric: She has a normal mood and affect. Her speech is normal and behavior is normal. Judgment and thought content normal. Cognition and memory are normal.         ED Course   Procedures  Labs Reviewed   COMPREHENSIVE METABOLIC PANEL - Abnormal       Result Value    Sodium 130 (*)     Potassium 5.4 (*)     Chloride 98      CO2 23      Anion Gap 14      Glucose 79 (*)     BUN 56 (*)     Creatinine 3.60 (*)     BUN/Creatinine Ratio 16      Calcium 10.5 (*)     Total Protein 6.9      Albumin 2.7 (*)     Globulin 4.2 (*)     A/G Ratio 0.6      Bilirubin, Total 0.3      Alk Phos 77      ALT <7      AST 29      eGFR 12 (*)    TSH - Abnormal    TSH 6.494 (*)    CBC WITH DIFFERENTIAL - Abnormal    WBC 8.83      RBC 3.88 (*)     Hemoglobin 9.8 (*)     Hematocrit 30.0 (*)     MCV 77.3 (*)     MCH 25.3 (*)     MCHC 32.7      RDW 17.2 (*)     Platelet Count 306      MPV 10.0      Neutrophils %  39.5 (*)     Lymphocytes % 40.9      Monocytes % 11.9 (*)     Eosinophils % 5.5 (*)     Basophils % 0.2      Immature Granulocytes % 2.0 (*)     nRBC, Auto 0.0      Neutrophils, Abs 3.48      Lymphocytes, Absolute 3.61      Monocytes, Absolute 1.05 (*)     Eosinophils, Absolute 0.49      Basophils, Absolute 0.02      Immature Granulocytes, Absolute 0.18 (*)     nRBC, Absolute 0.00      Diff Type Auto     URINALYSIS, REFLEX TO URINE CULTURE - Abnormal    Color, UA Yellow      Clarity, UA Turbid      pH, UA 5.5      Leukocytes, UA Large (*)     Nitrites, UA Negative      Protein, UA Negative      Glucose, UA Normal      Ketones, UA Negative      Urobilinogen, UA Normal      Bilirubin, UA Negative      Blood, UA Negative      Specific Gravity, UA 1.015     VITAMIN B12/FOLATE, SERUM PANEL - Abnormal    Vitamin B12 1,651 (*)     Folate 14.4     URINALYSIS, MICROSCOPIC - Abnormal    WBC, UA 66 (*)     RBC, UA 5 (*)     Bacteria, UA Many (*)     Squamous Epithelial Cells, UA Occasional (*)     Hyaline Casts, UA 0-2 (*)     Mucous Occasional (*)    PROTIME-INR - Normal    PT 13.6      INR 1.03     VALPROIC ACID - Normal    Valproic Acid (Depakene) 53     SARS-COV-2 RNA AMPLIFICATION, QUAL - Normal    SARS COV-2 Molecular Negative      Narrative:     Negative SARS-CoV results should not be used as the sole basis for treatment or patient management decisions; negative results should be considered in the context of a patient's recent exposures, history and the presene of clinical signs and symptoms consistent with COVID-19.  Negative results should be treated as presumptive and confirmed by molecular assay, if necessary for patient management.   MAGNESIUM - Normal    Magnesium 2.2     PHOSPHORUS - Normal    Phosphorus 4.7     CULTURE, URINE   CBC W/ AUTO DIFFERENTIAL    Narrative:     The following orders were created for panel order CBC W/ AUTO DIFFERENTIAL.  Procedure                               Abnormality         Status                      ---------                               -----------         ------                     CBC with Differential[[id number]]       Abnormal            Final result                 Please view results for these tests on the individual orders.   LIPID PANEL    Triglycerides 100      Cholesterol 176      HDL Cholesterol 45      Cholesterol/HDL Ratio (Risk Factor) 3.9      Non-      LDL Calculated 111      LDL/HDL 2.5      VLDL 20     POCT GLUCOSE MONITORING CONTINUOUS    POC Glucose 76     POCT GLUCOSE MONITORING CONTINUOUS     EKG Readings: (Independently Interpreted)   Heart Rate: 110 BPM.   Sinus tachycardia  Possible anterior infarct - age undetermined  Inferior/lateral ST-T abnormality may be due to myocardial ischemia  Abnormal ECG          Imaging Results              US Kidney (In process)                      X-Ray KUB (In process)                      CT Head Without Contrast (Final result)  Result time 07/28/25 22:36:25      Final result by Jeff Lopez MD (07/28/25 22:36:25)                   Impression:      1. No acute intracranial process with no significant change.  2. Postop changes and paranasal sinus disease on the left. Stable calcification obstructing the left nasal cavity could be a postop change.  Recommend clinical correlation.  3. Mild right mastoid air cell disease.      Electronically signed by: Jeff Lopez  Date:    07/28/2025  Time:    22:36               Narrative:    EXAMINATION:  CT HEAD WITHOUT CONTRAST    CLINICAL HISTORY:  Mental status change, unknown cause;    TECHNIQUE:  Low dose axial CT images obtained throughout the head without intravenous contrast. Sagittal and coronal reconstructions were performed.    COMPARISON:  08/05/2024    FINDINGS:  Intracranial compartment:    No midline shift or acute hydrocephalus.  No extra-axial blood or fluid collections.    Moderate involutional changes and chronic microvascular ischemic changes in the  periventricular white matter.  Bilateral senescent basal ganglia calcifications.    No parenchymal mass, hemorrhage, edema or major vascular distribution infarct.    Skull/extracranial contents (limited evaluation): No fracture. Postop changes of the maxillary and ethmoid sinuses with moderate mucosal thickening of the left maxillary sinus..  There is moderate posterior left ethmoid sinus disease.    Stable calcification obstructing the left nasal cavity could be a postop change.  Recommend clinical correlation.    Mild right mastoid air cell disease also.    No significant change.                                       Medications   acetaminophen suppository 650 mg (has no administration in time range)   acetaminophen tablet 1,000 mg (has no administration in time range)   aluminum & magnesium hydroxide-simethicone 400-400-40 mg/5 mL suspension 30 mL (has no administration in time range)   bisacodyL EC tablet 10 mg (has no administration in time range)   dextromethorphan-guaiFENesin  mg/5 ml liquid 10 mL (has no administration in time range)   diphenhydrAMINE capsule 50 mg (has no administration in time range)   docusate sodium capsule 100 mg (has no administration in time range)   melatonin tablet 6 mg (has no administration in time range)   ondansetron injection 8 mg (has no administration in time range)   traZODone tablet 50 mg (has no administration in time range)   0.9% NaCl infusion ( Intravenous New Bag 7/29/25 0400)   heparin (porcine) injection 5,000 Units (has no administration in time range)   allopurinoL tablet 200 mg (200 mg Oral Given 7/29/25 0340)   levothyroxine tablet 50 mcg (50 mcg Oral Given 7/29/25 0602)   polyethylene glycol packet 17 g (has no administration in time range)   ceFEPIme injection 1 g (1 g Intravenous Given 7/29/25 0602)   divalproex ER 24 hr tablet 1,000 mg (has no administration in time range)   acetaminophen tablet 1,000 mg (1,000 mg Oral Given 7/28/25 2300)   sodium  chloride 0.9% bolus 1,000 mL 1,000 mL (0 mLs Intravenous Stopped 7/29/25 0106)     Medical Decision Making  Patient is an 80 year old female with a history of CVA, DVT, epilepsy, HTN, HLD, Hypothyroid and PVD presenting to the ED with concerns of altered mental status and left sided weakness that began sometime after 19:00 tonight, 3 hours PTA. Per [facility], patient was found to be lethargic with decreased responsiveness and left sided facial droop at 21:00. While in ED there is no aphasia, weakness, facial asymmetry and patient is alert and oriented to person and place. No other complaints at this time. NIH stroke score 0.         Amount and/or Complexity of Data Reviewed  Labs: ordered.  Radiology: ordered.    Risk  OTC drugs.  Decision regarding hospitalization.      Additional MDM:     NIH Stroke Scale:   Interval = baseline (upon arrival/admit)  Level of consciousness = 0 - alert  LOC questions = 0 - answers both correctly  LOC commands = 0 - performs both correctly  Best gaze = 0 - normal  Visual = 0 - no visual loss  Facial palsy = 0 - normal  Motor left arm =  0 - no drift  Motor right arm =  0 - no drift  Motor left leg = 0 - no drift  Motor right leg =  0 - no drift  Limb ataxia = 0 - absent  Sensory = 0 - normal  Best language = 0 - no aphasia  Dysarthria = 0 - normal articulation  Extinction and inattention = 0 - no neglect  NIH Stroke Scale Total = 0             Attending Attestation:           Physician Attestation for Scribe:  Physician Attestation Statement for Scribe #1: I, Jack Daniels MD, reviewed documentation, as scribed by Mimi Crisostomo in my presence, and it is both accurate and complete.                                        Clinical Impression:  Final diagnoses:  [R29.818] Acute focal neurological deficit  [N17.9] PRABHA (acute kidney injury)  [G93.41] Acute metabolic encephalopathy [G93.41] (Primary)  [E86.0, E87.1] Dehydration with hyponatremia [E86.0, E87.1]  [C18.9] Colon  adenocarcinoma [C18.9]  [I10] Essential (primary) hypertension [I10]  [M10.9] Gout, arthritis [M10.9]  [G40.909] Seizure disorder [G40.909]          ED Disposition Condition    Admit                       [1]   Social History  Tobacco Use    Smoking status: Never    Smokeless tobacco: Never   Vaping Use    Vaping status: Never Used   Substance Use Topics    Alcohol use: Never    Drug use: Never        Jack Daniels MD  07/29/25 0637

## 2025-07-29 NOTE — ASSESSMENT & PLAN NOTE
Patient with colostomy and may need metamucil to help with fast transit loose stools.    Apparently there had been some issues previously and Dr. Brannon had ordered at CT last month that showed possible partial obstruction.  I do not clinically suspect it but will check a KUB for initial evaluation.

## 2025-07-29 NOTE — PLAN OF CARE
[facility] - Emergency Department  Initial Discharge Assessment       Primary Care Provider: Robina, Primary Doctor    Admission Diagnosis: PRABHA (acute kidney injury) [N17.9]    Admission Date: 7/28/2025  Expected Discharge Date: 8/4/2025    Transition of Care Barriers: None    Payor: MEDICARE / Plan: MEDICARE PART A & B / Product Type: Government /     Extended Emergency Contact Information  Primary Emergency Contact: nazia munoz  Address: [address] [facility] [address]           [place] [address] [place]  Mobile Phone: [phone]  Relation: Daughter  Preferred language: English   needed? No  Secondary Emergency Contact: Alams Lassiter  Address: [address] [place] [address]  Home Phone: [phone]  Mobile Phone: [phone]  Relation: Son    Discharge Plan A: Return to nursing home  Discharge Plan B: Home Health, Long-term acute care facility (LTAC), Rehab, Skilled Nursing Facility      [id number] - [place], [place] - [address] [facility] [address]  [address]  [facility] [place] [address]  Phone: [phone] Fax: [phone]    [facility] - [facility] [address] [facility]  [address] [facility] [address]  Phone: [phone] Fax: [phone]    [facility] - [place], [place] - [address]  [address]  [facility] [address]  Phone: [phone] Fax: [phone]      Initial Assessment (most recent)       Adult Discharge Assessment - 07/29/25 1200          Discharge Assessment    Assessment Type Discharge Planning Assessment     Source of Information patient;family     People in Home facility resident     Facility Arrived From: [facility]     Do you expect to return to your current living situation? Yes     Do you have help at home or someone to help you manage your care at home? No     Prior to hospitilization cognitive status: Unable to Assess      Current cognitive status: Alert/Oriented     Walking or Climbing Stairs Difficulty yes     Walking or Climbing Stairs ambulation difficulty, dependent;stair climbing difficulty, dependent     Dressing/Bathing Difficulty yes     Dressing/Bathing bathing difficulty, dependent;dressing difficulty, dependent     Home Accessibility wheelchair accessible     Home Layout Able to live on 1st floor     Equipment Currently Used at Home wheelchair     Readmission within 30 days? Yes     Patient currently being followed by outpatient case management? No     Do you currently have service(s) that help you manage your care at home? No     Do you take prescription medications? Yes     Do you have prescription coverage? Yes     Do you have any problems affording any of your prescribed medications? No     Is the patient taking medications as prescribed? yes     Who is going to help you get home at discharge? Esteban garcia - [phone]     How do you get to doctors appointments? other (see comments)   Facility Transportation    Are you on dialysis? No     Do you take coumadin? No     Discharge Plan A Return to nursing home     Discharge Plan B Home Health;Long-term acute care facility (LTAC);Rehab;Skilled Nursing Facility     DME Needed Upon Discharge  none     Discharge Plan discussed with: Patient;Adult children     Transition of Care Barriers None        Physical Activity    On average, how many days per week do you engage in moderate to strenuous exercise (like a brisk walk)? 0 days     On average, how many minutes do you engage in exercise at this level? 0 min        Stress    Do you feel stress - tense, restless, nervous, or anxious, or unable to sleep at night because your mind is troubled all the time - these days? To some extent        Social Isolation    How often do you feel lonely or isolated from those around you?  Sometimes        Alcohol Use    Q1: How often do you have a drink containing alcohol? Never      Q2: How many drinks containing alcohol do you have on a typical day when you are drinking? Patient does not drink     Q3: How often do you have six or more drinks on one occasion? Never                   CM at bedside to complete initial discharge assessment.  Son also present to assist.  Patient is a resident at [facility].  All used and needed DME provided by facility.  Plans are for patient to return to [facility] upon discharge.  CM spoke with Jeanna to confirm patient will be able to return when she is medically ready for discharge.  IM obtained and SDOH questions completed.  Met with patient and her son to review discharge recommendation of SNF and is agreeable to plan.    Patient/family provided list of facilities in-network with patient's payor plan. Providers that are owned, operated, or affiliated with [facility] are included on the list.     Notified that referral sent to below listed facilities from in-network list based on proximity to home/family support:   [facility]  2.  3.  4.  5. (can send more than 5)    Patient/family instructed to identify preference.    Preferred Facility: (if more than 1, listed in order of descending preference)  1.  OR  Patient has declined to select a preferred provider and elects placement with the first accepting in network provider that is available to provide services as ordered by the physician       If an additional preferred facility not listed above is identified, additional referral to be sent. If above facilities unable to accept, will send additional referrals to in-network providers.    CM faxed initial information to [facility] and Jeanna was notified.  CM will continue to follow for DC needs as they arise.

## 2025-07-29 NOTE — PHARMACY MED REC
"Admission Medication History     The home medication history was taken by Emeli Reagan.    You may go to "Admission" then "Reconcile Home Medications" tabs to review and/or act upon these items.     The home medication list has been updated by the Pharmacy department.   Please read ALL comments highlighted in yellow.   Please address this information as you see fit.    Feel free to contact us if you have any questions or require assistance.  Medications Updated:  Hydrocodone-acetaminophen 7.5-325 mg updated to every 4 hours  Medications Added:  Mirtazapine 7.5 mg      Patient reports no longer taking the following medication(s). The medication(s) listed below were removed from the home medication list. Please reorder if appropriate:  Acetaminophen-codeine 300-30 mg    Medications listed below were obtained from: [facility] home  (Not in a hospital admission)        Current Outpatient Medications on File Prior to Encounter   Medication Sig Dispense Refill Last Dose/Taking    allopurinoL (ZYLOPRIM) 100 MG tablet Take 200 mg by mouth every evening. May also give 100 mg by mouth daily PRN for elevated uric acid   7/28/2025 at  7:33 AM    ascorbic acid, vitamin C, (VITAMIN C) 500 MG tablet Take 500 mg by mouth 2 (two) times daily.   7/28/2025 at  7:33 AM    cilostazoL (PLETAL) 50 MG Tab Take 1 tablet (50 mg total) by mouth 2 (two) times daily. 180 tablet 3 7/28/2025 at  3:18 PM    clorazepate (TRANXENE) 3.75 MG Tab Take 3.75 mg by mouth 2 (two) times daily.   7/28/2025 at  7:33 AM    divalproex ER (DEPAKOTE ER) 500 MG Tb24 24 hr tablet TAKE 2 TABLETS BY MOUTH EVERY NIGHT AT BEDTIME FOR SEIZURES (Patient taking differently: Take 1,000 mg by mouth every evening. TAKE 2 TABLETS BY MOUTH EVERY NIGHT AT BEDTIME FOR SEIZURES) 180 tablet 3 7/27/2025 at  8:56 PM    furosemide (LASIX) 20 MG tablet Take 1 tablet (20 mg total) by mouth once daily. 90 tablet 3 7/28/2025 at  7:38 AM    " HYDROcodone-acetaminophen (NORCO) 7.5-325 mg per tablet Take 1 tablet by mouth every 6 (six) hours as needed for Pain. (Patient taking differently: Take 1 tablet by mouth every 4 (four) hours as needed for Pain.) 21 tablet 0 7/28/2025 at 12:45 PM    levothyroxine (SYNTHROID) 50 MCG tablet Take 1 tablet (50 mcg total) by mouth before breakfast. 90 tablet 3 7/28/2025 at  5:46 AM    losartan (COZAAR) 50 MG tablet Take 1 tablet (50 mg total) by mouth once daily. 90 tablet 3 7/28/2025 at  7:39 AM    meloxicam (MOBIC) 7.5 MG tablet Take 7.5 mg by mouth 2 (two) times a day.   7/28/2025 at  3:23 PM    mirtazapine (REMERON) 7.5 MG Tab Take 7.5 mg by mouth every evening.   7/27/2025 at  8:56 PM    multivitamin (THERAGRAN) per tablet Take 1 tablet by mouth once daily.   7/28/2025 at  7:33 AM    ondansetron (ZOFRAN-ODT) 4 MG TbDL Take 1 tablet (4 mg total) by mouth every 6 (six) hours as needed (nausea). 15 tablet 0 6/30/2025 at  7:01 PM    polyethylene glycol (GLYCOLAX) 17 gram PwPk Take 17 g by mouth once daily.   7/28/2025 at  7:33 AM    albuterol (PROVENTIL/VENTOLIN HFA) 90 mcg/actuation inhaler Inhale 2 puffs into the lungs every 6 (six) hours as needed for Wheezing. (Patient taking differently: Inhale 2 puffs into the lungs every 6 (six) hours as needed for Wheezing or Shortness of Breath.) 18 g 3     melatonin 10 mg Tab Take 10 mg by mouth every evening.   7/27/2025 at  8:56 PM    [DISCONTINUED] acetaminophen-codeine 300-30mg (TYLENOL #3) 300-30 mg Tab Take 1 tablet by mouth every 12 (twelve) hours as needed (pain).            Potential issues to be addressed PRIOR TO DISCHARGE  No issues identified.    Emeli Reagan  Medication Access Specialist  EXT. [id number]    .

## 2025-07-29 NOTE — ASSESSMENT & PLAN NOTE
Patient's blood pressure range in the last 24 hours was: BP  Min: 107/37  Max: 156/58.The patient's inpatient anti-hypertensive regimen is listed below:  Current Antihypertensives       Plan  - BP is controlled, no changes needed to their regimen  - Holding ARB and lasix for now.  Mild hypotension upon arrival  -  restart BP meds as tolerated.      Update: Continue to hold losartan on account of PRABHA.

## 2025-07-29 NOTE — PLAN OF CARE
Problem: Physical Therapy  Goal: Physical Therapy Goal  Description: Short term goals:  . Supine to sit with Contact Guard Assistance  . Sit to supine with Contact Guard Assistance  . Sit to stand transfer with Contact Guard Assistance  . Gait  x 25 feet with Contact Guard Assistance using Rolling Walker.     Long term goals:  Patient will gain highest level functional mobility with lowest level of assistive device to return to desired living arrangement and prior ADL's.     Outcome: Progressing

## 2025-07-29 NOTE — ASSESSMENT & PLAN NOTE
Recent Labs     07/28/25  2212 07/29/25  0844   CREATININE 3.60* 2.57*   EGFRNORACEVR 12* 18*      Plan  - Avoid nephrotoxins and renally dose meds for GFR listed above  - Monitor urine output, serial BMP, and adjust therapy as needed  -  Hold the lasix, NSAID mobic, and ARB  -  IVF and follow renal function daily.      Patient with condition that if not treated could result in loss of life or bodily function.  Due to co morbidities this patient has complex medical management.    Update: Kidney function improving with IV fluids.

## 2025-07-29 NOTE — SUBJECTIVE & OBJECTIVE
Interval History: Pt admitted overnight for acute metabolic encephalopathy. MRI unremarkable for acute pathology. EEG still pending. Pts mental status improved compared to last night. Kidney function has started to improve as well. Expect further improvement with fluid resuscitation. PT/OT consulted. Swallow evaluation recommends pureed diet.    Review of Systems   Unable to perform ROS: Mental status change     Objective:     Vital Signs (Most Recent):  Temp: 98.6 °F (37 °C) (07/29/25 0917)  Pulse: 108 (07/29/25 1354)  Resp: 11 (07/29/25 1354)  BP: (!) 137/54 (07/29/25 1354)  SpO2: 96 % (07/29/25 1354) Vital Signs (24h Range):  Temp:  [98.6 °F (37 °C)] 98.6 °F (37 °C)  Pulse:  [] 108  Resp:  [10-17] 11  SpO2:  [94 %-99 %] 96 %  BP: (107-156)/(37-59) 137/54  Arterial Line BP: (107)/(37) 107/37     Weight: 87.3 kg (192 lb 6.4 oz)  Body mass index is 31.05 kg/m².    Intake/Output Summary (Last 24 hours) at 7/29/2025 1526  Last data filed at 7/29/2025 1443  Gross per 24 hour   Intake 1740 ml   Output 800 ml   Net 940 ml         Physical Exam  Vitals and nursing note reviewed.   Constitutional:       General: She is not in acute distress.  HENT:      Head: Normocephalic and atraumatic.   Cardiovascular:      Rate and Rhythm: Normal rate and regular rhythm.      Pulses: Normal pulses.      Heart sounds: Normal heart sounds. No murmur heard.     No friction rub. No gallop.   Pulmonary:      Effort: Pulmonary effort is normal. No respiratory distress.      Breath sounds: Normal breath sounds.   Abdominal:      General: Bowel sounds are normal.      Tenderness: There is no abdominal tenderness.   Musculoskeletal:      Right lower leg: No edema.      Left lower leg: No edema.   Skin:     General: Skin is warm and dry.   Neurological:      General: No focal deficit present.      Mental Status: She is disoriented.      Motor: Weakness (generalized) present.               Significant Labs: All pertinent labs within the  past 24 hours have been reviewed.  Recent Lab Results  (Last 5 results in the past 24 hours)        07/29/25  1438   07/29/25  0844   07/29/25  0309   07/29/25  0113   07/28/25  2212        Albumin/Globulin Ratio         0.6       Albumin         2.7       ALP         77       ALT         <7       Anion Gap   12       14       Appearance, UA     Turbid           AST         29       Bacteria, UA     Many           Baso #         0.02       Basophil %         0.2       Bilirubin (UA)     Negative           BILIRUBIN TOTAL         0.3       BUN   52       56       BUN/CREAT RATIO   20       16       Calcium   9.6       10.5       Chloride   104       98       CHOL/HDLC Ratio         3.9       Cholesterol Total         176  Comment:   <200 mg/dL:  Desirable  200-240 mg/dL: Borderline High  >240 mg/dL:  High       CO2   21       23       Color, UA     Yellow           SARS COV-2 MOLECULAR       Negative         CPK         117       Creatinine   2.57       3.60       Differential Method         Auto       eGFR   18  Comment: Estimated GFR calculated using the CKD-EPI creatinine (2021) equation.       12  Comment: Estimated GFR calculated using the CKD-EPI creatinine (2021) equation.       Eos #         0.49       Eos %         5.5       Folate         14.4       Globulin, Total         4.2       Glucose   85       79       Glucose, UA     Normal           HDL         45  Comment:   <40 mg/dL: Low HDL  40-60 mg/dL: Normal  >60 mg/dL: Desirable       Hematocrit         30.0       Hemoglobin         9.8       Hyaline Casts, UA     0-2           Immature Grans (Abs)         0.18       Immature Granulocytes         2.0       INR         1.03       Ketones, UA     Negative           LDL Calculated         111  Comment: LDL calculated using the Friedewald equation.       LDL/HDL Ratio         2.5       Leukocyte Esterase, UA     Large           Lymph #         3.61       Lymph %         40.9       Magnesium          2.2        MCH         25.3       MCHC         32.7       MCV         77.3       Mono #         1.05       Mono %         11.9       MPV         10.0       Mucous     Occasional           Neutrophils, Abs         3.48       Neutrophils Relative         39.5       NITRITE UA     Negative           Non-HDL Cholesterol         131       nRBC         0.0       NUCLEATED RBC ABSOLUTE         0.00       Blood, UA     Negative           pH, UA     5.5           Phosphorus Level         4.7       Platelet Count         306       POC Glucose 71               Potassium   5.1       5.4       PROTEIN TOTAL         6.9       Protein, UA     Negative           PT         13.6       RBC         3.88       RBC, UA     5           RDW         17.2       Sodium   132       130       Spec Grav UA     1.015           Squamous Epithelial Cells, UA     Occasional           Triglycerides         100  Comment:   Normal:  <150 mg/dL  Borderline High: 150-199 mg/dL  High:   200-499 mg/dL  Very High:  >=500       TSH         6.494       UROBILINOGEN UA     Normal           Valproic Acid Lvl         53       Vitamin B12         1,651       VLDL Cholesterol Richard         20       WBC, UA     66           WBC         8.83                              Significant Imaging: I have reviewed all pertinent imaging results/findings within the past 24 hours.

## 2025-07-29 NOTE — ASSESSMENT & PLAN NOTE
Hyponatremia is likely due to Dehydration/hypovolemia. The patient's most recent sodium results are listed below.  Recent Labs     07/28/25  2212   *     Will hold lasix and hydrate with NS   1 L in ED and 125 cc now  Follow electrolytes daily.

## 2025-07-29 NOTE — PLAN OF CARE
Problem: Hospitalized Older Adult  Goal: Optimal Cognitive Function  Outcome: Progressing  Goal: Effective Bowel Elimination  Outcome: Progressing  Goal: Optimal Coping  Outcome: Progressing  Goal: Fluid and Electrolyte Balance  Outcome: Progressing  Goal: Optimal Functional Ability  Outcome: Progressing  Goal: Improved Oral Intake  Outcome: Progressing  Goal: Adequate Sleep/Rest  Outcome: Progressing  Goal: Effective Urinary Elimination  Outcome: Progressing     Problem: Skin Injury Risk Increased  Goal: Skin Health and Integrity  Outcome: Progressing     Problem: Adult Inpatient Plan of Care  Goal: Plan of Care Review  Outcome: Progressing  Goal: Patient-Specific Goal (Individualized)  Outcome: Progressing  Goal: Absence of Hospital-Acquired Illness or Injury  Outcome: Progressing  Goal: Optimal Comfort and Wellbeing  Outcome: Progressing  Goal: Readiness for Transition of Care  Outcome: Progressing

## 2025-07-29 NOTE — NURSING
"Patient noted to be "pocketing" food this morning with breakfast tray.  She was able to swallow but had to be consistently told/reminded to chew and swallow.  No issues noted with liquids.  MD notified and new order for ST eval and dysphagia diet.  Son aware.   "

## 2025-07-29 NOTE — SUBJECTIVE & OBJECTIVE
Past Medical History:   Diagnosis Date    Colon adenocarcinoma 06/26/2025    CVA (cerebral vascular accident) 05/23/2018    Essential (primary) hypertension     Generalized epilepsy 01/13/2022    Gout, arthritis     Pseudomonas aeruginosa infection     BLE cellulitis    Venous stasis ulcers        Past Surgical History:   Procedure Laterality Date    CHOLECYSTECTOMY      COLOSTOMY N/A 06/22/2025    Procedure: CREATION, COLOSTOMY;  Surgeon: Mannie Brannon MD;  Location: [facility];  Service: General;  Laterality: N/A;    EXCISION, SMALL INTESTINE N/A 06/22/2025    Procedure: EXCISION, SMALL INTESTINE;  Surgeon: Mannie Brannon MD;  Location: [facility] OR;  Service: General;  Laterality: N/A;    KNEE ARTHROSCOPY Left     x 2    LAPAROTOMY, EXPLORATORY N/A 06/22/2025    Procedure: LAPAROTOMY, EXPLORATORY;  Surgeon: Mannie Brannon MD;  Location: [facility] OR;  Service: General;  Laterality: N/A;    LASER ABLATION Right 04/20/2018    [facility] LASER ABLATION PERFORMED BY DR. KARMA BUTCHER.    LASER ABLATION Left 04/27/2018    [facility] LASER ABLATION PERFORMED BY DR. KARMA BUTCHER.    LASER ABLATION Right 12/17/2020    [facility] ( DUPLICATED )LASER ABLATION PERFORMED BY DR. KARMA BUTCHER.    LASER ABLATION Left 01/08/2021    [facility] LASER ABLATION PERFORMED BY DR. KARMA BUTCHER.    SHOULDER ARTHROSCOPY Left     SINUS SURGERY      TOTAL ABDOMINAL HYSTERECTOMY W/ BILATERAL SALPINGOOPHORECTOMY      VENOUS ABLATION Right 02/28/2020    DISTAL [facility] VARITHENA ABLATION PERFORMED BY DR. KARMA BUTCHER.       Review of patient's allergies indicates:   Allergen Reactions    Influenza virus vaccines      Pt states had to be admitted to hospital after last flu vaccine     Lexapro [escitalopram]        No current facility-administered medications on file prior to encounter.     Current Outpatient Medications on File Prior to Encounter   Medication Sig    acetaminophen-codeine 300-30mg (TYLENOL #3) 300-30 mg Tab Take 1 tablet by mouth every 12 (twelve)  hours as needed (pain).    albuterol (PROVENTIL/VENTOLIN HFA) 90 mcg/actuation inhaler Inhale 2 puffs into the lungs every 6 (six) hours as needed for Wheezing. (Patient taking differently: Inhale 2 puffs into the lungs every 6 (six) hours as needed for Wheezing or Shortness of Breath.)    allopurinoL (ZYLOPRIM) 100 MG tablet Take 200 mg by mouth every evening. May also give 100 mg by mouth daily PRN for elevated uric acid    ascorbic acid, vitamin C, (VITAMIN C) 500 MG tablet Take 500 mg by mouth 2 (two) times daily.    cilostazoL (PLETAL) 50 MG Tab Take 1 tablet (50 mg total) by mouth 2 (two) times daily.    clorazepate (TRANXENE) 7.5 MG Tab Take 3.75 mg by mouth 2 (two) times daily.    divalproex ER (DEPAKOTE ER) 500 MG Tb24 24 hr tablet TAKE 2 TABLETS BY MOUTH EVERY NIGHT AT BEDTIME FOR SEIZURES (Patient taking differently: Take 1,000 mg by mouth every evening. TAKE 2 TABLETS BY MOUTH EVERY NIGHT AT BEDTIME FOR SEIZURES)    furosemide (LASIX) 20 MG tablet Take 1 tablet (20 mg total) by mouth once daily.    HYDROcodone-acetaminophen (NORCO) 7.5-325 mg per tablet Take 1 tablet by mouth every 6 (six) hours as needed for Pain.    levothyroxine (SYNTHROID) 50 MCG tablet Take 1 tablet (50 mcg total) by mouth before breakfast.    losartan (COZAAR) 50 MG tablet Take 1 tablet (50 mg total) by mouth once daily.    melatonin 10 mg Tab Take 10 mg by mouth every evening.    meloxicam (MOBIC) 7.5 MG tablet Take 7.5 mg by mouth 2 (two) times a day.    multivitamin (THERAGRAN) per tablet Take 1 tablet by mouth once daily.    ondansetron (ZOFRAN-ODT) 4 MG TbDL Take 1 tablet (4 mg total) by mouth every 6 (six) hours as needed (nausea).    polyethylene glycol (GLYCOLAX) 17 gram PwPk Take 17 g by mouth once daily.     Family History       Problem Relation (Age of Onset)    Alzheimer's disease Mother    Cancer Brother    Diabetes Sister, Brother    Endometrial cancer Mother, Sister    Heart disease Mother, Daughter    Hypertension  Sister    Lung cancer Father    No Known Problems Son, Son, Son, Sister, Sister, Sister, Sister, Sister    Polycystic kidney disease Sister    Seizures Brother    Sickle cell anemia Daughter    Thyroid disease Sister          Tobacco Use    Smoking status: Never    Smokeless tobacco: Never   Vaping Use    Vaping status: Never Used   Substance and Sexual Activity    Alcohol use: Never    Drug use: Never    Sexual activity: Not Currently     Birth control/protection: Abstinence     Review of Systems   Unable to perform ROS: Mental status change     Objective:     Vital Signs (Most Recent):  Temp: 98.6 °F (37 °C) (07/28/25 2224)  Pulse: 108 (07/29/25 0352)  Resp: 11 (07/29/25 0352)  BP: (!) 131/46 (07/29/25 0352)  SpO2: 98 % (07/29/25 0352) Vital Signs (24h Range):  Temp:  [98.6 °F (37 °C)] 98.6 °F (37 °C)  Pulse:  [100-112] 108  Resp:  [10-17] 11  SpO2:  [95 %-99 %] 98 %  BP: (125-156)/(43-59) 131/46     Weight: 87.3 kg (192 lb 6.4 oz)  Body mass index is 31.05 kg/m².     Physical Exam  Vitals and nursing note reviewed. Exam conducted with a chaperone present.   Constitutional:       Appearance: She is ill-appearing.   HENT:      Head: Atraumatic.      Mouth/Throat:      Mouth: Mucous membranes are dry.      Pharynx: Oropharynx is clear.   Eyes:      Conjunctiva/sclera: Conjunctivae normal.      Pupils: Pupils are equal, round, and reactive to light.   Neck:      Vascular: No carotid bruit.   Cardiovascular:      Rate and Rhythm: Normal rate and regular rhythm.      Pulses: Normal pulses.      Heart sounds: Normal heart sounds.   Pulmonary:      Effort: Pulmonary effort is normal.      Breath sounds: Normal breath sounds.   Abdominal:      General: Abdomen is flat. Bowel sounds are normal. There is no distension.      Palpations: Abdomen is soft.      Tenderness: There is no abdominal tenderness. There is no guarding.   Musculoskeletal:         General: No tenderness, deformity or signs of injury. Normal range of  motion.      Cervical back: Neck supple.      Right lower leg: No edema.      Left lower leg: No edema.   Skin:     General: Skin is warm and dry.      Coloration: Skin is not jaundiced or pale.      Findings: No bruising, lesion or rash.   Neurological:      General: No focal deficit present.      Mental Status: She is lethargic and disoriented.   Psychiatric:         Mood and Affect: Mood normal.              CRANIAL NERVES     CN III, IV, VI   Pupils are equal, round, and reactive to light.       Significant Labs: All pertinent labs within the past 24 hours have been reviewed.    Significant Imaging: I have reviewed all pertinent imaging results/findings within the past 24 hours.

## 2025-07-29 NOTE — ASSESSMENT & PLAN NOTE
Will provide supportive environment and hopefully mental status will return to baseline once underlying condition improves.    Monitor for any seizure activity that could make postictal.    BS was 79 and patient has not been eating well but denies dysphagia.  Not sure how reliable she is at present but should improve.   She is not diabetic and not on any diabetic medications.

## 2025-07-29 NOTE — ASSESSMENT & PLAN NOTE
Recent Labs     07/28/25  2212   CREATININE 3.60*   EGFRNORACEVR 12*      Plan  - Avoid nephrotoxins and renally dose meds for GFR listed above  - Monitor urine output, serial BMP, and adjust therapy as needed  -  Hold the lasix, NSAID mobic, and ARB  -  IVF and follow renal function daily.      Patient with condition that if not treated could result in loss of life or bodily function.  Due to co morbidities this patient has complex medical management.

## 2025-07-29 NOTE — ASSESSMENT & PLAN NOTE
Hyponatremia is likely due to Dehydration/hypovolemia. The patient's most recent sodium results are listed below.  Recent Labs     07/28/25  2212 07/29/25  0844   * 132*     Will hold lasix and hydrate with NS   1 L in ED and 125 cc now  Follow electrolytes daily.

## 2025-07-29 NOTE — PROGRESS NOTES
[facility] - Emergency Department  Hospital Medicine  Progress Note    Patient Name: Umesh Lassiter  MRN: [id number]  Patient Class: IP- Inpatient   Admission Date: 7/28/2025  Length of Stay: 0 days  Attending Physician: Qasim Laboy MD  Primary Care Provider: Robina, Primary Doctor        Subjective     Principal Problem:Acute metabolic encephalopathy        HPI:  79 yo F presented to [facility] ED from [facility] with confusion and decreased LOC with left facial droop.  I notice a slight asymmetry but nothing dramatic.  She is slightly confused but easily redirected.  Her son is present and he states she is usually not confused and she is having some trouble giving a history.  She says she has N/V but then says she hasn't had N/V for years.  She says she has diarrhea but really just frequent, soft stools from her colostomy bag.  (Dr. Brannon performed partial colectomy for colon cancer.)   She does appear quite dry and she had hyponatremia and harsh and is on lasix.  Neurology was consulted in ED and they felt mental status change was from metabolic encephalopathy.      Patient was given a liter of IVF in the ED and made some urine (purewick) which did indicate infection.  Unknown if retention but renal US pending.  No suprapubic tenderness appreciated and could not get a bladder scan at the moment.  She has had BLE wounds previously with pseudomonas so will cover with cefepime.      Reviewed med recon and updated PMH.  See assessment and plan below for problem based evaluation      Overview/Hospital Course:  No notes on file    Interval History: Pt admitted overnight for acute metabolic encephalopathy. MRI unremarkable for acute pathology. EEG still pending. Pts mental status improved compared to last night. Kidney function has started to improve as well. Expect further improvement with fluid resuscitation. PT/OT consulted. Swallow evaluation recommends pureed diet.    Review of Systems   Unable to  perform ROS: Mental status change     Objective:     Vital Signs (Most Recent):  Temp: 98.6 °F (37 °C) (07/29/25 0917)  Pulse: 108 (07/29/25 1354)  Resp: 11 (07/29/25 1354)  BP: (!) 137/54 (07/29/25 1354)  SpO2: 96 % (07/29/25 1354) Vital Signs (24h Range):  Temp:  [98.6 °F (37 °C)] 98.6 °F (37 °C)  Pulse:  [] 108  Resp:  [10-17] 11  SpO2:  [94 %-99 %] 96 %  BP: (107-156)/(37-59) 137/54  Arterial Line BP: (107)/(37) 107/37     Weight: 87.3 kg (192 lb 6.4 oz)  Body mass index is 31.05 kg/m².    Intake/Output Summary (Last 24 hours) at 7/29/2025 1526  Last data filed at 7/29/2025 1443  Gross per 24 hour   Intake 1740 ml   Output 800 ml   Net 940 ml         Physical Exam  Vitals and nursing note reviewed.   Constitutional:       General: She is not in acute distress.  HENT:      Head: Normocephalic and atraumatic.   Cardiovascular:      Rate and Rhythm: Normal rate and regular rhythm.      Pulses: Normal pulses.      Heart sounds: Normal heart sounds. No murmur heard.     No friction rub. No gallop.   Pulmonary:      Effort: Pulmonary effort is normal. No respiratory distress.      Breath sounds: Normal breath sounds.   Abdominal:      General: Bowel sounds are normal.      Tenderness: There is no abdominal tenderness.   Musculoskeletal:      Right lower leg: No edema.      Left lower leg: No edema.   Skin:     General: Skin is warm and dry.   Neurological:      General: No focal deficit present.      Mental Status: She is disoriented.      Motor: Weakness (generalized) present.               Significant Labs: All pertinent labs within the past 24 hours have been reviewed.  Recent Lab Results  (Last 5 results in the past 24 hours)        07/29/25  1438   07/29/25  0844   07/29/25  0309   07/29/25  0113   07/28/25  2212        Albumin/Globulin Ratio         0.6       Albumin         2.7       ALP         77       ALT         <7       Anion Gap   12       14       Appearance, UA     Turbid           AST          29       Bacteria, UA     Many           Baso #         0.02       Basophil %         0.2       Bilirubin (UA)     Negative           BILIRUBIN TOTAL         0.3       BUN   52       56       BUN/CREAT RATIO   20       16       Calcium   9.6       10.5       Chloride   104       98       CHOL/HDLC Ratio         3.9       Cholesterol Total         176  Comment:   <200 mg/dL:  Desirable  200-240 mg/dL: Borderline High  >240 mg/dL:  High       CO2   21       23       Color, UA     Yellow           SARS COV-2 MOLECULAR       Negative         CPK         117       Creatinine   2.57       3.60       Differential Method         Auto       eGFR   18  Comment: Estimated GFR calculated using the CKD-EPI creatinine (2021) equation.       12  Comment: Estimated GFR calculated using the CKD-EPI creatinine (2021) equation.       Eos #         0.49       Eos %         5.5       Folate         14.4       Globulin, Total         4.2       Glucose   85       79       Glucose, UA     Normal           HDL         45  Comment:   <40 mg/dL: Low HDL  40-60 mg/dL: Normal  >60 mg/dL: Desirable       Hematocrit         30.0       Hemoglobin         9.8       Hyaline Casts, UA     0-2           Immature Grans (Abs)         0.18       Immature Granulocytes         2.0       INR         1.03       Ketones, UA     Negative           LDL Calculated         111  Comment: LDL calculated using the Friedewald equation.       LDL/HDL Ratio         2.5       Leukocyte Esterase, UA     Large           Lymph #         3.61       Lymph %         40.9       Magnesium          2.2       MCH         25.3       MCHC         32.7       MCV         77.3       Mono #         1.05       Mono %         11.9       MPV         10.0       Mucous     Occasional           Neutrophils, Abs         3.48       Neutrophils Relative         39.5       NITRITE UA     Negative           Non-HDL Cholesterol         131       nRBC         0.0       NUCLEATED RBC ABSOLUTE          0.00       Blood, UA     Negative           pH, UA     5.5           Phosphorus Level         4.7       Platelet Count         306       POC Glucose 71               Potassium   5.1       5.4       PROTEIN TOTAL         6.9       Protein, UA     Negative           PT         13.6       RBC         3.88       RBC, UA     5           RDW         17.2       Sodium   132       130       Spec Grav UA     1.015           Squamous Epithelial Cells, UA     Occasional           Triglycerides         100  Comment:   Normal:  <150 mg/dL  Borderline High: 150-199 mg/dL  High:   200-499 mg/dL  Very High:  >=500       TSH         6.494       UROBILINOGEN UA     Normal           Valproic Acid Lvl         53       Vitamin B12         1,651       VLDL Cholesterol Richard         20       WBC, UA     66           WBC         8.83                              Significant Imaging: I have reviewed all pertinent imaging results/findings within the past 24 hours.      Assessment & Plan  Acute metabolic encephalopathy  Will provide supportive environment and hopefully mental status will return to baseline once underlying condition improves.    Monitor for any seizure activity that could make postictal.    BS was 79 and patient has not been eating well but denies dysphagia.  Not sure how reliable she is at present but should improve.   She is not diabetic and not on any diabetic medications.      Update: Likely attributable to UTI and PRABHA. Pt improving and expect continued improvement with IV fluids and cefepime. MRI unremarkable for acute process. EEG pending.  PRABHA (acute kidney injury)    Recent Labs     07/28/25  2212 07/29/25  0844   CREATININE 3.60* 2.57*   EGFRNORACEVR 12* 18*      Plan  - Avoid nephrotoxins and renally dose meds for GFR listed above  - Monitor urine output, serial BMP, and adjust therapy as needed  -  Hold the lasix, NSAID mobic, and ARB  -  IVF and follow renal function daily.      Patient with condition that if not  treated could result in loss of life or bodily function.  Due to co morbidities this patient has complex medical management.    Update: Kidney function improving with IV fluids.  Dehydration with hyponatremia  Hyponatremia is likely due to Dehydration/hypovolemia. The patient's most recent sodium results are listed below.  Recent Labs     07/28/25  2212 07/29/25  0844   * 132*     Will hold lasix and hydrate with NS   1 L in ED and 125 cc now  Follow electrolytes daily.    Seizure disorder  Continue home Depakote.  Colon adenocarcinoma  Patient with colostomy and may need metamucil to help with fast transit loose stools.    Apparently there had been some issues previously and Dr. Brannon had ordered at CT last month that showed possible partial obstruction.  I do not clinically suspect it but will check a KUB for initial evaluation.      Update: KUB unremarkable for acute process.  Essential (primary) hypertension  Patient's blood pressure range in the last 24 hours was: BP  Min: 107/37  Max: 156/58.The patient's inpatient anti-hypertensive regimen is listed below:  Current Antihypertensives       Plan  - BP is controlled, no changes needed to their regimen  - Holding ARB and lasix for now.  Mild hypotension upon arrival  -  restart BP meds as tolerated.      Update: Continue to hold losartan on account of PRABHA.  Gout, arthritis  Continue renal dose allopurinol  VTE Risk Mitigation (From admission, onward)           Ordered     heparin (porcine) injection 5,000 Units  Every 12 hours         07/29/25 0301                    Discharge Planning   CARMEN: 8/4/2025     Code Status: Full Code   Medical Readiness for Discharge Date:   Discharge Plan A: Return to nursing home                  Please place Justification for DINA Gamez Schedule, DO  Department of Hospital Medicine   [facility] - Emergency Department

## 2025-07-29 NOTE — PT/OT/SLP EVAL
Physical Therapy Evaluation    Patient Name:  Umesh Lassiter   MRN:  [id number]    Recommendations:     Discharge Recommendations: Moderate Intensity Therapy   Discharge Equipment Recommendations: to be determined by next level of care   Barriers to discharge: ongoing medical treatment    Assessment:     Umesh Lassiter is a 80 y.o. female admitted with a medical diagnosis of Acute metabolic encephalopathy.  She presents with the following impairments/functional limitations: weakness, impaired endurance, impaired self care skills, impaired functional mobility, pain, impaired cognition     Patient presenting with generalized weakness and encephalopathy. She has been working with therapy at SNF and has been standing but not doing much walking. During eval she was able to sit EOB but not able to perform STS or attempt gait. High subjective pain score in her legs. Plan to return to NH at [facility]    Rehab Prognosis: Good; patient would benefit from acute skilled PT services to address these deficits and reach maximum level of function.    Recent Surgery: * No surgery found *      Plan:     During this hospitalization, patient to be seen 5 x/week to address the identified rehab impairments via gait training, therapeutic activities, therapeutic exercises, neuromuscular re-education and progress toward the following goals:    Plan of Care Expires:  08/26/25    Subjective     Chief Complaint: RLE pain  Patient/Family Comments/goals: agreeable  Pain/Comfort:  Pain Rating 1: 10/10  Location - Side 1: Right  Location 1: leg  Pain Addressed 1: Pre-medicate for activity    Patients cultural, spiritual, Taoism conflicts given the current situation:      Living Environment:  [facility]  Prior to admission, patients level of function was not able to progress to gait recently with PT.  Equipment used at home: wheelchair, walker, rolling.  DME owned (not currently used): none.  Upon discharge, patient will have assistance from facility  staff.    Objective:     Communicated with nurse prior to session.  Patient found HOB elevated with peripheral IV, blood pressure cuff, pulse ox (continuous), telemetry, germain catheter  upon PT entry to room.    General Precautions: Standard, fall  Orthopedic Precautions:    Braces:    Respiratory Status: Room air    Exams:  RLE ROM: WFL  RLE Strength: 3+/5  LLE ROM: WFL  LLE Strength: 3+/5    Functional Mobility:  Bed Mobility:     Supine to Sit: moderate assistance and of 2 persons  Sit to Supine: moderate assistance and of 2 persons  Balance: CGA for sitting balance; cues for upright midline positioning      AM-PAC 6 CLICK MOBILITY  Total Score:9       Treatment & Education:  Patient educated on the role of physical therapy in the acute care setting, call light usage, and safety including calling nurse for mobility  PT answered all patient questions within PT scope of practice  Mobility as noted      Patient left HOB elevated with all lines intact, call button in reach, and nurse notified.    GOALS:   Multidisciplinary Problems       Physical Therapy Goals          Problem: Physical Therapy    Goal Priority Disciplines Outcome Interventions   Physical Therapy Goal     PT, PT/OT Progressing    Description: Short term goals:  . Supine to sit with Contact Guard Assistance  . Sit to supine with Contact Guard Assistance  . Sit to stand transfer with Contact Guard Assistance  . Gait  x 25 feet with Contact Guard Assistance using Rolling Walker.     Long term goals:  Patient will gain highest level functional mobility with lowest level of assistive device to return to desired living arrangement and prior ADL's.                          DME Justifications:      History:     Past Medical History:   Diagnosis Date    Colon adenocarcinoma 06/26/2025    CVA (cerebral vascular accident) 05/23/2018    Essential (primary) hypertension     Generalized epilepsy 01/13/2022    Gout, arthritis     Pseudomonas aeruginosa infection      BLE cellulitis    Venous stasis ulcers        Past Surgical History:   Procedure Laterality Date    CHOLECYSTECTOMY      COLOSTOMY N/A 06/22/2025    Procedure: CREATION, COLOSTOMY;  Surgeon: Mannie Brannon MD;  Location: [facility];  Service: General;  Laterality: N/A;    EXCISION, SMALL INTESTINE N/A 06/22/2025    Procedure: EXCISION, SMALL INTESTINE;  Surgeon: Mannie Brannon MD;  Location: [facility] OR;  Service: General;  Laterality: N/A;    KNEE ARTHROSCOPY Left     x 2    LAPAROTOMY, EXPLORATORY N/A 06/22/2025    Procedure: LAPAROTOMY, EXPLORATORY;  Surgeon: Mannie Brannon MD;  Location: [facility] OR;  Service: General;  Laterality: N/A;    LASER ABLATION Right 04/20/2018    GSV LASER ABLATION PERFORMED BY DR. KARMA BUTCHER.    LASER ABLATION Left 04/27/2018    SSV LASER ABLATION PERFORMED BY DR. KARMA BUTCHER.    LASER ABLATION Right 12/17/2020    GSV ( DUPLICATED )LASER ABLATION PERFORMED BY DR. KARMA BUTCHER.    LASER ABLATION Left 01/08/2021    GSV LASER ABLATION PERFORMED BY DR. KARMA BUTCHER.    SHOULDER ARTHROSCOPY Left     SINUS SURGERY      TOTAL ABDOMINAL HYSTERECTOMY W/ BILATERAL SALPINGOOPHORECTOMY      VENOUS ABLATION Right 02/28/2020    DISTAL GSV VARITHENA ABLATION PERFORMED BY DR. KARMA BUTCHER.       Time Tracking:     PT Received On: 07/29/25  PT Start Time: 1448     PT Stop Time: 1515  PT Total Time (min): 27 min     Billable Minutes: Evaluation 27 07/29/2025

## 2025-07-29 NOTE — H&P
[facility] - Emergency Department  Hospital Medicine  History & Physical    Patient Name: Umesh Lassiter  MRN: [id number]  Patient Class: IP- Inpatient  Admission Date: 7/28/2025  Attending Physician: Qasim Laboy MD   Primary Care Provider: [facility] WALLACE         Patient information was obtained from patient, relative(s), past medical records, and ER records.     Subjective:     Principal Problem:PRABHA (acute kidney injury)    Chief Complaint:   Chief Complaint   Patient presents with    Altered Mental Status     SENT FROM [place] FOR EVAL OF AMS; POSSIBLY GIVEN DOUBLE DOSE OF NORCO        HPI: 79 yo F presented to [facility] ED from [facility] with confusion and decreased LOC with left facial droop.  I notice a slight asymmetry but nothing dramatic.  She is slightly confused but easily redirected.  Her son is present and he states she is usually not confused and she is having some trouble giving a history.  She says she has N/V but then says she hasn't had N/V for years.  She says she has diarrhea but really just frequent, soft stools from her colostomy bag.  (Dr. Brannon performed partial colectomy for colon cancer.)   She does appear quite dry and she had hyponatremia and prabha and is on lasix.  Neurology was consulted in ED and they felt mental status change was from metabolic encephalopathy.      Patient was given a liter of IVF in the ED and made some urine (purewick) which did indicate infection.  Unknown if retention but renal US pending.  No suprapubic tenderness appreciated and could not get a bladder scan at the moment.  She has had BLE wounds previously with pseudomonas so will cover with cefepime.      Reviewed med recon and updated PMH.  See assessment and plan below for problem based evaluation      Past Medical History:   Diagnosis Date    Colon adenocarcinoma 06/26/2025    CVA (cerebral vascular accident) 05/23/2018    Essential (primary) hypertension     Generalized epilepsy  01/13/2022    Gout, arthritis     Pseudomonas aeruginosa infection     BLE cellulitis    Venous stasis ulcers        Past Surgical History:   Procedure Laterality Date    CHOLECYSTECTOMY      COLOSTOMY N/A 06/22/2025    Procedure: CREATION, COLOSTOMY;  Surgeon: Mannie Brannon MD;  Location: [facility] OR;  Service: General;  Laterality: N/A;    EXCISION, SMALL INTESTINE N/A 06/22/2025    Procedure: EXCISION, SMALL INTESTINE;  Surgeon: Mannie Brannon MD;  Location: [facility] OR;  Service: General;  Laterality: N/A;    KNEE ARTHROSCOPY Left     x 2    LAPAROTOMY, EXPLORATORY N/A 06/22/2025    Procedure: LAPAROTOMY, EXPLORATORY;  Surgeon: Mannie Brannon MD;  Location: [facility] OR;  Service: General;  Laterality: N/A;    LASER ABLATION Right 04/20/2018    GSV LASER ABLATION PERFORMED BY DR. KARMA BUTCHER.    LASER ABLATION Left 04/27/2018    SSV LASER ABLATION PERFORMED BY DR. KARMA BUTCHER.    LASER ABLATION Right 12/17/2020    GSV ( DUPLICATED )LASER ABLATION PERFORMED BY DR. KARMA BUTCHER.    LASER ABLATION Left 01/08/2021    GSV LASER ABLATION PERFORMED BY DR. KARMA BUTCHER.    SHOULDER ARTHROSCOPY Left     SINUS SURGERY      TOTAL ABDOMINAL HYSTERECTOMY W/ BILATERAL SALPINGOOPHORECTOMY      VENOUS ABLATION Right 02/28/2020    DISTAL GSV VARITHENA ABLATION PERFORMED BY DR. KARMA BUTCHER.       Review of patient's allergies indicates:   Allergen Reactions    Influenza virus vaccines      Pt states had to be admitted to hospital after last flu vaccine     Lexapro [escitalopram]        No current facility-administered medications on file prior to encounter.     Current Outpatient Medications on File Prior to Encounter   Medication Sig    acetaminophen-codeine 300-30mg (TYLENOL #3) 300-30 mg Tab Take 1 tablet by mouth every 12 (twelve) hours as needed (pain).    albuterol (PROVENTIL/VENTOLIN HFA) 90 mcg/actuation inhaler Inhale 2 puffs into the lungs every 6 (six) hours as needed for Wheezing. (Patient taking  differently: Inhale 2 puffs into the lungs every 6 (six) hours as needed for Wheezing or Shortness of Breath.)    allopurinoL (ZYLOPRIM) 100 MG tablet Take 200 mg by mouth every evening. May also give 100 mg by mouth daily PRN for elevated uric acid    ascorbic acid, vitamin C, (VITAMIN C) 500 MG tablet Take 500 mg by mouth 2 (two) times daily.    cilostazoL (PLETAL) 50 MG Tab Take 1 tablet (50 mg total) by mouth 2 (two) times daily.    clorazepate (TRANXENE) 7.5 MG Tab Take 3.75 mg by mouth 2 (two) times daily.    divalproex ER (DEPAKOTE ER) 500 MG Tb24 24 hr tablet TAKE 2 TABLETS BY MOUTH EVERY NIGHT AT BEDTIME FOR SEIZURES (Patient taking differently: Take 1,000 mg by mouth every evening. TAKE 2 TABLETS BY MOUTH EVERY NIGHT AT BEDTIME FOR SEIZURES)    furosemide (LASIX) 20 MG tablet Take 1 tablet (20 mg total) by mouth once daily.    HYDROcodone-acetaminophen (NORCO) 7.5-325 mg per tablet Take 1 tablet by mouth every 6 (six) hours as needed for Pain.    levothyroxine (SYNTHROID) 50 MCG tablet Take 1 tablet (50 mcg total) by mouth before breakfast.    losartan (COZAAR) 50 MG tablet Take 1 tablet (50 mg total) by mouth once daily.    melatonin 10 mg Tab Take 10 mg by mouth every evening.    meloxicam (MOBIC) 7.5 MG tablet Take 7.5 mg by mouth 2 (two) times a day.    multivitamin (THERAGRAN) per tablet Take 1 tablet by mouth once daily.    ondansetron (ZOFRAN-ODT) 4 MG TbDL Take 1 tablet (4 mg total) by mouth every 6 (six) hours as needed (nausea).    polyethylene glycol (GLYCOLAX) 17 gram PwPk Take 17 g by mouth once daily.     Family History       Problem Relation (Age of Onset)    Alzheimer's disease Mother    Cancer Brother    Diabetes Sister, Brother    Endometrial cancer Mother, Sister    Heart disease Mother, Daughter    Hypertension Sister    Lung cancer Father    No Known Problems Son, Son, Son, Sister, Sister, Sister, Sister, Sister    Polycystic kidney disease Sister    Seizures Brother    Sickle cell  anemia Daughter    Thyroid disease Sister          Tobacco Use    Smoking status: Never    Smokeless tobacco: Never   Vaping Use    Vaping status: Never Used   Substance and Sexual Activity    Alcohol use: Never    Drug use: Never    Sexual activity: Not Currently     Birth control/protection: Abstinence     Review of Systems   Unable to perform ROS: Mental status change     Objective:     Vital Signs (Most Recent):  Temp: 98.6 °F (37 °C) (07/28/25 2224)  Pulse: 108 (07/29/25 0352)  Resp: 11 (07/29/25 0352)  BP: (!) 131/46 (07/29/25 0352)  SpO2: 98 % (07/29/25 0352) Vital Signs (24h Range):  Temp:  [98.6 °F (37 °C)] 98.6 °F (37 °C)  Pulse:  [100-112] 108  Resp:  [10-17] 11  SpO2:  [95 %-99 %] 98 %  BP: (125-156)/(43-59) 131/46     Weight: 87.3 kg (192 lb 6.4 oz)  Body mass index is 31.05 kg/m².     Physical Exam  Vitals and nursing note reviewed. Exam conducted with a chaperone present.   Constitutional:       Appearance: She is ill-appearing.   HENT:      Head: Atraumatic.      Mouth/Throat:      Mouth: Mucous membranes are dry.      Pharynx: Oropharynx is clear.   Eyes:      Conjunctiva/sclera: Conjunctivae normal.      Pupils: Pupils are equal, round, and reactive to light.   Neck:      Vascular: No carotid bruit.   Cardiovascular:      Rate and Rhythm: Normal rate and regular rhythm.      Pulses: Normal pulses.      Heart sounds: Normal heart sounds.   Pulmonary:      Effort: Pulmonary effort is normal.      Breath sounds: Normal breath sounds.   Abdominal:      General: Abdomen is flat. Bowel sounds are normal. There is no distension.      Palpations: Abdomen is soft.      Tenderness: There is no abdominal tenderness. There is no guarding.   Musculoskeletal:         General: No tenderness, deformity or signs of injury. Normal range of motion.      Cervical back: Neck supple.      Right lower leg: No edema.      Left lower leg: No edema.   Skin:     General: Skin is warm and dry.      Coloration: Skin is not  jaundiced or pale.      Findings: No bruising, lesion or rash.   Neurological:      General: No focal deficit present.      Mental Status: She is lethargic and disoriented.   Psychiatric:         Mood and Affect: Mood normal.              CRANIAL NERVES     CN III, IV, VI   Pupils are equal, round, and reactive to light.       Significant Labs: All pertinent labs within the past 24 hours have been reviewed.    Significant Imaging: I have reviewed all pertinent imaging results/findings within the past 24 hours.  Assessment/Plan:     Assessment & Plan  PRABHA (acute kidney injury)    Recent Labs     07/28/25  2212   CREATININE 3.60*   EGFRNORACEVR 12*      Plan  - Avoid nephrotoxins and renally dose meds for GFR listed above  - Monitor urine output, serial BMP, and adjust therapy as needed  -  Hold the lasix, NSAID mobic, and ARB  -  IVF and follow renal function daily.      Patient with condition that if not treated could result in loss of life or bodily function.  Due to co morbidities this patient has complex medical management.    Dehydration with hyponatremia  Hyponatremia is likely due to Dehydration/hypovolemia. The patient's most recent sodium results are listed below.  Recent Labs     07/28/25  2212   *     Will hold lasix and hydrate with NS   1 L in ED and 125 cc now  Follow electrolytes daily.    Acute metabolic encephalopathy  Will provide supportive environment and hopefully mental status will return to baseline once underlying condition improves.    Monitor for any seizure activity that could make postictal.    BS was 79 and patient has not been eating well but denies dysphagia.  Not sure how reliable she is at present but should improve.   She is not diabetic and not on any diabetic medications.      Essential (primary) hypertension  Patient's blood pressure range in the last 24 hours was: BP  Min: 125/43  Max: 156/58.The patient's inpatient anti-hypertensive regimen is listed below:  Current  Antihypertensives       Plan  - BP is controlled, no changes needed to their regimen  - Holding ARB and lasix for now.  Mild hypotension upon arrival  -  restart BP meds as tolerated.    Gout, arthritis  Continue renal dose allopurinol    Colon adenocarcinoma  Patient with colostomy and may need metamucil to help with fast transit loose stools.    Apparently there had been some issues previously and Dr. Brannon had ordered at CT last month that showed possible partial obstruction.  I do not clinically suspect it but will check a KUB for initial evaluation.      VTE Risk Mitigation (From admission, onward)           Ordered     heparin (porcine) injection 5,000 Units  Every 12 hours         07/29/25 0301                                                Veda Cid MD  Department of Hospital Medicine  [facility] - Emergency Department

## 2025-07-29 NOTE — ASSESSMENT & PLAN NOTE
Will provide supportive environment and hopefully mental status will return to baseline once underlying condition improves.    Monitor for any seizure activity that could make postictal.    BS was 79 and patient has not been eating well but denies dysphagia.  Not sure how reliable she is at present but should improve.   She is not diabetic and not on any diabetic medications.      Update: Likely attributable to UTI and PRABHA. Pt improving and expect continued improvement with IV fluids and cefepime. MRI unremarkable for acute process. EEG pending.

## 2025-07-29 NOTE — NURSING
1530 Patient arrives to floor alert to self and confused, IV clean, dry and intact to Right thumb infusing. Skin warm, dry with dark discoloration to LLE, Family member @ the bed side @ this time.

## 2025-07-29 NOTE — HPI
79 yo F presented to [facility] ED from [facility] with confusion and decreased LOC with left facial droop.  I notice a slight asymmetry but nothing dramatic.  She is slightly confused but easily redirected.  Her son is present and he states she is usually not confused and she is having some trouble giving a history.  She says she has N/V but then says she hasn't had N/V for years.  She says she has diarrhea but really just frequent, soft stools from her colostomy bag.  (Dr. Brannon performed partial colectomy for colon cancer.)   She does appear quite dry and she had hyponatremia and harsh and is on lasix.  Neurology was consulted in ED and they felt mental status change was from metabolic encephalopathy.      Patient was given a liter of IVF in the ED and made some urine (purewick) which did indicate infection.  Unknown if retention but renal US pending.  No suprapubic tenderness appreciated and could not get a bladder scan at the moment.  She has had BLE wounds previously with pseudomonas so will cover with cefepime.      Reviewed med recon and updated PMH.  See assessment and plan below for problem based evaluation

## 2025-07-29 NOTE — ASSESSMENT & PLAN NOTE
Patient with colostomy and may need metamucil to help with fast transit loose stools.    Apparently there had been some issues previously and Dr. Brannon had ordered at CT last month that showed possible partial obstruction.  I do not clinically suspect it but will check a KUB for initial evaluation.      Update: KUB unremarkable for acute process.   I have personally provided the amount of critical care time documented below excluding time spent on separate procedures

## 2025-07-29 NOTE — PT/OT/SLP EVAL
Speech Language Pathology Evaluation  Bedside Swallow    Patient Name:  Umesh Lassiter   MRN:  [id number]  Admitting Diagnosis: PRABHA (acute kidney injury)    Recommendations:                 General Recommendations:  Follow-up not indicated  Diet recommendations:  Puree, Other (Comment) (May advance to soft diet when pt is less lethargic and more alert.), Thin   Aspiration Precautions: 1 bite/sip at a time, Assistance with meals, Feed only when awake/alert, HOB to 90 degrees, Meds crushed in puree, Remain upright 30 minutes post meal, Small bites/sips, and Standard aspiration precautions   General Precautions: Standard    Communication strategies:  Patient answered some questions but kept saying her stomach was bothering her.  Discharge recommendations:      Barriers to Discharge:  Level of Skilled Assistance Needed return to nursing home    Assessment:     Umesh Lassiter is a 80 y.o. female with an SLP diagnosis of possible dysphagia.  She presents with being lethargic and needing pureed foods at this time.    History:     Past Medical History:   Diagnosis Date    Colon adenocarcinoma 06/26/2025    CVA (cerebral vascular accident) 05/23/2018    Essential (primary) hypertension     Generalized epilepsy 01/13/2022    Gout, arthritis     Pseudomonas aeruginosa infection     BLE cellulitis    Venous stasis ulcers        Past Surgical History:   Procedure Laterality Date    CHOLECYSTECTOMY      COLOSTOMY N/A 06/22/2025    Procedure: CREATION, COLOSTOMY;  Surgeon: Mannie Brannon MD;  Location: [facility] OR;  Service: General;  Laterality: N/A;    EXCISION, SMALL INTESTINE N/A 06/22/2025    Procedure: EXCISION, SMALL INTESTINE;  Surgeon: Mannie Brannon MD;  Location: [facility] OR;  Service: General;  Laterality: N/A;    KNEE ARTHROSCOPY Left     x 2    LAPAROTOMY, EXPLORATORY N/A 06/22/2025    Procedure: LAPAROTOMY, EXPLORATORY;  Surgeon: Mannie Brannon MD;  Location: [facility] OR;  Service: General;  Laterality:  N/A;    LASER ABLATION Right 04/20/2018    GSV LASER ABLATION PERFORMED BY DR. KARMA BUTCHER.    LASER ABLATION Left 04/27/2018    SSV LASER ABLATION PERFORMED BY DR. KARMA BUTCHER.    LASER ABLATION Right 12/17/2020    GSV ( DUPLICATED )LASER ABLATION PERFORMED BY DR. KARMA BUTCHER.    LASER ABLATION Left 01/08/2021    GSV LASER ABLATION PERFORMED BY DR. KARMA BUTCHER.    SHOULDER ARTHROSCOPY Left     SINUS SURGERY      TOTAL ABDOMINAL HYSTERECTOMY W/ BILATERAL SALPINGOOPHORECTOMY      VENOUS ABLATION Right 02/28/2020    DISTAL GSV VARITHENA ABLATION PERFORMED BY DR. KARMA BUTCHER.       Social History: Patient lives in a nursing home.    Prior Intubation HX:  n/a    Modified Barium Swallow: n/a    Chest X-Rays: see chart    Prior diet: unknown.    Occupation/hobbies/homemaking: not stated.    Subjective     Patient lying in bed with eyes closed but arousable. Patient reports that she can't stop shaking. SLP pulled covers up closer to patient's neck. Patient reported that she was nauseous but was agreeable to try to take a few bites. Patient still pretty lethargic and needed cues to keep her eyes open.   Patient goals: not stated     Pain/Comfort:  Pain Rating 1:  (Pt reported being nauseous. Nurse aware.)    Respiratory Status: see chart    Objective:     Oral Musculature Evaluation  Oral Musculature: general weakness  Dentition: present and adequate  Secretion Management: adequate  Mucosal Quality: adequate    Bedside Swallow Eval:   Consistencies Assessed:  Thin liquids No difficulties noted with sips of water via a straw. No overt s/s of aspiration noted.   Puree No difficulties swallowing apple sauce or mashed potatoes; however, patient refused to eat much at all.      Oral Phase:   WFL    Pharyngeal Phase:   no overt clinical signs/symptoms of aspiration  no overt clinical signs/symptoms of pharyngeal dysphagia    Compensatory Strategies  None    Treatment: Rec a pureed consistency diet with thin liquids as  tolerated. As patient becomes more alert, may advance to a soft diet with thin liquids. Patient will need assistance with all oral intake at this time.     Goals:   Multidisciplinary Problems       SLP Goals       Not on file                    Plan:     Patient to be seen:      Plan of Care expires:     Plan of Care reviewed with:      SLP Follow-Up:  No       Time Tracking:     SLP Treatment Date:      Speech Start Time:  1245  Speech Stop Time:  1305     Speech Total Time (min):  20 min    Billable Minutes: Eval Swallow and Oral Function 20 07/29/2025

## 2025-07-29 NOTE — SUBJECTIVE & OBJECTIVE
HPI:  80 y.o. female with a history of HLD, hypothyroidism, epilepsy, PVD, CKD, depression, colon adenocarcinoma who presented from a NH with confusion and left facial weakness.         Images personally reviewed and interpreted:  Study: Head CT  Study Interpretation: No acute ischemia or hemorrhage.     Assessment and plan:  79 y/o female with history as above who presents with encephalopathy and left  facial weakness.      Exam ->  Slight lethargy but arouses easily to voice and follows simple commands.  Oriented to person, place and month.  Slow to respond.  No aphasia  No facial asymmetry or ophthalmoplegia appreciated     Acute ischemic stroke is felt to be less likely.  In any case, she is not a TNK  candidate due to history of colon cancer.  One possible explanation is an unwitnessed seizure with postictal state.     Recommend MRI brain.   If acute ischemia noted, advised  and Plavix 300 load along with Neurology consult for further workup and recommendations.  Check Depakote level.  Toxic/metabolic workup per ED.    Has trouble tracking to  Lytics recommendation: Thrombolytic therapy not recommended due to History of colon cancer    Thrombectomy recommendation: No; at this time symptoms not suggestive of large vessel occlusion  Placement recommendation: pending further studies  admit to inpatient

## 2025-07-30 PROBLEM — N39.0 UTI (URINARY TRACT INFECTION): Status: ACTIVE | Noted: 2025-07-30

## 2025-07-30 PROBLEM — E87.5 HYPERKALEMIA: Status: ACTIVE | Noted: 2025-07-30

## 2025-07-30 LAB
ANION GAP SERPL CALCULATED.3IONS-SCNC: 13 MMOL/L (ref 7–16)
BASOPHILS # BLD AUTO: 0.01 K/UL (ref 0–0.2)
BASOPHILS NFR BLD AUTO: 0.2 % (ref 0–1)
BUN SERPL-MCNC: 37 MG/DL (ref 10–20)
BUN/CREAT SERPL: 26 (ref 6–20)
CALCIUM SERPL-MCNC: 9.5 MG/DL (ref 8.4–10.2)
CHLORIDE SERPL-SCNC: 110 MMOL/L (ref 98–107)
CO2 SERPL-SCNC: 20 MMOL/L (ref 23–31)
CREAT SERPL-MCNC: 1.44 MG/DL (ref 0.55–1.02)
DIFFERENTIAL METHOD BLD: ABNORMAL
EGFR (NO RACE VARIABLE) (RUSH/TITUS): 36 ML/MIN/1.73M2
EOSINOPHIL # BLD AUTO: 0.29 K/UL (ref 0–0.5)
EOSINOPHIL NFR BLD AUTO: 4.6 % (ref 1–4)
ERYTHROCYTE [DISTWIDTH] IN BLOOD BY AUTOMATED COUNT: 17.2 % (ref 11.5–14.5)
GLUCOSE SERPL-MCNC: 76 MG/DL (ref 82–115)
HCT VFR BLD AUTO: 27.3 % (ref 38–47)
HGB BLD-MCNC: 8.9 G/DL (ref 12–16)
IMM GRANULOCYTES # BLD AUTO: 0.06 K/UL (ref 0–0.04)
IMM GRANULOCYTES NFR BLD: 0.9 % (ref 0–0.4)
LYMPHOCYTES # BLD AUTO: 1.7 K/UL (ref 1–4.8)
LYMPHOCYTES NFR BLD AUTO: 26.8 % (ref 27–41)
MCH RBC QN AUTO: 25.2 PG (ref 27–31)
MCHC RBC AUTO-ENTMCNC: 32.6 G/DL (ref 32–36)
MCV RBC AUTO: 77.3 FL (ref 80–96)
MONOCYTES # BLD AUTO: 0.92 K/UL (ref 0–0.8)
MONOCYTES NFR BLD AUTO: 14.5 % (ref 2–6)
MPC BLD CALC-MCNC: 9.7 FL (ref 9.4–12.4)
NEUTROPHILS # BLD AUTO: 3.37 K/UL (ref 1.8–7.7)
NEUTROPHILS NFR BLD AUTO: 53 % (ref 53–65)
NRBC # BLD AUTO: 0 X10E3/UL
NRBC, AUTO (.00): 0 %
PLATELET # BLD AUTO: 289 K/UL (ref 150–400)
POTASSIUM SERPL-SCNC: 5.5 MMOL/L (ref 3.5–5.1)
RBC # BLD AUTO: 3.53 M/UL (ref 4.2–5.4)
SODIUM SERPL-SCNC: 137 MMOL/L (ref 136–145)
WBC # BLD AUTO: 6.35 K/UL (ref 4.5–11)

## 2025-07-30 PROCEDURE — 95813 EEG EXTND MNTR 61-119 MIN: CPT

## 2025-07-30 PROCEDURE — 25000003 PHARM REV CODE 250: Performed by: HOSPITALIST

## 2025-07-30 PROCEDURE — 99900035 HC TECH TIME PER 15 MIN (STAT)

## 2025-07-30 PROCEDURE — 11000001 HC ACUTE MED/SURG PRIVATE ROOM

## 2025-07-30 PROCEDURE — 99233 SBSQ HOSP IP/OBS HIGH 50: CPT | Mod: GC,,, | Performed by: INTERNAL MEDICINE

## 2025-07-30 PROCEDURE — 25000003 PHARM REV CODE 250: Performed by: INTERNAL MEDICINE

## 2025-07-30 PROCEDURE — 80048 BASIC METABOLIC PNL TOTAL CA: CPT | Performed by: INTERNAL MEDICINE

## 2025-07-30 PROCEDURE — 63600175 PHARM REV CODE 636 W HCPCS: Performed by: HOSPITALIST

## 2025-07-30 PROCEDURE — 36415 COLL VENOUS BLD VENIPUNCTURE: CPT | Performed by: INTERNAL MEDICINE

## 2025-07-30 PROCEDURE — 94761 N-INVAS EAR/PLS OXIMETRY MLT: CPT

## 2025-07-30 PROCEDURE — 85025 COMPLETE CBC W/AUTO DIFF WBC: CPT | Performed by: INTERNAL MEDICINE

## 2025-07-30 PROCEDURE — 97165 OT EVAL LOW COMPLEX 30 MIN: CPT

## 2025-07-30 PROCEDURE — 97530 THERAPEUTIC ACTIVITIES: CPT

## 2025-07-30 RX ADMIN — LEVOTHYROXINE SODIUM 50 MCG: 0.05 TABLET ORAL at 05:07

## 2025-07-30 RX ADMIN — DIVALPROEX SODIUM 1000 MG: 500 TABLET, FILM COATED, EXTENDED RELEASE ORAL at 08:07

## 2025-07-30 RX ADMIN — HEPARIN SODIUM 5000 UNITS: 5000 INJECTION, SOLUTION INTRAVENOUS; SUBCUTANEOUS at 08:07

## 2025-07-30 RX ADMIN — CEFEPIME 1 G: 1 INJECTION, POWDER, FOR SOLUTION INTRAMUSCULAR; INTRAVENOUS at 05:07

## 2025-07-30 RX ADMIN — POLYETHYLENE GLYCOL 3350 17 G: 17 POWDER, FOR SOLUTION ORAL at 08:07

## 2025-07-30 RX ADMIN — ACETAMINOPHEN 1000 MG: 500 TABLET ORAL at 10:07

## 2025-07-30 RX ADMIN — SODIUM CHLORIDE: 9 INJECTION, SOLUTION INTRAVENOUS at 03:07

## 2025-07-30 RX ADMIN — ALLOPURINOL 100 MG: 100 TABLET ORAL at 08:07

## 2025-07-30 NOTE — CONSULTS
"[facility] - [address] [place] [address]  Adult Nutrition  Consult Note         Reason for Assessment  Reason For Assessment: consult (malnutrition)     Assessment and Plan  Consult received and appreciated. Consult to assess for malnutrition. Patient is an 79 yo F who presented to ED from [facility] w/ confusion and decreased LOC w/ left facial droop. Neurology consulted whom felt mental status change was from metabolic encephalopathy. PMHx includes colon adenocarcinoma w/ colostomy creation 6/22/25, CVA, HTN, generalized epilepsy, gout, venous stasis ulcers. Patient also F2H PRABHA, dehydration w/ hyponatremia.    Patient is 87.3 kg (192 lb) w/ a BMI of 31.05 and is considered obese (geriatric standards: 24 - 29). Patient denied weight loss and poor appetite w/ MST 0. However, patient noted to be confused and lethargic. Question accuracy. Chart review reveals no significant weight loss within the past year. Patient documented to weigh 167 lb x 1 mo ago, 168 lb x 6 mo ago, and 186 lb x 1 yr ago. Per records, patient has gained weight as of recent rather than lost weight. However, H&P notes BS was 79 and patient has not been eating well, but denies dysphagia. RD is not currently on-site. Will assess for physical s/s of malnutrition upon follow-up and attempt to clarify weight hx and eating habits prior to admission w/ son, if present. Per ASPEN guidelines, there is insufficient evidence to diagnose patient w/ malnutrition at this time, though patient is at risk.    Patient is currently on a Dysphagia, Pureed, Thin Diet. Only one meal recorded at 50%. Requires assistance w/ feeds. SLP evaluated patient yesterday- recommended pureed, thin. Per SLP: "May advance to soft diet when pt is less lethargic and more alert."    Nutrition Recommendations  - Continue w/ pureed diet per SLP recommendations w/ addition of Suplena BID (PRABHA) to better meet protein/calorie needs.  - Encourage good PO intakes and " provide assistance w/ feeds.  - K+ slightly elevated- will monitor and implement potassium restricted as appropriate.    Last Bowel Movement: 07/30/25    Medications/labs reviewed. RD following.    Learning Needs/Social Determinants of Health    Learning Assessment       07/29/2025 0720 [facility] (7/28/2025 - Present)   Created by Maylin Alcantara RN - RN (Nurse) Status: Complete                 PRIMARY LEARNER     Primary Learner Name:  Patient KJ - 07/29/2025 0720    Relationship:  Patient KJ - 07/29/2025 0720    Does the primary learner have any barriers to learning?:  No Barriers KJ - 07/29/2025 0720    What is the preferred language of the primary learner?:  English KJ - 07/29/2025 0720    Is an  required?:  No KJ - 07/29/2025 0720    How does the primary learner prefer to learn new concepts?:  Listening KJ - 07/29/2025 0720    How often do you need to have someone help you read instructions, pamphlets, or written material from your doctor or pharmacy?:  Rarely KJ - 07/29/2025 0720        CO-LEARNER #1     Co-Learner Name (if applicable):  Son KJ - 07/29/2025 0720    Relationship:  Family KJ - 07/29/2025 0720    Does the co-learner have any barriers to learning?:  No Barriers KJ - 07/29/2025 0720    What is the preferred language of the co-learner?:  English KJ - 07/29/2025 0720    Is an  required?:  No KJ - 07/29/2025 0720    How does the co-learner prefer to learn new concepts?:  Listening KJ - 07/29/2025 0720        CO-LEARNER #2     No question answered        SPECIAL TOPICS     No question answered        ANSWERED BY:     No question answered        Comments         Edit History       Maylin Alcantara, RN - RN (Nurse)   07/29/2025 0720                          Social Drivers of Health     Tobacco Use: Low Risk  (7/28/2025)    Patient History     Smoking Tobacco Use: Never     Smokeless Tobacco Use: Never     Passive Exposure: Not on file   Alcohol  Use: Not At Risk (7/29/2025)    AUDIT-C     Frequency of Alcohol Consumption: Never     Average Number of Drinks: Patient does not drink     Frequency of Binge Drinking: Never   Financial Resource Strain: Low Risk  (7/29/2025)    Overall Financial Resource Strain (CARDIA)     Difficulty of Paying Living Expenses: Not hard at all   Food Insecurity: No Food Insecurity (7/29/2025)    Hunger Vital Sign     Worried About Running Out of Food in the Last Year: Never true     Ran Out of Food in the Last Year: Never true   Transportation Needs: No Transportation Needs (7/29/2025)    PRAPARE - Transportation     Lack of Transportation (Medical): No     Lack of Transportation (Non-Medical): No   Physical Activity: Inactive (7/29/2025)    Exercise Vital Sign     Days of Exercise per Week: 0 days     Minutes of Exercise per Session: 0 min   Stress: Stress Concern Present (7/29/2025)    [employer] - Occupational Stress Questionnaire     Feeling of Stress : To some extent   Housing Stability: Low Risk  (7/29/2025)    Housing Stability Vital Sign     Unable to Pay for Housing in the Last Year: No     Number of Times Moved in the Last Year: 0     Homeless in the Last Year: No   Depression: Low Risk  (10/14/2024)    Depression     Last PHQ-4: Flowsheet Data: 0   Utilities: Not At Risk (7/29/2025)    [facility] Utilities     Threatened with loss of utilities: No   Health Literacy: Adequate Health Literacy (7/29/2025)     Health Literacy     Frequency of need for help with medical instructions: Rarely   Social Isolation: Somewhat Isolated (7/29/2025)    Social Isolation     Social Isolation: 3     Malnutrition  Is Patient Malnourished: No    Nutrition Diagnosis  Swallowing difficulty related to Dysphagia/ difficulty swallowing, AMS, lethargy as evidenced by SLP recommendation of pureed diet  Comments: diet per SLP- pureed, thin.    Recent Labs   Lab 07/29/25  1438 07/30/25  0412   GLU  --  76*   POCGLU 71   --      Comments on Glucose: Low. Likely related to poor PO intake.    Nutrition Prescription / Recommendations  Interventions: Texture modified diet, Medical food supplement therapy, Collaboration and referral of nutrition care  Recommendations: Recommend to continue w/ Pureed Diet per SLP recommendations w/ addition of Suplena BID (PRABHA) to better meet protein/calorie needs. Encourage good PO intakes and provide assistance w/ feeds. Will monitor K+ and implement potassium restriction as warranted.  Goal #1: PO intake will meet greater than or equal to 75% of estimated needs by RD follow up  Nutrition Goal Status #1: new  Goal #2: Maintain weight throughout hospitalization  Nutrition Goal Status #2: new    Current Diet Order: Pureed Diet  Chewing or Swallowing Difficulty?: Swallowing difficulty  Recommended Diet: Puree  Recommended Oral Supplement: Suplena BID  Is Nutrition Support Recommended: No  Is Nutrition Education Recommended: No- 81 yo NH resident w/ confusion    Monitor and Evaluation  % current Intake: P.O. intake of 25 - 50 %  % intake to meet estimated needs: 50 - 75 %  Monitor and Evaluation: Food and beverage intake, Protein intake, Diet order, Food and nutrition knowledge, Weight, Beliefs and attitudes, Electrolyte and renal panel, Gastrointestinal profile, Glucose/endocrine profile, Inflammatory profile, Lipid profile, Nutrition focused physical findings, Skin    Current Medical Diagnosis and Past Medical History  Diagnosis: other (see comments) (acute metabolic encephalopathy)  Past Medical History:   Diagnosis Date    Colon adenocarcinoma 06/26/2025    CVA (cerebral vascular accident) 05/23/2018    Essential (primary) hypertension     Generalized epilepsy 01/13/2022    Gout, arthritis     Pseudomonas aeruginosa infection     BLE cellulitis    Venous stasis ulcers      Nutrition/Diet History  Spiritual, Cultural Beliefs, Confucianist Practices, Values that Affect Care: no  Food Allergies: NKFA  Factors  Affecting Nutritional Intake: impaired cognitive status/motor control, difficulty/impaired swallowing, inability to feed self  Nutrition-related SDOH: None Identified    Lab/Procedures/Meds  Recent Labs   Lab 07/28/25  2212 07/29/25  0844 07/30/25  0412   *   < > 137   K 5.4*   < > 5.5*   BUN 56*   < > 37*   CREATININE 3.60*   < > 1.44*   CALCIUM 10.5*   < > 9.5   ALBUMIN 2.7*  --   --    CL 98   < > 110*   ALT <7  --   --    AST 29  --   --    PHOS 4.7  --   --     < > = values in this interval not displayed.   Note: K+, BUN, Cr elevated (PRABHA). Cl- elevated (IV fluids, renal dysfunction)    Last A1c:   Lab Results   Component Value Date    HGBA1C 5.8 02/07/2023     Lab Results   Component Value Date    RBC 3.53 (L) 07/30/2025    HGB 8.9 (L) 07/30/2025    HCT 27.3 (L) 07/30/2025    MCV 77.3 (L) 07/30/2025    MCH 25.2 (L) 07/30/2025    MCHC 32.6 07/30/2025    TIBC 169 (L) 06/28/2025   Note: H/H low. MCV/MCH low - indicative of microcytic anemia. Recommend to consider checking iron stores.    Pertinent Labs Reviewed: reviewed  Pertinent Labs Comments: K+ elevated- PRABHA  Pertinent Medications Reviewed: reviewed  Pertinent Medications Comments: levo- empty stomach    Scheduled Meds:   allopurinoL  100 mg Oral Daily    ceFEPime IV (PEDS and ADULTS)  1 g Intravenous Q12H    divalproex ER  1,000 mg Oral QHS    heparin (porcine)  5,000 Units Subcutaneous Q12H    levothyroxine  50 mcg Oral Before breakfast    polyethylene glycol  17 g Oral Daily     Continuous Infusions:  PRN Meds:.  Current Facility-Administered Medications:     acetaminophen, 650 mg, Rectal, Q6H PRN    acetaminophen, 1,000 mg, Oral, Q6H PRN    aluminum & magnesium hydroxide-simethicone, 30 mL, Oral, Q6H PRN    bisacodyL, 10 mg, Oral, Daily PRN    dextromethorphan-guaiFENesin  mg/5 ml, 10 mL, Oral, Q6H PRN    diphenhydrAMINE, 50 mg, Oral, Q6H PRN    docusate sodium, 100 mg, Oral, BID PRN    melatonin, 6 mg, Oral, Nightly PRN    ondansetron, 8 mg,  "Intravenous, Q6H PRN    traZODone, 50 mg, Oral, Nightly PRN    Anthropometrics  Height: 5' 6" (167.6 cm)  Height (inches): 66 in  Height Method: Stated  Weight: 87.3 kg (192 lb 6.4 oz)  Weight (lb): 192.4 lb  Weight Method: Standard Scale  Ideal Body Weight (IBW), Female: 130 lb  BMI (Calculated): 31.1    Estimated/Assessed Needs  RMR ([place]-St. Jeor Equation): 1349.47   Temp: 97.6 °F (36.4 °C)Oral    Weight Used For Calorie Calculations: 87.3 kg (192 lb 7.4 oz)   Energy Calorie Requirements (kcal): 1,746 - 2,183 kcal (20 - 25 kcal/kg ABW) (BMR decreases w/ age- reduced energy needs)    Weight Used For Protein Calculations: 87.3 kg (192 lb 7.4 oz)  Protein Requirements: 70 - 87 g (0.8 - 1 g/kg ABW) (PRABHA non-dialysis dependent)    Fluid Requirements (mL): ~ 1,500 mL (PRABHA) - per MD    CHO Requirement: NA    Nutrition by Nursing  Intake (%): 50%  Diet/Feeding Assistance: assisted with feeding    Nutrition Follow-Up  RD Follow-up?: Yes    Nutrition Discharge Planning: Oral diet with texture modifications per SLP (comments)    Sheri Bowens MS, RD, LD  Available via Secure Chat  "

## 2025-07-30 NOTE — ASSESSMENT & PLAN NOTE
Urinalysis on admission significant for infectious process.   All previous urinalysis on file unremarkable,  pt does not appear to frequently get UTIs.  Treating with cefepime while awaiting urine culture.

## 2025-07-30 NOTE — SUBJECTIVE & OBJECTIVE
Interval History: No acute events overnight. Pts kidney function improved. Weakness is still significant but better than yesterday. Poor appetite persists. Will try to calorie count. EEG to be performed today.    Review of Systems   Reason unable to perform ROS: limited ROS.   Respiratory:  Negative for shortness of breath.    Cardiovascular:  Negative for chest pain.   Gastrointestinal:  Negative for abdominal pain.     Objective:     Vital Signs (Most Recent):  Temp: 97.6 °F (36.4 °C) (07/30/25 1152)  Pulse: 89 (07/30/25 1152)  Resp: 18 (07/30/25 1152)  BP: (!) 119/53 (07/30/25 1152)  SpO2: 96 % (07/30/25 1100) Vital Signs (24h Range):  Temp:  [97.6 °F (36.4 °C)-99.1 °F (37.3 °C)] 97.6 °F (36.4 °C)  Pulse:  [] 89  Resp:  [17-18] 18  SpO2:  [91 %-99 %] 96 %  BP: ()/(43-62) 119/53     Weight: 87.3 kg (192 lb 6.4 oz)  Body mass index is 31.05 kg/m².    Intake/Output Summary (Last 24 hours) at 7/30/2025 1405  Last data filed at 7/30/2025 0605  Gross per 24 hour   Intake --   Output 1200 ml   Net -1200 ml         Physical Exam  Vitals and nursing note reviewed.   Constitutional:       General: She is not in acute distress.     Appearance: She is not ill-appearing.   HENT:      Head: Normocephalic and atraumatic.   Cardiovascular:      Rate and Rhythm: Normal rate and regular rhythm.      Pulses: Normal pulses.      Heart sounds: Normal heart sounds. No murmur heard.     No friction rub. No gallop.   Pulmonary:      Effort: Pulmonary effort is normal. No respiratory distress.      Breath sounds: Normal breath sounds.   Abdominal:      General: Bowel sounds are normal.      Tenderness: There is no abdominal tenderness.   Musculoskeletal:      Right lower leg: No edema.      Left lower leg: No edema.   Skin:     General: Skin is warm and dry.   Neurological:      General: No focal deficit present.      Mental Status: She is alert. She is disoriented.      Comments: Oriented to place and person, not time                Significant Labs: All pertinent labs within the past 24 hours have been reviewed.  Recent Lab Results         07/30/25  0412   07/29/25  1438        Anion Gap 13         Baso # 0.01         Basophil % 0.2         BUN 37         BUN/CREAT RATIO 26         Calcium 9.5         Chloride 110         CO2 20         Creatinine 1.44         Differential Method Auto         eGFR 36  Comment: Estimated GFR calculated using the CKD-EPI creatinine (2021) equation.         Eos # 0.29         Eos % 4.6         Glucose 76         Hematocrit 27.3         Hemoglobin 8.9         Immature Grans (Abs) 0.06         Immature Granulocytes 0.9         Lymph # 1.70         Lymph % 26.8         MCH 25.2         MCHC 32.6         MCV 77.3         Mono # 0.92         Mono % 14.5         MPV 9.7         Neutrophils, Abs 3.37         Neutrophils Relative 53.0         nRBC 0.0         NUCLEATED RBC ABSOLUTE 0.00         Platelet Count 289         POC Glucose   71       Potassium 5.5         RBC 3.53         RDW 17.2         Sodium 137         WBC 6.35                 Significant Imaging: I have reviewed all pertinent imaging results/findings within the past 24 hours.

## 2025-07-30 NOTE — PLAN OF CARE
Problem: Hospitalized Older Adult  Goal: Optimal Cognitive Function  Outcome: Progressing  Goal: Effective Bowel Elimination  Outcome: Progressing  Goal: Optimal Coping  Outcome: Progressing  Goal: Fluid and Electrolyte Balance  Outcome: Progressing  Goal: Optimal Functional Ability  Outcome: Progressing  Goal: Improved Oral Intake  Outcome: Progressing  Goal: Adequate Sleep/Rest  Outcome: Progressing  Goal: Effective Urinary Elimination  Outcome: Progressing     Problem: Skin Injury Risk Increased  Goal: Skin Health and Integrity  Outcome: Progressing     Problem: Adult Inpatient Plan of Care  Goal: Plan of Care Review  Outcome: Progressing  Goal: Patient-Specific Goal (Individualized)  Outcome: Progressing  Goal: Absence of Hospital-Acquired Illness or Injury  Outcome: Progressing  Goal: Optimal Comfort and Wellbeing  Outcome: Progressing  Goal: Readiness for Transition of Care  Outcome: Progressing     Problem: Acute Kidney Injury/Impairment  Goal: Fluid and Electrolyte Balance  Outcome: Progressing  Goal: Improved Oral Intake  Outcome: Progressing  Goal: Effective Renal Function  Outcome: Progressing     Problem: Wound  Goal: Optimal Coping  Outcome: Progressing  Goal: Optimal Functional Ability  Outcome: Progressing  Goal: Absence of Infection Signs and Symptoms  Outcome: Progressing  Goal: Improved Oral Intake  Outcome: Progressing  Goal: Optimal Pain Control and Function  Outcome: Progressing  Goal: Skin Health and Integrity  Outcome: Progressing  Goal: Optimal Wound Healing  Outcome: Progressing

## 2025-07-30 NOTE — PROGRESS NOTES
[facility] - [address] [place] [address]  Wound Care    Patient Name:  Umesh Lassiter   MRN:  [id number]  Date: 7/30/2025  Diagnosis: Acute metabolic encephalopathy    History:     Past Medical History:   Diagnosis Date    Colon adenocarcinoma 06/26/2025    CVA (cerebral vascular accident) 05/23/2018    Essential (primary) hypertension     Generalized epilepsy 01/13/2022    Gout, arthritis     Pseudomonas aeruginosa infection     BLE cellulitis    Venous stasis ulcers        Social History[1]    Precautions:     Allergies as of 07/28/2025 - Reviewed 07/28/2025   Allergen Reaction Noted    Influenza virus vaccines  04/12/2022    Lexapro [escitalopram]  04/12/2021       WO Assessment Details/Treatment   Narrative: Seen patient for initial preventative  skin care measures.  Patient ambulates with walker.  Foam borders noted to bilateral heels and sacral.  No redness or open wounds noted.  Consult wound care of any findings.  07/30/2025        [1]   Social History  Socioeconomic History    Marital status:    Tobacco Use    Smoking status: Never    Smokeless tobacco: Never   Vaping Use    Vaping status: Never Used   Substance and Sexual Activity    Alcohol use: Never    Drug use: Never    Sexual activity: Not Currently     Birth control/protection: Abstinence     Social Drivers of Health     Financial Resource Strain: Low Risk  (7/29/2025)    Overall Financial Resource Strain (CARDIA)     Difficulty of Paying Living Expenses: Not hard at all   Food Insecurity: No Food Insecurity (7/29/2025)    Hunger Vital Sign     Worried About Running Out of Food in the Last Year: Never true     Ran Out of Food in the Last Year: Never true   Transportation Needs: No Transportation Needs (7/29/2025)    PRAPARE - Transportation     Lack of Transportation (Medical): No     Lack of Transportation (Non-Medical): No   Physical Activity: Inactive (7/29/2025)    Exercise Vital Sign     Days of Exercise per Week: 0 days      Minutes of Exercise per Session: 0 min   Stress: Stress Concern Present (7/29/2025)    [employer] - Occupational Stress Questionnaire     Feeling of Stress : To some extent   Housing Stability: Low Risk  (7/29/2025)    Housing Stability Vital Sign     Unable to Pay for Housing in the Last Year: No     Number of Times Moved in the Last Year: 0     Homeless in the Last Year: No

## 2025-07-30 NOTE — ASSESSMENT & PLAN NOTE
Patient with colostomy and may need metamucil to help with fast transit loose stools.    Apparently there had been some issues previously and Dr. Brannon had ordered at CT last month that showed possible partial obstruction.  I do not clinically suspect it but will check a KUB for initial evaluation.      Update: KUB unremarkable for acute process.

## 2025-07-30 NOTE — ASSESSMENT & PLAN NOTE
Hyponatremia is likely due to Dehydration/hypovolemia. The patient's most recent sodium results are listed below.  Recent Labs     07/28/25  2212 07/29/25  0844 07/30/25  0412   * 132* 137     Will hold lasix and hydrate with NS   1 L in ED and 125 cc now  Follow electrolytes daily.

## 2025-07-30 NOTE — PROGRESS NOTES
[facility] - [address] [place] [address] Medicine  Progress Note    Patient Name: Umesh Lassiter  MRN: [id number]  Patient Class: IP- Inpatient   Admission Date: 7/28/2025  Length of Stay: 1 days  Attending Physician: Qasim Laboy MD  Primary Care Provider: No, Primary Doctor        Subjective     Principal Problem:Acute metabolic encephalopathy        HPI:  81 yo F presented to [facility] ED from [facility] with confusion and decreased LOC with left facial droop.  I notice a slight asymmetry but nothing dramatic.  She is slightly confused but easily redirected.  Her son is present and he states she is usually not confused and she is having some trouble giving a history.  She says she has N/V but then says she hasn't had N/V for years.  She says she has diarrhea but really just frequent, soft stools from her colostomy bag.  (Dr. Brannon performed partial colectomy for colon cancer.)   She does appear quite dry and she had hyponatremia and harsh and is on lasix.  Neurology was consulted in ED and they felt mental status change was from metabolic encephalopathy.      Patient was given a liter of IVF in the ED and made some urine (purewick) which did indicate infection.  Unknown if retention but renal US pending.  No suprapubic tenderness appreciated and could not get a bladder scan at the moment.  She has had BLE wounds previously with pseudomonas so will cover with cefepime.      Reviewed med recon and updated PMH.  See assessment and plan below for problem based evaluation      Overview/Hospital Course:  No notes on file    Interval History: No acute events overnight. Pts kidney function improved. Weakness is still significant but better than yesterday. Poor appetite persists. Will try to calorie count. EEG to be performed today.    Review of Systems   Reason unable to perform ROS: limited ROS.   Respiratory:  Negative for shortness of breath.    Cardiovascular:  Negative for chest pain.    Gastrointestinal:  Negative for abdominal pain.     Objective:     Vital Signs (Most Recent):  Temp: 97.6 °F (36.4 °C) (07/30/25 1152)  Pulse: 89 (07/30/25 1152)  Resp: 18 (07/30/25 1152)  BP: (!) 119/53 (07/30/25 1152)  SpO2: 96 % (07/30/25 1100) Vital Signs (24h Range):  Temp:  [97.6 °F (36.4 °C)-99.1 °F (37.3 °C)] 97.6 °F (36.4 °C)  Pulse:  [] 89  Resp:  [17-18] 18  SpO2:  [91 %-99 %] 96 %  BP: ()/(43-62) 119/53     Weight: 87.3 kg (192 lb 6.4 oz)  Body mass index is 31.05 kg/m².    Intake/Output Summary (Last 24 hours) at 7/30/2025 1405  Last data filed at 7/30/2025 0605  Gross per 24 hour   Intake --   Output 1200 ml   Net -1200 ml         Physical Exam  Vitals and nursing note reviewed.   Constitutional:       General: She is not in acute distress.     Appearance: She is not ill-appearing.   HENT:      Head: Normocephalic and atraumatic.   Cardiovascular:      Rate and Rhythm: Normal rate and regular rhythm.      Pulses: Normal pulses.      Heart sounds: Normal heart sounds. No murmur heard.     No friction rub. No gallop.   Pulmonary:      Effort: Pulmonary effort is normal. No respiratory distress.      Breath sounds: Normal breath sounds.   Abdominal:      General: Bowel sounds are normal.      Tenderness: There is no abdominal tenderness.   Musculoskeletal:      Right lower leg: No edema.      Left lower leg: No edema.   Skin:     General: Skin is warm and dry.   Neurological:      General: No focal deficit present.      Mental Status: She is alert. She is disoriented.      Comments: Oriented to place and person, not time               Significant Labs: All pertinent labs within the past 24 hours have been reviewed.  Recent Lab Results         07/30/25  0412   07/29/25  1438        Anion Gap 13         Baso # 0.01         Basophil % 0.2         BUN 37         BUN/CREAT RATIO 26         Calcium 9.5         Chloride 110         CO2 20         Creatinine 1.44         Differential Method Auto          eGFR 36  Comment: Estimated GFR calculated using the CKD-EPI creatinine (2021) equation.         Eos # 0.29         Eos % 4.6         Glucose 76         Hematocrit 27.3         Hemoglobin 8.9         Immature Grans (Abs) 0.06         Immature Granulocytes 0.9         Lymph # 1.70         Lymph % 26.8         MCH 25.2         MCHC 32.6         MCV 77.3         Mono # 0.92         Mono % 14.5         MPV 9.7         Neutrophils, Abs 3.37         Neutrophils Relative 53.0         nRBC 0.0         NUCLEATED RBC ABSOLUTE 0.00         Platelet Count 289         POC Glucose   71       Potassium 5.5         RBC 3.53         RDW 17.2         Sodium 137         WBC 6.35                 Significant Imaging: I have reviewed all pertinent imaging results/findings within the past 24 hours.      Assessment & Plan  Acute metabolic encephalopathy  Will provide supportive environment and hopefully mental status will return to baseline once underlying condition improves.    Monitor for any seizure activity that could make postictal.    BS was 79 and patient has not been eating well but denies dysphagia.  Not sure how reliable she is at present but should improve.   She is not diabetic and not on any diabetic medications.      Update: Pt likely at or near baseline at this point. Awaiting EGD to be read.   UTI (urinary tract infection)  Urinalysis on admission significant for infectious process.   All previous urinalysis on file unremarkable,  pt does not appear to frequently get UTIs.  Treating with cefepime while awaiting urine culture.  PRABHA (acute kidney injury)    Recent Labs     07/28/25  2212 07/29/25  0844 07/30/25  0412   CREATININE 3.60* 2.57* 1.44*   EGFRNORACEVR 12* 18* 36*      Plan  - Avoid nephrotoxins and renally dose meds for GFR listed above  - Monitor urine output, serial BMP, and adjust therapy as needed  -  Hold the lasix, NSAID mobic, and ARB  -  IVF and follow renal function daily.      Patient with condition  that if not treated could result in loss of life or bodily function.  Due to co morbidities this patient has complex medical management.    Update: Kidney function improving with IV fluids.  Dehydration with hyponatremia  Hyponatremia is likely due to Dehydration/hypovolemia. The patient's most recent sodium results are listed below.  Recent Labs     07/28/25  2212 07/29/25  0844 07/30/25  0412   * 132* 137     Will hold lasix and hydrate with NS   1 L in ED and 125 cc now  Follow electrolytes daily.    Seizure disorder  Continue home Depakote. Awaiting EGD reading.  Colon adenocarcinoma  Patient with colostomy and may need metamucil to help with fast transit loose stools.    Apparently there had been some issues previously and Dr. Brannon had ordered at CT last month that showed possible partial obstruction.  I do not clinically suspect it but will check a KUB for initial evaluation.      Update: KUB unremarkable for acute process.  Essential (primary) hypertension  Patient's blood pressure range in the last 24 hours was: BP  Min: 94/58  Max: 122/62.The patient's inpatient anti-hypertensive regimen is listed below:  Current Antihypertensives       Plan  - BP is controlled, no changes needed to their regimen  - Holding ARB and lasix for now.  Mild hypotension upon arrival  -  restart BP meds as tolerated.      Update: Continue to hold losartan on account of PRABHA.  Gout, arthritis  Continue renal dose allopurinol  VTE Risk Mitigation (From admission, onward)           Ordered     heparin (porcine) injection 5,000 Units  Every 12 hours         07/29/25 0301                    Discharge Planning   CARMEN: 8/4/2025     Code Status: Full Code   Medical Readiness for Discharge Date:   Discharge Plan A: Return to nursing home                  Please place Justification for DME      Franco Schedule, DO  Department of Hospital Medicine   [facility] - [address] [place] [address]

## 2025-07-30 NOTE — ASSESSMENT & PLAN NOTE
Patient's blood pressure range in the last 24 hours was: BP  Min: 94/58  Max: 122/62.The patient's inpatient anti-hypertensive regimen is listed below:  Current Antihypertensives       Plan  - BP is controlled, no changes needed to their regimen  - Holding ARB and lasix for now.  Mild hypotension upon arrival  -  restart BP meds as tolerated.      Update: Continue to hold losartan on account of PRABHA.

## 2025-07-30 NOTE — PT/OT/SLP PROGRESS
Physical Therapy Treatment    Patient Name:  Umesh Lassiter   MRN:  [id number]    Recommendations:     Discharge Recommendations: Moderate Intensity Therapy  Discharge Equipment Recommendations: to be determined by next level of care  Barriers to discharge: ongoing medical care    Assessment:     Umesh Lassiter is a 80 y.o. female admitted with a medical diagnosis of Acute metabolic encephalopathy.  She presents with the following impairments/functional limitations: weakness, impaired endurance, impaired self care skills, impaired functional mobility, pain, impaired cognition. During middle of session, pt's colostomy bag ruptured, causing session to end abruptly once RN arrived to repair.     Rehab Prognosis: Good and Fair; patient would benefit from acute skilled PT services to address these deficits and reach maximum level of function.    Recent Surgery: * No surgery found *      Plan:     During this hospitalization, patient to be seen 5 x/week to address the identified rehab impairments via gait training, therapeutic activities, therapeutic exercises, neuromuscular re-education and progress toward the following goals:    Plan of Care Expires:  08/26/25    Subjective     Chief Complaint: Acute metabolic encephalopathy   Patient/Family Comments/goals: agreeable  Pain/Comfort:  Pain Rating 1: 0/10      Objective:     Communicated with SAMI Carroll RN prior to session.  Patient found HOB elevated with   IV, colostomy bag, telemetry, purewick upon PT entry to room.     General Precautions: Standard, fall  Orthopedic Precautions:    Braces:    Respiratory Status: Room air     Functional Mobility:  Bed Mobility:     Rolling Left:  minimum assistance and moderate assistance  Rolling Right: minimum assistance and moderate assistance  Scooting: moderate assistance      AM-PAC 6 CLICK MOBILITY          Treatment & Education:  Bilateral lower extremity exercise x 10-15 reps: ankle pumps, Quad sets, glut sets, and straight  leg raises with active assist ROM, verbal cues for sequencing and safety, and tactile cues     Patient left supine with RN and nursing students present..    GOALS:   Multidisciplinary Problems       Physical Therapy Goals          Problem: Physical Therapy    Goal Priority Disciplines Outcome Interventions   Physical Therapy Goal     PT, PT/OT Progressing    Description: Short term goals:  . Supine to sit with Contact Guard Assistance  . Sit to supine with Contact Guard Assistance  . Sit to stand transfer with Contact Guard Assistance  . Gait  x 25 feet with Contact Guard Assistance using Rolling Walker.     Long term goals:  Patient will gain highest level functional mobility with lowest level of assistive device to return to desired living arrangement and prior ADL's.                          DME Justifications:  To be determined.     Time Tracking:     PT Received On: 07/30/25  PT Start Time: 1547     PT Stop Time: 1602  PT Total Time (min): 15 min     Billable Minutes: Therapeutic Activity 9    Treatment Type: Treatment  PT/PTA: PT     Number of PTA visits since last PT visit: 0     07/30/2025

## 2025-07-30 NOTE — PLAN OF CARE
Problem: Occupational Therapy  Goal: Occupational Therapy Goal  Description: STG:  Pt will perform grooming with setup  Pt will feed self with setup with AD as needed  Pt will perform UE dressing with setup  Pt will perform LE dressing with min a  Pt will sit EOB x 15  min with (S)   Pt will transfer bed/chair/bsc with min a  Pt will perform standing task x 1 min with min  assistance  Pt will tolerate 20 minutes of tx without fatigue      LT.Restore to max I with self care and mobility.     Outcome: Progressing

## 2025-07-30 NOTE — ASSESSMENT & PLAN NOTE
Recent Labs     07/28/25  2212 07/29/25  0844 07/30/25  0412   CREATININE 3.60* 2.57* 1.44*   EGFRNORACEVR 12* 18* 36*      Plan  - Avoid nephrotoxins and renally dose meds for GFR listed above  - Monitor urine output, serial BMP, and adjust therapy as needed  -  Hold the lasix, NSAID mobic, and ARB  -  IVF and follow renal function daily.      Patient with condition that if not treated could result in loss of life or bodily function.  Due to co morbidities this patient has complex medical management.    Update: Kidney function improving with IV fluids.

## 2025-07-30 NOTE — PROCEDURES
EEG    Date/Time: 7/30/2025 2:32 PM    Performed by: Jareth Ingram MD  Authorized by: Qasim Laboy MD      ELECTROENCEPHALOGRAM REPORT    DATE OF SERVICE: 07/30/2025  EEG NUMBER: [id number]  LOCATION OF SERVICE: [facility]   Electroencephalographic (EEG) recording is with electrodes placed according to the International 10-20 placement system.  Thirty two (32) channels of digital signal (sampling rate of 512/sec) including T1 and T2 was simultaneously recorded from the scalp and may include  EKG, EMG, and/or eye monitors.  Recording band pass was 0.1 to 512 hz.  Digital video recording of the patient is simultaneously recorded with the EEG.  The patient is instructed report clinical symptoms which may occur during the recording session.  EEG and video recording is stored and archived in digital format. Activation procedures which include photic stimulation, hyperventilation and instructing patients to perform simple task are done in selected patients.    The EEG is displayed on a monitor screen and can be reviewed using different montages.  Computer assisted analysis is employed to detect spike and electrographic seizure activity.   The entire record is submitted for computer analysis.  The entire recording is visually reviewed and the times identified by computer analysis as being spikes or seizures are reviewed again.  Compresses spectral analysis (CSA) is also performed on the activity recorded from each individual channel.  This is displayed as a power display of frequencies from 0 to 30 Hz over time.   The CSA is reviewed looking for asymmetries in power between homologous areas of the scalp and then compared with the original EEG recording.     [employer] software was also utilized in the review of this study.  This software suite analyzes the EEG recording in multiple domains.  Coherence and rhythmicity is computed to identify EEG sections which may contain organized seizures.  Each  channel undergoes analysis to detect presence of spike and sharp waves which have special and morphological characteristic of epileptic activity.  The routine EEG recording is converted from spacial into frequency domain.  This is then displayed comparing homologous areas to identify areas of significant asymmetry.  Algorithm to identify non-cortically generated artifact is used to separate eye movement, EMG and other artifact from the EEG    EEG FINDINGS  During the maximally alert state, a 8 Hz posterior dominant rhythm was seen which was symmetrical, well-regulated and briskly attenuated to eye opening. In the more anterior head regions, symmetric frontocentral beta frequencies predominated. As drowsiness occurred, the posterior dominant rhythm attenuated, slow rolling eye movements appeared, and symmetrical vertex sharp transients were seen.  Sleep was not captured during the study.    No epileptiform findings were noted, no electrographic seizures were seen, and no clinical events were reported.      Activation Procedures were not performed      IMPRESSION:  Normal awake and drowsy    CLINICAL CORRELATION:  This is a normal EEG in awake and drowsy states.  There were no epileptiform findings, electrographic seizures, or no clinical events recorded. An EEG without epileptiform findings does not exclude the possibility of epilepsy. If the clinical suspicion of epilepsy is high, a repeat EEG after sleep depreivation, following a seizure, or a prolonged EEG may increase the frequency of detecting epileptiform discharges.      Jareth Ingram MD  Neurology

## 2025-07-30 NOTE — PT/OT/SLP EVAL
Occupational Therapy   Evaluation    Name: Umesh Lassiter  MRN: [id number]  Admitting Diagnosis: Acute metabolic encephalopathy  Recent Surgery: * No surgery found *      Recommendations:     Discharge Recommendations: Moderate Intensity Therapy  Discharge Equipment Recommendations:  to be determined by next level of care  Barriers to discharge:  None    Assessment:     Umesh Lassiter is a 80 y.o. female with a medical diagnosis of Acute metabolic encephalopathy.  She presents with complaint of (B) LE pain.Pt agreed to OT evaluation. Performance deficits affecting function: weakness, impaired endurance, impaired self care skills, impaired functional mobility, impaired balance, impaired cognition, pain.      Rehab Prognosis: Good; patient would benefit from acute skilled OT services to address these deficits and reach maximum level of function.       Plan:     Patient to be seen 5 x/week to address the above listed problems via self-care/home management, therapeutic activities, therapeutic exercises  Plan of Care Expires: 09/03/25  Plan of Care Reviewed with: patient    Subjective     Chief Complaint: (B) LE pain  Patient/Family Comments/goals: To return to NH    Occupational Profile:  Living Environment: Pt is a NH resident  Previous level of function: Pt received assistance from staff for self care   Roles and Routines: Pt assisted by staff for self care   Equipment Used at Home: walker, rolling, wheelchair  Assistance upon Discharge: NH staff    Pain/Comfort:  Pain Rating 1: 10/10  Location - Side 1: Bilateral  Location 1: leg  Pain Addressed 1: Nurse notified  Pain Rating Post-Intervention 1: 10/10    Patients cultural, spiritual, Roman [employer] conflicts given the current situation: no    Objective:     Communicated with: DOE Carroll prior to session.  Patient found HOB elevated with PureWick, colostomy, peripheral IV upon OT entry to room.    General Precautions: Standard, fall  Orthopedic Precautions:  N/A  Braces: N/A  Respiratory Status: Room air    Occupational Performance:    Bed Mobility:    Patient completed Rolling/Turning to Right with minimum assistance  Patient completed Supine to Sit with minimum assistance  Patient completed Sit to Supine with minimum assistance and x 2 persons    Functional Mobility/Transfers:  Patient completed Sit <> Stand Transfer with minimum assistance and of x 2 persons  with  gait belt and manual assistance   Functional Mobility: NT    Activities of Daily Living:  Upper Body Dressing: moderate assistance    Lower Body Dressing: dependence      Cognitive/Visual Perceptual:  Cognitive/Psychosocial Skills:     -       Oriented to: Person   -       Follows Commands/attention:Follows one-step commands  -       Communication: WFL  Visual/Perceptual:      -WFL      Physical Exam:  Balance:    -       CGA with EOB sitting. Min a x 2 with sit/stand  Skin integrity: Visible skin intact  Edema:  None noted  Sensation:    -       Intact  Motor Planning:    -       Impaired  Dominant hand:    -       right  Upper Extremity Range of Motion:     -       Right Upper Extremity: WFL  -       Left Upper Extremity: WFL  Upper Extremity Strength:    -       Right Upper Extremity: 3/5  -       Left Upper Extremity: 3/5   Strength:    -       Right Upper Extremity: WFL  -       Left Upper Extremity: WFL    AMPAC 6 Click ADL:  AMPAC Total Score: 13    Treatment & Education:  OT evaluation completed. See eval for details. Pt is a NH resident. Pt receive assistance from staff for bathing, but reports being able to assist with dressing,grooming and feeding. Tx plan to focus on increasing (I).  Pt educated on OT role/POC.   Importance of OOB activity.  Importance of sitting.  Safety during functional t/f and mobility with use of RW  Importance of assisting with self-care activities   All questions/concerns answered within OT scope of practice     Patient left HOB elevated with all lines intact, call  button in reach, and nurse present    GOALS:   Multidisciplinary Problems       Occupational Therapy Goals          Problem: Occupational Therapy    Goal Priority Disciplines Outcome Interventions   Occupational Therapy Goal     OT, PT/OT Progressing    Description: STG:  Pt will perform grooming with setup  Pt will feed self with setup with AD as needed  Pt will perform UE dressing with setup  Pt will perform LE dressing with min a  Pt will sit EOB x 15  min with (S)   Pt will transfer bed/chair/bsc with min a  Pt will perform standing task x 1 min with min  assistance  Pt will tolerate 20 minutes of tx without fatigue      LT.Restore to max I with self care and mobility.                          DME Justifications:      History:     Past Medical History:   Diagnosis Date    Colon adenocarcinoma 2025    CVA (cerebral vascular accident) 2018    Essential (primary) hypertension     Generalized epilepsy 2022    Gout, arthritis     Pseudomonas aeruginosa infection     BLE cellulitis    Venous stasis ulcers          Past Surgical History:   Procedure Laterality Date    CHOLECYSTECTOMY      COLOSTOMY N/A 2025    Procedure: CREATION, COLOSTOMY;  Surgeon: Mannie Brannon MD;  Location: [facility];  Service: General;  Laterality: N/A;    EXCISION, SMALL INTESTINE N/A 2025    Procedure: EXCISION, SMALL INTESTINE;  Surgeon: Mannie Brannon MD;  Location: [facility] OR;  Service: General;  Laterality: N/A;    KNEE ARTHROSCOPY Left     x 2    LAPAROTOMY, EXPLORATORY N/A 2025    Procedure: LAPAROTOMY, EXPLORATORY;  Surgeon: Mannie Brannon MD;  Location: [facility];  Service: General;  Laterality: N/A;    LASER ABLATION Right 2018    GSV LASER ABLATION PERFORMED BY DR. KARMA BUTCHER.    LASER ABLATION Left 2018    SSV LASER ABLATION PERFORMED BY DR. KARMA BUTCHER.    LASER ABLATION Right 2020    GSV ( DUPLICATED )LASER ABLATION PERFORMED BY DR. KARMA BUTCHER.     LASER ABLATION Left 01/08/2021    GSV LASER ABLATION PERFORMED BY DR. KARMA BUTCHER.    SHOULDER ARTHROSCOPY Left     SINUS SURGERY      TOTAL ABDOMINAL HYSTERECTOMY W/ BILATERAL SALPINGOOPHORECTOMY      VENOUS ABLATION Right 02/28/2020    DISTAL GSV VARITHENA ABLATION PERFORMED BY DR. KARMA BUTCHER.       Time Tracking:     OT Date of Treatment: 07/30/25  OT Start Time: 1001  OT Stop Time: 1022  OT Total Time (min): 21 min    Billable Minutes:Evaluation 21 7/30/2025

## 2025-07-31 VITALS
SYSTOLIC BLOOD PRESSURE: 121 MMHG | WEIGHT: 192.38 LBS | TEMPERATURE: 98 F | RESPIRATION RATE: 18 BRPM | BODY MASS INDEX: 30.92 KG/M2 | HEIGHT: 66 IN | DIASTOLIC BLOOD PRESSURE: 57 MMHG | OXYGEN SATURATION: 99 % | HEART RATE: 83 BPM

## 2025-07-31 PROBLEM — R13.12 OROPHARYNGEAL DYSPHAGIA: Status: ACTIVE | Noted: 2025-07-31

## 2025-07-31 PROBLEM — D63.0 ANEMIA IN NEOPLASTIC DISEASE: Status: ACTIVE | Noted: 2025-07-31

## 2025-07-31 PROBLEM — E87.1 DEHYDRATION WITH HYPONATREMIA: Status: RESOLVED | Noted: 2025-07-29 | Resolved: 2025-07-31

## 2025-07-31 PROBLEM — E86.0 DEHYDRATION WITH HYPONATREMIA: Status: RESOLVED | Noted: 2025-07-29 | Resolved: 2025-07-31

## 2025-07-31 LAB
ANION GAP SERPL CALCULATED.3IONS-SCNC: 10 MMOL/L (ref 7–16)
BASOPHILS # BLD AUTO: 0.02 K/UL (ref 0–0.2)
BASOPHILS NFR BLD AUTO: 0.4 % (ref 0–1)
BUN SERPL-MCNC: 22 MG/DL (ref 10–20)
BUN/CREAT SERPL: 27 (ref 6–20)
CALCIUM SERPL-MCNC: 9.8 MG/DL (ref 8.4–10.2)
CHLORIDE SERPL-SCNC: 111 MMOL/L (ref 98–107)
CO2 SERPL-SCNC: 23 MMOL/L (ref 23–31)
CREAT SERPL-MCNC: 0.81 MG/DL (ref 0.55–1.02)
DIFFERENTIAL METHOD BLD: ABNORMAL
EGFR (NO RACE VARIABLE) (RUSH/TITUS): 73 ML/MIN/1.73M2
EOSINOPHIL # BLD AUTO: 0.56 K/UL (ref 0–0.5)
EOSINOPHIL NFR BLD AUTO: 10.3 % (ref 1–4)
ERYTHROCYTE [DISTWIDTH] IN BLOOD BY AUTOMATED COUNT: 17.1 % (ref 11.5–14.5)
GLUCOSE SERPL-MCNC: 89 MG/DL (ref 82–115)
HCT VFR BLD AUTO: 28.8 % (ref 38–47)
HGB BLD-MCNC: 9.2 G/DL (ref 12–16)
IMM GRANULOCYTES # BLD AUTO: 0.04 K/UL (ref 0–0.04)
IMM GRANULOCYTES NFR BLD: 0.7 % (ref 0–0.4)
LYMPHOCYTES # BLD AUTO: 1.31 K/UL (ref 1–4.8)
LYMPHOCYTES NFR BLD AUTO: 24.1 % (ref 27–41)
MCH RBC QN AUTO: 25.1 PG (ref 27–31)
MCHC RBC AUTO-ENTMCNC: 31.9 G/DL (ref 32–36)
MCV RBC AUTO: 78.5 FL (ref 80–96)
MONOCYTES # BLD AUTO: 0.88 K/UL (ref 0–0.8)
MONOCYTES NFR BLD AUTO: 16.2 % (ref 2–6)
MPC BLD CALC-MCNC: 9.9 FL (ref 9.4–12.4)
NEUTROPHILS # BLD AUTO: 2.63 K/UL (ref 1.8–7.7)
NEUTROPHILS NFR BLD AUTO: 48.3 % (ref 53–65)
NRBC # BLD AUTO: 0 X10E3/UL
NRBC, AUTO (.00): 0 %
PLATELET # BLD AUTO: 299 K/UL (ref 150–400)
POTASSIUM SERPL-SCNC: 5.5 MMOL/L (ref 3.5–5.1)
RBC # BLD AUTO: 3.67 M/UL (ref 4.2–5.4)
SODIUM SERPL-SCNC: 138 MMOL/L (ref 136–145)
UA COMPLETE W REFLEX CULTURE PNL UR: ABNORMAL
WBC # BLD AUTO: 5.44 K/UL (ref 4.5–11)

## 2025-07-31 PROCEDURE — 25000003 PHARM REV CODE 250: Performed by: INTERNAL MEDICINE

## 2025-07-31 PROCEDURE — 99239 HOSP IP/OBS DSCHRG MGMT >30: CPT | Mod: ,,, | Performed by: INTERNAL MEDICINE

## 2025-07-31 PROCEDURE — 63600175 PHARM REV CODE 636 W HCPCS: Performed by: HOSPITALIST

## 2025-07-31 PROCEDURE — 36415 COLL VENOUS BLD VENIPUNCTURE: CPT | Performed by: INTERNAL MEDICINE

## 2025-07-31 PROCEDURE — 99900035 HC TECH TIME PER 15 MIN (STAT)

## 2025-07-31 PROCEDURE — 25000003 PHARM REV CODE 250: Performed by: HOSPITALIST

## 2025-07-31 PROCEDURE — 85025 COMPLETE CBC W/AUTO DIFF WBC: CPT | Performed by: INTERNAL MEDICINE

## 2025-07-31 PROCEDURE — 63600175 PHARM REV CODE 636 W HCPCS: Performed by: INTERNAL MEDICINE

## 2025-07-31 PROCEDURE — 80048 BASIC METABOLIC PNL TOTAL CA: CPT | Performed by: INTERNAL MEDICINE

## 2025-07-31 RX ORDER — CEFTRIAXONE 1 G/1
1 INJECTION, POWDER, FOR SOLUTION INTRAMUSCULAR; INTRAVENOUS
Status: DISCONTINUED | OUTPATIENT
Start: 2025-07-31 | End: 2025-07-31 | Stop reason: HOSPADM

## 2025-07-31 RX ORDER — CEFDINIR 300 MG/1
300 CAPSULE ORAL 2 TIMES DAILY
Start: 2025-07-31 | End: 2025-08-10

## 2025-07-31 RX ADMIN — CEFEPIME 1 G: 1 INJECTION, POWDER, FOR SOLUTION INTRAMUSCULAR; INTRAVENOUS at 05:07

## 2025-07-31 RX ADMIN — ALLOPURINOL 100 MG: 100 TABLET ORAL at 08:07

## 2025-07-31 RX ADMIN — LEVOTHYROXINE SODIUM 50 MCG: 0.05 TABLET ORAL at 05:07

## 2025-07-31 RX ADMIN — POLYETHYLENE GLYCOL 3350 17 G: 17 POWDER, FOR SOLUTION ORAL at 08:07

## 2025-07-31 RX ADMIN — ACETAMINOPHEN 1000 MG: 500 TABLET ORAL at 08:07

## 2025-07-31 RX ADMIN — CEFTRIAXONE SODIUM 1 G: 1 INJECTION, POWDER, FOR SOLUTION INTRAMUSCULAR; INTRAVENOUS at 09:07

## 2025-07-31 RX ADMIN — HEPARIN SODIUM 5000 UNITS: 5000 INJECTION, SOLUTION INTRAVENOUS; SUBCUTANEOUS at 08:07

## 2025-07-31 RX ADMIN — SODIUM ZIRCONIUM CYCLOSILICATE 10 G: 10 POWDER, FOR SUSPENSION ORAL at 09:07

## 2025-07-31 NOTE — ASSESSMENT & PLAN NOTE
Patient's blood pressure range in the last 24 hours was: BP  Min: 119/52  Max: 155/69.The patient's inpatient anti-hypertensive regimen is listed below:  Current Antihypertensives       Plan  - BP is controlled, no changes needed to their regimen  - Hold ARB until potassium back to normal, please repeat in 2 days

## 2025-07-31 NOTE — ASSESSMENT & PLAN NOTE
Anemia is likely due to chronic disease due to Malignancy. Most recent hemoglobin and hematocrit are listed below.  Recent Labs     07/28/25  2212 07/30/25  0412 07/31/25  0416   HGB 9.8* 8.9* 9.2*   HCT 30.0* 27.3* 28.8*       Plan  - Monitor serial CBC: as needed  - Transfuse PRBC if patient becomes hemodynamically unstable, symptomatic or H/H drops below 7/21.  - Patient has not received any PRBC transfusions to date  - Patient's anemia is currently stable

## 2025-07-31 NOTE — ASSESSMENT & PLAN NOTE
Baseline Cr WNL.   Recent Labs     07/29/25  0844 07/30/25  0412 07/31/25  0416   CREATININE 2.57* 1.44* 0.81   EGFRNORACEVR 18* 36* 73      Plan  - Avoid nephrotoxins and renally dose meds for GFR listed above  - Monitor urine output, serial BMP, and adjust therapy as needed  -  Resolved with fluids.

## 2025-07-31 NOTE — PLAN OF CARE
[facility] Telemetry  Discharge Final Note    Primary Care Provider: No, Primary Doctor    Expected Discharge Date: 7/31/2025    Final Discharge Note (most recent)       Final Note - 07/31/25 1327          Final Note    Assessment Type Final Discharge Note     Anticipated Discharge Disposition [facility] Nursing Facility     What phone number can be called within the next 1-3 days to see how you are doing after discharge? [phone]        Post-Acute Status    Post-Acute Authorization Placement     Post-Acute Placement Status Set-up Complete/Auth obtained     Patient choice form signed by patient/caregiver List with quality metrics by geographic area provided;List from CMS Compare;List from System Post-Acute Care     Discharge Delays None known at this time                     Important Message from Medicare  Important Message from Medicare regarding Discharge Appeal Rights: Explained to patient/caregiver, Signed/date by patient/caregiver     Date IMM was signed: 07/30/25  Time IMM was signed: 1400    After-discharge care                Destination       [facility]   Service: Nursing Home    [address] [place] [address]   Phone: [phone]                     Patient will be discharged back to [facility] today.  IM updated.  DC information faxed to [facility].  Facility aware.  No further needs.

## 2025-07-31 NOTE — ASSESSMENT & PLAN NOTE
Hyperkalemia is likely due to Medication induced.The patients most recent potassium results are listed below.  Recent Labs     07/29/25  0844 07/30/25  0412 07/31/25  0416   K 5.1 5.5* 5.5*     Plan  - Monitor for arrhythmias with EKG and/or continuous telemetry.   - Treat the hyperkalemia with Potassium Binders.   - Monitor potassium: as needed  - The patient's hyperkalemia is stable

## 2025-07-31 NOTE — PLAN OF CARE
Problem: Hospitalized Older Adult  Goal: Optimal Cognitive Function  Outcome: Adequate for Care Transition  Goal: Effective Bowel Elimination  Outcome: Adequate for Care Transition  Goal: Optimal Coping  Outcome: Adequate for Care Transition  Goal: Fluid and Electrolyte Balance  Outcome: Adequate for Care Transition  Goal: Optimal Functional Ability  Outcome: Adequate for Care Transition  Goal: Improved Oral Intake  Outcome: Adequate for Care Transition  Goal: Adequate Sleep/Rest  Outcome: Adequate for Care Transition  Goal: Effective Urinary Elimination  Outcome: Adequate for Care Transition     Problem: Skin Injury Risk Increased  Goal: Skin Health and Integrity  Outcome: Adequate for Care Transition     Problem: Adult Inpatient Plan of Care  Goal: Plan of Care Review  Outcome: Adequate for Care Transition  Goal: Patient-Specific Goal (Individualized)  Outcome: Adequate for Care Transition  Goal: Absence of Hospital-Acquired Illness or Injury  Outcome: Adequate for Care Transition  Goal: Optimal Comfort and Wellbeing  Outcome: Adequate for Care Transition  Goal: Readiness for Transition of Care  Outcome: Adequate for Care Transition

## 2025-07-31 NOTE — NURSING
1305 Lines removed. Personal belongings returned to patient.  1315 Patient d/c to SNF via stretcher per EMS. No distress noted at time of departure.

## 2025-07-31 NOTE — DISCHARGE SUMMARY
[facility] - [facility] Medicine  Discharge Summary      Patient Name: Umesh Lassiter  MRN: [id number]  YAO: [id number]  Patient Class: IP- Inpatient  Admission Date: 7/28/2025  Hospital Length of Stay: 2 days  Discharge Date and Time: 07/31/2025 10:50 AM  Attending Physician: Qasim Laboy MD   Discharging Provider: QASIM LABOY MD  Primary Care Provider: Robina, Primary Doctor    Primary Care Team: Networked reference to record PCT     HPI:   81 yo F presented to [facility] ED from [facility] with confusion and decreased LOC with left facial droop.  I notice a slight asymmetry but nothing dramatic.  She is slightly confused but easily redirected.  Her son is present and he states she is usually not confused and she is having some trouble giving a history.  She says she has N/V but then says she hasn't had N/V for years.  She says she has diarrhea but really just frequent, soft stools from her colostomy bag.  (Dr. Brannon performed partial colectomy for colon cancer.)   She does appear quite dry and she had hyponatremia and harsh and is on lasix.  Neurology was consulted in ED and they felt mental status change was from metabolic encephalopathy.      Patient was given a liter of IVF in the ED and made some urine (purewick) which did indicate infection.  Unknown if retention but renal US pending.  No suprapubic tenderness appreciated and could not get a bladder scan at the moment.  She has had BLE wounds previously with pseudomonas so will cover with cefepime.      Reviewed med recon and updated PMH.  See assessment and plan below for problem based evaluation      * No surgery found *      Hospital Course:   Stable for discharge home as renal function back to normal. Much more alert and conversing, able to tolerate > 50% of meals. E.coli UTI noted and discharging with Omnicef.      Goals of Care Treatment Preferences:  Code Status: Full Code         Consults:   Consults (From  admission, onward)          Status Ordering Provider     Inpatient consult to Registered Dietitian/Nutritionist  Once        Provider:  (Not yet assigned)    Completed SUN MCGRAW     Consult to Telemedicine - Acute Stroke  Once        Provider:  Edwin Gonzalez MD    Completed LUZMA LARSON            Assessment & Plan  Acute metabolic encephalopathy  Likely sec to dehydration, PRABHA and UTI.  Now back to baseline with management (IV fluids, antibiotics).     Acute UTI  E.coli noted on urine culture.  Discharge on 5 days of Omnicef.     PRABHA (acute kidney injury)  Baseline Cr WNL.   Recent Labs     07/29/25  0844 07/30/25  0412 07/31/25  0416   CREATININE 2.57* 1.44* 0.81   EGFRNORACEVR 18* 36* 73      Plan  - Avoid nephrotoxins and renally dose meds for GFR listed above  - Monitor urine output, serial BMP, and adjust therapy as needed  -  Resolved with fluids.     Dehydration with hyponatremia (Resolved: 7/31/2025)  Hyponatremia is likely due to Dehydration/hypovolemia. The patient's most recent sodium results are listed below.  Recent Labs     07/29/25  0844 07/30/25  0412 07/31/25  0416   * 137 138     Will hold lasix and hydrate with NS   1 L in ED and 125 cc now  Follow electrolytes daily.      Seizure disorder  Continue home Depakote.     Colon adenocarcinoma  S/p colectomy per Dr. Brannon.  Maintain follow up with Dr. Brannon on 8/5/25.     Essential (primary) hypertension  Patient's blood pressure range in the last 24 hours was: BP  Min: 119/52  Max: 155/69.The patient's inpatient anti-hypertensive regimen is listed below:  Current Antihypertensives       Plan  - BP is controlled, no changes needed to their regimen  - Hold ARB until potassium back to normal, please repeat in 2 days    Gout, arthritis  Continue renal dose allopurinol    Hyperkalemia  Hyperkalemia is likely due to Medication induced.The patients most recent potassium results are listed below.  Recent Labs     07/29/25  0844  07/30/25  0412 07/31/25  0416   K 5.1 5.5* 5.5*     Plan  - Monitor for arrhythmias with EKG and/or continuous telemetry.   - Treat the hyperkalemia with Potassium Binders.   - Monitor potassium: as needed  - The patient's hyperkalemia is stable    Anemia in neoplastic disease  Anemia is likely due to chronic disease due to Malignancy. Most recent hemoglobin and hematocrit are listed below.  Recent Labs     07/28/25  2212 07/30/25 0412 07/31/25 0416   HGB 9.8* 8.9* 9.2*   HCT 30.0* 27.3* 28.8*       Plan  - Monitor serial CBC: as needed  - Transfuse PRBC if patient becomes hemodynamically unstable, symptomatic or H/H drops below 7/21.  - Patient has not received any PRBC transfusions to date  - Patient's anemia is currently stable    Oropharyngeal dysphagia  SLP consulted, ok for pureed diet with thin liquids.  Need help being fed.   Monitor for signs of aspiration.     Final Active Diagnoses:    Diagnosis Date Noted POA    PRINCIPAL PROBLEM:  Acute metabolic encephalopathy [G93.41] 07/29/2025 Yes    Acute UTI [N39.0] 07/30/2025 Yes    Hyperkalemia [E87.5] 07/30/2025 Yes    PRABHA (acute kidney injury) [N17.9] 07/29/2025 Yes    Dehydration with hyponatremia [E86.0, E87.1] 07/29/2025 Yes    Seizure disorder [G40.909] 07/29/2025 Yes    Essential (primary) hypertension [I10]  Yes    Gout, arthritis [M10.9]  Yes    Colon adenocarcinoma [C18.9] 06/26/2025 Yes      Problems Resolved During this Admission:       Discharged Condition: good    Disposition: Skilled Nursing Facility    Follow Up:    Patient Instructions:   No discharge procedures on file.    Significant Diagnostic Studies: Labs: BMP:   Recent Labs   Lab 07/30/25  0412 07/31/25 0416   GLU 76* 89    138   K 5.5* 5.5*   * 111*   CO2 20* 23   BUN 37* 22*   CREATININE 1.44* 0.81   CALCIUM 9.5 9.8    and CBC   Recent Labs   Lab 07/30/25  0412 07/31/25  0416   WBC 6.35 5.44   HGB 8.9* 9.2*   HCT 27.3* 28.8*    299       Pending Diagnostic Studies:        None           Medications:  Reconciled Home Medications:      Medication List        PAUSE taking these medications      losartan 50 MG tablet  Wait to take this until: August 4, 2025  Commonly known as: COZAAR  Take 1 tablet (50 mg total) by mouth once daily.            START taking these medications      cefdinir 300 MG capsule  Commonly known as: OMNICEF  Take 1 capsule (300 mg total) by mouth 2 (two) times daily. for 10 days            CHANGE how you take these medications      divalproex  MG Tb24 24 hr tablet  Commonly known as: DEPAKOTE ER  TAKE 2 TABLETS BY MOUTH EVERY NIGHT AT BEDTIME FOR SEIZURES  What changed:   how much to take  how to take this  when to take this     HYDROcodone-acetaminophen 7.5-325 mg per tablet  Commonly known as: NORCO  Take 1 tablet by mouth every 6 (six) hours as needed for Pain.  What changed: when to take this            CONTINUE taking these medications      albuterol 90 mcg/actuation inhaler  Commonly known as: PROVENTIL/VENTOLIN HFA  Inhale 2 puffs into the lungs every 6 (six) hours as needed for Wheezing.     allopurinoL 100 MG tablet  Commonly known as: ZYLOPRIM  Take 200 mg by mouth every evening. May also give 100 mg by mouth daily PRN for elevated uric acid     ascorbic acid (vitamin C) 500 MG tablet  Commonly known as: VITAMIN C  Take 500 mg by mouth 2 (two) times daily.     cilostazoL 50 MG Tab  Commonly known as: PLETAL  Take 1 tablet (50 mg total) by mouth 2 (two) times daily.     clorazepate 3.75 MG Tab  Commonly known as: TRANXENE  Take 3.75 mg by mouth 2 (two) times daily.     furosemide 20 MG tablet  Commonly known as: LASIX  Take 1 tablet (20 mg total) by mouth once daily.     levothyroxine 50 MCG tablet  Commonly known as: SYNTHROID  Take 1 tablet (50 mcg total) by mouth before breakfast.     melatonin 10 mg Tab  Take 10 mg by mouth every evening.     mirtazapine 7.5 MG Tab  Commonly known as: REMERON  Take 7.5 mg by mouth every evening.      multivitamin per tablet  Commonly known as: THERAGRAN  Take 1 tablet by mouth once daily.     ondansetron 4 MG Tbdl  Commonly known as: ZOFRAN-ODT  Take 1 tablet (4 mg total) by mouth every 6 (six) hours as needed (nausea).     polyethylene glycol 17 gram Pwpk  Commonly known as: GLYCOLAX  Take 17 g by mouth once daily.            STOP taking these medications      meloxicam 7.5 MG tablet  Commonly known as: MOBIC              Indwelling Lines/Drains at time of discharge:   Lines/Drains/Airways       Drain  Duration                  Colostomy 06/22/25 1009 Other (see comments) LLQ 39 days    Female External Urinary Catheter w/ Suction 07/29/25 0100 2 days                        Time spent on the discharge of patient: 41 minutes         SUN MCGRAW MD  Department of Hospital Medicine  [facility] Tele[facility]

## 2025-07-31 NOTE — ASSESSMENT & PLAN NOTE
Likely sec to dehydration, PRABHA and UTI.  Now back to baseline with management (IV fluids, antibiotics).

## 2025-07-31 NOTE — HOSPITAL COURSE
Stable for discharge home as renal function back to normal. Much more alert and conversing, able to tolerate > 50% of meals. E.coli UTI noted and discharging with Omnicef.

## 2025-07-31 NOTE — PLAN OF CARE
Packet started and taken to 6N and placed in patient's folder.  CM will continue to follow for DC needs as they arise.

## 2025-07-31 NOTE — ASSESSMENT & PLAN NOTE
SLP consulted, ok for pureed diet with thin liquids.  Need help being fed.   Monitor for signs of aspiration.

## 2025-07-31 NOTE — ASSESSMENT & PLAN NOTE
Hyponatremia is likely due to Dehydration/hypovolemia. The patient's most recent sodium results are listed below.  Recent Labs     07/29/25  0844 07/30/25  0412 07/31/25  0416   * 137 138     Will hold lasix and hydrate with NS   1 L in ED and 125 cc now  Follow electrolytes daily.

## 2025-08-05 ENCOUNTER — OFFICE VISIT (OUTPATIENT)
Dept: SURGERY | Facility: CLINIC | Age: 80
End: 2025-08-05
Attending: SURGERY
Payer: MEDICARE

## 2025-08-05 VITALS — WEIGHT: 192.44 LBS | HEIGHT: 66 IN | BODY MASS INDEX: 30.93 KG/M2

## 2025-08-05 DIAGNOSIS — Z09 FOLLOW-UP EXAMINATION, FOLLOWING OTHER SURGERY: Primary | ICD-10-CM

## 2025-08-05 PROCEDURE — 99024 POSTOP FOLLOW-UP VISIT: CPT | Mod: ,,, | Performed by: SURGERY

## 2025-08-05 PROCEDURE — 99999 PR PBB SHADOW E&M-EST. PATIENT-LVL III: CPT | Mod: PBBFAC,,, | Performed by: SURGERY

## 2025-08-05 PROCEDURE — 99213 OFFICE O/P EST LOW 20 MIN: CPT | Mod: PBBFAC | Performed by: SURGERY

## 2025-08-05 NOTE — PROGRESS NOTES
Post-operative Note    HPI:  The patient is status post exploratory laparotomy with a Valencia's procedure for near obstructing sigmoid lesion.  He bounced back within a week with a small-bowel obstruction that has treated conservatively and did well.  Since then she has been doing good no issues.  No nausea or vomiting tolerating a diet.  Little bit of erosions around her ostomy site but they are cutting the bag has a complete [place]q without any flange to protect the skin.    PHYSICAL EXAM:    Incision midline healing well clean dry intact.  Mild erosions in the inferior part of the ostomy site    Recent Results (from the past 3 weeks)   POCT glucose    Collection Time: 07/28/25  9:54 PM   Result Value Ref Range    POC Glucose 76 70 - 105 mg/dL   Comprehensive metabolic panel    Collection Time: 07/28/25 10:12 PM   Result Value Ref Range    Sodium 130 (L) 136 - 145 mmol/L    Potassium 5.4 (H) 3.5 - 5.1 mmol/L    Chloride 98 98 - 107 mmol/L    CO2 23 23 - 31 mmol/L    Anion Gap 14 7 - 16 mmol/L    Glucose 79 (L) 82 - 115 mg/dL    BUN 56 (H) 10 - 20 mg/dL    Creatinine 3.60 (H) 0.55 - 1.02 mg/dL    BUN/Creatinine Ratio 16 6 - 20    Calcium 10.5 (H) 8.4 - 10.2 mg/dL    Total Protein 6.9 5.8 - 7.6 g/dL    Albumin 2.7 (L) 3.4 - 4.8 g/dL    Globulin 4.2 (H) 2.0 - 4.0 g/dL    A/G Ratio 0.6     Bilirubin, Total 0.3 <=1.5 mg/dL    Alk Phos 77 40 - 150 U/L    ALT <7 <=55 U/L    AST 29 11 - 45 U/L    eGFR 12 (L) >=60 mL/min/1.73m2   Protime-INR    Collection Time: 07/28/25 10:12 PM   Result Value Ref Range    PT 13.6 11.7 - 14.7 seconds    INR 1.03 <=3.30   TSH    Collection Time: 07/28/25 10:12 PM   Result Value Ref Range    TSH 6.494 (H) 0.350 - 4.940 uIU/mL   LDL - Lipid Panel    Collection Time: 07/28/25 10:12 PM   Result Value Ref Range    Triglycerides 100 37 - 140 mg/dL    Cholesterol 176 <=200 mg/dL    HDL Cholesterol 45 35 - 60 mg/dL     Cholesterol/HDL Ratio (Risk Factor) 3.9     Non- mg/dL    LDL Calculated 111 mg/dL    LDL/HDL 2.5     VLDL 20 mg/dL   CBC with Differential    Collection Time: 07/28/25 10:12 PM   Result Value Ref Range    WBC 8.83 4.50 - 11.00 K/uL    RBC 3.88 (L) 4.20 - 5.40 M/uL    Hemoglobin 9.8 (L) 12.0 - 16.0 g/dL    Hematocrit 30.0 (L) 38.0 - 47.0 %    MCV 77.3 (L) 80.0 - 96.0 fL    MCH 25.3 (L) 27.0 - 31.0 pg    MCHC 32.7 32.0 - 36.0 g/dL    RDW 17.2 (H) 11.5 - 14.5 %    Platelet Count 306 150 - 400 K/uL    MPV 10.0 9.4 - 12.4 fL    Neutrophils % 39.5 (L) 53.0 - 65.0 %    Lymphocytes % 40.9 27.0 - 41.0 %    Monocytes % 11.9 (H) 2.0 - 6.0 %    Eosinophils % 5.5 (H) 1.0 - 4.0 %    Basophils % 0.2 0.0 - 1.0 %    Immature Granulocytes % 2.0 (H) 0.0 - 0.4 %    nRBC, Auto 0.0 <=0.0 %    Neutrophils, Abs 3.48 1.80 - 7.70 K/uL    Lymphocytes, Absolute 3.61 1.00 - 4.80 K/uL    Monocytes, Absolute 1.05 (H) 0.00 - 0.80 K/uL    Eosinophils, Absolute 0.49 0.00 - 0.50 K/uL    Basophils, Absolute 0.02 0.00 - 0.20 K/uL    Immature Granulocytes, Absolute 0.18 (H) 0.00 - 0.04 K/uL    nRBC, Absolute 0.00 <=0.00 x10e3/uL    Diff Type Auto    Valproic Acid    Collection Time: 07/28/25 10:12 PM   Result Value Ref Range    Valproic Acid (Depakene) 53 50 - 100 µg/mL   Magnesium    Collection Time: 07/28/25 10:12 PM   Result Value Ref Range    Magnesium 2.2 1.6 - 2.6 mg/dL   Phosphorus    Collection Time: 07/28/25 10:12 PM   Result Value Ref Range    Phosphorus 4.7 2.3 - 4.7 mg/dL   Vitamin B12 & Folate    Collection Time: 07/28/25 10:12 PM   Result Value Ref Range    Vitamin B12 1,651 (H) 213 - 816 pg/mL    Folate 14.4 7.0 - 31.4 ng/mL   CK    Collection Time: 07/28/25 10:12 PM   Result Value Ref Range     29 - 168 U/L   ECG 12 lead    Collection Time: 07/28/25 10:24 PM   Result Value Ref Range    QRS Duration 82 ms    [facility] QTC Calculation 382 ms   COVID-19 Rapid Screening    Collection Time: 07/29/25  1:13 AM   Result Value Ref  Range    SARS COV-2 Molecular Negative Negative, Invalid   Urinalysis, Reflex to Urine Culture    Collection Time: 07/29/25  3:09 AM    Specimen: Urine   Result Value Ref Range    Color, UA Yellow Colorless, Straw, Yellow, Dark Yellow, Light Yellow    Clarity, UA Turbid Clear    pH, UA 5.5 5.0 to 8.0 pH Units    Leukocytes, UA Large (A) Negative    Nitrites, UA Negative Negative    Protein, UA Negative Negative    Glucose, UA Normal Normal mg/dL    Ketones, UA Negative Negative mg/dL    Urobilinogen, UA Normal 0.2, 1.0, Normal mg/dL    Bilirubin, UA Negative Negative    Blood, UA Negative Negative    Specific Gravity, UA 1.015 <=1.030   Urinalysis, Microscopic    Collection Time: 07/29/25  3:09 AM   Result Value Ref Range    WBC, UA 66 (H) <=5 /hpf    RBC, UA 5 (H) <=3 /hpf    Bacteria, UA Many (A) None Seen /hpf    Squamous Epithelial Cells, UA Occasional (A) None Seen /HPF    Hyaline Casts, UA 0-2 (A) None Seen /lpf    Mucous Occasional (A) None Seen /LPF   Urine culture    Collection Time: 07/29/25  3:09 AM    Specimen: Urine   Result Value Ref Range    Culture, Urine >100,000 Escherichia coli (A)        Susceptibility    Escherichia coli - SALTY     Ampicillin >16 Resistant µg/ml     Ampicillin/Sulbactam >16/8 Resistant µg/ml     Aztreonam <=4 Sensitive µg/ml     Cefazolin >16 Resistant µg/ml     Cefepime <=2 Sensitive µg/ml     Ceftriaxone <=1 Sensitive µg/ml     Ciprofloxacin <=0.25 Sensitive µg/ml     Ertapenem <=0.5 Sensitive µg/ml     Gentamicin <=2 Sensitive µg/ml     Meropenem <=1 Sensitive µg/ml     Nitrofurantoin <=32 Sensitive µg/ml     Piperacillin/Tazobactam <=8 Sensitive µg/ml     Tetracycline <=4 Sensitive µg/ml     Tobramycin <=2 Sensitive µg/ml     Trimeth/Sulfa <=2/38 Sensitive µg/ml     Ceftazidime 4 Sensitive µg/ml     Ceftazidime/Avibactam <=8 Sensitive µg/ml   Basic Metabolic Panel    Collection Time: 07/29/25  8:44 AM   Result Value Ref Range    Sodium 132 (L) 136 - 145 mmol/L    Potassium  5.1 3.5 - 5.1 mmol/L    Chloride 104 98 - 107 mmol/L    CO2 21 (L) 23 - 31 mmol/L    Anion Gap 12 7 - 16 mmol/L    Glucose 85 82 - 115 mg/dL    BUN 52 (H) 10 - 20 mg/dL    Creatinine 2.57 (H) 0.55 - 1.02 mg/dL    BUN/Creatinine Ratio 20 6 - 20    Calcium 9.6 8.4 - 10.2 mg/dL    eGFR 18 (L) >=60 mL/min/1.73m2   POCT glucose    Collection Time: 07/29/25  2:38 PM   Result Value Ref Range    POC Glucose 71 70 - 105 mg/dL   Basic Metabolic Panel    Collection Time: 07/30/25  4:12 AM   Result Value Ref Range    Sodium 137 136 - 145 mmol/L    Potassium 5.5 (H) 3.5 - 5.1 mmol/L    Chloride 110 (H) 98 - 107 mmol/L    CO2 20 (L) 23 - 31 mmol/L    Anion Gap 13 7 - 16 mmol/L    Glucose 76 (L) 82 - 115 mg/dL    BUN 37 (H) 10 - 20 mg/dL    Creatinine 1.44 (H) 0.55 - 1.02 mg/dL    BUN/Creatinine Ratio 26 (H) 6 - 20    Calcium 9.5 8.4 - 10.2 mg/dL    eGFR 36 (L) >=60 mL/min/1.73m2   CBC with Differential    Collection Time: 07/30/25  4:12 AM   Result Value Ref Range    WBC 6.35 4.50 - 11.00 K/uL    RBC 3.53 (L) 4.20 - 5.40 M/uL    Hemoglobin 8.9 (L) 12.0 - 16.0 g/dL    Hematocrit 27.3 (L) 38.0 - 47.0 %    MCV 77.3 (L) 80.0 - 96.0 fL    MCH 25.2 (L) 27.0 - 31.0 pg    MCHC 32.6 32.0 - 36.0 g/dL    RDW 17.2 (H) 11.5 - 14.5 %    Platelet Count 289 150 - 400 K/uL    MPV 9.7 9.4 - 12.4 fL    Neutrophils % 53.0 53.0 - 65.0 %    Lymphocytes % 26.8 (L) 27.0 - 41.0 %    Monocytes % 14.5 (H) 2.0 - 6.0 %    Eosinophils % 4.6 (H) 1.0 - 4.0 %    Basophils % 0.2 0.0 - 1.0 %    Immature Granulocytes % 0.9 (H) 0.0 - 0.4 %    nRBC, Auto 0.0 <=0.0 %    Neutrophils, Abs 3.37 1.80 - 7.70 K/uL    Lymphocytes, Absolute 1.70 1.00 - 4.80 K/uL    Monocytes, Absolute 0.92 (H) 0.00 - 0.80 K/uL    Eosinophils, Absolute 0.29 0.00 - 0.50 K/uL    Basophils, Absolute 0.01 0.00 - 0.20 K/uL    Immature Granulocytes, Absolute 0.06 (H) 0.00 - 0.04 K/uL    nRBC, Absolute 0.00 <=0.00 x10e3/uL    Diff Type Auto    Basic Metabolic Panel    Collection Time: 07/31/25  4:16  AM   Result Value Ref Range    Sodium 138 136 - 145 mmol/L    Potassium 5.5 (H) 3.5 - 5.1 mmol/L    Chloride 111 (H) 98 - 107 mmol/L    CO2 23 23 - 31 mmol/L    Anion Gap 10 7 - 16 mmol/L    Glucose 89 82 - 115 mg/dL    BUN 22 (H) 10 - 20 mg/dL    Creatinine 0.81 0.55 - 1.02 mg/dL    BUN/Creatinine Ratio 27 (H) 6 - 20    Calcium 9.8 8.4 - 10.2 mg/dL    eGFR 73 >=60 mL/min/1.73m2   CBC with Differential    Collection Time: 07/31/25  4:16 AM   Result Value Ref Range    WBC 5.44 4.50 - 11.00 K/uL    RBC 3.67 (L) 4.20 - 5.40 M/uL    Hemoglobin 9.2 (L) 12.0 - 16.0 g/dL    Hematocrit 28.8 (L) 38.0 - 47.0 %    MCV 78.5 (L) 80.0 - 96.0 fL    MCH 25.1 (L) 27.0 - 31.0 pg    MCHC 31.9 (L) 32.0 - 36.0 g/dL    RDW 17.1 (H) 11.5 - 14.5 %    Platelet Count 299 150 - 400 K/uL    MPV 9.9 9.4 - 12.4 fL    Neutrophils % 48.3 (L) 53.0 - 65.0 %    Lymphocytes % 24.1 (L) 27.0 - 41.0 %    Monocytes % 16.2 (H) 2.0 - 6.0 %    Eosinophils % 10.3 (H) 1.0 - 4.0 %    Basophils % 0.4 0.0 - 1.0 %    Immature Granulocytes % 0.7 (H) 0.0 - 0.4 %    nRBC, Auto 0.0 <=0.0 %    Neutrophils, Abs 2.63 1.80 - 7.70 K/uL    Lymphocytes, Absolute 1.31 1.00 - 4.80 K/uL    Monocytes, Absolute 0.88 (H) 0.00 - 0.80 K/uL    Eosinophils, Absolute 0.56 (H) 0.00 - 0.50 K/uL    Basophils, Absolute 0.02 0.00 - 0.20 K/uL    Immature Granulocytes, Absolute 0.04 0.00 - 0.04 K/uL    nRBC, Absolute 0.00 <=0.00 x10e3/uL    Diff Type Auto         ASSESSMENT:      The patient is doing well after surgery.       PLAN:      Follow up PRN.    Will refer patient to Oncology.  If it is determined that she would benefit from chemotherapy I have explained to her [employer] how it works in the risks and benefits including risk of bleeding, infection, pneumothorax, possible port malfunction, she will call and schedule surgery date if that is what she wants.  All questions answered

## (undated) DEVICE — BLADE ELECTRO EDGE INSULATED

## (undated) DEVICE — SPONGE LAP 18X18 PREWASHED

## (undated) DEVICE — RELOAD PROXIMATE CUT BLUE 75MM

## (undated) DEVICE — BARRIER FLAT FLEXTEND YEL 4IN

## (undated) DEVICE — SOL NACL IRR 1000ML BTL

## (undated) DEVICE — TRAY CATH 1-LYR URIMTR 16FR

## (undated) DEVICE — Device

## (undated) DEVICE — STAPLER INTERNL CONTOR CV BLU

## (undated) DEVICE — GLOVE SENSICARE PI SURG 6.5

## (undated) DEVICE — CUTTER PROXIMATE BLUE 75MM

## (undated) DEVICE — SUT CTD VICRYL 3-0 CR/SH

## (undated) DEVICE — GOWN POLY REINF BRTH SLV 2XL

## (undated) DEVICE — GLOVE SENSICARE PI SURG 7

## (undated) DEVICE — SUT 1 48IN PDS II VIO MONO

## (undated) DEVICE — STAPLER SKIN ROTATING HEAD

## (undated) DEVICE — SEALER LIGASURE IMPACT 18CM

## (undated) DEVICE — SUT PDSII 3-0 SH 27IN CLEAR

## (undated) DEVICE — KIT BASIC RUSH

## (undated) DEVICE — GLOVE SENSICARE PI GRN 6.5

## (undated) DEVICE — BINDER ABDOMINAL 9 30-45

## (undated) DEVICE — KIT PREVENA INCISION MGMT 13CM